# Patient Record
Sex: MALE | Race: BLACK OR AFRICAN AMERICAN | NOT HISPANIC OR LATINO | Employment: UNEMPLOYED | URBAN - METROPOLITAN AREA
[De-identification: names, ages, dates, MRNs, and addresses within clinical notes are randomized per-mention and may not be internally consistent; named-entity substitution may affect disease eponyms.]

---

## 2017-01-16 ENCOUNTER — HOSPITAL ENCOUNTER (INPATIENT)
Facility: HOSPITAL | Age: 37
LOS: 7 days | Discharge: HOME | End: 2017-01-23
Attending: EMERGENCY MEDICINE | Admitting: INTERNAL MEDICINE

## 2017-01-16 DIAGNOSIS — T83.510A URINARY TRACT INFECTION ASSOCIATED WITH CYSTOSTOMY CATHETER, INITIAL ENCOUNTER (HCC): ICD-10-CM

## 2017-01-16 DIAGNOSIS — L89.154 SACRAL DECUBITUS ULCER, STAGE IV (HCC): ICD-10-CM

## 2017-01-16 DIAGNOSIS — L89.154 DECUBITUS ULCER OF SACRAL REGION, STAGE 4 (HCC): ICD-10-CM

## 2017-01-16 DIAGNOSIS — N39.0 URINARY TRACT INFECTION ASSOCIATED WITH CYSTOSTOMY CATHETER, INITIAL ENCOUNTER (HCC): ICD-10-CM

## 2017-01-16 DIAGNOSIS — D64.9 SEVERE ANEMIA: Primary | ICD-10-CM

## 2017-01-16 PROBLEM — T83.010A SUPRAPUBIC CATHETER DYSFUNCTION (HCC): Status: ACTIVE | Noted: 2017-01-16

## 2017-01-16 LAB
ABO GROUP BLD: NORMAL
ALBUMIN SERPL-MCNC: 2.7 G/DL (ref 3.5–5.2)
ALBUMIN/GLOB SERPL: 0.6 G/DL
ALP SERPL-CCNC: 89 U/L (ref 39–117)
ALT SERPL W P-5'-P-CCNC: <5 U/L (ref 1–41)
ANION GAP SERPL CALCULATED.3IONS-SCNC: 10.8 MMOL/L
AST SERPL-CCNC: 6 U/L (ref 1–40)
BASOPHILS # BLD AUTO: 0.05 10*3/MM3 (ref 0–0.2)
BASOPHILS NFR BLD AUTO: 0.6 % (ref 0–1.5)
BILIRUB SERPL-MCNC: <0.2 MG/DL (ref 0.1–1.2)
BLD GP AB SCN SERPL QL: NEGATIVE
BUN BLD-MCNC: 14 MG/DL (ref 6–20)
BUN/CREAT SERPL: 16.1 (ref 7–25)
CALCIUM SPEC-SCNC: 8.5 MG/DL (ref 8.6–10.5)
CHLORIDE SERPL-SCNC: 102 MMOL/L (ref 98–107)
CO2 SERPL-SCNC: 26.2 MMOL/L (ref 22–29)
CREAT BLD-MCNC: 0.87 MG/DL (ref 0.76–1.27)
D-LACTATE SERPL-SCNC: 0.8 MMOL/L (ref 0.5–2)
DEPRECATED RDW RBC AUTO: 55.9 FL (ref 37–54)
EOSINOPHIL # BLD AUTO: 0.17 10*3/MM3 (ref 0–0.7)
EOSINOPHIL NFR BLD AUTO: 1.9 % (ref 0.3–6.2)
ERYTHROCYTE [DISTWIDTH] IN BLOOD BY AUTOMATED COUNT: 18.5 % (ref 11.5–14.5)
GFR SERPL CREATININE-BSD FRML MDRD: 120 ML/MIN/1.73
GLOBULIN UR ELPH-MCNC: 4.7 GM/DL
GLUCOSE BLD-MCNC: 121 MG/DL (ref 65–99)
HCT VFR BLD AUTO: 22.8 % (ref 40.4–52.2)
HGB BLD-MCNC: 6.3 G/DL (ref 13.7–17.6)
HOLD SPECIMEN: NORMAL
IMM GRANULOCYTES # BLD: 0.03 10*3/MM3 (ref 0–0.03)
IMM GRANULOCYTES NFR BLD: 0.3 % (ref 0–0.5)
LYMPHOCYTES # BLD AUTO: 2.06 10*3/MM3 (ref 0.9–4.8)
LYMPHOCYTES NFR BLD AUTO: 23.3 % (ref 19.6–45.3)
MCH RBC QN AUTO: 22.5 PG (ref 27–32.7)
MCHC RBC AUTO-ENTMCNC: 27.6 G/DL (ref 32.6–36.4)
MCV RBC AUTO: 81.4 FL (ref 79.8–96.2)
MONOCYTES # BLD AUTO: 0.55 10*3/MM3 (ref 0.2–1.2)
MONOCYTES NFR BLD AUTO: 6.2 % (ref 5–12)
NEUTROPHILS # BLD AUTO: 5.98 10*3/MM3 (ref 1.9–8.1)
NEUTROPHILS NFR BLD AUTO: 67.7 % (ref 42.7–76)
NRBC BLD MANUAL-RTO: 0 /100 WBC (ref 0–0)
PLATELET # BLD AUTO: 280 10*3/MM3 (ref 140–500)
PMV BLD AUTO: 8.6 FL (ref 6–12)
POTASSIUM BLD-SCNC: 3.6 MMOL/L (ref 3.5–5.2)
PROCALCITONIN SERPL-MCNC: 0.08 NG/ML (ref 0.1–0.25)
PROT SERPL-MCNC: 7.4 G/DL (ref 6–8.5)
RBC # BLD AUTO: 2.8 10*6/MM3 (ref 4.6–6)
RH BLD: POSITIVE
SODIUM BLD-SCNC: 139 MMOL/L (ref 136–145)
WBC NRBC COR # BLD: 8.84 10*3/MM3 (ref 4.5–10.7)
WHOLE BLOOD HOLD SPECIMEN: NORMAL

## 2017-01-16 PROCEDURE — 86850 RBC ANTIBODY SCREEN: CPT

## 2017-01-16 PROCEDURE — 84145 PROCALCITONIN (PCT): CPT | Performed by: EMERGENCY MEDICINE

## 2017-01-16 PROCEDURE — 25010000002 ENOXAPARIN PER 10 MG: Performed by: INTERNAL MEDICINE

## 2017-01-16 PROCEDURE — 25010000002 HYDROMORPHONE PER 4 MG: Performed by: EMERGENCY MEDICINE

## 2017-01-16 PROCEDURE — 80053 COMPREHEN METABOLIC PANEL: CPT | Performed by: EMERGENCY MEDICINE

## 2017-01-16 PROCEDURE — 86923 COMPATIBILITY TEST ELECTRIC: CPT

## 2017-01-16 PROCEDURE — 83605 ASSAY OF LACTIC ACID: CPT | Performed by: EMERGENCY MEDICINE

## 2017-01-16 PROCEDURE — 86900 BLOOD TYPING SEROLOGIC ABO: CPT

## 2017-01-16 PROCEDURE — 99284 EMERGENCY DEPT VISIT MOD MDM: CPT

## 2017-01-16 PROCEDURE — 25010000002 HYDROMORPHONE PER 4 MG: Performed by: INTERNAL MEDICINE

## 2017-01-16 PROCEDURE — 85025 COMPLETE CBC W/AUTO DIFF WBC: CPT | Performed by: EMERGENCY MEDICINE

## 2017-01-16 PROCEDURE — 36430 TRANSFUSION BLD/BLD COMPNT: CPT

## 2017-01-16 PROCEDURE — 25010000002 ONDANSETRON PER 1 MG: Performed by: EMERGENCY MEDICINE

## 2017-01-16 PROCEDURE — 86901 BLOOD TYPING SEROLOGIC RH(D): CPT

## 2017-01-16 PROCEDURE — P9016 RBC LEUKOCYTES REDUCED: HCPCS

## 2017-01-16 RX ORDER — ALPRAZOLAM 0.5 MG/1
1 TABLET ORAL 2 TIMES DAILY PRN
Status: DISCONTINUED | OUTPATIENT
Start: 2017-01-16 | End: 2017-01-23 | Stop reason: HOSPADM

## 2017-01-16 RX ORDER — SODIUM CHLORIDE 0.9 % (FLUSH) 0.9 %
10 SYRINGE (ML) INJECTION AS NEEDED
Status: DISCONTINUED | OUTPATIENT
Start: 2017-01-16 | End: 2017-01-23 | Stop reason: HOSPADM

## 2017-01-16 RX ORDER — OXYBUTYNIN CHLORIDE 5 MG/1
5 TABLET ORAL 3 TIMES DAILY
Status: DISCONTINUED | OUTPATIENT
Start: 2017-01-16 | End: 2017-01-23 | Stop reason: HOSPADM

## 2017-01-16 RX ORDER — PANTOPRAZOLE SODIUM 40 MG/1
40 TABLET, DELAYED RELEASE ORAL DAILY
Status: DISCONTINUED | OUTPATIENT
Start: 2017-01-16 | End: 2017-01-23 | Stop reason: HOSPADM

## 2017-01-16 RX ORDER — SODIUM HYPOCHLORITE 1.25 MG/ML
SOLUTION TOPICAL 2 TIMES DAILY
Status: DISCONTINUED | OUTPATIENT
Start: 2017-01-16 | End: 2017-01-23 | Stop reason: HOSPADM

## 2017-01-16 RX ORDER — ONDANSETRON 4 MG/1
4 TABLET, FILM COATED ORAL EVERY 6 HOURS PRN
Status: DISCONTINUED | OUTPATIENT
Start: 2017-01-16 | End: 2017-01-23 | Stop reason: HOSPADM

## 2017-01-16 RX ORDER — ONDANSETRON 4 MG/1
4 TABLET, ORALLY DISINTEGRATING ORAL EVERY 6 HOURS PRN
Status: DISCONTINUED | OUTPATIENT
Start: 2017-01-16 | End: 2017-01-23 | Stop reason: HOSPADM

## 2017-01-16 RX ORDER — SODIUM CHLORIDE 0.9 % (FLUSH) 0.9 %
1-10 SYRINGE (ML) INJECTION AS NEEDED
Status: DISCONTINUED | OUTPATIENT
Start: 2017-01-16 | End: 2017-01-23 | Stop reason: HOSPADM

## 2017-01-16 RX ORDER — OXYCODONE HYDROCHLORIDE 15 MG/1
15 TABLET, FILM COATED, EXTENDED RELEASE ORAL EVERY 12 HOURS SCHEDULED
Status: DISCONTINUED | OUTPATIENT
Start: 2017-01-16 | End: 2017-01-18

## 2017-01-16 RX ORDER — OXYCODONE HYDROCHLORIDE 15 MG/1
15 TABLET, FILM COATED, EXTENDED RELEASE ORAL EVERY 12 HOURS SCHEDULED
Status: DISCONTINUED | OUTPATIENT
Start: 2017-01-16 | End: 2017-01-16 | Stop reason: SDUPTHER

## 2017-01-16 RX ORDER — PETROLATUM 42 G/100G
OINTMENT TOPICAL EVERY 12 HOURS SCHEDULED
Status: DISCONTINUED | OUTPATIENT
Start: 2017-01-16 | End: 2017-01-23 | Stop reason: HOSPADM

## 2017-01-16 RX ORDER — ONDANSETRON 2 MG/ML
4 INJECTION INTRAMUSCULAR; INTRAVENOUS ONCE
Status: COMPLETED | OUTPATIENT
Start: 2017-01-16 | End: 2017-01-16

## 2017-01-16 RX ORDER — OXYCODONE HYDROCHLORIDE 15 MG/1
15 TABLET, FILM COATED, EXTENDED RELEASE ORAL EVERY 12 HOURS SCHEDULED
COMMUNITY
End: 2017-01-23 | Stop reason: HOSPADM

## 2017-01-16 RX ORDER — ALPRAZOLAM 1 MG/1
1 TABLET ORAL 2 TIMES DAILY PRN
COMMUNITY
End: 2017-01-23 | Stop reason: HOSPADM

## 2017-01-16 RX ORDER — HYDROMORPHONE HYDROCHLORIDE 1 MG/ML
0.5 INJECTION, SOLUTION INTRAMUSCULAR; INTRAVENOUS; SUBCUTANEOUS
Status: DISCONTINUED | OUTPATIENT
Start: 2017-01-16 | End: 2017-01-18

## 2017-01-16 RX ORDER — ONDANSETRON 2 MG/ML
4 INJECTION INTRAMUSCULAR; INTRAVENOUS EVERY 6 HOURS PRN
Status: DISCONTINUED | OUTPATIENT
Start: 2017-01-16 | End: 2017-01-23 | Stop reason: HOSPADM

## 2017-01-16 RX ORDER — SODIUM CHLORIDE 0.9 % (FLUSH) 0.9 %
10 SYRINGE (ML) INJECTION EVERY 12 HOURS SCHEDULED
Status: DISCONTINUED | OUTPATIENT
Start: 2017-01-16 | End: 2017-01-23 | Stop reason: HOSPADM

## 2017-01-16 RX ORDER — GABAPENTIN 300 MG/1
300 CAPSULE ORAL 3 TIMES DAILY
Status: DISCONTINUED | OUTPATIENT
Start: 2017-01-16 | End: 2017-01-23 | Stop reason: HOSPADM

## 2017-01-16 RX ADMIN — GABAPENTIN 300 MG: 300 CAPSULE ORAL at 11:45

## 2017-01-16 RX ADMIN — ENOXAPARIN SODIUM 40 MG: 40 INJECTION SUBCUTANEOUS at 11:45

## 2017-01-16 RX ADMIN — GABAPENTIN 300 MG: 300 CAPSULE ORAL at 21:35

## 2017-01-16 RX ADMIN — HYDROMORPHONE HYDROCHLORIDE 0.5 MG: 1 INJECTION, SOLUTION INTRAMUSCULAR; INTRAVENOUS; SUBCUTANEOUS at 11:45

## 2017-01-16 RX ADMIN — OXYBUTYNIN CHLORIDE 5 MG: 5 TABLET ORAL at 21:35

## 2017-01-16 RX ADMIN — OXYBUTYNIN CHLORIDE 5 MG: 5 TABLET ORAL at 11:45

## 2017-01-16 RX ADMIN — HYDROMORPHONE HYDROCHLORIDE 0.5 MG: 1 INJECTION, SOLUTION INTRAMUSCULAR; INTRAVENOUS; SUBCUTANEOUS at 21:35

## 2017-01-16 RX ADMIN — OXYBUTYNIN CHLORIDE 5 MG: 5 TABLET ORAL at 17:35

## 2017-01-16 RX ADMIN — ONDANSETRON 4 MG: 2 INJECTION INTRAMUSCULAR; INTRAVENOUS at 01:50

## 2017-01-16 RX ADMIN — HYDROMORPHONE HYDROCHLORIDE 1 MG: 1 INJECTION, SOLUTION INTRAMUSCULAR; INTRAVENOUS; SUBCUTANEOUS at 01:49

## 2017-01-16 RX ADMIN — HYDROMORPHONE HYDROCHLORIDE 0.5 MG: 1 INJECTION, SOLUTION INTRAMUSCULAR; INTRAVENOUS; SUBCUTANEOUS at 13:55

## 2017-01-16 RX ADMIN — GABAPENTIN 300 MG: 300 CAPSULE ORAL at 17:35

## 2017-01-16 RX ADMIN — OXYCODONE HYDROCHLORIDE 15 MG: 15 TABLET, FILM COATED, EXTENDED RELEASE ORAL at 05:50

## 2017-01-16 RX ADMIN — PANTOPRAZOLE SODIUM 40 MG: 40 TABLET, DELAYED RELEASE ORAL at 11:54

## 2017-01-16 RX ADMIN — DAKIN'S SOLUTION 0.125% (QUARTER STRENGTH): 0.12 SOLUTION at 14:32

## 2017-01-16 RX ADMIN — OXYCODONE HYDROCHLORIDE 15 MG: 15 TABLET, FILM COATED, EXTENDED RELEASE ORAL at 21:35

## 2017-01-16 RX ADMIN — HYDROMORPHONE HYDROCHLORIDE 0.5 MG: 1 INJECTION, SOLUTION INTRAMUSCULAR; INTRAVENOUS; SUBCUTANEOUS at 17:35

## 2017-01-16 NOTE — NURSING NOTE
"CWOCN consult- patient has multiple chronic wounds that we have followed in the past. His Cl[facility]ron bed has arrived.  Left ischial wound extends across buttocks to right ischium, also sacrum/coccyx area. The only area to pack is the left trochanter. We cleansed all with NS. Packed 1/4 kerlix roll moistened with dakins into trochanter. Placed opticel ag and abd pads over the rest and secured with paper tape. The wounds are all beefy red with epithelialization around the edges. No odor noted. There was some yellow/green drainage on the gauze that was removed. No slough noted. There is bone involvement in wound bed. No need for Santyl at this time. Dakins and Opticel Ag can assist with maintaining these wounds. There continues to be open tissue at base of penis/inner thighs towards buttocks- this tissue is moist, pink as well and we placed opticel ag here.   Patient has a condom catheter as well as suprapubic. The condom catheter seems to be working better than last admission. The suprapubic continues to leak. We replaced this foam dressing and abd pad. Urology has been consulted.  Patient has multiple chronic and recurring lower extremity wounds. He has very dry, flaky skin. There is scar tissue on heels and ankles. He currently has an open red area on his left heel and right lateral malleolus. Also, left lateral leg and right leg. With the dry, peeling skin and prior pressure ulcers, patient is always at risk that an area could reopen. Additionally, there is an open, moist, pink, with some yellow slough wound on left lower leg above ankle. To all open areas on legs, we cleansed with NS and placed NS moistened opticel ag. BLE were wrapped with kerlix.   Patient favors laying to his right side. Due to his contractures he does not lay to the left very well. Patients extremities are very thin and bony. He reports that he has gained a few pounds and that \"my stomach and my face are mir\" in appearance due to the weight " gain.   I would recommend dressing changes as above [id number]. No Santyl indicated at this time. Recommend to change the dressing around the suprapubic catheter daily and PRN. Recommend aquaphor lotion to BLE [id number]. WOCN will plan on seeing weekly.  Patient has a colostomy- supplies are in room for staff to change.

## 2017-01-16 NOTE — IP AVS SNAPSHOT
AFTER VISIT SUMMARY             Joaquín Sherman           About your hospitalization     You were admitted on:  January 16, 2017 You last received care in the:  [address]       Procedures & Surgeries         Medications    If you or your caregiver advised us that you are currently taking a medication and that medication is marked below as “Resume”, this simply indicates that we have reviewed those medications to make sure our new therapy recommendations do not interfere.  If you have concerns about medications other than those new ones which we are prescribing today, please consult the physician who prescribed them (or your primary physician).  Our review of your home medications is not meant to indicate that we are directing their use.             Your Medications      START taking these medications     hydrophor ointment ointment   Apply  topically Every 12 (Twelve) Hours for 30 days.   Last time this was given:  1/23/2017  8:24 AM           oxyCODONE-acetaminophen  MG per tablet   Take 1 tablet by mouth Every 4 (Four) Hours As Needed for severe pain (7-10) for up to 5 days.   Last time this was given:  1/23/2017  4:43 PM   Commonly known as:  PERCOCET             CONTINUE taking these medications     ALPRAZolam 1 MG tablet   Take 1 tablet by mouth 2 (Two) Times a Day As Needed for anxiety.   Last time this was given:  1/23/2017 11:14 AM   Commonly known as:  XANAX           bisacodyl 5 MG EC tablet   Take 1 tablet by mouth daily as needed for constipation.   Commonly known as:  DULCOLAX           gabapentin 300 MG capsule   Take 300 mg by mouth 3 (three) times a day.   Last time this was given:  1/23/2017  4:43 PM   Commonly known as:  NEURONTIN           OXYBUTYNIN CHLORIDE PO   Take 5 mg by mouth 3 (three) times a day.   Last time this was given:  1/23/2017  4:43 PM           oxyCODONE ER 15 MG tablet extended-release 12 hour   Take 1 tablet by mouth Every 12 (Twelve) Hours for  11 doses.   Last time this was given:  1/23/2017  8:23 AM   Commonly known as:  oxyCONTIN           PROTONIX PO   Take 40 mg by mouth daily.   Last time this was given:  1/23/2017  8:23 AM           sodium hypochlorite 0.125 % solution topical solution 0.125%   Lt hip & Rt. Calf: bid with Kerlex and ABD pads   Last time this was given:  1/23/2017  8:24 AM   Commonly known as:  DAKIN'S 1/4 STRENGTH             STOP taking these medications     collagenase 250 UNIT/GM ointment                Where to Get Your Medications      These medications were sent to [facility] [address] [facility] [address] [place] [facility] [place] [address] - [phone]  - [phone]   [address] , [facility] [address]     Phone:  [phone]     hydrophor ointment ointment         You can get these medications from any pharmacy     Bring a paper prescription for each of these medications     ALPRAZolam 1 MG tablet    oxyCODONE ER 15 MG tablet extended-release 12 hour    oxyCODONE-acetaminophen  MG per tablet                  Your Medications      Your Medication List           Morning Noon Evening Bedtime As Needed    ALPRAZolam 1 MG tablet   Take 1 tablet by mouth 2 (Two) Times a Day As Needed for anxiety.   Commonly known as:  XANAX                                   bisacodyl 5 MG EC tablet   Take 1 tablet by mouth daily as needed for constipation.   Commonly known as:  DULCOLAX                                   gabapentin 300 MG capsule   Take 300 mg by mouth 3 (three) times a day.   Commonly known as:  NEURONTIN                                         hydrophor ointment ointment   Apply  topically Every 12 (Twelve) Hours for 30 days.                                      OXYBUTYNIN CHLORIDE PO   Take 5 mg by mouth 3 (three) times a day.                                         oxyCODONE ER 15 MG tablet extended-release 12 hour   Take 1 tablet by mouth Every 12 (Twelve) Hours for 11 doses.    Commonly known as:  oxyCONTIN                                      oxyCODONE-acetaminophen  MG per tablet   Take 1 tablet by mouth Every 4 (Four) Hours As Needed for severe pain (7-10) for up to 5 days.   Commonly known as:  PERCOCET                                   PROTONIX PO   Take 40 mg by mouth daily.                                   sodium hypochlorite 0.125 % solution topical solution 0.125%   Lt hip & Rt. Calf: bid with Kerlex and ABD pads   Commonly known as:  DAKIN'S 1/4 STRENGTH                                               Instructions for After Discharge        Discharge References/Attachments     ACETAMINOPHEN; OXYCODONE TABLETS (ENGLISH)       Follow-ups for After Discharge        Follow-up Information     Follow up with [facility] .    Specialty:  Nurse Practitioner    Contact information:    [address] [address]  [phone]        Referrals and Follow-ups to Schedule     Ambulatory Referral to Wound Clinic    As directed        Follow-Up    As directed    Dr. Blanca () in 2 weeks  Rama Parker (PCP) in 1 week   Follow Up Details:  see below             [employer] Signup     Our records indicate that your [employer] account has been deactivated. If you would like to reactivate your account, please email [email or web address] or call [phone] to talk to our [employer] staff.         Summary of Your Hospitalization        Reason for Hospitalization     Your primary diagnosis was:  Severe Anemia    Your diagnoses also included:  Decubitus Ulcer Of Sacral Region, Stage 4, Paralysis Of Both Lower Limbs, Suprapubic Catheter Dysfunction, Low Blood Pressure      Care Providers     Provider Service Role Specialty    Honorio Meier MD Urology Consulting Physician  Urology      Your Allergies  Date Reviewed: 1/18/2017    Allergen Reactions    Amoxicillin-Pot Clavulanate Not Noted         Ibuprofen Not Noted         Ketorolac  Tromethamine Not Noted         Meropenem Other (See Comments)    Pt reports kidney damage         Vancomycin Other (See Comments)    Pt reports kidney damage      Patient Belongings Returned     Document Return of Belongings Flowsheet     Were the patient bedside belongings sent home?   Yes   Belongings Retrieved from Security & Sent Home   N/A    Belongings Sent to Safe   --   Medications Retrieved from Pharmacy & Sent Home   N/A              More Information      Acetaminophen; Oxycodone tablets  What is this medicine?  ACETAMINOPHEN; OXYCODONE (a set a ERIC aaron fen; ox i KOE done) is a pain reliever. It is used to treat moderate to severe pain.  This medicine may be used for other purposes; ask your health care provider or pharmacist if you have questions.  What should I tell my health care provider before I take this medicine?  They need to know if you have any of these conditions:  -brain tumor  -Crohn's disease, inflammatory bowel disease, or ulcerative colitis  -drug abuse or addiction  -head injury  -heart or circulation problems  -if you often drink alcohol  -kidney disease or problems going to the bathroom  -liver disease  -lung disease, asthma, or breathing problems  -an unusual or allergic reaction to acetaminophen, oxycodone, other opioid analgesics, other medicines, foods, dyes, or preservatives  -pregnant or trying to get pregnant  -breast-feeding  How should I use this medicine?  Take this medicine by mouth with a full glass of water. Follow the directions on the prescription label. You can take it with or without food. If it upsets your stomach, take it with food. Take your medicine at regular intervals. Do not take it more often than directed.  Talk to your pediatrician regarding the use of this medicine in children. Special care may be needed.  Patients over 65 years old may have a stronger reaction and need a smaller dose.  Overdosage: If you think you have taken too much of this medicine contact  a poison control center or emergency room at once.  NOTE: This medicine is only for you. Do not share this medicine with others.  What if I miss a dose?  If you miss a dose, take it as soon as you can. If it is almost time for your next dose, take only that dose. Do not take double or extra doses.  What may interact with this medicine?  -alcohol  -antihistamines  -barbiturates like amobarbital, butalbital, butabarbital, methohexital, pentobarbital, phenobarbital, thiopental, and secobarbital  -benztropine  -drugs for bladder problems like solifenacin, trospium, oxybutynin, tolterodine, hyoscyamine, and methscopolamine  -drugs for breathing problems like ipratropium and tiotropium  -drugs for certain stomach or intestine problems like propantheline, homatropine methylbromide, glycopyrrolate, atropine, belladonna, and dicyclomine  -general anesthetics like etomidate, ketamine, nitrous oxide, propofol, desflurane, enflurane, halothane, isoflurane, and sevoflurane  -medicines for depression, anxiety, or psychotic disturbances  -medicines for sleep  -muscle relaxants  -naltrexone  -narcotic medicines (opiates) for pain  -phenothiazines like perphenazine, thioridazine, chlorpromazine, mesoridazine, fluphenazine, prochlorperazine, promazine, and trifluoperazine  -scopolamine  -tramadol  -trihexyphenidyl  This list may not describe all possible interactions. Give your health care provider a list of all the medicines, herbs, non-prescription drugs, or dietary supplements you use. Also tell them if you smoke, drink alcohol, or use illegal drugs. Some items may interact with your medicine.  What should I watch for while using this medicine?  Tell your doctor or health care professional if your pain does not go away, if it gets worse, or if you have new or a different type of pain. You may develop tolerance to the medicine. Tolerance means that you will need a higher dose of the medication for pain relief. Tolerance is normal  and is expected if you take this medicine for a long time.  Do not suddenly stop taking your medicine because you may develop a severe reaction. Your body becomes used to the medicine. This does NOT mean you are addicted. Addiction is a behavior related to getting and using a drug for a non-medical reason. If you have pain, you have a medical reason to take pain medicine. Your doctor will tell you how much medicine to take. If your doctor wants you to stop the medicine, the dose will be slowly lowered over time to avoid any side effects.  You may get drowsy or dizzy. Do not drive, use machinery, or do anything that needs mental alertness until you know how this medicine affects you. Do not stand or sit up quickly, especially if you are an older patient. This reduces the risk of dizzy or fainting spells. Alcohol may interfere with the effect of this medicine. Avoid alcoholic drinks.  There are different types of narcotic medicines (opiates) for pain. If you take more than one type at the same time, you may have more side effects. Give your health care provider a list of all medicines you use. Your doctor will tell you how much medicine to take. Do not take more medicine than directed. Call emergency for help if you have problems breathing.  The medicine will cause constipation. Try to have a bowel movement at least every 2 to 3 days. If you do not have a bowel movement for 3 days, call your doctor or health care professional.  Do not take Tylenol (acetaminophen) or medicines that have acetaminophen with this medicine. Too much acetaminophen can be very dangerous. Many nonprescription medicines contain acetaminophen. Always read the labels carefully to avoid taking more acetaminophen.  What side effects may I notice from receiving this medicine?  Side effects that you should report to your doctor or health care professional as soon as possible:  -allergic reactions like skin rash, itching or hives, swelling of the  face, lips, or tongue  -breathing difficulties, wheezing  -confusion  -light headedness or fainting spells  -severe stomach pain  -unusually weak or tired  -yellowing of the skin or the whites of the eyes  Side effects that usually do not require medical attention (report to your doctor or health care professional if they continue or are bothersome):  -dizziness  -drowsiness  -nausea  -vomiting  This list may not describe all possible side effects. Call your doctor for medical advice about side effects. You may report side effects to FDA at 9-853-FDA-1953.  Where should I keep my medicine?  Keep out of the reach of children. This medicine can be abused. Keep your medicine in a safe place to protect it from theft. Do not share this medicine with anyone. Selling or giving away this medicine is dangerous and against the law.  This medicine may cause accidental overdose and death if it taken by other adults, children, or pets. Mix any unused medicine with a substance like cat litter or coffee grounds. Then throw the medicine away in a sealed container like a sealed bag or a coffee can with a lid. Do not use the medicine after the expiration date.  Store at room temperature between 20 and 25 degrees C (68 and 77 degrees F).  NOTE: This sheet is a summary. It may not cover all possible information. If you have questions about this medicine, talk to your doctor, pharmacist, or health care provider.     © 2016, [employer]. (2015-11-18 15:18:46)            SYMPTOMS OF A STROKE    Call 911 or have someone take you to the Emergency Department if you have any of the following:    · Sudden numbness or weakness of your face, arm or leg especially on one side of the body  · Sudden confusion, diffiiculty speaking or trouble understanding   · Changes in your vision or loss of sight in one eye  · Sudden severe headache with no known cause  · sudden dizziness, trouble walking, loss of balance or coordination    It is  important to seek emergency care right away if you have further stroke symptoms. If you get emergency help quickly, the powerful clot-dissolving medicines can reduce the disabilities caused by a stroke.     For more information:    American Stroke Association  0-398-9-STROKE  www.strokeassociation.org           IF YOU SMOKE OR USE TOBACCO PLEASE READ THE FOLLOWING:    Why is smoking bad for me?  Smoking increases the risk of heart disease, lung disease, vascular disease, stroke, and cancer.     If you smoke, STOP!    If you would like more information on quitting smoking, please visit the [employer] website: [email or web address]/healthier-[email or web address]   This link will provide additional resources including the QUIT line and the Beat the Pack support groups.     For more information:    American Cancer Society  (300) 789-8648    American Heart Association  1-699.955.8223               YOU ARE THE MOST IMPORTANT FACTOR IN YOUR RECOVERY.     Follow all instructions carefully.     I have reviewed my discharge instructions with my nurse, including the following information, if applicable:     Information about my illness and diagnosis   Follow up appointments (including lab draws)   Wound Care   Equipment Needs   Medications (new and continuing) along with side effects   Preventative information such as vaccines and smoking cessations   Diet   Pain   I know when to contact my Doctor's office or seek emergency care      I want my nurse to describe the side effects of my medications: YES NO   If the answer is no, I understand the side effects of my medications: YES NO   My nurse described the side effects of my medications in a way that I could understand: YES NO   I have taken my personal belongings and my own medications with me at discharge: YES NO            I have received this information and my questions have been answered. I have discussed any concerns I see with this plan with the nurse or  physician. I understand these instructions.    Signature of Patient or Responsible Person: _____________________________________    Date: _________________  Time: __________________    Signature of Healthcare Provider: _______________________________________  Date: _________________  Time: __________________

## 2017-01-16 NOTE — ED PROVIDER NOTES
EMERGENCY DEPARTMENT ENCOUNTER    CHIEF COMPLAINT  Chief Complaint: Catheter problem  History given by: patient  History limited by: n/a  Room Number: 16/16  PMD: ARIES Up      HPI:  Pt is a 36 y.o. male who presents complaining of a catheter problem. Pt states that his suprapubic catheter is clogged, and he reports malodorous urine.      Duration:  1 day  Onset: sudden  Timing: constant  Location: urinary  Radiation: n/a  Quality:  catheter malfunction  Intensity/Severity: moderate  Progression: unchanged   Associated Symptoms: malodorous urine  Aggravating Factors: hx of kidney damage  Alleviating Factors: none  Previous Episodes: unknown  Treatment before arrival: none    PAST MEDICAL HISTORY  Active Ambulatory Problems     Diagnosis Date Noted   • Acute cystitis without hematuria 07/26/2016   • Anemia 07/26/2016   • Paraplegia 07/26/2016   • Hypotension 07/26/2016   • Pneumonia of both lower lobes due to methicillin resistant Staphylococcus aureus (MRSA) 09/26/2016   • Decubitus ulcer of sacral region, stage 4 09/27/2016   • Hypotension 09/27/2016   • Acute kidney failure 09/27/2016   • Sepsis 09/27/2016   • Severe protein-calorie malnutrition 10/03/2016     Resolved Ambulatory Problems     Diagnosis Date Noted   • No Resolved Ambulatory Problems     Past Medical History   Diagnosis Date   • Chronic pain    • GERD (gastroesophageal reflux disease)        PAST SURGICAL HISTORY  Past Surgical History   Procedure Laterality Date   • Suprapubic catheter insertion     • Pr change of bladder tube,complicated N/A 7/28/2016     Procedure: CYSTOSCOPY WITH SUPRAPUBIC CATHETER INSERTION;  Surgeon: Brant Blanca Jr., MD;  Location: [facility];  Service: Urology       FAMILY HISTORY  History reviewed. No pertinent family history.    SOCIAL HISTORY  Social History     Social History   • Marital status: Single     Spouse name: N/A   • Number of children: N/A   • Years of education: N/A     Occupational  History   • Not on file.     Social History Main Topics   • Smoking status: Current Every Day Smoker     Packs/day: 0.50   • Smokeless tobacco: Not on file   • Alcohol use No   • Drug use: No   • Sexual activity: Defer     Other Topics Concern   • Not on file     Social History Narrative       ALLERGIES  Amoxicillin-pot clavulanate; Ibuprofen; Ketorolac tromethamine; Meropenem; and Vancomycin    REVIEW OF SYSTEMS  Review of Systems   Constitutional: Negative for chills and fever.   HENT: Negative for sore throat.    Gastrointestinal: Negative for nausea and vomiting.   Genitourinary: Positive for difficulty urinating (catheter issue). Negative for dysuria.   Musculoskeletal: Negative for back pain.   Skin: Negative for rash.   Psychiatric/Behavioral: The patient is not nervous/anxious.        PHYSICAL EXAM  ED Triage Vitals   Temp Heart Rate Resp BP SpO2   01/16/17 0030 01/16/17 0030 01/16/17 0030 01/16/17 0030 01/16/17 0030   97 °F (36.1 °C) 92 16 92/56 95 %      Temp src Heart Rate Source Patient Position BP Location FiO2 (%)   -- -- -- -- --              Physical Exam   Constitutional: He is oriented to person, place, and time and well-developed, well-nourished, and in no distress.   Eyes: EOM are normal.   Neck: Normal range of motion.   Cardiovascular: Normal rate and regular rhythm.    Pulmonary/Chest: Effort normal and breath sounds normal. No respiratory distress.   Abdominal:   Suprapubic catheter in place   Neurological: He is alert and oriented to person, place, and time.   Pt is a paraplegic   Skin: Skin is warm and dry.   Psychiatric: Affect normal.   Nursing note and vitals reviewed.      LAB RESULTS  Lab Results (last 24 hours)     Procedure Component Value Units Date/Time    CBC & Differential [[id number]] Collected:  01/16/17 0124    Specimen:  Blood Updated:  01/16/17 0142    Narrative:       The following orders were created for panel order CBC & Differential.  Procedure                                Abnormality         Status                     ---------                               -----------         ------                     Scan Slide[[id number]]                                                                   CBC Auto Differential[[id number]]         Abnormal            Final result                 Please view results for these tests on the individual orders.    Comprehensive Metabolic Panel [[id number]] Collected:  01/16/17 0124    Specimen:  Blood Updated:  01/16/17 0134    Procalcitonin [[id number]] Collected:  01/16/17 0124    Specimen:  Blood Updated:  01/16/17 0134    Lactic Acid, Plasma [[id number]]  (Normal) Collected:  01/16/17 0124    Specimen:  Blood Updated:  01/16/17 0148     Lactate 0.8 mmol/L     CBC Auto Differential [[id number]]  (Abnormal) Collected:  01/16/17 0124    Specimen:  Blood Updated:  01/16/17 0142     WBC 8.84 10*3/mm3      RBC 2.80 (L) 10*6/mm3      Hemoglobin 6.3 (C) g/dL      Hematocrit 22.8 (L) %      MCV 81.4 fL      MCH 22.5 (L) pg      MCHC 27.6 (L) g/dL      RDW 18.5 (H) %      RDW-SD 55.9 (H) fl      MPV 8.6 fL      Platelets 280 10*3/mm3      Neutrophil % 67.7 %      Lymphocyte % 23.3 %      Monocyte % 6.2 %      Eosinophil % 1.9 %      Basophil % 0.6 %      Immature Grans % 0.3 %      Neutrophils, Absolute 5.98 10*3/mm3      Lymphocytes, Absolute 2.06 10*3/mm3      Monocytes, Absolute 0.55 10*3/mm3      Eosinophils, Absolute 0.17 10*3/mm3      Basophils, Absolute 0.05 10*3/mm3      Immature Grans, Absolute 0.03 10*3/mm3      nRBC 0.0 /100 WBC           I ordered the above labs and reviewed the results    PROCEDURES  Procedures      PROGRESS AND CONSULTS  ED Course     00:49  Labs ordered for further evaluation.     01:39  RN states that the pt is complaining of abd pain, and he now states that his catheter is working appropriately. Dilaudid ordered.     01:42  HGB is 6.3. Type and screen and 2 units pRBC's ordered. Call placed to [facility] for admission.    01;53  Discussed case  with Dr Soto  Reviewed history, exam, results and treatments.  Discussed concerns and plan of care. Dr Soto accepts pt to be admitted to telemetry.    MEDICAL DECISION MAKING  Results were reviewed/discussed with the patient and they were also made aware of online access. Pt also made aware that some labs, such as cultures, will not be resulted during ER visit and follow up with PMD is necessary.     MDM  Number of Diagnoses or Management Options     Amount and/or Complexity of Data Reviewed  Clinical lab tests: ordered and reviewed  Decide to obtain previous medical records or to obtain history from someone other than the patient: yes  Discuss the patient with other providers: yes (Dr Soto)    Patient Progress  Patient progress: stable         DIAGNOSIS  Final diagnoses:   None       DISPOSITION  ADMISSION    Discussed treatment plan and reason for admission with pt/family and admitting physician.  Pt/family voiced understanding of the plan for admission for further testing/treatment as needed.     Latest Documented Vital Signs:  As of 1:55 AM  BP- 92/56 HR- 92 Temp- 97 °F (36.1 °C) O2 sat- 95%    --  Documentation assistance provided by jeniffer Guerrero for Dr Delvalle.  Information recorded by the jeniffer was done at my direction and has been verified and validated by me.        Cecille Guerrero  01/16/17 0155       Renaldo Delvalle MD  01/16/17 9615

## 2017-01-16 NOTE — H&P
"A Admission H&P    Patient Care Team:  ARIES Up as PCP - General (Nurse Practitioner)    Chief complaint: suprapubic catheter has been leaking and gets clogged resulting in abdominal pain.    History of Present Illness    His is a 36-year-old -American male who is well known to our service.  He is paraplegic and has sacral ulcers that are followed in the wound care clinic but is not an operative candidate.  He also has colostomy and suprapubic catheter.  He presents with a one-week history of catheter dysfunction.  He states that urine has been leaking out of his catheter and has caused his sacral wounds to become worse.  He also states that he is \"peeing rocks\" which he feels clogs the catheter and causes subsequent abdominal pain.  He also wears a condom catheter which has been irritating the shaft of his penis and caused skin breakdown.  For these reasons he came to the emergency room last night.  He was found to have a hemoglobin of 6.3.  2 units of packed red blood cells were ordered and he was admitted.  His hemoglobin typically runs around 8.0.  He has been evaluated extensively in the past by hematology and then diagnosed with anemia of chronic disease.  The last time he was seen by them was in October 2016 at which time they commended for him to be transfused as necessary and that there was no solution to his anemia.  During his previous hospitalization he was treated for pneumonia. The symptoms have improved and he is having no respiratory complaints at this time.  He does have some mild to moderate abdominal pain.  He says at the present time he thinks his catheter is functioning better but still feels that it is leaking.    Past Medical History   Diagnosis Date   • Anemia    • Chronic pain    • GERD (gastroesophageal reflux disease)    • Hypotension    • Paraplegia      Past Surgical History   Procedure Laterality Date   • Suprapubic catheter insertion     • Pr change of bladder " tube,complicated N/A 7/28/2016     Procedure: CYSTOSCOPY WITH SUPRAPUBIC CATHETER INSERTION;  Surgeon: Brant Blanca Jr., MD;  Location: [facility];  Service: Urology     History reviewed. No pertinent family history.  Social History   Substance Use Topics   • Smoking status: Current Every Day Smoker     Packs/day: 0.50   • Smokeless tobacco: None   • Alcohol use No     Prescriptions Prior to Admission   Medication Sig Dispense Refill Last Dose   • ALPRAZolam (XANAX) 1 MG tablet Take 1 mg by mouth 2 (Two) Times a Day As Needed for anxiety.      • oxyCODONE ER (oxyCONTIN) 15 MG tablet extended-release 12 hour Take 15 mg by mouth Every 12 (Twelve) Hours.      • bisacodyl (DULCOLAX) 5 MG EC tablet Take 1 tablet by mouth daily as needed for constipation. 30 tablet 0    • collagenase 250 UNIT/GM ointment Apply  topically Daily. 90 g 0    • gabapentin (NEURONTIN) 300 MG capsule Take 300 mg by mouth 3 (three) times a day.      • OXYBUTYNIN CHLORIDE PO Take 5 mg by mouth 3 (three) times a day.      • Pantoprazole Sodium (PROTONIX PO) Take 40 mg by mouth daily.      • sodium hypochlorite , (DAKIN'S 1/4 STRENGTH) 0.125 % solution topical solution 0.125% Lt hip & Rt. Calf: bid with Kerlex and ABD pads 1 bottle 0      Allergies:  Amoxicillin-pot clavulanate; Ibuprofen; Ketorolac tromethamine; Meropenem; and Vancomycin    Review of Systems   Constitutional: Negative for chills and fever.   HENT: Negative for congestion and sore throat.    Eyes: Negative for visual disturbance.   Respiratory: Negative for cough, chest tightness, shortness of breath and wheezing.    Cardiovascular: Negative for chest pain, palpitations and leg swelling.   Gastrointestinal: Negative for abdominal distention, abdominal pain, diarrhea, nausea and vomiting.   Endocrine: Negative for polydipsia and polyuria.   Genitourinary: Positive for genital sores and penile pain. Negative for difficulty urinating, dysuria, frequency and urgency.         Urine leaking from around his suprapubic catheter.  The catheter also gets clogged at times and causes him abdominal pain.   Musculoskeletal: Negative for arthralgias and myalgias.   Skin: Positive for wound. Negative for color change and rash.        Sacral wounds are more irritated due to urine leakage.   Neurological: Negative for dizziness and light-headedness.        PHYSICAL EXAM    Vital Signs  tMax 24 hrs:  Temp (24hrs), Av.1 °F (36.7 °C), Min:97 °F (36.1 °C), Max:98.5 °F (36.9 °C)    Vitals Ranges:  Temp:  [97 °F (36.1 °C)-98.5 °F (36.9 °C)] 98.4 °F (36.9 °C)  Heart Rate:  [84-98] 84  Resp:  [16] 16  BP: ()/(56-71) 96/61    Physical Exam   Constitutional: He is oriented to person, place, and time. He appears well-developed and well-nourished.   Thin, chronically ill-appearing   HENT:   Head: Normocephalic and atraumatic.   Eyes: EOM are normal. Pupils are equal, round, and reactive to light.   Neck: Neck supple. No tracheal deviation present.   Cardiovascular: Normal rate and regular rhythm.  Exam reveals no gallop.    No murmur heard.  Pulmonary/Chest: Effort normal. No respiratory distress. He has no wheezes.   Abdominal: Soft. Bowel sounds are normal. He exhibits no distension. There is tenderness.   Mild generalized abdominal tenderness When I press firmly with my stethoscope he does not appear to be any pain but when I press with my hands he is tender to moderate palpation.   Genitourinary:   Genitourinary Comments: Suprapubic catheter is in place He also has a condom catheter on and does appear to have skin breakdown on the shaft of penis   Musculoskeletal: He exhibits no edema or tenderness.   Severe lower extremity muscle wasting. His lower extremities are contracted.   Neurological: He is alert and oriented to person, place, and time. No cranial nerve deficit.   paraplegia   Skin: Skin is warm and dry.   Extensive sacral wounds, stage IV   Nursing note and vitals reviewed.      Results  Review:    Results from last 7 days  Lab Units 01/16/17  0124   WBC 10*3/mm3 8.84   HEMOGLOBIN g/dL 6.3*   HEMATOCRIT % 22.8*   PLATELETS 10*3/mm3 280       Results from last 7 days  Lab Units 01/16/17  0124   SODIUM mmol/L 139   POTASSIUM mmol/L 3.6   CHLORIDE mmol/L 102   TOTAL CO2 mmol/L 26.2   BUN mg/dL 14   CREATININE mg/dL 0.87   CALCIUM mg/dL 8.5*   BILIRUBIN mg/dL <0.2   ALK PHOS U/L 89   ALT (SGPT) U/L <5   AST (SGOT) U/L 6   GLUCOSE mg/dL 121*        I reviewed the patient's new clinical results.      Principal Problem:    Severe anemia  Active Problems:    Paraplegia    Decubitus ulcer of sacral region, stage 4    Suprapubic catheter dysfunction      Assessment & Plan    The patient is receiving his second unit of packed red blood cells.  We'll check a hemoglobin this afternoon once transfusion complete.  His anemia is a chronic issue and he has had extensive workup in the past.  I will repeat iron studies.  We'll obtain a urinalysis to ensure that there is no hematuria. I'll also ask urology to evaluate him for the complaints of leakage and blockage of his suprapubic catheter.  Wound care will evaluate patient will get him an air mattress to offload his wounds.  Per previous plastic surgery evaluations he is not a surgical candidate.  Additional plans based on his clinical course.    I discussed the patients findings and my recommendations with patient and nursing staff    Bean Alejandre MD  01/16/17  10:21 AM

## 2017-01-16 NOTE — PLAN OF CARE
Problem: Patient Care Overview (Adult)  Goal: Plan of Care Review  Outcome: Ongoing (interventions implemented as appropriate)    01/16/17 5236   Coping/Psychosocial Response Interventions   Plan Of Care Reviewed With patient   Patient Care Overview   Progress progress toward functional goals is gradual   Outcome Evaluation   Outcome Summary/Follow up Plan pt refuses to comply with nutritional regimen. he is demanding and anxious at times. he has a specialty air bed. wound addressed pressure ulcers and skin issues. pt c/o generalized pain. prn pain meds given. 2 units of blood completed. waiting for hgb results. vs are stable. no distress noted. will conitnue to monitor.       Goal: Adult Individualization and Mutuality  Outcome: Ongoing (interventions implemented as appropriate)    Problem: Fall Risk (Adult)  Goal: Identify Related Risk Factors and Signs and Symptoms  Outcome: Outcome(s) achieved Date Met:  01/16/17  Goal: Absence of Falls  Outcome: Ongoing (interventions implemented as appropriate)    Problem: Skin Integrity Impairment, Risk/Actual (Adult)  Goal: Identify Related Risk Factors and Signs and Symptoms  Outcome: Outcome(s) achieved Date Met:  01/16/17  Goal: Skin Integrity/Wound Healing  Outcome: Ongoing (interventions implemented as appropriate)    Problem: Nutrition, Imbalanced: Inadequate Oral Intake (Adult)  Goal: Identify Related Risk Factors and Signs and Symptoms  Outcome: Outcome(s) achieved Date Met:  01/16/17  Goal: Improved Oral Intake  Outcome: Ongoing (interventions implemented as appropriate)  Goal: Prevent Further Weight Loss  Outcome: Ongoing (interventions implemented as appropriate)

## 2017-01-16 NOTE — PLAN OF CARE
Problem: Patient Care Overview (Adult)  Goal: Plan of Care Review  Outcome: Ongoing (interventions implemented as appropriate)    01/16/17 0801   Coping/Psychosocial Response Interventions   Plan Of Care Reviewed With patient   Patient Care Overview   Progress improving   Outcome Evaluation   Outcome Summary/Follow up Plan Blood infusing, multiple ulcers to be seen by wound RN, pain meds x1 pt resting quietly         Problem: Fall Risk (Adult)  Goal: Absence of Falls  Outcome: Ongoing (interventions implemented as appropriate)

## 2017-01-16 NOTE — PROGRESS NOTES
Discharge Planning Assessment  Psychiatric     Patient Name: Joaquín Sherman  MRN: [id number]  Today's Date: 1/16/2017    Admit Date: 1/16/2017          Discharge Needs Assessment       01/16/17 1505    Living Environment    Lives With parent(s)   mother, sister, son    Living Arrangements house    Provides Primary Care For no one    Quality Of Family Relationships supportive    Able to Return to Prior Living Arrangements yes    Discharge Needs Assessment    Concerns To Be Addressed basic needs concerns    Readmission Within The Last 30 Days no previous admission in last 30 days    Equipment Currently Used at Home colostomy/ostomy supplies;wound care supplies;wheelchair, motorized    Equipment Needed After Discharge none    Transportation Available ambulance            Discharge Plan       01/16/17 1507    Case Management/Social Work Plan    Plan Home with [facility]?    Patient/Family In Agreement With Plan yes    Additional Comments S/W patient at bedside and verified face sheet.  Patient lives at home with mother, sister and son.  Patient requesting Dr. Jolley, as he has no PCP at this time.  Call out to Dr. Jolley's office to see if will accept patient.  Patient requested [facility] and they stated that patient was non-compliant before and are not able to take patient back.  Patient has an electric w/c at home and hospital bed.  Patient uses [facility] on [address] and has no problems getting medications.  Will check re: possible facility placement, and follow up with Dr. Jolley's office in AM.  Will continue to monitor and assist.  Zay RN, CCP        Discharge Placement     No information found                Demographic Summary       01/16/17 1501    Referral Information    Admission Type inpatient    Arrived From home or self-care    Referral Source admission list    Reason For Consult discharge planning    Record Reviewed plan of care    Contact Information    Permission Granted to Share Information  With family/designee   mother, Marlen Al [phone], or Sister, Jenni Khoury [phone]    Primary Care Physician Information    Name Dr. Jolley             Functional Status       01/16/17 8530    Functional Status Current    Ambulation 4-->completely dependent    Transferring 4-->completely dependent    Toileting 4-->completely dependent    Bathing 4-->completely dependent    Dressing 2-->assistive person    Eating 0-->independent    Communication 0-->understands/communicates without difficulty    Swallowing (if score 2 or more for any item, consult Rehab Services) 0-->swallows foods/liquids without difficulty    IADL    Medications completely dependent    Meal Preparation completely dependent    Housekeeping completely dependent    Laundry completely dependent    Shopping completely dependent    Oral Care independent    Activity Tolerance    Current Activity Limitations other (see comments)   paraplegia    Cognitive/Perceptual/Developmental    Current Mental Status/Cognitive Functioning no deficits noted            Psychosocial     None            Abuse/Neglect     None            Legal     None            Substance Abuse     None            Patient Forms     None          Seda Monge RN

## 2017-01-16 NOTE — PROGRESS NOTES
Malnutrition Severity Assessment    Patient Name:  Joaquín Sherman  YOB: 1980  MRN: [id number]  Admit Date:  1/16/2017    Patient meets criteria for : Severe malnutrition    Comments:  BMI, %IBW, %UBW.  Ordered re-weight.  Yesterday weight was 100# and patient reports weight is 120#.  He reports weight gain since last admit.  Ensure Enlive TID with all meals.  Recommend liberalizing diet to a Regular diet.  Will continue to follow.      Malnutrition Type: Social/Environmental Circumstance Malnutrition    Weight Status         Most Recent Value    BMI  Severe (<16)    %IBW  Severe (<70%)    %UBW  Severe (<75%)      Energy Intake Status         Most Recent Value    Energy Intake  Mod (<75% / > or equal to 3 mo)              Electronically signed by:  Darcy Jacobs RD  01/16/17 2:58 PM

## 2017-01-17 ENCOUNTER — APPOINTMENT (OUTPATIENT)
Dept: CT IMAGING | Facility: HOSPITAL | Age: 37
End: 2017-01-17
Attending: UROLOGY

## 2017-01-17 LAB
ABO + RH BLD: NORMAL
ABO + RH BLD: NORMAL
ANION GAP SERPL CALCULATED.3IONS-SCNC: 13.8 MMOL/L
ANISOCYTOSIS BLD QL: NORMAL
BASOPHILS # BLD AUTO: 0.04 10*3/MM3 (ref 0–0.2)
BASOPHILS NFR BLD AUTO: 0.5 % (ref 0–1.5)
BH BB BLOOD EXPIRATION DATE: NORMAL
BH BB BLOOD EXPIRATION DATE: NORMAL
BH BB BLOOD TYPE BARCODE: 8400
BH BB BLOOD TYPE BARCODE: 8400
BH BB DISPENSE STATUS: NORMAL
BH BB DISPENSE STATUS: NORMAL
BH BB PRODUCT CODE: NORMAL
BH BB PRODUCT CODE: NORMAL
BH BB UNIT NUMBER: NORMAL
BH BB UNIT NUMBER: NORMAL
BUN BLD-MCNC: 6 MG/DL (ref 6–20)
BUN/CREAT SERPL: 7 (ref 7–25)
C3 FRG RBC-MCNC: NORMAL
CALCIUM SPEC-SCNC: 8.5 MG/DL (ref 8.6–10.5)
CHLORIDE SERPL-SCNC: 98 MMOL/L (ref 98–107)
CO2 SERPL-SCNC: 24.2 MMOL/L (ref 22–29)
CREAT BLD-MCNC: 0.86 MG/DL (ref 0.76–1.27)
DEPRECATED RDW RBC AUTO: 55.5 FL (ref 37–54)
EOSINOPHIL # BLD AUTO: 0.13 10*3/MM3 (ref 0–0.7)
EOSINOPHIL NFR BLD AUTO: 1.7 % (ref 0.3–6.2)
ERYTHROCYTE [DISTWIDTH] IN BLOOD BY AUTOMATED COUNT: 18.1 % (ref 11.5–14.5)
GFR SERPL CREATININE-BSD FRML MDRD: 122 ML/MIN/1.73
GLUCOSE BLD-MCNC: 115 MG/DL (ref 65–99)
HCT VFR BLD AUTO: 28.3 % (ref 40.4–52.2)
HGB BLD-MCNC: 8.2 G/DL (ref 13.7–17.6)
HGB RETIC QN: 24.5 PG (ref 32.7–38.6)
HYPOCHROMIA BLD QL: NORMAL
IMM GRANULOCYTES # BLD: 0.02 10*3/MM3 (ref 0–0.03)
IMM GRANULOCYTES NFR BLD: 0.3 % (ref 0–0.5)
IMM RETICS NFR: 13.7 % (ref 0.7–13.7)
IRON 24H UR-MRATE: 17 MCG/DL (ref 59–158)
IRON SATN MFR SERPL: 10 % (ref 20–50)
LYMPHOCYTES # BLD AUTO: 2.16 10*3/MM3 (ref 0.9–4.8)
LYMPHOCYTES NFR BLD AUTO: 28.3 % (ref 19.6–45.3)
MAGNESIUM SERPL-MCNC: 1.6 MG/DL (ref 1.6–2.6)
MCH RBC QN AUTO: 23.9 PG (ref 27–32.7)
MCHC RBC AUTO-ENTMCNC: 29 G/DL (ref 32.6–36.4)
MCV RBC AUTO: 82.5 FL (ref 79.8–96.2)
MONOCYTES # BLD AUTO: 0.55 10*3/MM3 (ref 0.2–1.2)
MONOCYTES NFR BLD AUTO: 7.2 % (ref 5–12)
NEUTROPHILS # BLD AUTO: 4.73 10*3/MM3 (ref 1.9–8.1)
NEUTROPHILS NFR BLD AUTO: 62 % (ref 42.7–76)
NRBC BLD MANUAL-RTO: 0 /100 WBC (ref 0–0)
OVALOCYTES BLD QL SMEAR: NORMAL
PLAT MORPH BLD: NORMAL
PLATELET # BLD AUTO: 278 10*3/MM3 (ref 140–500)
PMV BLD AUTO: 8.7 FL (ref 6–12)
POTASSIUM BLD-SCNC: 4 MMOL/L (ref 3.5–5.2)
RBC # BLD AUTO: 3.43 10*6/MM3 (ref 4.6–6)
RETICS/RBC NFR AUTO: 0.89 % (ref 0.5–1.5)
SODIUM BLD-SCNC: 136 MMOL/L (ref 136–145)
TIBC SERPL-MCNC: 173 MCG/DL (ref 298–536)
TRANSFERRIN SERPL-MCNC: 116 MG/DL (ref 200–360)
UNIT  ABO: NORMAL
UNIT  ABO: NORMAL
UNIT  RH: NORMAL
UNIT  RH: NORMAL
WBC MORPH BLD: NORMAL
WBC NRBC COR # BLD: 7.63 10*3/MM3 (ref 4.5–10.7)

## 2017-01-17 PROCEDURE — 85046 RETICYTE/HGB CONCENTRATE: CPT | Performed by: INTERNAL MEDICINE

## 2017-01-17 PROCEDURE — 25010000002 HYDROMORPHONE PER 4 MG: Performed by: INTERNAL MEDICINE

## 2017-01-17 PROCEDURE — 85007 BL SMEAR W/DIFF WBC COUNT: CPT | Performed by: INTERNAL MEDICINE

## 2017-01-17 PROCEDURE — 84466 ASSAY OF TRANSFERRIN: CPT | Performed by: INTERNAL MEDICINE

## 2017-01-17 PROCEDURE — 83735 ASSAY OF MAGNESIUM: CPT | Performed by: INTERNAL MEDICINE

## 2017-01-17 PROCEDURE — 25010000002 ENOXAPARIN PER 10 MG: Performed by: INTERNAL MEDICINE

## 2017-01-17 PROCEDURE — 80048 BASIC METABOLIC PNL TOTAL CA: CPT | Performed by: INTERNAL MEDICINE

## 2017-01-17 PROCEDURE — 74176 CT ABD & PELVIS W/O CONTRAST: CPT

## 2017-01-17 PROCEDURE — 25010000002 ALTEPLASE: Performed by: INTERNAL MEDICINE

## 2017-01-17 PROCEDURE — 25010000002 HEPARIN FLUSH (PORCINE) 100 UNIT/ML SOLUTION: Performed by: INTERNAL MEDICINE

## 2017-01-17 PROCEDURE — 25010000002 IRON SUCROSE PER 1 MG: Performed by: INTERNAL MEDICINE

## 2017-01-17 PROCEDURE — 83540 ASSAY OF IRON: CPT | Performed by: INTERNAL MEDICINE

## 2017-01-17 PROCEDURE — 85025 COMPLETE CBC W/AUTO DIFF WBC: CPT | Performed by: INTERNAL MEDICINE

## 2017-01-17 RX ADMIN — GABAPENTIN 300 MG: 300 CAPSULE ORAL at 18:00

## 2017-01-17 RX ADMIN — ALPRAZOLAM 1 MG: 0.5 TABLET ORAL at 04:39

## 2017-01-17 RX ADMIN — ALTEPLASE 2 MG: 2.2 INJECTION, POWDER, LYOPHILIZED, FOR SOLUTION INTRAVENOUS at 00:20

## 2017-01-17 RX ADMIN — HEPARIN SODIUM (PORCINE) LOCK FLUSH IV SOLN 100 UNIT/ML 500 UNITS: 100 SOLUTION at 03:24

## 2017-01-17 RX ADMIN — HYDROMORPHONE HYDROCHLORIDE 0.5 MG: 1 INJECTION, SOLUTION INTRAMUSCULAR; INTRAVENOUS; SUBCUTANEOUS at 21:05

## 2017-01-17 RX ADMIN — PETROLATUM: 42 OINTMENT TOPICAL at 13:30

## 2017-01-17 RX ADMIN — HYDROMORPHONE HYDROCHLORIDE 0.5 MG: 1 INJECTION, SOLUTION INTRAMUSCULAR; INTRAVENOUS; SUBCUTANEOUS at 10:46

## 2017-01-17 RX ADMIN — DAKIN'S SOLUTION 0.125% (QUARTER STRENGTH): 0.12 SOLUTION at 06:56

## 2017-01-17 RX ADMIN — IRON SUCROSE 300 MG: 20 INJECTION, SOLUTION INTRAVENOUS at 15:40

## 2017-01-17 RX ADMIN — DAKIN'S SOLUTION 0.125% (QUARTER STRENGTH): 0.12 SOLUTION at 13:30

## 2017-01-17 RX ADMIN — ENOXAPARIN SODIUM 40 MG: 40 INJECTION SUBCUTANEOUS at 10:46

## 2017-01-17 RX ADMIN — PANTOPRAZOLE SODIUM 40 MG: 40 TABLET, DELAYED RELEASE ORAL at 10:40

## 2017-01-17 RX ADMIN — ALPRAZOLAM 1 MG: 0.5 TABLET ORAL at 15:59

## 2017-01-17 RX ADMIN — HYDROMORPHONE HYDROCHLORIDE 0.5 MG: 1 INJECTION, SOLUTION INTRAMUSCULAR; INTRAVENOUS; SUBCUTANEOUS at 03:23

## 2017-01-17 RX ADMIN — SODIUM CHLORIDE, PRESERVATIVE FREE 10 ML: 5 INJECTION INTRAVENOUS at 20:51

## 2017-01-17 RX ADMIN — SODIUM CHLORIDE, PRESERVATIVE FREE 10 ML: 5 INJECTION INTRAVENOUS at 10:41

## 2017-01-17 RX ADMIN — PETROLATUM: 42 OINTMENT TOPICAL at 06:55

## 2017-01-17 RX ADMIN — HYDROMORPHONE HYDROCHLORIDE 0.5 MG: 1 INJECTION, SOLUTION INTRAMUSCULAR; INTRAVENOUS; SUBCUTANEOUS at 15:39

## 2017-01-17 RX ADMIN — GABAPENTIN 300 MG: 300 CAPSULE ORAL at 15:39

## 2017-01-17 RX ADMIN — SODIUM CHLORIDE, PRESERVATIVE FREE 500 UNITS: 5 INJECTION INTRAVENOUS at 20:51

## 2017-01-17 RX ADMIN — OXYBUTYNIN CHLORIDE 5 MG: 5 TABLET ORAL at 18:00

## 2017-01-17 RX ADMIN — OXYBUTYNIN CHLORIDE 5 MG: 5 TABLET ORAL at 10:40

## 2017-01-17 RX ADMIN — SODIUM CHLORIDE, PRESERVATIVE FREE 500 UNITS: 5 INJECTION INTRAVENOUS at 15:39

## 2017-01-17 RX ADMIN — OXYCODONE HYDROCHLORIDE 15 MG: 15 TABLET, FILM COATED, EXTENDED RELEASE ORAL at 01:30

## 2017-01-17 NOTE — CONSULTS
FIRST UROLOGY CONSULT      Patient Identification:  NAME:  Joaquín Sherman  Age:  36 y.o.   Sex:  male   :  1980   MRN:  [id number]       Chief complaint: Urinary leakage around SP tube    History of present illness:  Mr. Sherman is a 36-year-old man with paraplegia and sacral ulcers whose urinary tract is managed with chronic SP tube. Recently he has been leaking around his SP tube, irritating his sacral wound. He also reports urinary stones have been clogging the SP catheter. He also wears a condom catheter. No gross hematuria.    After discharge in October, he was lost to follow up, and he reports the SP tube he currently has was placed in October.      Past medical history:  Past Medical History   Diagnosis Date   • Anemia    • Chronic pain    • GERD (gastroesophageal reflux disease)    • Hypotension    • Paraplegia        Past surgical history:  Past Surgical History   Procedure Laterality Date   • Suprapubic catheter insertion     • Pr change of bladder tube,complicated N/A 2016     Procedure: CYSTOSCOPY WITH SUPRAPUBIC CATHETER INSERTION;  Surgeon: Brant Blanca Jr., MD;  Location: [facility];  Service: Urology       Allergies:  Amoxicillin-pot clavulanate; Ibuprofen; Ketorolac tromethamine; Meropenem; and Vancomycin    Home medications:  Prescriptions Prior to Admission   Medication Sig Dispense Refill Last Dose   • ALPRAZolam (XANAX) 1 MG tablet Take 1 mg by mouth 2 (Two) Times a Day As Needed for anxiety.      • oxyCODONE ER (oxyCONTIN) 15 MG tablet extended-release 12 hour Take 15 mg by mouth Every 12 (Twelve) Hours.      • bisacodyl (DULCOLAX) 5 MG EC tablet Take 1 tablet by mouth daily as needed for constipation. 30 tablet 0    • collagenase 250 UNIT/GM ointment Apply  topically Daily. 90 g 0    • gabapentin (NEURONTIN) 300 MG capsule Take 300 mg by mouth 3 (three) times a day.      • OXYBUTYNIN CHLORIDE PO Take 5 mg by mouth 3 (three) times a day.      • Pantoprazole Sodium  (PROTONIX PO) Take 40 mg by mouth daily.      • sodium hypochlorite , (DAKIN'S 1/4 STRENGTH) 0.125 % solution topical solution 0.125% Lt hip & Rt. Calf: bid with Kerlex and ABD pads 1 bottle 0         Hospital medications:    collagenase  Topical Q24H   enoxaparin 40 mg Subcutaneous Q24H   gabapentin 300 mg Oral TID   heparin flush (porcine) 5 mL Intracatheter Q12H   hydrophor  Topical Q12H   oxybutynin 5 mg Oral TID   oxyCODONE 15 mg Oral Q12H   pantoprazole 40 mg Oral Daily   sodium chloride 10 mL Intracatheter Q12H   sodium hypochlorite  Topical BID        •  ALPRAZolam  •  ALPRAZolam  •  bisacodyl  •  heparin flush (porcine)  •  HYDROmorphone  •  ondansetron **OR** ondansetron ODT **OR** ondansetron  •  sodium chloride  •  Insert peripheral IV **AND** sodium chloride  •  sodium chloride    Family history:  History reviewed. No pertinent family history.    Social history:  Social History   Substance Use Topics   • Smoking status: Current Every Day Smoker     Packs/day: 0.50   • Smokeless tobacco: None   • Alcohol use No       Review of systems:    Negative 12-system ROS except for the following:  As above      Objective:  TMax 24 hours:   Temp (24hrs), Av.5 °F (36.9 °C), Min:97.3 °F (36.3 °C), Max:99.7 °F (37.6 °C)      Vitals Ranges:   Temp:  [97.3 °F (36.3 °C)-99.7 °F (37.6 °C)] 99.7 °F (37.6 °C)  Heart Rate:  [] 116  Resp:  [16] 16  BP: (86-96)/(61-72) 91/66    Intake/Output Last 3 shifts:        Physical Exam:       General Appearance:    Alert, cooperative, in no acute distress   Head:    Normocephalic, without obvious abnormality, atraumatic   Eyes:          PERRL                   Lungs:     Respirations unlabored, symmetric excursion       Abdomen:     Soft, NDNT, no masses, no guarding   :    SP tube intact. Urine clear. Condom catheter in place draining clear yellow urine.           Neuro/Psych:   Orientation intact, mood/affect pleasant, no focal findings       Results review:   I reviewed  the patient's new clinical results.    Data review:  Lab Results (last 24 hours)     Procedure Component Value Units Date/Time    Retic With IRF & RET-He [[id number]]  (Abnormal) Collected:  01/17/17 0335    Specimen:  Blood Updated:  01/17/17 0404     Immature Reticulocyte Fraction 13.7 %      Reticulocyte % 0.89 %      Reticulated Hgb 24.5 (L) pg     Iron Profile [[id number]]  (Abnormal) Collected:  01/17/17 0335    Specimen:  Blood Updated:  01/17/17 0420     Iron 17 (L) mcg/dL      Iron Saturation 10 (L) %      Transferrin 116 (L) mg/dL      TIBC 173 mcg/dL     Basic Metabolic Panel [[id number]]  (Abnormal) Collected:  01/17/17 0335    Specimen:  Blood Updated:  01/17/17 0423     Glucose 115 (H) mg/dL      BUN 6 mg/dL      Creatinine 0.86 mg/dL      Sodium 136 mmol/L      Potassium 4.0 mmol/L      Chloride 98 mmol/L      CO2 24.2 mmol/L      Calcium 8.5 (L) mg/dL      eGFR  African Amer 122 mL/min/1.73      BUN/Creatinine Ratio 7.0      Anion Gap 13.8 mmol/L     Narrative:       GFR Normal >60  Chronic Kidney Disease <60  Kidney Failure <15    CBC & Differential [[id number]] Collected:  01/17/17 0335    Specimen:  Blood Updated:  01/17/17 0426    Narrative:       The following orders were created for panel order CBC & Differential.  Procedure                               Abnormality         Status                     ---------                               -----------         ------                     Scan Slide[[id number]]                                        Final result               CBC Auto Differential[[id number]]         Abnormal            Final result                 Please view results for these tests on the individual orders.    CBC Auto Differential [[id number]]  (Abnormal) Collected:  01/17/17 0335    Specimen:  Blood Updated:  01/17/17 0426     WBC 7.63 10*3/mm3      RBC 3.43 (L) 10*6/mm3      Hemoglobin 8.2 (L) g/dL      Hematocrit 28.3 (L) %      MCV 82.5 fL      MCH 23.9 (L) pg      MCHC 29.0 (L) g/dL       RDW 18.1 (H) %      RDW-SD 55.5 (H) fl      MPV 8.7 fL      Platelets 278 10*3/mm3      Neutrophil % 62.0 %      Lymphocyte % 28.3 %      Monocyte % 7.2 %      Eosinophil % 1.7 %      Basophil % 0.5 %      Immature Grans % 0.3 %      Neutrophils, Absolute 4.73 10*3/mm3      Lymphocytes, Absolute 2.16 10*3/mm3      Monocytes, Absolute 0.55 10*3/mm3      Eosinophils, Absolute 0.13 10*3/mm3      Basophils, Absolute 0.04 10*3/mm3      Immature Grans, Absolute 0.02 10*3/mm3      nRBC 0.0 /100 WBC     Scan Slide [[id number]] Collected:  01/17/17 0335    Specimen:  Blood Updated:  01/17/17 0426     Anisocytosis Mod/2+      Hypochromia Slight/1+      Ovalocytes Slight/1+      RBC Fragments Slight/1+      WBC Morphology Normal      Platelet Morphology Normal            Imaging:  Imaging Results (last 24 hours)     ** No results found for the last 24 hours. **             Assessment:     Principal Problem:    Severe anemia  Active Problems:    Paraplegia    Decubitus ulcer of sacral region, stage 4    Suprapubic catheter dysfunction    Neurogenic bladder    Plan:     CT abd/pelvis without contrast. If no bladder stones adherent to the SP tube, will arrange SP tube exchange while inpatient    Brant Blanca Jr., MD  01/17/17  6:38 AM

## 2017-01-17 NOTE — PROGRESS NOTES
Continued Stay Note  [facility]     Patient Name: Joaquín Sherman  MRN: [id number]  Today's Date: 1/17/2017    Admit Date: 1/16/2017          Discharge Plan       01/17/17 1651    Case Management/Social Work Plan    Plan [facility] also unable to accept      01/17/17 1622    Case Management/Social Work Plan    Plan Home with HH    Additional Comments Spoke with Nicki  with [facility]. Patient has used [facility]  in the past  and they are unable to readmit him on discharge.. Spoke with patient. He states he is followed by [facility] . Called [facility]--[phone] and spoke with Pina. She states  patient  is not current with them and they will not readmit him. Spoke with patient. He  is interested in anout patient wound clinic. No wound care clinic at  [facility] [place]note 2] . [facility] and [facility] wound care clinics  would require a pre-cert from patient's Passport.  Patient is  interested in having Dr. Hall be his  PCP. With patient's permission, Dr. Hall's  office was contacted.---[phone].  Was told that  patient and family would  need to make their own appointment. Spoke with patient and patient's sister, Jenni--[phone]. Jenni is agreeable to  caring for patient at discharge. Jenni  will  contact Dr. Hall  and see if he will follow patient as PCP. Have asked [facility] to see patient for HH needs . They are unable to follow              Discharge Codes     None        Expected Discharge Date and Time     Expected Discharge Date Expected Discharge Time    Jan 18, 2017             BALDO Mccall

## 2017-01-17 NOTE — PLAN OF CARE
Problem: Patient Care Overview (Adult)  Goal: Plan of Care Review  Outcome: Ongoing (interventions implemented as appropriate)    01/17/17 0431   Coping/Psychosocial Response Interventions   Plan Of Care Reviewed With patient   Patient Care Overview   Progress improving   Outcome Evaluation   Outcome Summary/Follow up Plan Pt refuses turns or weight readjustments, pain meds x2, continues feeling better, no other complications         Problem: Fall Risk (Adult)  Goal: Absence of Falls  Outcome: Ongoing (interventions implemented as appropriate)    Problem: Skin Integrity Impairment, Risk/Actual (Adult)  Goal: Skin Integrity/Wound Healing  Outcome: Ongoing (interventions implemented as appropriate)    Problem: Nutrition, Imbalanced: Inadequate Oral Intake (Adult)  Goal: Improved Oral Intake  Outcome: Ongoing (interventions implemented as appropriate)

## 2017-01-17 NOTE — PROGRESS NOTES
Continued Stay Note  [facility]     Patient Name: Joaquín Sherman  MRN: [id number]  Today's Date: 1/17/2017    Admit Date: 1/16/2017          Discharge Plan       01/17/17 1301    Case Management/Social Work Plan    Additional Comments S/W [facility] and they do not accept Passport and s/w Marissa Dr. De La Cruz's office and they no longer accept Passport and they have not seen patient since 2015.  JAMAL Collazo, CCP      01/17/17 1107    Case Management/Social Work Plan    Plan Home with HH    Patient/Family In Agreement With Plan yes    Additional Comments S/W patient at bedside and patient does not want to go to a SNF, wants to go home.  I have put another call out to Dr. Jolley's office and left a message re: new patient referral.  Called Dr. Johnson and they do not accept Passport.  Call out to [facility] and they will not take patient back due to non-compliance, not home when physicians were scheduled.  Call out to [facility] Calls at [phone] to see if they will take Passport and accept a new referral, left message.  [facility] will not take patient back and neither will [facility] r/t non-compliance..  Will need a PCP to get [facility] for HH.  Will continue to monitor.  JAMAL Collazo, CCP              Discharge Codes     None            Seda Monge RN

## 2017-01-17 NOTE — PAYOR COMM NOTE
"Joaquín Youssef (36 y.o. Male)                                                               REQUEST FOR INPATIENT                                                                                                CONTACT=  ROBBY ALCALA                                                          FAX .    [phone]                                                       #  [phone]           Date of Birth Social Security Number Address Home Phone MRN    1980  [id number] [id number]    [place]ondo[place] Marital Status          None Single       Admission Date Admission Type Admitting Provider Attending Provider Department, Room/Bed    1/16/17 Emergency Bean Alejandre MD [place], Robert Russell, MD [address], 655/1    Discharge Date Discharge Disposition Discharge Destination                      Attending Provider: Bean Alejandre MD     Allergies:  Amoxicillin-pot Clavulanate, Ibuprofen, Ketorolac Tromethamine, Meropenem, Vancomycin    Isolation:  Contact   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), 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)   Code Status:  FULL    Ht:  72.99\" (185.4 cm)   Wt:  91 lb (41.3 kg)    Admission Cmt:  None   Principal Problem:  Severe anemia [D64.9]                 Active Insurance as of 1/16/2017     Primary Coverage     Payor Plan Insurance Group Employer/Plan Group    PASSPORT PASSPORT      Payor Plan Address Payor Plan Phone Number Effective From Effective To    [address] [phone] 1/1/1998     [place] [address]       Subscriber Name Subscriber Birth Date Member ID       JOAQUÍN YOUSSEF 1980 [id number]                 Emergency Contacts      (Rel.) Home Phone Work Phone Mobile Phone    MikaelaMarlen (Mother) [phone] -- --    Jenni Khoury (Sister) [phone] -- --            Insurance Information                PASSPORT/PASSPORT Phone: [phone]    Subscriber: Joaquín Youssef Subscriber#: " "[id number]    Group#:  Precert#:              History & Physical      Bean Alejandre MD at 1/16/2017 10:21 AM          A Admission H&P    Patient Care Team:  ARIES Up as PCP - General (Nurse Practitioner)    Chief complaint: suprapubic catheter has been leaking and gets clogged resulting in abdominal pain.    History of Present Illness    His is a 36-year-old -American male who is well known to our service.  He is paraplegic and has sacral ulcers that are followed in the wound care clinic but is not an operative candidate.  He also has colostomy and suprapubic catheter.  He presents with a one-week history of catheter dysfunction.  He states that urine has been leaking out of his catheter and has caused his sacral wounds to become worse.  He also states that he is \"peeing rocks\" which he feels clogs the catheter and causes subsequent abdominal pain.  He also wears a condom catheter which has been irritating the shaft of his penis and caused skin breakdown.  For these reasons he came to the emergency room last night.  He was found to have a hemoglobin of 6.3.  2 units of packed red blood cells were ordered and he was admitted.  His hemoglobin typically runs around 8.0.  He has been evaluated extensively in the past by hematology and then diagnosed with anemia of chronic disease.  The last time he was seen by them was in October 2016 at which time they commended for him to be transfused as necessary and that there was no solution to his anemia.  During his previous hospitalization he was treated for pneumonia. The symptoms have improved and he is having no respiratory complaints at this time.  He does have some mild to moderate abdominal pain.  He says at the present time he thinks his catheter is functioning better but still feels that it is leaking.    Past Medical History   Diagnosis Date   • Anemia    • Chronic pain    • GERD (gastroesophageal reflux disease)    • Hypotension    • " Paraplegia      Past Surgical History   Procedure Laterality Date   • Suprapubic catheter insertion     • Pr change of bladder tube,complicated N/A 7/28/2016     Procedure: CYSTOSCOPY WITH SUPRAPUBIC CATHETER INSERTION;  Surgeon: Brant Blanca Jr., MD;  Location: [facility];  Service: Urology     History reviewed. No pertinent family history.  Social History   Substance Use Topics   • Smoking status: Current Every Day Smoker     Packs/day: 0.50   • Smokeless tobacco: None   • Alcohol use No     Prescriptions Prior to Admission   Medication Sig Dispense Refill Last Dose   • ALPRAZolam (XANAX) 1 MG tablet Take 1 mg by mouth 2 (Two) Times a Day As Needed for anxiety.      • oxyCODONE ER (oxyCONTIN) 15 MG tablet extended-release 12 hour Take 15 mg by mouth Every 12 (Twelve) Hours.      • bisacodyl (DULCOLAX) 5 MG EC tablet Take 1 tablet by mouth daily as needed for constipation. 30 tablet 0    • collagenase 250 UNIT/GM ointment Apply  topically Daily. 90 g 0    • gabapentin (NEURONTIN) 300 MG capsule Take 300 mg by mouth 3 (three) times a day.      • OXYBUTYNIN CHLORIDE PO Take 5 mg by mouth 3 (three) times a day.      • Pantoprazole Sodium (PROTONIX PO) Take 40 mg by mouth daily.      • sodium hypochlorite , (DAKIN'S 1/4 STRENGTH) 0.125 % solution topical solution 0.125% Lt hip & Rt. Calf: bid with Kerlex and ABD pads 1 bottle 0      Allergies:  Amoxicillin-pot clavulanate; Ibuprofen; Ketorolac tromethamine; Meropenem; and Vancomycin    Review of Systems   Constitutional: Negative for chills and fever.   HENT: Negative for congestion and sore throat.    Eyes: Negative for visual disturbance.   Respiratory: Negative for cough, chest tightness, shortness of breath and wheezing.    Cardiovascular: Negative for chest pain, palpitations and leg swelling.   Gastrointestinal: Negative for abdominal distention, abdominal pain, diarrhea, nausea and vomiting.   Endocrine: Negative for polydipsia and polyuria.    Genitourinary: Positive for genital sores and penile pain. Negative for difficulty urinating, dysuria, frequency and urgency.        Urine leaking from around his suprapubic catheter.  The catheter also gets clogged at times and causes him abdominal pain.   Musculoskeletal: Negative for arthralgias and myalgias.   Skin: Positive for wound. Negative for color change and rash.        Sacral wounds are more irritated due to urine leakage.   Neurological: Negative for dizziness and light-headedness.        PHYSICAL EXAM    Vital Signs  tMax 24 hrs:  Temp (24hrs), Av.1 °F (36.7 °C), Min:97 °F (36.1 °C), Max:98.5 °F (36.9 °C)    Vitals Ranges:  Temp:  [97 °F (36.1 °C)-98.5 °F (36.9 °C)] 98.4 °F (36.9 °C)  Heart Rate:  [84-98] 84  Resp:  [16] 16  BP: ()/(56-71) 96/61    Physical Exam   Constitutional: He is oriented to person, place, and time. He appears well-developed and well-nourished.   Thin, chronically ill-appearing   HENT:   Head: Normocephalic and atraumatic.   Eyes: EOM are normal. Pupils are equal, round, and reactive to light.   Neck: Neck supple. No tracheal deviation present.   Cardiovascular: Normal rate and regular rhythm.  Exam reveals no gallop.    No murmur heard.  Pulmonary/Chest: Effort normal. No respiratory distress. He has no wheezes.   Abdominal: Soft. Bowel sounds are normal. He exhibits no distension. There is tenderness.   Mild generalized abdominal tenderness When I press firmly with my stethoscope he does not appear to be any pain but when I press with my hands he is tender to moderate palpation.   Genitourinary:   Genitourinary Comments: Suprapubic catheter is in place He also has a condom catheter on and does appear to have skin breakdown on the shaft of penis   Musculoskeletal: He exhibits no edema or tenderness.   Severe lower extremity muscle wasting. His lower extremities are contracted.   Neurological: He is alert and oriented to person, place, and time. No cranial nerve  deficit.   paraplegia   Skin: Skin is warm and dry.   Extensive sacral wounds, stage IV   Nursing note and vitals reviewed.      Results Review:    Results from last 7 days  Lab Units 01/16/17  0124   WBC 10*3/mm3 8.84   HEMOGLOBIN g/dL 6.3*   HEMATOCRIT % 22.8*   PLATELETS 10*3/mm3 280       Results from last 7 days  Lab Units 01/16/17  0124   SODIUM mmol/L 139   POTASSIUM mmol/L 3.6   CHLORIDE mmol/L 102   TOTAL CO2 mmol/L 26.2   BUN mg/dL 14   CREATININE mg/dL 0.87   CALCIUM mg/dL 8.5*   BILIRUBIN mg/dL <0.2   ALK PHOS U/L 89   ALT (SGPT) U/L <5   AST (SGOT) U/L 6   GLUCOSE mg/dL 121*        I reviewed the patient's new clinical results.      Principal Problem:    Severe anemia  Active Problems:    Paraplegia    Decubitus ulcer of sacral region, stage 4    Suprapubic catheter dysfunction      Assessment & Plan    The patient is receiving his second unit of packed red blood cells.  We'll check a hemoglobin this afternoon once transfusion complete.  His anemia is a chronic issue and he has had extensive workup in the past.  I will repeat iron studies.  We'll obtain a urinalysis to ensure that there is no hematuria. I'll also ask urology to evaluate him for the complaints of leakage and blockage of his suprapubic catheter.  Wound care will evaluate patient will get him an air mattress to offload his wounds.  Per previous plastic surgery evaluations he is not a surgical candidate.  Additional plans based on his clinical course.    I discussed the patients findings and my recommendations with patient and nursing staff    Bean Alejandre MD  01/16/17  10:21 AM               Electronically signed by Bean Alejandre MD at 1/16/2017 10:32 AM           Emergency Department Notes      Renaldo Delvalle MD at 1/16/2017 12:48 AM           EMERGENCY DEPARTMENT ENCOUNTER    CHIEF COMPLAINT  Chief Complaint: Catheter problem  History given by: patient  History limited by: n/a  Room Number: 16/16  PMD: Mavis Duong  ARIES Cervantes      HPI:  Pt is a 36 y.o. male who presents complaining of a catheter problem. Pt states that his suprapubic catheter is clogged, and he reports malodorous urine.      Duration:  1 day  Onset: sudden  Timing: constant  Location: urinary  Radiation: n/a  Quality:  catheter malfunction  Intensity/Severity: moderate  Progression: unchanged   Associated Symptoms: malodorous urine  Aggravating Factors: hx of kidney damage  Alleviating Factors: none  Previous Episodes: unknown  Treatment before arrival: none    PAST MEDICAL HISTORY  Active Ambulatory Problems     Diagnosis Date Noted   • Acute cystitis without hematuria 07/26/2016   • Anemia 07/26/2016   • Paraplegia 07/26/2016   • Hypotension 07/26/2016   • Pneumonia of both lower lobes due to methicillin resistant Staphylococcus aureus (MRSA) 09/26/2016   • Decubitus ulcer of sacral region, stage 4 09/27/2016   • Hypotension 09/27/2016   • Acute kidney failure 09/27/2016   • Sepsis 09/27/2016   • Severe protein-calorie malnutrition 10/03/2016     Resolved Ambulatory Problems     Diagnosis Date Noted   • No Resolved Ambulatory Problems     Past Medical History   Diagnosis Date   • Chronic pain    • GERD (gastroesophageal reflux disease)        PAST SURGICAL HISTORY  Past Surgical History   Procedure Laterality Date   • Suprapubic catheter insertion     • Pr change of bladder tube,complicated N/A 7/28/2016     Procedure: CYSTOSCOPY WITH SUPRAPUBIC CATHETER INSERTION;  Surgeon: Brant Blanca Jr., MD;  Location: [facility];  Service: Urology       FAMILY HISTORY  History reviewed. No pertinent family history.    SOCIAL HISTORY  Social History     Social History   • Marital status: Single     Spouse name: N/A   • Number of children: N/A   • Years of education: N/A     Occupational History   • Not on file.     Social History Main Topics   • Smoking status: Current Every Day Smoker     Packs/day: 0.50   • Smokeless tobacco: Not on file   • Alcohol use No    • Drug use: No   • Sexual activity: Defer     Other Topics Concern   • Not on file     Social History Narrative       ALLERGIES  Amoxicillin-pot clavulanate; Ibuprofen; Ketorolac tromethamine; Meropenem; and Vancomycin    REVIEW OF SYSTEMS  Review of Systems   Constitutional: Negative for chills and fever.   HENT: Negative for sore throat.    Gastrointestinal: Negative for nausea and vomiting.   Genitourinary: Positive for difficulty urinating (catheter issue). Negative for dysuria.   Musculoskeletal: Negative for back pain.   Skin: Negative for rash.   Psychiatric/Behavioral: The patient is not nervous/anxious.        PHYSICAL EXAM  ED Triage Vitals   Temp Heart Rate Resp BP SpO2   01/16/17 0030 01/16/17 0030 01/16/17 0030 01/16/17 0030 01/16/17 0030   97 °F (36.1 °C) 92 16 92/56 95 %      Temp src Heart Rate Source Patient Position BP Location FiO2 (%)   -- -- -- -- --              Physical Exam   Constitutional: He is oriented to person, place, and time and well-developed, well-nourished, and in no distress.   Eyes: EOM are normal.   Neck: Normal range of motion.   Cardiovascular: Normal rate and regular rhythm.    Pulmonary/Chest: Effort normal and breath sounds normal. No respiratory distress.   Abdominal:   Suprapubic catheter in place   Neurological: He is alert and oriented to person, place, and time.   Pt is a paraplegic   Skin: Skin is warm and dry.   Psychiatric: Affect normal.   Nursing note and vitals reviewed.      LAB RESULTS  Lab Results (last 24 hours)     Procedure Component Value Units Date/Time    CBC & Differential [[id number]] Collected:  01/16/17 0124    Specimen:  Blood Updated:  01/16/17 0142    Narrative:       The following orders were created for panel order CBC & Differential.  Procedure                               Abnormality         Status                     ---------                               -----------         ------                     Scan Slide[[id number]]                                                                    CBC Auto Differential[[id number]]         Abnormal            Final result                 Please view results for these tests on the individual orders.    Comprehensive Metabolic Panel [[id number]] Collected:  01/16/17 0124    Specimen:  Blood Updated:  01/16/17 0134    Procalcitonin [[id number]] Collected:  01/16/17 0124    Specimen:  Blood Updated:  01/16/17 0134    Lactic Acid, Plasma [[id number]]  (Normal) Collected:  01/16/17 0124    Specimen:  Blood Updated:  01/16/17 0148     Lactate 0.8 mmol/L     CBC Auto Differential [[id number]]  (Abnormal) Collected:  01/16/17 0124    Specimen:  Blood Updated:  01/16/17 0142     WBC 8.84 10*3/mm3      RBC 2.80 (L) 10*6/mm3      Hemoglobin 6.3 (C) g/dL      Hematocrit 22.8 (L) %      MCV 81.4 fL      MCH 22.5 (L) pg      MCHC 27.6 (L) g/dL      RDW 18.5 (H) %      RDW-SD 55.9 (H) fl      MPV 8.6 fL      Platelets 280 10*3/mm3      Neutrophil % 67.7 %      Lymphocyte % 23.3 %      Monocyte % 6.2 %      Eosinophil % 1.9 %      Basophil % 0.6 %      Immature Grans % 0.3 %      Neutrophils, Absolute 5.98 10*3/mm3      Lymphocytes, Absolute 2.06 10*3/mm3      Monocytes, Absolute 0.55 10*3/mm3      Eosinophils, Absolute 0.17 10*3/mm3      Basophils, Absolute 0.05 10*3/mm3      Immature Grans, Absolute 0.03 10*3/mm3      nRBC 0.0 /100 WBC           I ordered the above labs and reviewed the results    PROCEDURES  Procedures      PROGRESS AND CONSULTS  ED Course     00:49  Labs ordered for further evaluation.     01:39  RN states that the pt is complaining of abd pain, and he now states that his catheter is working appropriately. Dilaudid ordered.     01:42  HGB is 6.3. Type and screen and 2 units pRBC's ordered. Call placed to A for admission.    01;53  Discussed case with Dr Soto  Reviewed history, exam, results and treatments.  Discussed concerns and plan of care. Dr Soto accepts pt to be admitted to telemetry.    MEDICAL DECISION  MAKING  Results were reviewed/discussed with the patient and they were also made aware of online access. Pt also made aware that some labs, such as cultures, will not be resulted during ER visit and follow up with PMD is necessary.     MDM  Number of Diagnoses or Management Options     Amount and/or Complexity of Data Reviewed  Clinical lab tests: ordered and reviewed  Decide to obtain previous medical records or to obtain history from someone other than the patient: yes  Discuss the patient with other providers: yes (Dr Soto)    Patient Progress  Patient progress: stable         DIAGNOSIS  Final diagnoses:   None       DISPOSITION  ADMISSION    Discussed treatment plan and reason for admission with pt/family and admitting physician.  Pt/family voiced understanding of the plan for admission for further testing/treatment as needed.     Latest Documented Vital Signs:  As of 1:55 AM  BP- 92/56 HR- 92 Temp- 97 °F (36.1 °C) O2 sat- 95%    --  Documentation assistance provided by jeniffer Guerrero for Dr Delvalle.  Information recorded by the scribe was done at my direction and has been verified and validated by me.        Cecille Guerrero  01/16/17 0155       Renaldo Delvalle MD  01/16/17 0323       Electronically signed by Renaldo Delvalle MD at 1/16/2017  3:23 AM        Vital Signs (last 24 hours)       01/16 0700  -  01/17 0659 01/17 0700  -  01/17 0916   Most Recent    Temp (°F) 97.3 -  99.7      98.6     98.6 (37)    Heart Rate 80 -  116      99     99    Resp   16      20     20    BP (!)86/62 -  96/61      (!)87/60     (!) 87/60    SpO2 (%)   100      99     99          Hospital Medications (active)       Dose Frequency Start End    ALPRAZolam (XANAX) tablet 1 mg 1 mg 2 Times Daily PRN 1/16/2017 1/26/2017    Sig - Route: Take 2 tablets by mouth 2 (Two) Times a Day As Needed for anxiety. - Oral    ALPRAZolam (XANAX) tablet 1 mg 1 mg 2 Times Daily PRN 1/16/2017     Sig - Route: Take 2 tablets by mouth 2 (Two) Times a  "Day As Needed for anxiety. - Oral    alteplase ((CATHFLO/ACTIVASE)) syringe 2 mg 2 mg Once 1/16/2017 1/17/2017    Sig - Route: 2 mL by Intracatheter route 1 (One) Time. - Intracatheter    bisacodyl (DULCOLAX) EC tablet 5 mg 5 mg Daily PRN 1/16/2017     Sig - Route: Take 1 tablet by mouth Daily As Needed for constipation. - Oral    enoxaparin (LOVENOX) syringe 40 mg 40 mg Every 24 Hours 1/16/2017     Sig - Route: Inject 0.4 mL under the skin Daily. - Subcutaneous    gabapentin (NEURONTIN) capsule 300 mg 300 mg 3 Times Daily 1/16/2017     Sig - Route: Take 1 capsule by mouth 3 (Three) Times a Day. - Oral    heparin flush (porcine) 100 UNIT/ML injection 500 Units 5 mL Every 12 Hours Scheduled 1/16/2017     Sig - Route: 5 mL by Intracatheter route Every 12 (Twelve) Hours. - Intracatheter    heparin flush (porcine) 100 UNIT/ML injection 500 Units 5 mL As Needed 1/16/2017     Sig - Route: 5 mL by Intracatheter route As Needed for line care (After Medication Administration or Blood Draw If Not Going to Be Utilized Immediately). - Intracatheter    HYDROmorphone (DILAUDID) injection 0.5 mg 0.5 mg Every 2 Hours PRN 1/16/2017 1/26/2017    Sig - Route: Infuse 0.5 mg into a venous catheter Every 2 (Two) Hours As Needed for severe pain (7-10). - Intravenous    hydrophor (AQUAPHOR) ointment  Every 12 Hours Scheduled 1/16/2017     Sig - Route: Apply  topically Every 12 (Twelve) Hours. - Topical    ondansetron (ZOFRAN) injection 4 mg 4 mg Every 6 Hours PRN 1/16/2017     Sig - Route: Infuse 2 mL into a venous catheter Every 6 (Six) Hours As Needed for nausea or vomiting. - Intravenous    Linked Group 1:  \"Or\" Linked Group Details        ondansetron (ZOFRAN) tablet 4 mg 4 mg Every 6 Hours PRN 1/16/2017     Sig - Route: Take 1 tablet by mouth Every 6 (Six) Hours As Needed for nausea or vomiting. - Oral    Linked Group 1:  \"Or\" Linked Group Details        ondansetron ODT (ZOFRAN-ODT) disintegrating tablet 4 mg 4 mg Every 6 Hours PRN " "1/16/2017     Sig - Route: Take 1 tablet by mouth Every 6 (Six) Hours As Needed for nausea or vomiting. - Oral    Linked Group 1:  \"Or\" Linked Group Details        oxybutynin (DITROPAN) tablet 5 mg 5 mg 3 Times Daily 1/16/2017     Sig - Route: Take 1 tablet by mouth 3 (Three) Times a Day. - Oral    oxyCODONE ER (oxyCONTIN) 12 hr tablet 15 mg 15 mg Every 12 Hours Scheduled 1/16/2017 1/26/2017    Sig - Route: Take 1 tablet by mouth Every 12 (Twelve) Hours. - Oral    pantoprazole (PROTONIX) EC tablet 40 mg 40 mg Daily 1/16/2017     Sig - Route: Take 1 tablet by mouth Daily. - Oral    sodium chloride 0.9 % flush 1-10 mL 1-10 mL As Needed 1/16/2017     Sig - Route: Infuse 1-10 mL into a venous catheter As Needed for line care. - Intravenous    sodium chloride 0.9 % flush 10 mL 10 mL As Needed 1/16/2017     Sig - Route: Infuse 10 mL into a venous catheter As Needed for line care. - Intravenous    Linked Group 2:  \"And\" Linked Group Details        sodium chloride 0.9 % flush 10 mL 10 mL Every 12 Hours Scheduled 1/16/2017     Sig - Route: 10 mL by Intracatheter route Every 12 (Twelve) Hours. - Intracatheter    sodium chloride 0.9 % flush 10 mL 10 mL As Needed 1/16/2017     Sig - Route: 10 mL by Intracatheter route As Needed for line care (After Medication Administration or Blood Draw). - Intracatheter    sodium hypochlorite (DAKIN'S 1/4 STRENGTH) 0.125 % topical solution 0.125% solution  2 Times Daily 1/16/2017     Sig - Route: Apply  topically 2 (Two) Times a Day. - Topical    alteplase ((CATHFLO/ACTIVASE)) injection 2 mg (Discontinued) 2 mg Once 1/16/2017 1/16/2017    Sig - Route: Infuse 2 mL into a venous catheter 1 (One) Time. - Intravenous    Reason for Discontinue: Error    collagenase ointment (Discontinued)  Every 24 Hours Scheduled 1/16/2017 1/17/2017    Sig - Route: Apply  topically Daily. - Topical          Physician Progress Notes (last 24 hours) (Notes from 1/16/2017  9:17 AM through 1/17/2017  9:17 AM)     No " notes of this type exist for this encounter.           Consult Notes (last 24 hours) (Notes from 2017  9:17 AM through 2017  9:17 AM)      Brant Blanca Jr., MD at 2017  6:38 AM  Version 1 of 1     Consult Orders:    1. Inpatient Consult to Urology [[id number]] ordered by Bean Alejandre MD at 17 1019                       FIRST UROLOGY CONSULT      Patient Identification:  NAME:  Joaquín Sherman  Age:  36 y.o.   Sex:  male   :  1980   MRN:  [id number]       Chief complaint: Urinary leakage around SP tube    History of present illness:  Mr. Sherman is a 36-year-old man with paraplegia and sacral ulcers whose urinary tract is managed with chronic SP tube. Recently he has been leaking around his SP tube, irritating his sacral wound. He also reports urinary stones have been clogging the SP catheter. He also wears a condom catheter. No gross hematuria.    After discharge in October, he was lost to follow up, and he reports the SP tube he currently has was placed in October.      Past medical history:  Past Medical History   Diagnosis Date   • Anemia    • Chronic pain    • GERD (gastroesophageal reflux disease)    • Hypotension    • Paraplegia        Past surgical history:  Past Surgical History   Procedure Laterality Date   • Suprapubic catheter insertion     • Pr change of bladder tube,complicated N/A 2016     Procedure: CYSTOSCOPY WITH SUPRAPUBIC CATHETER INSERTION;  Surgeon: Brant Blanca Jr., MD;  Location: [facility];  Service: Urology       Allergies:  Amoxicillin-pot clavulanate; Ibuprofen; Ketorolac tromethamine; Meropenem; and Vancomycin    Home medications:  Prescriptions Prior to Admission   Medication Sig Dispense Refill Last Dose   • ALPRAZolam (XANAX) 1 MG tablet Take 1 mg by mouth 2 (Two) Times a Day As Needed for anxiety.      • oxyCODONE ER (oxyCONTIN) 15 MG tablet extended-release 12 hour Take 15 mg by mouth Every 12 (Twelve) Hours.      •  bisacodyl (DULCOLAX) 5 MG EC tablet Take 1 tablet by mouth daily as needed for constipation. 30 tablet 0    • collagenase 250 UNIT/GM ointment Apply  topically Daily. 90 g 0    • gabapentin (NEURONTIN) 300 MG capsule Take 300 mg by mouth 3 (three) times a day.      • OXYBUTYNIN CHLORIDE PO Take 5 mg by mouth 3 (three) times a day.      • Pantoprazole Sodium (PROTONIX PO) Take 40 mg by mouth daily.      • sodium hypochlorite , (DAKIN'S 1/4 STRENGTH) 0.125 % solution topical solution 0.125% Lt hip & Rt. Calf: bid with Kerlex and ABD pads 1 bottle 0         Hospital medications:    collagenase  Topical Q24H   enoxaparin 40 mg Subcutaneous Q24H   gabapentin 300 mg Oral TID   heparin flush (porcine) 5 mL Intracatheter Q12H   hydrophor  Topical Q12H   oxybutynin 5 mg Oral TID   oxyCODONE 15 mg Oral Q12H   pantoprazole 40 mg Oral Daily   sodium chloride 10 mL Intracatheter Q12H   sodium hypochlorite  Topical BID        •  ALPRAZolam  •  ALPRAZolam  •  bisacodyl  •  heparin flush (porcine)  •  HYDROmorphone  •  ondansetron **OR** ondansetron ODT **OR** ondansetron  •  sodium chloride  •  Insert peripheral IV **AND** sodium chloride  •  sodium chloride    Family history:  History reviewed. No pertinent family history.    Social history:  Social History   Substance Use Topics   • Smoking status: Current Every Day Smoker     Packs/day: 0.50   • Smokeless tobacco: None   • Alcohol use No       Review of systems:    Negative 12-system ROS except for the following:  As above      Objective:  TMax 24 hours:   Temp (24hrs), Av.5 °F (36.9 °C), Min:97.3 °F (36.3 °C), Max:99.7 °F (37.6 °C)      Vitals Ranges:   Temp:  [97.3 °F (36.3 °C)-99.7 °F (37.6 °C)] 99.7 °F (37.6 °C)  Heart Rate:  [] 116  Resp:  [16] 16  BP: (86-96)/(61-72) 91/66    Intake/Output Last 3 shifts:        Physical Exam:       General Appearance:    Alert, cooperative, in no acute distress   Head:    Normocephalic, without obvious abnormality, atraumatic    Eyes:          PERRL                   Lungs:     Respirations unlabored, symmetric excursion       Abdomen:     Soft, NDNT, no masses, no guarding   :    SP tube intact. Urine clear. Condom catheter in place draining clear yellow urine.           Neuro/Psych:   Orientation intact, mood/affect pleasant, no focal findings       Results review:   I reviewed the patient's new clinical results.    Data review:  Lab Results (last 24 hours)     Procedure Component Value Units Date/Time    Retic With IRF & RET-He [[id number]]  (Abnormal) Collected:  01/17/17 0335    Specimen:  Blood Updated:  01/17/17 0404     Immature Reticulocyte Fraction 13.7 %      Reticulocyte % 0.89 %      Reticulated Hgb 24.5 (L) pg     Iron Profile [[id number]]  (Abnormal) Collected:  01/17/17 0335    Specimen:  Blood Updated:  01/17/17 0420     Iron 17 (L) mcg/dL      Iron Saturation 10 (L) %      Transferrin 116 (L) mg/dL      TIBC 173 mcg/dL     Basic Metabolic Panel [[id number]]  (Abnormal) Collected:  01/17/17 0335    Specimen:  Blood Updated:  01/17/17 0423     Glucose 115 (H) mg/dL      BUN 6 mg/dL      Creatinine 0.86 mg/dL      Sodium 136 mmol/L      Potassium 4.0 mmol/L      Chloride 98 mmol/L      CO2 24.2 mmol/L      Calcium 8.5 (L) mg/dL      eGFR  African Amer 122 mL/min/1.73      BUN/Creatinine Ratio 7.0      Anion Gap 13.8 mmol/L     Narrative:       GFR Normal >60  Chronic Kidney Disease <60  Kidney Failure <15    CBC & Differential [[id number]] Collected:  01/17/17 0335    Specimen:  Blood Updated:  01/17/17 0426    Narrative:       The following orders were created for panel order CBC & Differential.  Procedure                               Abnormality         Status                     ---------                               -----------         ------                     Scan Slide[[id number]]                                        Final result               CBC Auto Differential[[id number]]         Abnormal            Final result                  Please view results for these tests on the individual orders.    CBC Auto Differential [[id number]]  (Abnormal) Collected:  01/17/17 0335    Specimen:  Blood Updated:  01/17/17 0426     WBC 7.63 10*3/mm3      RBC 3.43 (L) 10*6/mm3      Hemoglobin 8.2 (L) g/dL      Hematocrit 28.3 (L) %      MCV 82.5 fL      MCH 23.9 (L) pg      MCHC 29.0 (L) g/dL      RDW 18.1 (H) %      RDW-SD 55.5 (H) fl      MPV 8.7 fL      Platelets 278 10*3/mm3      Neutrophil % 62.0 %      Lymphocyte % 28.3 %      Monocyte % 7.2 %      Eosinophil % 1.7 %      Basophil % 0.5 %      Immature Grans % 0.3 %      Neutrophils, Absolute 4.73 10*3/mm3      Lymphocytes, Absolute 2.16 10*3/mm3      Monocytes, Absolute 0.55 10*3/mm3      Eosinophils, Absolute 0.13 10*3/mm3      Basophils, Absolute 0.04 10*3/mm3      Immature Grans, Absolute 0.02 10*3/mm3      nRBC 0.0 /100 WBC     Scan Slide [[id number]] Collected:  01/17/17 0335    Specimen:  Blood Updated:  01/17/17 0426     Anisocytosis Mod/2+      Hypochromia Slight/1+      Ovalocytes Slight/1+      RBC Fragments Slight/1+      WBC Morphology Normal      Platelet Morphology Normal          Assessment:     Principal Problem:    Severe anemia  Active Problems:    Paraplegia    Decubitus ulcer of sacral region, stage 4    Suprapubic catheter dysfunction    Neurogenic bladder    Plan:     CT abd/pelvis without contrast. If no bladder stones adherent to the SP tube, will arrange SP tube exchange while inpatient    Brant Blanca Jr., MD  01/17/17  6:38 AM               Electronically signed by Brant Blanca Jr., MD at 1/17/2017  6:49 AM

## 2017-01-17 NOTE — PROGRESS NOTES
" LOS: 1 day     Name: Joaquín Sherman  Age: 36 y.o.  Sex: male  :  1980  MRN: [id number]         Primary Care Physician: ARIES Up    Subjective   Subjective  Abdominal pain better today.  No new complaints.  Poor appetite but this is chronic, was able to eat PB&J today    Objective   Vital Signs  Temp:  [97.3 °F (36.3 °C)-99.7 °F (37.6 °C)] 98.6 °F (37 °C)  Heart Rate:  [] 99  Resp:  [16-20] 20  BP: (86-91)/(60-72) 87/60  Body mass index is 12.01 kg/(m^2).    Objective:  General Appearance:  Comfortable and in no acute distress (chronically ill appearing).    Vital signs: (most recent): Blood pressure 87/60, pulse 99, temperature 98.6 °F (37 °C), temperature source Oral, resp. rate 20, height 72.99\" (185.4 cm), weight 91 lb (41.3 kg), SpO2 99 %.    Lungs:  Normal respiratory rate and normal effort.    Heart: Normal rate.  Regular rhythm.    Abdomen: Abdomen is soft.  (Suprapubic catheter and ostomy in place)Bowel sounds are normal.   There is left upper quadrant tenderness.    Extremities: There is no local swelling or dependent edema.  (Muscle wasting and contracture of BLE)  Neurological: Patient is alert and oriented to person, place and time.    Skin:  Warm and dry.              Results Review:       I reviewed the patient's new clinical results.      Results from last 7 days  Lab Units 17  0335 17  0124   WBC 10*3/mm3 7.63 8.84   HEMOGLOBIN g/dL 8.2* 6.3*   PLATELETS 10*3/mm3 278 280       Results from last 7 days  Lab Units 17  0335 17  0124   SODIUM mmol/L 136 139   POTASSIUM mmol/L 4.0 3.6   CHLORIDE mmol/L 98 102   TOTAL CO2 mmol/L 24.2 26.2   BUN mg/dL 6 14   CREATININE mg/dL 0.86 0.87   CALCIUM mg/dL 8.5* 8.5*   GLUCOSE mg/dL 115* 121*         Scheduled Meds:     enoxaparin 40 mg Subcutaneous Q24H   gabapentin 300 mg Oral TID   heparin flush (porcine) 5 mL Intracatheter Q12H   hydrophor  Topical Q12H   oxybutynin 5 mg Oral TID   oxyCODONE 15 mg Oral Q12H "   pantoprazole 40 mg Oral Daily   sodium chloride 10 mL Intracatheter Q12H   sodium hypochlorite  Topical BID     PRN Meds:   •  ALPRAZolam  •  ALPRAZolam  •  bisacodyl  •  heparin flush (porcine)  •  HYDROmorphone  •  ondansetron **OR** ondansetron ODT **OR** ondansetron  •  sodium chloride  •  Insert peripheral IV **AND** sodium chloride  •  sodium chloride  Continuous Infusions:       Assessment/Plan   Principal Problem:    Severe anemia  Active Problems:    Paraplegia    Hypotension, chronic asymptomatic    Decubitus ulcer of sacral region, stage 4    Suprapubic catheter dysfunction      Assessment & Plan    - Good response to 2 units PRBCs yesterday.  Hgb now back at his baseline.  Monitor.  No evidence of active bleeding.  Anemia due to chronic disease.  Iron low, will give a dose of venofer  - Urology eval appreciated.  Awaiting CT scan before proceeding with SP catheter exchange  - Continue wound and ostomy care  - He plans to return home at discharge    Bean Alejandre MD  [facility]  01/17/17  12:00 PM

## 2017-01-17 NOTE — PLAN OF CARE
Problem: Patient Care Overview (Adult)  Goal: Plan of Care Review  Outcome: Ongoing (interventions implemented as appropriate)    01/17/17 3942   Coping/Psychosocial Response Interventions   Plan Of Care Reviewed With patient   Patient Care Overview   Progress improving   Outcome Evaluation   Outcome Summary/Follow up Plan pt tolerating dressing changes. he is refusing to turn or repposition. pt is noncompliant with nutritional regimen. he c/o of generalized pain. prn pan meds given. no acute distress noted. will continue to monitor.       Goal: Adult Individualization and Mutuality  Outcome: Ongoing (interventions implemented as appropriate)    Problem: Fall Risk (Adult)  Goal: Absence of Falls  Outcome: Ongoing (interventions implemented as appropriate)    Problem: Skin Integrity Impairment, Risk/Actual (Adult)  Goal: Skin Integrity/Wound Healing  Outcome: Ongoing (interventions implemented as appropriate)    Problem: Nutrition, Imbalanced: Inadequate Oral Intake (Adult)  Goal: Improved Oral Intake  Outcome: Ongoing (interventions implemented as appropriate)  Goal: Prevent Further Weight Loss  Outcome: Ongoing (interventions implemented as appropriate)

## 2017-01-17 NOTE — PROGRESS NOTES
Continued Stay Note  [facility]     Patient Name: Joaquín Sherman  MRN: [id number]  Today's Date: 1/17/2017    Admit Date: 1/16/2017          Discharge Plan       01/17/17 1622    Case Management/Social Work Plan    Plan Home with HH    Additional Comments Spoke with Nicki  with [facility]. [facility] has used [facility]  in the past  and they are unable to readmit him on discharge.. Spoke with patient. He states he is followed by [facility] . Called [facility]--[phone] and spoke with Pina. She states  patient  is not current with them and they will not readmit him. Spoke with patient. He  is interested in anout patient wound clinic. No wound care clinic at  [facility] . [facility] and [facility] wound care clinics  would require a pre-cert from patient's Passport.  Patient is  interested in having Dr. Hall be his  PCP. With patient's permission, Dr. Hall's  office was contacted.---[phone].  Was told that  patient and family would  need to make their own appointment. Spoke with patient and patient's sister, Jenni--[phone]. Jenni is agreeable to  caring for patient at discharge. Jenni  will  contact Dr. Hall  and see if he will follow patient as PCP. Have asked [facility] to see patient for HH needs . They are unable to follow              Discharge Codes     None        Expected Discharge Date and Time     Expected Discharge Date Expected Discharge Time    Jan 18, 2017             BALDO Mccall

## 2017-01-17 NOTE — PROGRESS NOTES
Continued Stay Note  [facility]     Patient Name: Joaquín Sherman  MRN: [id number]  Today's Date: 1/17/2017    Admit Date: 1/16/2017          Discharge Plan       01/17/17 1107    Case Management/Social Work Plan    Plan Home with HH    Patient/Family In Agreement With Plan yes    Additional Comments S/W patient at bedside and patient does not want to go to a SNF, wants to go home.  I have put another call out to Dr. Jolley's office and left a message re: new patient referral.  Called Dr. Johnson and they do not accept Passport.  Call out to [facility] and they will not take patient back due to non-compliance, not home when physicians were scheduled.  Call out to [facility] at [phone] to see if they will take Passport and accept a new referral, left message.  [facility] will not take patient back and neither will [facility] r/t non-compliance..  Will need a PCP to get [facility] for HH.  Will continue to monitor.  Zay RN, CCP              Discharge Codes     None            Seda Monge RN

## 2017-01-18 LAB
ANION GAP SERPL CALCULATED.3IONS-SCNC: 12.4 MMOL/L
BASOPHILS # BLD AUTO: 0.04 10*3/MM3 (ref 0–0.2)
BASOPHILS NFR BLD AUTO: 0.5 % (ref 0–1.5)
BILIRUB UR QL STRIP: NEGATIVE
BUN BLD-MCNC: 11 MG/DL (ref 6–20)
BUN/CREAT SERPL: 12.2 (ref 7–25)
CALCIUM SPEC-SCNC: 8.4 MG/DL (ref 8.6–10.5)
CHLORIDE SERPL-SCNC: 98 MMOL/L (ref 98–107)
CLARITY UR: ABNORMAL
CO2 SERPL-SCNC: 23.6 MMOL/L (ref 22–29)
COLOR UR: YELLOW
CREAT BLD-MCNC: 0.9 MG/DL (ref 0.76–1.27)
DEPRECATED RDW RBC AUTO: 54.8 FL (ref 37–54)
EOSINOPHIL # BLD AUTO: 0.13 10*3/MM3 (ref 0–0.7)
EOSINOPHIL NFR BLD AUTO: 1.7 % (ref 0.3–6.2)
ERYTHROCYTE [DISTWIDTH] IN BLOOD BY AUTOMATED COUNT: 18.1 % (ref 11.5–14.5)
GFR SERPL CREATININE-BSD FRML MDRD: 116 ML/MIN/1.73
GLUCOSE BLD-MCNC: 123 MG/DL (ref 65–99)
GLUCOSE UR STRIP-MCNC: NEGATIVE MG/DL
HCT VFR BLD AUTO: 28 % (ref 40.4–52.2)
HGB BLD-MCNC: 8 G/DL (ref 13.7–17.6)
HGB UR QL STRIP.AUTO: ABNORMAL
IMM GRANULOCYTES # BLD: 0.02 10*3/MM3 (ref 0–0.03)
IMM GRANULOCYTES NFR BLD: 0.3 % (ref 0–0.5)
KETONES UR QL STRIP: NEGATIVE
LEUKOCYTE ESTERASE UR QL STRIP.AUTO: ABNORMAL
LYMPHOCYTES # BLD AUTO: 1.73 10*3/MM3 (ref 0.9–4.8)
LYMPHOCYTES NFR BLD AUTO: 22 % (ref 19.6–45.3)
MCH RBC QN AUTO: 23.7 PG (ref 27–32.7)
MCHC RBC AUTO-ENTMCNC: 28.6 G/DL (ref 32.6–36.4)
MCV RBC AUTO: 83.1 FL (ref 79.8–96.2)
MONOCYTES # BLD AUTO: 0.77 10*3/MM3 (ref 0.2–1.2)
MONOCYTES NFR BLD AUTO: 9.8 % (ref 5–12)
NEUTROPHILS # BLD AUTO: 5.18 10*3/MM3 (ref 1.9–8.1)
NEUTROPHILS NFR BLD AUTO: 65.7 % (ref 42.7–76)
NITRITE UR QL STRIP: NEGATIVE
NRBC BLD MANUAL-RTO: 0 /100 WBC (ref 0–0)
PH UR STRIP.AUTO: 7 [PH] (ref 5–8)
PLATELET # BLD AUTO: 243 10*3/MM3 (ref 140–500)
PMV BLD AUTO: 8.8 FL (ref 6–12)
POTASSIUM BLD-SCNC: 4 MMOL/L (ref 3.5–5.2)
PROT UR QL STRIP: ABNORMAL
RBC # BLD AUTO: 3.37 10*6/MM3 (ref 4.6–6)
SODIUM BLD-SCNC: 134 MMOL/L (ref 136–145)
SP GR UR STRIP: 1.01 (ref 1–1.03)
UROBILINOGEN UR QL STRIP: ABNORMAL
WBC NRBC COR # BLD: 7.87 10*3/MM3 (ref 4.5–10.7)

## 2017-01-18 PROCEDURE — 25010000002 ENOXAPARIN PER 10 MG: Performed by: INTERNAL MEDICINE

## 2017-01-18 PROCEDURE — 93010 ELECTROCARDIOGRAM REPORT: CPT | Performed by: INTERNAL MEDICINE

## 2017-01-18 PROCEDURE — 87086 URINE CULTURE/COLONY COUNT: CPT | Performed by: EMERGENCY MEDICINE

## 2017-01-18 PROCEDURE — 80048 BASIC METABOLIC PNL TOTAL CA: CPT | Performed by: INTERNAL MEDICINE

## 2017-01-18 PROCEDURE — 93005 ELECTROCARDIOGRAM TRACING: CPT | Performed by: INTERNAL MEDICINE

## 2017-01-18 PROCEDURE — 81001 URINALYSIS AUTO W/SCOPE: CPT | Performed by: EMERGENCY MEDICINE

## 2017-01-18 PROCEDURE — 25010000002 HYDROMORPHONE PER 4 MG: Performed by: INTERNAL MEDICINE

## 2017-01-18 PROCEDURE — 85025 COMPLETE CBC W/AUTO DIFF WBC: CPT | Performed by: INTERNAL MEDICINE

## 2017-01-18 PROCEDURE — 87186 SC STD MICRODIL/AGAR DIL: CPT | Performed by: EMERGENCY MEDICINE

## 2017-01-18 PROCEDURE — 25010000002 HEPARIN FLUSH (PORCINE) 100 UNIT/ML SOLUTION: Performed by: INTERNAL MEDICINE

## 2017-01-18 PROCEDURE — 0T2BX0Z CHANGE DRAINAGE DEVICE IN BLADDER, EXTERNAL APPROACH: ICD-10-PCS | Performed by: UROLOGY

## 2017-01-18 PROCEDURE — 25010000002 HYDROMORPHONE PER 4 MG: Performed by: HOSPITALIST

## 2017-01-18 RX ORDER — OXYCODONE HCL 10 MG/1
10 TABLET, FILM COATED, EXTENDED RELEASE ORAL EVERY 12 HOURS SCHEDULED
Status: DISCONTINUED | OUTPATIENT
Start: 2017-01-18 | End: 2017-01-19

## 2017-01-18 RX ORDER — OXYCODONE HCL 10 MG/1
10 TABLET, FILM COATED, EXTENDED RELEASE ORAL EVERY 12 HOURS SCHEDULED
Status: DISCONTINUED | OUTPATIENT
Start: 2017-01-18 | End: 2017-01-18

## 2017-01-18 RX ORDER — OXYCODONE HCL 10 MG/1
10 TABLET, FILM COATED, EXTENDED RELEASE ORAL EVERY 4 HOURS PRN
Status: DISCONTINUED | OUTPATIENT
Start: 2017-01-18 | End: 2017-01-18

## 2017-01-18 RX ORDER — HYDROMORPHONE HYDROCHLORIDE 1 MG/ML
0.5 INJECTION, SOLUTION INTRAMUSCULAR; INTRAVENOUS; SUBCUTANEOUS EVERY 4 HOURS PRN
Status: DISCONTINUED | OUTPATIENT
Start: 2017-01-18 | End: 2017-01-20

## 2017-01-18 RX ORDER — OXYCODONE AND ACETAMINOPHEN 10; 325 MG/1; MG/1
1 TABLET ORAL EVERY 4 HOURS PRN
Status: DISCONTINUED | OUTPATIENT
Start: 2017-01-18 | End: 2017-01-23 | Stop reason: HOSPADM

## 2017-01-18 RX ADMIN — OXYBUTYNIN CHLORIDE 5 MG: 5 TABLET ORAL at 21:04

## 2017-01-18 RX ADMIN — HYDROMORPHONE HYDROCHLORIDE 0.5 MG: 1 INJECTION, SOLUTION INTRAMUSCULAR; INTRAVENOUS; SUBCUTANEOUS at 23:11

## 2017-01-18 RX ADMIN — SODIUM CHLORIDE, PRESERVATIVE FREE 10 ML: 5 INJECTION INTRAVENOUS at 21:09

## 2017-01-18 RX ADMIN — OXYBUTYNIN CHLORIDE 5 MG: 5 TABLET ORAL at 00:40

## 2017-01-18 RX ADMIN — OXYCODONE HYDROCHLORIDE AND ACETAMINOPHEN 1 TABLET: 10; 325 TABLET ORAL at 21:03

## 2017-01-18 RX ADMIN — DAKIN'S SOLUTION 0.125% (QUARTER STRENGTH): 0.12 SOLUTION at 01:15

## 2017-01-18 RX ADMIN — OXYCODONE HYDROCHLORIDE 15 MG: 15 TABLET, FILM COATED, EXTENDED RELEASE ORAL at 00:39

## 2017-01-18 RX ADMIN — HYDROMORPHONE HYDROCHLORIDE 0.5 MG: 1 INJECTION, SOLUTION INTRAMUSCULAR; INTRAVENOUS; SUBCUTANEOUS at 01:36

## 2017-01-18 RX ADMIN — ALPRAZOLAM 1 MG: 0.5 TABLET ORAL at 21:04

## 2017-01-18 RX ADMIN — HYDROMORPHONE HYDROCHLORIDE 0.5 MG: 1 INJECTION, SOLUTION INTRAMUSCULAR; INTRAVENOUS; SUBCUTANEOUS at 08:34

## 2017-01-18 RX ADMIN — OXYCODONE HYDROCHLORIDE 15 MG: 15 TABLET, FILM COATED, EXTENDED RELEASE ORAL at 08:35

## 2017-01-18 RX ADMIN — SODIUM CHLORIDE, PRESERVATIVE FREE 10 ML: 5 INJECTION INTRAVENOUS at 08:35

## 2017-01-18 RX ADMIN — GABAPENTIN 300 MG: 300 CAPSULE ORAL at 16:06

## 2017-01-18 RX ADMIN — HYDROMORPHONE HYDROCHLORIDE 0.5 MG: 1 INJECTION, SOLUTION INTRAMUSCULAR; INTRAVENOUS; SUBCUTANEOUS at 18:14

## 2017-01-18 RX ADMIN — HYDROMORPHONE HYDROCHLORIDE 0.5 MG: 1 INJECTION, SOLUTION INTRAMUSCULAR; INTRAVENOUS; SUBCUTANEOUS at 13:04

## 2017-01-18 RX ADMIN — GABAPENTIN 300 MG: 300 CAPSULE ORAL at 00:40

## 2017-01-18 RX ADMIN — HYDROMORPHONE HYDROCHLORIDE 0.5 MG: 1 INJECTION, SOLUTION INTRAMUSCULAR; INTRAVENOUS; SUBCUTANEOUS at 04:33

## 2017-01-18 RX ADMIN — ENOXAPARIN SODIUM 40 MG: 40 INJECTION SUBCUTANEOUS at 13:04

## 2017-01-18 RX ADMIN — PETROLATUM: 42 OINTMENT TOPICAL at 21:08

## 2017-01-18 RX ADMIN — PANTOPRAZOLE SODIUM 40 MG: 40 TABLET, DELAYED RELEASE ORAL at 08:34

## 2017-01-18 RX ADMIN — GABAPENTIN 300 MG: 300 CAPSULE ORAL at 21:04

## 2017-01-18 RX ADMIN — PETROLATUM: 42 OINTMENT TOPICAL at 01:15

## 2017-01-18 RX ADMIN — ALPRAZOLAM 1 MG: 0.5 TABLET ORAL at 08:35

## 2017-01-18 RX ADMIN — OXYBUTYNIN CHLORIDE 5 MG: 5 TABLET ORAL at 18:16

## 2017-01-18 RX ADMIN — OXYBUTYNIN CHLORIDE 5 MG: 5 TABLET ORAL at 08:34

## 2017-01-18 RX ADMIN — HYDROMORPHONE HYDROCHLORIDE 0.5 MG: 1 INJECTION, SOLUTION INTRAMUSCULAR; INTRAVENOUS; SUBCUTANEOUS at 06:47

## 2017-01-18 RX ADMIN — DAKIN'S SOLUTION 0.125% (QUARTER STRENGTH): 0.12 SOLUTION at 23:14

## 2017-01-18 NOTE — PROGRESS NOTES
No events overnight. Catheter functioning well.     AVSS  Abd soft  SP tube intact.    CT reviewed. No calcifications associated with SP tube.    Plan:  - After informed consent obtained, SP tube exchanged with new 20F catheter under sterile conditions. 10 cc sterile water used in balloon.  - Recommend SP tube changes q6 weeks.   - He will need follow up with urologist after discharge. I recommended the [facility] urology department, as they may be able to discuss suprapubic urinary diversion with him as well.

## 2017-01-18 NOTE — PROGRESS NOTES
Continued Stay Note  [facility]     Patient Name: Joaquín Sherman  MRN: [id number]  Today's Date: 1/18/2017    Admit Date: 1/16/2017          Discharge Plan       01/18/17 2525    Case Management/Social Work Plan    Additional Comments Pt may be eligible for a personal care attendant program through the [facility]. Spoke with Destinee [phone] who reports pt will need to call and speak with Ashleigh to determine if pt is eligible and meets financial criteria. Per Destinee Personal care attendant can help with housecleaning, cooking, laundry, shopping, transferring, skin care, bathing, positioning, toilet assistance, dressing and grooming. Gave contact information to pt.               Discharge Codes     None        Expected Discharge Date and Time     Expected Discharge Date Expected Discharge Time    Jan 18, 2017             Dena Johnston

## 2017-01-18 NOTE — PLAN OF CARE
Problem: Patient Care Overview (Adult)  Goal: Plan of Care Review  Outcome: Ongoing (interventions implemented as appropriate)    01/18/17 0416   Coping/Psychosocial Response Interventions   Plan Of Care Reviewed With patient   Patient Care Overview   Progress improving   Outcome Evaluation   Outcome Summary/Follow up Plan pt noncompliant with diet, tolerating dressing changes, pe-medicated before changing his dressings, no falls, dialudid given PRN for generalized pain, room air, sinus tach and bigeminyDr. Menezes aware, possible suprapubic catheter exchange, possible D/C, continue to monitor        Goal: Adult Individualization and Mutuality  Outcome: Ongoing (interventions implemented as appropriate)  Goal: Discharge Needs Assessment  Outcome: Ongoing (interventions implemented as appropriate)    Problem: Fall Risk (Adult)  Goal: Absence of Falls  Outcome: Ongoing (interventions implemented as appropriate)    Problem: Skin Integrity Impairment, Risk/Actual (Adult)  Goal: Skin Integrity/Wound Healing  Outcome: Ongoing (interventions implemented as appropriate)    Problem: Nutrition, Imbalanced: Inadequate Oral Intake (Adult)  Goal: Improved Oral Intake  Outcome: Ongoing (interventions implemented as appropriate)  Goal: Prevent Further Weight Loss  Outcome: Ongoing (interventions implemented as appropriate)

## 2017-01-18 NOTE — PROGRESS NOTES
Continued Stay Note  [facility]     Patient Name: Joaquín Sherman  MRN: [id number]  Today's Date: 1/18/2017    Admit Date: 1/16/2017          Discharge Plan       01/18/17 1617    Case Management/Social Work Plan    Additional Comments Spoke with sisterJenni and she said that once patient gets home and she gets all his information ( passport number, social security, etc), she will schedule him an appointment with Dr. Hall.  Encouraged her to get an apoointment ASAP and advised that patient will likely be dc'sd tomorrow.       01/18/17 1405    Case Management/Social Work Plan    Additional Comments Pt may be eligible for a personal care attendant program through the [facility] for [facility]. Spoke with Destinee [phone] who reports pt will need to call and speak with Ashleigh to determine if pt is eligible and meets financial criteria. Per Destinee Personal care attendant can help with housecleaning, cooking, laundry, shopping, transferring, skin care, bathing, positioning, toilet assistance, dressing and grooming. Gave contact information to pt.               Discharge Codes     None        Expected Discharge Date and Time     Expected Discharge Date Expected Discharge Time    Jan 18, 2017             Idalia Ocasio RN

## 2017-01-18 NOTE — PROGRESS NOTES
"   LOS: 2 days   Patient Care Team:  ARIES Up as PCP - General (Nurse Practitioner)    Chief Complaint: back pain    Subjective     HPI Comments: Pt is sedated. Says he's acting this way because his back hurts.    Urinary Tract Infection    Pertinent negatives include no nausea or vomiting.       Subjective:  Symptoms:  Stable.  No shortness of breath, malaise, cough, chest pain, weakness, headache, chest pressure, anorexia, diarrhea or anxiety.    Diet:  Adequate intake.  No nausea or vomiting.    Activity level: Activity impairment: impaired due to paraplegia.    Pain:  He complains of pain that is mild.  He reports pain is unchanged.  Pain is partially controlled.        History taken from: patient chart and CCP    Objective     Vital Signs  Temp:  [97.7 °F (36.5 °C)-98.8 °F (37.1 °C)] 98 °F (36.7 °C)  Heart Rate:  [] 93  Resp:  [18] 18  BP: (85-94)/(56-62) 85/58    Objective:  General Appearance:  Comfortable and in no acute distress (seems sedated).    Vital signs: (most recent): Blood pressure 85/58, pulse 93, temperature 98 °F (36.7 °C), temperature source Oral, resp. rate 18, height 72.99\" (185.4 cm), weight 91 lb (41.3 kg), SpO2 98 %.  Vital signs are normal.  No fever.    Output: Producing urine and producing stool.    Lungs:  Normal respiratory rate and normal effort.  Breath sounds clear to auscultation.    Heart: Normal rate.  Regular rhythm.    Abdomen: Abdomen is soft.  Bowel sounds are normal.   There is no abdominal tenderness.     Extremities: There is no dependent edema.    Pulses: Distal pulses are intact.    Neurological: Patient is alert and oriented to person, place and time.  (Sedated).    Skin:  Warm and dry.              Results Review:     I reviewed the patient's new clinical results.  I reviewed the patient's other test results and agree with the interpretation  Discussed with patient and CCP    Medication Review: reviewed    Assessment/Plan     Principal Problem:    " Severe anemia  Active Problems:    Paraplegia    Hypotension, chronic asymptomatic    Decubitus ulcer of sacral region, stage 4    Suprapubic catheter dysfunction      Assessment:  (1. Severe anemia (iron deficiency and chronic disease)  2. Paraplegia and chronic suprapubic catheter  3. Non-compliance  4. Chronic hypotension  5. Stage 4 Sacral decubitus  6. Chronic pain syndrome  7. GERD).     Plan:   (IV iron  S/p exchange of supra-pubic catheter (will need to get changed every 6 weeks)   suggests follow-up with [facility]  Compliance is biggest carri for pt, every home health agency in area has refused to accept him. Can't find any wound clinics for him either. Family is trying to get him an appt with Dr. Hall as PCP now. Pt is chronically non-compliant. Misses appointments. Doesn't use his pressure mattress. Isn't home when HH agencies come by.).       Brant Saxena MD  01/18/17  2:06 PM    Time: 35min

## 2017-01-19 ENCOUNTER — APPOINTMENT (OUTPATIENT)
Dept: CT IMAGING | Facility: HOSPITAL | Age: 37
End: 2017-01-19
Attending: HOSPITALIST

## 2017-01-19 LAB
ANION GAP SERPL CALCULATED.3IONS-SCNC: 9.9 MMOL/L
BACTERIA UR QL AUTO: ABNORMAL /HPF
BUN BLD-MCNC: 12 MG/DL (ref 6–20)
BUN/CREAT SERPL: 14.1 (ref 7–25)
CALCIUM SPEC-SCNC: 8.6 MG/DL (ref 8.6–10.5)
CHLORIDE SERPL-SCNC: 98 MMOL/L (ref 98–107)
CO2 SERPL-SCNC: 27.1 MMOL/L (ref 22–29)
CREAT BLD-MCNC: 0.85 MG/DL (ref 0.76–1.27)
DEPRECATED RDW RBC AUTO: 57.2 FL (ref 37–54)
ERYTHROCYTE [DISTWIDTH] IN BLOOD BY AUTOMATED COUNT: 18.3 % (ref 11.5–14.5)
GFR SERPL CREATININE-BSD FRML MDRD: 124 ML/MIN/1.73
GLUCOSE BLD-MCNC: 117 MG/DL (ref 65–99)
HCT VFR BLD AUTO: 29.7 % (ref 40.4–52.2)
HGB BLD-MCNC: 8.4 G/DL (ref 13.7–17.6)
HYALINE CASTS UR QL AUTO: ABNORMAL /LPF
MCH RBC QN AUTO: 24.1 PG (ref 27–32.7)
MCHC RBC AUTO-ENTMCNC: 28.3 G/DL (ref 32.6–36.4)
MCV RBC AUTO: 85.3 FL (ref 79.8–96.2)
PLATELET # BLD AUTO: 249 10*3/MM3 (ref 140–500)
PMV BLD AUTO: 8.8 FL (ref 6–12)
POTASSIUM BLD-SCNC: 4.7 MMOL/L (ref 3.5–5.2)
RBC # BLD AUTO: 3.48 10*6/MM3 (ref 4.6–6)
RBC # UR: ABNORMAL /HPF
REF LAB TEST METHOD: ABNORMAL
SODIUM BLD-SCNC: 135 MMOL/L (ref 136–145)
SQUAMOUS #/AREA URNS HPF: ABNORMAL /HPF
WBC NRBC COR # BLD: 6.89 10*3/MM3 (ref 4.5–10.7)
WBC UR QL AUTO: ABNORMAL /HPF

## 2017-01-19 PROCEDURE — 25010000003 CEFTRIAXONE PER 250 MG: Performed by: HOSPITALIST

## 2017-01-19 PROCEDURE — 85027 COMPLETE CBC AUTOMATED: CPT | Performed by: HOSPITALIST

## 2017-01-19 PROCEDURE — 80048 BASIC METABOLIC PNL TOTAL CA: CPT | Performed by: HOSPITALIST

## 2017-01-19 PROCEDURE — 71250 CT THORAX DX C-: CPT

## 2017-01-19 PROCEDURE — 25010000002 HYDROMORPHONE PER 4 MG: Performed by: HOSPITALIST

## 2017-01-19 PROCEDURE — 25010000002 ENOXAPARIN PER 10 MG: Performed by: INTERNAL MEDICINE

## 2017-01-19 RX ORDER — CEFTRIAXONE SODIUM 1 G/50ML
1 INJECTION, SOLUTION INTRAVENOUS EVERY 24 HOURS
Status: DISCONTINUED | OUTPATIENT
Start: 2017-01-19 | End: 2017-01-20

## 2017-01-19 RX ORDER — OXYCODONE HYDROCHLORIDE 15 MG/1
15 TABLET, FILM COATED, EXTENDED RELEASE ORAL EVERY 12 HOURS SCHEDULED
Status: DISCONTINUED | OUTPATIENT
Start: 2017-01-19 | End: 2017-01-23 | Stop reason: HOSPADM

## 2017-01-19 RX ADMIN — CEFTRIAXONE SODIUM 1 G: 1 INJECTION, SOLUTION INTRAVENOUS at 13:17

## 2017-01-19 RX ADMIN — OXYCODONE HYDROCHLORIDE 10 MG: 10 TABLET, FILM COATED, EXTENDED RELEASE ORAL at 09:59

## 2017-01-19 RX ADMIN — DAKIN'S SOLUTION 0.125% (QUARTER STRENGTH): 0.12 SOLUTION at 21:46

## 2017-01-19 RX ADMIN — HYDROMORPHONE HYDROCHLORIDE 0.5 MG: 1 INJECTION, SOLUTION INTRAMUSCULAR; INTRAVENOUS; SUBCUTANEOUS at 07:53

## 2017-01-19 RX ADMIN — OXYCODONE HYDROCHLORIDE 15 MG: 15 TABLET, FILM COATED, EXTENDED RELEASE ORAL at 21:44

## 2017-01-19 RX ADMIN — GABAPENTIN 300 MG: 300 CAPSULE ORAL at 16:50

## 2017-01-19 RX ADMIN — OXYBUTYNIN CHLORIDE 5 MG: 5 TABLET ORAL at 21:44

## 2017-01-19 RX ADMIN — OXYBUTYNIN CHLORIDE 5 MG: 5 TABLET ORAL at 16:50

## 2017-01-19 RX ADMIN — SODIUM CHLORIDE, PRESERVATIVE FREE 10 ML: 5 INJECTION INTRAVENOUS at 10:00

## 2017-01-19 RX ADMIN — PETROLATUM: 42 OINTMENT TOPICAL at 21:46

## 2017-01-19 RX ADMIN — OXYCODONE HYDROCHLORIDE 10 MG: 10 TABLET, FILM COATED, EXTENDED RELEASE ORAL at 00:22

## 2017-01-19 RX ADMIN — OXYBUTYNIN CHLORIDE 5 MG: 5 TABLET ORAL at 09:59

## 2017-01-19 RX ADMIN — PANTOPRAZOLE SODIUM 40 MG: 40 TABLET, DELAYED RELEASE ORAL at 10:00

## 2017-01-19 RX ADMIN — OXYCODONE HYDROCHLORIDE AND ACETAMINOPHEN 1 TABLET: 10; 325 TABLET ORAL at 19:53

## 2017-01-19 RX ADMIN — GABAPENTIN 300 MG: 300 CAPSULE ORAL at 21:44

## 2017-01-19 RX ADMIN — PETROLATUM: 42 OINTMENT TOPICAL at 10:01

## 2017-01-19 RX ADMIN — ENOXAPARIN SODIUM 40 MG: 40 INJECTION SUBCUTANEOUS at 13:16

## 2017-01-19 RX ADMIN — HYDROMORPHONE HYDROCHLORIDE 0.5 MG: 1 INJECTION, SOLUTION INTRAMUSCULAR; INTRAVENOUS; SUBCUTANEOUS at 03:03

## 2017-01-19 RX ADMIN — ALPRAZOLAM 1 MG: 0.5 TABLET ORAL at 19:45

## 2017-01-19 RX ADMIN — HYDROMORPHONE HYDROCHLORIDE 0.5 MG: 1 INJECTION, SOLUTION INTRAMUSCULAR; INTRAVENOUS; SUBCUTANEOUS at 13:30

## 2017-01-19 RX ADMIN — HYDROMORPHONE HYDROCHLORIDE 0.5 MG: 1 INJECTION, SOLUTION INTRAMUSCULAR; INTRAVENOUS; SUBCUTANEOUS at 18:10

## 2017-01-19 RX ADMIN — OXYCODONE HYDROCHLORIDE AND ACETAMINOPHEN 1 TABLET: 10; 325 TABLET ORAL at 04:46

## 2017-01-19 RX ADMIN — DAKIN'S SOLUTION 0.125% (QUARTER STRENGTH): 0.12 SOLUTION at 10:01

## 2017-01-19 RX ADMIN — GABAPENTIN 300 MG: 300 CAPSULE ORAL at 13:17

## 2017-01-19 RX ADMIN — SODIUM CHLORIDE, PRESERVATIVE FREE 10 ML: 5 INJECTION INTRAVENOUS at 21:47

## 2017-01-19 NOTE — PROGRESS NOTES
Discharge Planning Assessment  [facility]     Patient Name: Joaquín Sherman  MRN: [id number]  Today's Date: 1/19/2017    Admit Date: 1/16/2017          Discharge Needs Assessment     None            Discharge Plan       01/19/17 1327    Case Management/Social Work Plan    Plan Home with sister Jenni     Additional Comments Called and spoke with Delmi/ [facility] [phone] Patient is current with [facility] as listed on the back of his current insurance care, but has not been there for 6 years. Called sister Jenni and informed her CCP will  leave print out of Clinic address and phone number in patient's  room. CONTRERAS Che RN        Discharge Placement     Facility/Agency Request Status Selected? Address Phone Number Fax Number    [facility] Declined   they have had patient in the past and he was non compliant     [address] [address] [phone]    [facility] Declined   couldn't take a passport at this time     [address] [id number] [phone]    [facility] Declined   declined due to non compliance     [address] [facility] [address] [phone]    [facility] Declined   declined due to past experience with patient     [address] [facility] [address] [phone]        Expected Discharge Date and Time     Expected Discharge Date Expected Discharge Time    Jan 18, 2017               Demographic Summary     None            Functional Status     None            Psychosocial     None            Abuse/Neglect     None            Legal     None            Substance Abuse     None            Patient Forms     None          Meeta Che, RN

## 2017-01-19 NOTE — PLAN OF CARE
Problem: Patient Care Overview (Adult)  Goal: Plan of Care Review  Outcome: Ongoing (interventions implemented as appropriate)  Goal: Adult Individualization and Mutuality  Outcome: Ongoing (interventions implemented as appropriate)  Goal: Discharge Needs Assessment  Outcome: Ongoing (interventions implemented as appropriate)    Problem: Fall Risk (Adult)  Goal: Absence of Falls  Outcome: Ongoing (interventions implemented as appropriate)    Problem: Skin Integrity Impairment, Risk/Actual (Adult)  Goal: Skin Integrity/Wound Healing  Outcome: Ongoing (interventions implemented as appropriate)    Problem: Nutrition, Imbalanced: Inadequate Oral Intake (Adult)  Goal: Improved Oral Intake  Outcome: Ongoing (interventions implemented as appropriate)  Goal: Prevent Further Weight Loss  Outcome: Ongoing (interventions implemented as appropriate)

## 2017-01-19 NOTE — PAYOR COMM NOTE
"Joaquín Youssef (36 y.o. Male)                                            CONT STAY REVIEW                                          REF# [id number]                                                         CONTACT=   ROBBY ALCALA                                                         FAX  [phone]                                                           #  [phone]          Date of Birth Social Security Number Address Home Phone MRN    1980  [id number] [id number] [phone] [id number]    Yarsanism Marital Status          None Single       Admission Date Admission Type Admitting Provider Attending Provider Department, Room/Bed    1/16/17 Emergency Bean Alejandre MD Benton, John B, MD [address], 655/1    Discharge Date Discharge Disposition Discharge Destination                      Attending Provider: Brant Saxena MD     Allergies:  Amoxicillin-pot Clavulanate, Ibuprofen, Ketorolac Tromethamine, Meropenem, Vancomycin    Isolation:  Contact   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), 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)   Code Status:  FULL    Ht:  72.99\" (185.4 cm)   Wt:  91 lb (41.3 kg)    Admission Cmt:  None   Principal Problem:  Severe anemia [D64.9]                 Active Insurance as of 1/16/2017     Primary Coverage     Payor Plan Insurance Group Employer/Plan Group    PASSPORT PASSPORT      Payor Plan Address Payor Plan Phone Number Effective From Effective To    [address] [phone] 1/1/1998     [place] [address]       Subscriber Name Subscriber Birth Date Member ID       JOAQUÍN YOUSSEF 1980 [id number]                 Emergency Contacts      (Rel.) Home Phone Work Phone Mobile Phone    Marlen Al (Mother) [phone] -- --    Jenni Khoury (Sister) [phone] -- --            Insurance Information                PASSPORT/PASSPORT Phone: [phone]    Subscriber: Joaquín Youssef Subscriber#: [id number]    Group#:  Precert#:  "          Vital Signs (last 24 hours)       01/18 0700  -  01/19 0659 01/19 0700  -  01/19 1120   Most Recent    Temp (°F) 98 -  98.3      98.5     98.5 (36.9)    Heart Rate 85 -  94      96     96    Resp 12 - 16 16 16    BP (!)76/60 -  93/64    (!)88/55 -  90/60     90/60    SpO2 (%) 98 -  100      97     97          Hospital Medications (active)       Dose Frequency Start End    ALPRAZolam (XANAX) tablet 1 mg 1 mg 2 Times Daily PRN 1/16/2017 1/26/2017    Sig - Route: Take 2 tablets by mouth 2 (Two) Times a Day As Needed for anxiety. - Oral    ALPRAZolam (XANAX) tablet 1 mg 1 mg 2 Times Daily PRN 1/16/2017     Sig - Route: Take 2 tablets by mouth 2 (Two) Times a Day As Needed for anxiety. - Oral    bisacodyl (DULCOLAX) EC tablet 5 mg 5 mg Daily PRN 1/16/2017     Sig - Route: Take 1 tablet by mouth Daily As Needed for constipation. - Oral    cefTRIAXone (ROCEPHIN) IVPB 1 g 1 g Every 24 Hours 1/19/2017     Sig - Route: Infuse 50 mL into a venous catheter Daily. - Intravenous    enoxaparin (LOVENOX) syringe 40 mg 40 mg Every 24 Hours 1/16/2017     Sig - Route: Inject 0.4 mL under the skin Daily. - Subcutaneous    gabapentin (NEURONTIN) capsule 300 mg 300 mg 3 Times Daily 1/16/2017     Sig - Route: Take 1 capsule by mouth 3 (Three) Times a Day. - Oral    heparin flush (porcine) 100 UNIT/ML injection 500 Units 5 mL Every 12 Hours Scheduled 1/16/2017     Sig - Route: 5 mL by Intracatheter route Every 12 (Twelve) Hours. - Intracatheter    heparin flush (porcine) 100 UNIT/ML injection 500 Units 5 mL As Needed 1/16/2017     Sig - Route: 5 mL by Intracatheter route As Needed for line care (After Medication Administration or Blood Draw If Not Going to Be Utilized Immediately). - Intracatheter    HYDROmorphone (DILAUDID) injection 0.5 mg 0.5 mg Every 4 Hours PRN 1/18/2017     Sig - Route: Infuse 0.5 mg into a venous catheter Every 4 (Four) Hours As Needed for severe pain (7-10). - Intravenous    hydrophor (AQUAPHOR)  "ointment  Every 12 Hours Scheduled 1/16/2017     Sig - Route: Apply  topically Every 12 (Twelve) Hours. - Topical    ondansetron (ZOFRAN) injection 4 mg 4 mg Every 6 Hours PRN 1/16/2017     Sig - Route: Infuse 2 mL into a venous catheter Every 6 (Six) Hours As Needed for nausea or vomiting. - Intravenous    Linked Group 1:  \"Or\" Linked Group Details        ondansetron (ZOFRAN) tablet 4 mg 4 mg Every 6 Hours PRN 1/16/2017     Sig - Route: Take 1 tablet by mouth Every 6 (Six) Hours As Needed for nausea or vomiting. - Oral    Linked Group 1:  \"Or\" Linked Group Details        ondansetron ODT (ZOFRAN-ODT) disintegrating tablet 4 mg 4 mg Every 6 Hours PRN 1/16/2017     Sig - Route: Take 1 tablet by mouth Every 6 (Six) Hours As Needed for nausea or vomiting. - Oral    Linked Group 1:  \"Or\" Linked Group Details        oxybutynin (DITROPAN) tablet 5 mg 5 mg 3 Times Daily 1/16/2017     Sig - Route: Take 1 tablet by mouth 3 (Three) Times a Day. - Oral    oxyCODONE ER (oxyCONTIN) 12 hr tablet 15 mg 15 mg Every 12 Hours Scheduled 1/19/2017 1/28/2017    Sig - Route: Take 1 tablet by mouth Every 12 (Twelve) Hours. - Oral    oxyCODONE-acetaminophen (PERCOCET)  MG per tablet 1 tablet 1 tablet Every 4 Hours PRN 1/18/2017 1/28/2017    Sig - Route: Take 1 tablet by mouth Every 4 (Four) Hours As Needed for severe pain (7-10). - Oral    pantoprazole (PROTONIX) EC tablet 40 mg 40 mg Daily 1/16/2017     Sig - Route: Take 1 tablet by mouth Daily. - Oral    sodium chloride 0.9 % flush 1-10 mL 1-10 mL As Needed 1/16/2017     Sig - Route: Infuse 1-10 mL into a venous catheter As Needed for line care. - Intravenous    sodium chloride 0.9 % flush 10 mL 10 mL As Needed 1/16/2017     Sig - Route: Infuse 10 mL into a venous catheter As Needed for line care. - Intravenous    Linked Group 2:  \"And\" Linked Group Details        sodium chloride 0.9 % flush 10 mL 10 mL Every 12 Hours Scheduled 1/16/2017     Sig - Route: 10 mL by Intracatheter route " Every 12 (Twelve) Hours. - Intracatheter    sodium chloride 0.9 % flush 10 mL 10 mL As Needed 1/16/2017     Sig - Route: 10 mL by Intracatheter route As Needed for line care (After Medication Administration or Blood Draw). - Intracatheter    sodium hypochlorite (DAKIN'S 1/4 STRENGTH) 0.125 % topical solution 0.125% solution  2 Times Daily 1/16/2017     Sig - Route: Apply  topically 2 (Two) Times a Day. - Topical    HYDROmorphone (DILAUDID) injection 0.5 mg (Discontinued) 0.5 mg Every 2 Hours PRN 1/16/2017 1/18/2017    Sig - Route: Infuse 0.5 mg into a venous catheter Every 2 (Two) Hours As Needed for severe pain (7-10). - Intravenous    oxyCODONE (oxyCONTIN) 12 hr tablet 10 mg (Discontinued) 10 mg Every 12 Hours Scheduled 1/18/2017 1/18/2017    Sig - Route: Take 1 tablet by mouth Every 12 (Twelve) Hours. - Oral    oxyCODONE (oxyCONTIN) 12 hr tablet 10 mg (Discontinued) 10 mg Every 4 Hours PRN 1/18/2017 1/18/2017    Sig - Route: Take 1 tablet by mouth Every 4 (Four) Hours As Needed for severe pain (7-10). - Oral    oxyCODONE (oxyCONTIN) 12 hr tablet 10 mg (Discontinued) 10 mg Every 12 Hours Scheduled 1/18/2017 1/19/2017    Sig - Route: Take 1 tablet by mouth Every 12 (Twelve) Hours. - Oral    oxyCODONE ER (oxyCONTIN) 12 hr tablet 15 mg (Discontinued) 15 mg Every 12 Hours Scheduled 1/16/2017 1/18/2017    Sig - Route: Take 1 tablet by mouth Every 12 (Twelve) Hours. - Oral          Lab Results (last 24 hours)     Procedure Component Value Units Date/Time    Urinalysis With / Culture If Indicated [[id number]]  (Abnormal) Collected:  01/18/17 2107    Specimen:  Urine from Urine, Catheter Updated:  01/18/17 2135     Color, UA Yellow      Appearance, UA Cloudy (A)      pH, UA 7.0      Specific Gravity, UA 1.007      Glucose, UA Negative      Ketones, UA Negative      Bilirubin, UA Negative      Blood, UA Moderate (2+) (A)      Protein, UA 30 mg/dL (1+) (A)      Leuk Esterase, UA Large (3+) (A)      Nitrite, UA Negative       Urobilinogen, UA 1.0 E.U./dL     Urinalysis, Microscopic Only [[id number]]  (Abnormal) Collected:  01/18/17 2107    Specimen:  Urine from Urine, Catheter Updated:  01/19/17 0015     RBC, UA  /HPF      Unable to determine due to loaded field (A)     WBC, UA Too Numerous to Count (A) /HPF      Bacteria, UA  /HPF      Unable to determine due to loaded field (A)     Squamous Epithelial Cells, UA  /HPF      Unable to determine due to loaded field (A)     Hyaline Casts, UA  /LPF      Unable to determine due to loaded field     Methodology Manual Light Microscopy     Basic Metabolic Panel [[id number]]  (Abnormal) Collected:  01/19/17 0453    Specimen:  Blood Updated:  01/19/17 0548     Glucose 117 (H) mg/dL      BUN 12 mg/dL      Creatinine 0.85 mg/dL      Sodium 135 (L) mmol/L      Potassium 4.7 mmol/L      Chloride 98 mmol/L      CO2 27.1 mmol/L      Calcium 8.6 mg/dL      eGFR   Amer 124 mL/min/1.73      BUN/Creatinine Ratio 14.1      Anion Gap 9.9 mmol/L     Narrative:       GFR Normal >60  Chronic Kidney Disease <60  Kidney Failure <15    CBC (No Diff) [[id number]]  (Abnormal) Collected:  01/19/17 0453    Specimen:  Blood Updated:  01/19/17 0557     WBC 6.89 10*3/mm3      RBC 3.48 (L) 10*6/mm3      Hemoglobin 8.4 (L) g/dL      Hematocrit 29.7 (L) %      MCV 85.3 fL      MCH 24.1 (L) pg      MCHC 28.3 (L) g/dL      RDW 18.3 (H) %      RDW-SD 57.2 (H) fl      MPV 8.8 fL      Platelets 249 10*3/mm3     Urine Culture [[id number]]  (Abnormal) Collected:  01/18/17 2107    Specimen:  Urine from Urine, Catheter Updated:  01/19/17 0823     Urine Culture >100,000 CFU/mL Escherichia coli (A)              Physician Progress Notes (last 24 hours) (Notes from 1/18/2017 11:20 AM through 1/19/2017 11:20 AM)      Brant Saxena MD at 1/18/2017  2:06 PM  Version 1 of 1            LOS: 2 days   Patient Care Team:  ARIES Up as PCP - General (Nurse Practitioner)    Chief Complaint: back pain    Subjective     HPI Comments: Pt  "is sedated. Says he's acting this way because his back hurts.    Urinary Tract Infection    Pertinent negatives include no nausea or vomiting.       Subjective:  Symptoms:  Stable.  No shortness of breath, malaise, cough, chest pain, weakness, headache, chest pressure, anorexia, diarrhea or anxiety.    Diet:  Adequate intake.  No nausea or vomiting.    Activity level: Activity impairment: impaired due to paraplegia.    Pain:  He complains of pain that is mild.  He reports pain is unchanged.  Pain is partially controlled.        History taken from: patient chart and CCP    Objective     Vital Signs  Temp:  [97.7 °F (36.5 °C)-98.8 °F (37.1 °C)] 98 °F (36.7 °C)  Heart Rate:  [] 93  Resp:  [18] 18  BP: (85-94)/(56-62) 85/58    Objective:  General Appearance:  Comfortable and in no acute distress (seems sedated).    Vital signs: (most recent): Blood pressure 85/58, pulse 93, temperature 98 °F (36.7 °C), temperature source Oral, resp. rate 18, height 72.99\" (185.4 cm), weight 91 lb (41.3 kg), SpO2 98 %.  Vital signs are normal.  No fever.    Output: Producing urine and producing stool.    Lungs:  Normal respiratory rate and normal effort.  Breath sounds clear to auscultation.    Heart: Normal rate.  Regular rhythm.    Abdomen: Abdomen is soft.  Bowel sounds are normal.   There is no abdominal tenderness.     Extremities: There is no dependent edema.    Pulses: Distal pulses are intact.    Neurological: Patient is alert and oriented to person, place and time.  (Sedated).    Skin:  Warm and dry.              Results Review:     I reviewed the patient's new clinical results.  I reviewed the patient's other test results and agree with the interpretation  Discussed with patient and CCP    Medication Review: reviewed    Assessment&#47;Plan     Principal Problem:    Severe anemia  Active Problems:    Paraplegia    Hypotension, chronic asymptomatic    Decubitus ulcer of sacral region, stage 4    Suprapubic catheter " dysfunction      Assessment:  (1. Severe anemia (iron deficiency and chronic disease)  2. Paraplegia and chronic suprapubic catheter  3. Non-compliance  4. Chronic hypotension  5. Stage 4 Sacral decubitus  6. Chronic pain syndrome  7. GERD).     Plan:   (IV iron  S/p exchange of supra-pubic catheter (will need to get changed every 6 weeks)   suggests follow-up with [facility]  Compliance is biggest carri for pt, every home health agency in area has refused to accept him. Can't find any wound clinics for him either. Family is trying to get him an appt with Dr. Hall as PCP now. Pt is chronically non-compliant. Misses appointments. Doesn't use his pressure mattress. Isn't home when HH agencies come by.).       Brant Saxena MD  01/18/17  2:06 PM    Time: 35min         Electronically signed by Brant Saxena MD at 1/18/2017  2:33 PM      Brant Saxena MD at 1/19/2017  9:53 AM  Version 1 of 1            LOS: 3 days   Patient Care Team:  ARIES Up as PCP - General (Nurse Practitioner)    Chief Complaint: back pain    Subjective     HPI Comments: :Much more awake today now that analgesia decreased. All pt wants to talk about is his financial/social situation and the fact that his pain meds were decreased. Much more awake and interactive today.    Urinary Tract Infection    Pertinent negatives include no nausea or vomiting.       Subjective:  Symptoms:  Stable.  No shortness of breath, malaise, cough, chest pain, weakness, headache, chest pressure, anorexia, diarrhea or anxiety.    Diet:  Adequate intake.  No nausea or vomiting.    Activity level: Activity impairment: impaired due to paraplegia.    Pain:  He complains of pain that is mild.  He reports pain is unchanged.  Pain is partially controlled.        History taken from: patient chart RN    Objective     Vital Signs  Temp:  [98.3 °F (36.8 °C)-98.5 °F (36.9 °C)] 98.5 °F (36.9 °C)  Heart Rate:  [85-96] 96  Resp:  [12-16] 16  BP: (76-93)/(50-64)  "90/60    Objective:  General Appearance:  Comfortable and in no acute distress (seems sedated).    Vital signs: (most recent): Blood pressure 90/60, pulse 96, temperature 98.5 °F (36.9 °C), temperature source Oral, resp. rate 16, height 72.99\" (185.4 cm), weight 91 lb (41.3 kg), SpO2 97 %.  Vital signs are normal.  No fever.    Output: Producing urine and producing stool.    Lungs:  Normal respiratory rate and normal effort.  Breath sounds clear to auscultation.    Heart: Normal rate.  Regular rhythm.    Chest: (Port right chest)  Abdomen: Abdomen is soft.  (Colostomy LLQ)Bowel sounds are normal.   There is no abdominal tenderness.     Extremities: There is no dependent edema.    Pulses: Distal pulses are intact.    Neurological: Patient is alert and oriented to person, place and time.  (Less sedated today).    Skin:  Warm and dry.              Results Review:     I reviewed the patient's new clinical results.  I reviewed the patient's other test results and agree with the interpretation  Discussed with patient and RN    Medication Review: reviewed and adjusted    Assessment&#47;Plan     Principal Problem:    Severe anemia  Active Problems:    Paraplegia    Hypotension, chronic asymptomatic    Decubitus ulcer of sacral region, stage 4    Suprapubic catheter dysfunction      Assessment:  (1. Severe anemia (iron deficiency and chronic disease)  2. Paraplegia and chronic suprapubic catheter  3. Non-compliance  4. Chronic hypotension  5. Stage 4 Sacral decubitus with SP cath and colostomy  6. Chronic pain syndrome  7. GERD  8. Abnl CT Chest in October 2016).     Plan:   (S/p PRBCs and IV iron  S/p exchange of supra-pubic catheter (will need to get changed every 6 weeks)   suggests follow-up with [facility]  Compliance is biggest carri for pt, every home health agency in area has refused to accept him. Can't find any wound clinics for him either. Family is trying to get him an appt with Dr. Hall as PCP now. Pt is " chronically non-compliant. Misses appointments. Doesn't use his pressure mattress. Isn't home when HH agencies come by.  BRANNON reviewed, pt does not actually take chronic opioids at home as he claims. This would explain how sedated he is here. Doses decreased accordingly.  Start Rocephin and await cx/sensitivities, colonization?  Repeat CT chest as suggested in October.).       Brant Saxena MD  01/19/17  9:53 AM    Time: More than 50% of time spent in counseling and coordination of care:  Total face-to-face/floor time 45 min.  Time spent in counseling 39 min. Counseling included the following topics: UTI, chronic pain issues, opioid dependence         Electronically signed by Brant Saxena MD at 1/19/2017 10:22 AM

## 2017-01-19 NOTE — PROGRESS NOTES
"   LOS: 3 days   Patient Care Team:  ARIES Up as PCP - General (Nurse Practitioner)    Chief Complaint: back pain    Subjective     HPI Comments: :Much more awake today now that analgesia decreased. All pt wants to talk about is his financial/social situation and the fact that his pain meds were decreased. Much more awake and interactive today.    Urinary Tract Infection    Pertinent negatives include no nausea or vomiting.       Subjective:  Symptoms:  Stable.  No shortness of breath, malaise, cough, chest pain, weakness, headache, chest pressure, anorexia, diarrhea or anxiety.    Diet:  Adequate intake.  No nausea or vomiting.    Activity level: Activity impairment: impaired due to paraplegia.    Pain:  He complains of pain that is mild.  He reports pain is unchanged.  Pain is partially controlled.        History taken from: patient chart RN    Objective     Vital Signs  Temp:  [98.3 °F (36.8 °C)-98.5 °F (36.9 °C)] 98.5 °F (36.9 °C)  Heart Rate:  [85-96] 96  Resp:  [12-16] 16  BP: (76-93)/(50-64) 90/60    Objective:  General Appearance:  Comfortable and in no acute distress (seems sedated).    Vital signs: (most recent): Blood pressure 90/60, pulse 96, temperature 98.5 °F (36.9 °C), temperature source Oral, resp. rate 16, height 72.99\" (185.4 cm), weight 91 lb (41.3 kg), SpO2 97 %.  Vital signs are normal.  No fever.    Output: Producing urine and producing stool.    Lungs:  Normal respiratory rate and normal effort.  Breath sounds clear to auscultation.    Heart: Normal rate.  Regular rhythm.    Chest: (Port right chest)  Abdomen: Abdomen is soft.  (Colostomy LLQ)Bowel sounds are normal.   There is no abdominal tenderness.     Extremities: There is no dependent edema.    Pulses: Distal pulses are intact.    Neurological: Patient is alert and oriented to person, place and time.  (Less sedated today).    Skin:  Warm and dry.              Results Review:     I reviewed the patient's new clinical " results.  I reviewed the patient's other test results and agree with the interpretation  Discussed with patient and RN    Medication Review: reviewed and adjusted    Assessment/Plan     Principal Problem:    Severe anemia  Active Problems:    Paraplegia    Hypotension, chronic asymptomatic    Decubitus ulcer of sacral region, stage 4    Suprapubic catheter dysfunction      Assessment:  (1. Severe anemia (iron deficiency and chronic disease)  2. Paraplegia and chronic suprapubic catheter  3. Non-compliance  4. Chronic hypotension  5. Stage 4 Sacral decubitus with SP cath and colostomy  6. Chronic pain syndrome  7. GERD  8. Abnl CT Chest in October 2016).     Plan:   (S/p PRBCs and IV iron  S/p exchange of supra-pubic catheter (will need to get changed every 6 weeks)   suggests follow-up with [facility]  Compliance is biggest carri for pt, every home health agency in area has refused to accept him. Can't find any wound clinics for him either. Family is trying to get him an appt with Dr. Hall as PCP now. Pt is chronically non-compliant. Misses appointments. Doesn't use his pressure mattress. Isn't home when HH agencies come by.  BRANNON reviewed, pt does not actually take chronic opioids at home as he claims. This would explain how sedated he is here. Doses decreased accordingly.  Start Rocephin and await cx/sensitivities, colonization?  Repeat CT chest as suggested in October.).       Brant Saxena MD  01/19/17  9:53 AM    Time: More than 50% of time spent in counseling and coordination of care:  Total face-to-face/floor time 45 min.  Time spent in counseling 39 min. Counseling included the following topics: UTI, chronic pain issues, opioid dependence

## 2017-01-19 NOTE — PLAN OF CARE
Problem: Patient Care Overview (Adult)  Goal: Plan of Care Review    01/19/17 1839 01/19/17 1843   Coping/Psychosocial Response Interventions   Plan Of Care Reviewed With --  patient   Outcome Evaluation   Outcome Summary/Follow up Plan pt medicated prn for pain, pt changed condom catheter, good output from suprapubic catheter, tolerated dressing changes. Continue to monitor.  --

## 2017-01-19 NOTE — PLAN OF CARE
Problem: Patient Care Overview (Adult)  Goal: Plan of Care Review  Outcome: Ongoing (interventions implemented as appropriate)    01/19/17 0417   Coping/Psychosocial Response Interventions   Plan Of Care Reviewed With patient   Patient Care Overview   Progress no change   Outcome Evaluation   Outcome Summary/Follow up Plan pt tolerated dressing changes this shift. agitated over pain medicine. MD notified. meds given prn for pain. suprapubic catheter draining. Condom catheter changed per pt. pt irritable and noncompliant. Will continue to monitor.        Goal: Adult Individualization and Mutuality  Outcome: Ongoing (interventions implemented as appropriate)  Goal: Discharge Needs Assessment  Outcome: Ongoing (interventions implemented as appropriate)    Problem: Fall Risk (Adult)  Goal: Absence of Falls  Outcome: Ongoing (interventions implemented as appropriate)    Problem: Skin Integrity Impairment, Risk/Actual (Adult)  Goal: Skin Integrity/Wound Healing  Outcome: Ongoing (interventions implemented as appropriate)    Problem: Nutrition, Imbalanced: Inadequate Oral Intake (Adult)  Goal: Improved Oral Intake  Outcome: Ongoing (interventions implemented as appropriate)  Goal: Prevent Further Weight Loss  Outcome: Ongoing (interventions implemented as appropriate)

## 2017-01-20 LAB
ANION GAP SERPL CALCULATED.3IONS-SCNC: 13.3 MMOL/L
BACTERIA SPEC AEROBE CULT: ABNORMAL
BUN BLD-MCNC: 12 MG/DL (ref 6–20)
BUN/CREAT SERPL: 14.5 (ref 7–25)
CALCIUM SPEC-SCNC: 8.5 MG/DL (ref 8.6–10.5)
CHLORIDE SERPL-SCNC: 97 MMOL/L (ref 98–107)
CO2 SERPL-SCNC: 25.7 MMOL/L (ref 22–29)
CREAT BLD-MCNC: 0.83 MG/DL (ref 0.76–1.27)
DEPRECATED RDW RBC AUTO: 58.2 FL (ref 37–54)
ERYTHROCYTE [DISTWIDTH] IN BLOOD BY AUTOMATED COUNT: 18.5 % (ref 11.5–14.5)
GFR SERPL CREATININE-BSD FRML MDRD: 127 ML/MIN/1.73
GLUCOSE BLD-MCNC: 112 MG/DL (ref 65–99)
HCT VFR BLD AUTO: 30 % (ref 40.4–52.2)
HGB BLD-MCNC: 8.8 G/DL (ref 13.7–17.6)
MCH RBC QN AUTO: 25.1 PG (ref 27–32.7)
MCHC RBC AUTO-ENTMCNC: 29.3 G/DL (ref 32.6–36.4)
MCV RBC AUTO: 85.7 FL (ref 79.8–96.2)
PLATELET # BLD AUTO: 221 10*3/MM3 (ref 140–500)
PMV BLD AUTO: 8.9 FL (ref 6–12)
POTASSIUM BLD-SCNC: 4.9 MMOL/L (ref 3.5–5.2)
RBC # BLD AUTO: 3.5 10*6/MM3 (ref 4.6–6)
SODIUM BLD-SCNC: 136 MMOL/L (ref 136–145)
WBC NRBC COR # BLD: 6.91 10*3/MM3 (ref 4.5–10.7)

## 2017-01-20 PROCEDURE — 85027 COMPLETE CBC AUTOMATED: CPT | Performed by: HOSPITALIST

## 2017-01-20 PROCEDURE — 25010000002 HYDROMORPHONE PER 4 MG: Performed by: HOSPITALIST

## 2017-01-20 PROCEDURE — 25010000002 ALTEPLASE 2 MG RECONSTITUTED SOLUTION: Performed by: HOSPITALIST

## 2017-01-20 PROCEDURE — 25010000002 HEPARIN FLUSH (PORCINE) 100 UNIT/ML SOLUTION: Performed by: INTERNAL MEDICINE

## 2017-01-20 PROCEDURE — 80048 BASIC METABOLIC PNL TOTAL CA: CPT | Performed by: HOSPITALIST

## 2017-01-20 PROCEDURE — 25010000003 CEFTRIAXONE PER 250 MG: Performed by: HOSPITALIST

## 2017-01-20 PROCEDURE — 25010000002 ENOXAPARIN PER 10 MG: Performed by: INTERNAL MEDICINE

## 2017-01-20 RX ORDER — HYDROMORPHONE HYDROCHLORIDE 1 MG/ML
0.5 INJECTION, SOLUTION INTRAMUSCULAR; INTRAVENOUS; SUBCUTANEOUS
Status: DISCONTINUED | OUTPATIENT
Start: 2017-01-20 | End: 2017-01-23 | Stop reason: HOSPADM

## 2017-01-20 RX ADMIN — ALTEPLASE 2 MG: 2.2 INJECTION, POWDER, LYOPHILIZED, FOR SOLUTION INTRAVENOUS at 10:45

## 2017-01-20 RX ADMIN — SODIUM CHLORIDE, PRESERVATIVE FREE 10 ML: 5 INJECTION INTRAVENOUS at 20:03

## 2017-01-20 RX ADMIN — ENOXAPARIN SODIUM 40 MG: 40 INJECTION SUBCUTANEOUS at 11:33

## 2017-01-20 RX ADMIN — OXYCODONE HYDROCHLORIDE 15 MG: 15 TABLET, FILM COATED, EXTENDED RELEASE ORAL at 09:57

## 2017-01-20 RX ADMIN — OXYCODONE HYDROCHLORIDE AND ACETAMINOPHEN 1 TABLET: 10; 325 TABLET ORAL at 21:37

## 2017-01-20 RX ADMIN — OXYCODONE HYDROCHLORIDE AND ACETAMINOPHEN 1 TABLET: 10; 325 TABLET ORAL at 09:57

## 2017-01-20 RX ADMIN — PANTOPRAZOLE SODIUM 40 MG: 40 TABLET, DELAYED RELEASE ORAL at 09:57

## 2017-01-20 RX ADMIN — CEFTRIAXONE SODIUM 1 G: 1 INJECTION, SOLUTION INTRAVENOUS at 11:33

## 2017-01-20 RX ADMIN — PETROLATUM: 42 OINTMENT TOPICAL at 11:30

## 2017-01-20 RX ADMIN — OXYCODONE HYDROCHLORIDE 15 MG: 15 TABLET, FILM COATED, EXTENDED RELEASE ORAL at 20:02

## 2017-01-20 RX ADMIN — HYDROMORPHONE HYDROCHLORIDE 0.5 MG: 1 INJECTION, SOLUTION INTRAMUSCULAR; INTRAVENOUS; SUBCUTANEOUS at 00:42

## 2017-01-20 RX ADMIN — OXYBUTYNIN CHLORIDE 5 MG: 5 TABLET ORAL at 20:02

## 2017-01-20 RX ADMIN — SODIUM CHLORIDE, PRESERVATIVE FREE 500 UNITS: 5 INJECTION INTRAVENOUS at 20:03

## 2017-01-20 RX ADMIN — HYDROMORPHONE HYDROCHLORIDE 0.5 MG: 1 INJECTION, SOLUTION INTRAMUSCULAR; INTRAVENOUS; SUBCUTANEOUS at 06:25

## 2017-01-20 RX ADMIN — GABAPENTIN 300 MG: 300 CAPSULE ORAL at 12:46

## 2017-01-20 RX ADMIN — OXYCODONE HYDROCHLORIDE AND ACETAMINOPHEN 1 TABLET: 10; 325 TABLET ORAL at 16:34

## 2017-01-20 RX ADMIN — OXYBUTYNIN CHLORIDE 5 MG: 5 TABLET ORAL at 09:57

## 2017-01-20 RX ADMIN — HYDROMORPHONE HYDROCHLORIDE 0.5 MG: 1 INJECTION, SOLUTION INTRAMUSCULAR; INTRAVENOUS; SUBCUTANEOUS at 20:33

## 2017-01-20 RX ADMIN — HYDROMORPHONE HYDROCHLORIDE 0.5 MG: 1 INJECTION, SOLUTION INTRAMUSCULAR; INTRAVENOUS; SUBCUTANEOUS at 10:11

## 2017-01-20 RX ADMIN — OXYBUTYNIN CHLORIDE 5 MG: 5 TABLET ORAL at 16:30

## 2017-01-20 RX ADMIN — GABAPENTIN 300 MG: 300 CAPSULE ORAL at 16:30

## 2017-01-20 RX ADMIN — HYDROMORPHONE HYDROCHLORIDE 0.5 MG: 1 INJECTION, SOLUTION INTRAMUSCULAR; INTRAVENOUS; SUBCUTANEOUS at 17:11

## 2017-01-20 RX ADMIN — GABAPENTIN 300 MG: 300 CAPSULE ORAL at 20:02

## 2017-01-20 RX ADMIN — HYDROMORPHONE HYDROCHLORIDE 0.5 MG: 1 INJECTION, SOLUTION INTRAMUSCULAR; INTRAVENOUS; SUBCUTANEOUS at 12:46

## 2017-01-20 NOTE — PROGRESS NOTES
"   LOS: 4 days   Patient Care Team:  ARIES Up as PCP - General (Nurse Practitioner)    Chief Complaint: bladder pain    Subjective     HPI Comments: Still c/o pain in bladder. Looks much better today.    Urinary Tract Infection    Pertinent negatives include no nausea or vomiting.       Subjective:  Symptoms:  Stable.  No shortness of breath, malaise, cough, chest pain, weakness, headache, chest pressure, anorexia, diarrhea or anxiety.    Diet:  Adequate intake.  No nausea or vomiting.    Activity level: Activity impairment: impaired due to paraplegia.    Pain:  He complains of pain that is moderate.  He reports pain is unchanged.  Pain is partially controlled.        History taken from: patient chart    Objective     Vital Signs  Temp:  [97.8 °F (36.6 °C)-98.7 °F (37.1 °C)] 98.2 °F (36.8 °C)  Heart Rate:  [] 96  Resp:  [16-17] 16  BP: (81-99)/(51-64) 81/51    Objective:  General Appearance:  Comfortable and in no acute distress (seems sedated).    Vital signs: (most recent): Blood pressure 81/51, pulse 96, temperature 98.2 °F (36.8 °C), temperature source Oral, resp. rate 16, height 72.99\" (185.4 cm), weight 91 lb 0.8 oz (41.3 kg), SpO2 100 %.  Vital signs are normal.  No fever.    Output: Producing urine and producing stool.    Lungs:  Normal respiratory rate and normal effort.  Breath sounds clear to auscultation.    Heart: Normal rate.  Regular rhythm.    Chest: (Port right chest)  Abdomen: Abdomen is soft.  (Colostomy LLQ)Bowel sounds are normal.   There is suprapubic tenderness. There is no rebound tenderness.  There is no guarding.     Extremities: There is no dependent edema.    Pulses: Distal pulses are intact.    Neurological: Patient is alert and oriented to person, place and time.  (Less sedated today).    Skin:  Warm and dry.              Results Review:     I reviewed the patient's new clinical results.  I reviewed the patient's new imaging results and agree with the " interpretation.  I reviewed the patient's other test results and agree with the interpretation  Discussed with patient    Medication Review: reviewed    Assessment/Plan     Principal Problem:    Severe anemia  Active Problems:    Paraplegia    Hypotension, chronic asymptomatic    Decubitus ulcer of sacral region, stage 4    Suprapubic catheter dysfunction      Assessment:  (1. Severe anemia (iron deficiency and chronic disease)  2. Paraplegia and chronic suprapubic catheter  3. Non-compliance  4. Chronic hypotension  5. Stage 4 Sacral decubitus with SP cath and colostomy  6. Chronic pain syndrome  7. GERD  8. Abnl CT Chest in October 2016  9. ESBL E.coli in urine).     Plan:   (S/p PRBCs and IV iron  S/p exchange of supra-pubic catheter (will need to get changed every 6 weeks)   suggests follow-up with [facility]  Compliance is biggest carri for pt, every home health agency in area has refused to accept him. Can't find any wound clinics for him either. Apparently he is still a pt at [facility] [place]--family will look into this. Pt is chronically non-compliant. Misses appointments. Doesn't use his pressure mattress. Isn't home when HH agencies come by.  BRANNON reviewed, pt does not actually take chronic opioids at home as he claims. This would explain how sedated he is here. Doses decreased accordingly.  Will ask ID to comment on ESBL E.coli in urine, ?colonization ?need for treatment  Repeat CT chest (as suggested in October) noted, appears to be scarring in left apex, continued surveillance recommended).       Brant Saxena MD  01/20/17  1:39 PM    Time: 30min

## 2017-01-20 NOTE — PLAN OF CARE
Problem: Patient Care Overview (Adult)  Goal: Plan of Care Review  Outcome: Ongoing (interventions implemented as appropriate)    01/20/17 0357   Coping/Psychosocial Response Interventions   Plan Of Care Reviewed With patient   Patient Care Overview   Progress no change   Outcome Evaluation   Outcome Summary/Follow up Plan pt medicated for pain as needed. pt changed condom catheter and colostomy. pt tolerated dressing changes. will continue to monitor pt.        Goal: Adult Individualization and Mutuality  Outcome: Ongoing (interventions implemented as appropriate)  Goal: Discharge Needs Assessment  Outcome: Ongoing (interventions implemented as appropriate)    Problem: Fall Risk (Adult)  Goal: Absence of Falls  Outcome: Ongoing (interventions implemented as appropriate)    Problem: Skin Integrity Impairment, Risk/Actual (Adult)  Goal: Skin Integrity/Wound Healing  Outcome: Ongoing (interventions implemented as appropriate)    Problem: Nutrition, Imbalanced: Inadequate Oral Intake (Adult)  Goal: Improved Oral Intake  Outcome: Ongoing (interventions implemented as appropriate)  Goal: Prevent Further Weight Loss  Outcome: Ongoing (interventions implemented as appropriate)

## 2017-01-20 NOTE — PROGRESS NOTES
Continued Stay Note  [facility]     Patient Name: Joaquín Sherman  MRN: [id number]  Today's Date: 1/20/2017    Admit Date: 1/16/2017          Discharge Plan       01/20/17 1511    Case Management/Social Work Plan    Additional Comments Spoke with sister Jenni she stated [facility] will provide a ride to and from the clinic  if they have at least a 24 hour notice.  Called [facility] [phone] and they confirmed  they  do provide transport to patient's they have Passport insurance. CONTRERAS Che RN.      01/20/17 1424    Case Management/Social Work Plan    Plan Home with Mom and sister Jenni    Additional Comments Sister Jenni to call [facility] [phone] when discharged for appointment.  Patient is current with [facility] but has not been there for 6 years.               Discharge Codes     None        Expected Discharge Date and Time     Expected Discharge Date Expected Discharge Time    Jan 18, 2017             Meeta Che, RN

## 2017-01-21 LAB
ANION GAP SERPL CALCULATED.3IONS-SCNC: 9.6 MMOL/L
BUN BLD-MCNC: 12 MG/DL (ref 6–20)
BUN/CREAT SERPL: 13.3 (ref 7–25)
CALCIUM SPEC-SCNC: 8.6 MG/DL (ref 8.6–10.5)
CHLORIDE SERPL-SCNC: 99 MMOL/L (ref 98–107)
CO2 SERPL-SCNC: 27.4 MMOL/L (ref 22–29)
CREAT BLD-MCNC: 0.9 MG/DL (ref 0.76–1.27)
GFR SERPL CREATININE-BSD FRML MDRD: 116 ML/MIN/1.73
GLUCOSE BLD-MCNC: 89 MG/DL (ref 65–99)
POTASSIUM BLD-SCNC: 4.4 MMOL/L (ref 3.5–5.2)
SODIUM BLD-SCNC: 136 MMOL/L (ref 136–145)

## 2017-01-21 PROCEDURE — 25010000002 HYDROMORPHONE PER 4 MG: Performed by: HOSPITALIST

## 2017-01-21 PROCEDURE — 25010000002 HEPARIN FLUSH (PORCINE) 100 UNIT/ML SOLUTION: Performed by: INTERNAL MEDICINE

## 2017-01-21 PROCEDURE — 80048 BASIC METABOLIC PNL TOTAL CA: CPT | Performed by: HOSPITALIST

## 2017-01-21 PROCEDURE — 25010000002 ENOXAPARIN PER 10 MG: Performed by: INTERNAL MEDICINE

## 2017-01-21 PROCEDURE — 99222 1ST HOSP IP/OBS MODERATE 55: CPT | Performed by: INTERNAL MEDICINE

## 2017-01-21 RX ORDER — PHENAZOPYRIDINE HYDROCHLORIDE 100 MG/1
100 TABLET, FILM COATED ORAL
Status: DISCONTINUED | OUTPATIENT
Start: 2017-01-21 | End: 2017-01-23 | Stop reason: HOSPADM

## 2017-01-21 RX ADMIN — HYDROMORPHONE HYDROCHLORIDE 0.5 MG: 1 INJECTION, SOLUTION INTRAMUSCULAR; INTRAVENOUS; SUBCUTANEOUS at 15:02

## 2017-01-21 RX ADMIN — PETROLATUM: 42 OINTMENT TOPICAL at 14:59

## 2017-01-21 RX ADMIN — GABAPENTIN 300 MG: 300 CAPSULE ORAL at 17:22

## 2017-01-21 RX ADMIN — SODIUM CHLORIDE, PRESERVATIVE FREE 10 ML: 5 INJECTION INTRAVENOUS at 08:17

## 2017-01-21 RX ADMIN — DAKIN'S SOLUTION 0.125% (QUARTER STRENGTH): 0.12 SOLUTION at 00:27

## 2017-01-21 RX ADMIN — GABAPENTIN 300 MG: 300 CAPSULE ORAL at 20:50

## 2017-01-21 RX ADMIN — HYDROMORPHONE HYDROCHLORIDE 0.5 MG: 1 INJECTION, SOLUTION INTRAMUSCULAR; INTRAVENOUS; SUBCUTANEOUS at 11:37

## 2017-01-21 RX ADMIN — OXYCODONE HYDROCHLORIDE 15 MG: 15 TABLET, FILM COATED, EXTENDED RELEASE ORAL at 17:30

## 2017-01-21 RX ADMIN — OXYCODONE HYDROCHLORIDE AND ACETAMINOPHEN 1 TABLET: 10; 325 TABLET ORAL at 15:24

## 2017-01-21 RX ADMIN — SODIUM CHLORIDE, PRESERVATIVE FREE 500 UNITS: 5 INJECTION INTRAVENOUS at 08:17

## 2017-01-21 RX ADMIN — HYDROMORPHONE HYDROCHLORIDE 0.5 MG: 1 INJECTION, SOLUTION INTRAMUSCULAR; INTRAVENOUS; SUBCUTANEOUS at 08:30

## 2017-01-21 RX ADMIN — ALPRAZOLAM 1 MG: 0.5 TABLET ORAL at 02:14

## 2017-01-21 RX ADMIN — PANTOPRAZOLE SODIUM 40 MG: 40 TABLET, DELAYED RELEASE ORAL at 08:17

## 2017-01-21 RX ADMIN — OXYCODONE HYDROCHLORIDE AND ACETAMINOPHEN 1 TABLET: 10; 325 TABLET ORAL at 01:34

## 2017-01-21 RX ADMIN — DAKIN'S SOLUTION 0.125% (QUARTER STRENGTH): 0.12 SOLUTION at 14:58

## 2017-01-21 RX ADMIN — OXYBUTYNIN CHLORIDE 5 MG: 5 TABLET ORAL at 08:17

## 2017-01-21 RX ADMIN — PETROLATUM: 42 OINTMENT TOPICAL at 00:26

## 2017-01-21 RX ADMIN — OXYCODONE HYDROCHLORIDE AND ACETAMINOPHEN 1 TABLET: 10; 325 TABLET ORAL at 21:08

## 2017-01-21 RX ADMIN — GABAPENTIN 300 MG: 300 CAPSULE ORAL at 15:00

## 2017-01-21 RX ADMIN — SODIUM CHLORIDE, PRESERVATIVE FREE 500 UNITS: 5 INJECTION INTRAVENOUS at 20:50

## 2017-01-21 RX ADMIN — ALPRAZOLAM 1 MG: 0.5 TABLET ORAL at 15:24

## 2017-01-21 RX ADMIN — OXYBUTYNIN CHLORIDE 5 MG: 5 TABLET ORAL at 17:23

## 2017-01-21 RX ADMIN — OXYBUTYNIN CHLORIDE 5 MG: 5 TABLET ORAL at 20:50

## 2017-01-21 RX ADMIN — HYDROMORPHONE HYDROCHLORIDE 0.5 MG: 1 INJECTION, SOLUTION INTRAMUSCULAR; INTRAVENOUS; SUBCUTANEOUS at 00:26

## 2017-01-21 RX ADMIN — HYDROMORPHONE HYDROCHLORIDE 0.5 MG: 1 INJECTION, SOLUTION INTRAMUSCULAR; INTRAVENOUS; SUBCUTANEOUS at 18:57

## 2017-01-21 RX ADMIN — HYDROMORPHONE HYDROCHLORIDE 0.5 MG: 1 INJECTION, SOLUTION INTRAMUSCULAR; INTRAVENOUS; SUBCUTANEOUS at 22:02

## 2017-01-21 RX ADMIN — ENOXAPARIN SODIUM 40 MG: 40 INJECTION SUBCUTANEOUS at 11:38

## 2017-01-21 RX ADMIN — HYDROMORPHONE HYDROCHLORIDE 0.5 MG: 1 INJECTION, SOLUTION INTRAMUSCULAR; INTRAVENOUS; SUBCUTANEOUS at 05:32

## 2017-01-21 NOTE — PROGRESS NOTES
"   LOS: 5 days   Patient Care Team:  ARIES Up as PCP - General (Nurse Practitioner)    Chief Complaint: leaking around condom cath    Subjective     HPI Comments: Pt still c/o generalized pain. Looks comfortable. Says he didn't sleep all night so he's dozing now. Still c/o leaking around condom cath.    Urinary Tract Infection    Pertinent negatives include no nausea or vomiting.       Subjective:  Symptoms:  Stable.  No shortness of breath, malaise, cough, chest pain, weakness, headache, chest pressure, anorexia, diarrhea or anxiety.    Diet:  Adequate intake.  No nausea or vomiting.    Activity level: Activity impairment: impaired due to paraplegia.    Pain:  He complains of pain that is moderate.  He reports pain is unchanged.  Pain is partially controlled.        History taken from: patient chart    Objective     Vital Signs  Temp:  [98 °F (36.7 °C)-99.1 °F (37.3 °C)] 98 °F (36.7 °C)  Heart Rate:  [78-94] 78  Resp:  [16-18] 18  BP: ()/(59-73) 84/73    Objective:  General Appearance:  Comfortable and in no acute distress (seems sedated today).    Vital signs: (most recent): Blood pressure 84/73, pulse 78, temperature 98 °F (36.7 °C), temperature source Oral, resp. rate 18, height 72.99\" (185.4 cm), weight 91 lb 0.8 oz (41.3 kg), SpO2 95 %.  Vital signs are normal.  No fever.    Output: Producing urine and producing stool.    Lungs:  Normal respiratory rate and normal effort.  Breath sounds clear to auscultation.    Heart: Normal rate.  Regular rhythm.    Chest: (Port right chest)  Abdomen: Abdomen is soft.  (Colostomy LLQ)Bowel sounds are normal.   There is suprapubic tenderness. There is no rebound tenderness.  There is no guarding.     Extremities: There is no dependent edema.    Pulses: Distal pulses are intact.    Neurological: Patient is alert and oriented to person, place and time.    Skin:  Warm and dry.              Results Review:     I reviewed the patient's new clinical results.  I " reviewed the patient's other test results and agree with the interpretation  Discussed with patient    Medication Review: reviewed    Assessment/Plan     Principal Problem:    Severe anemia  Active Problems:    Paraplegia    Hypotension, chronic asymptomatic    Decubitus ulcer of sacral region, stage 4    Suprapubic catheter dysfunction      Assessment:  (1. Severe anemia (iron deficiency and chronic disease)  2. Paraplegia and chronic suprapubic catheter (obstructed)  3. Non-compliance  4. Chronic hypotension  5. Stage 4 Sacral decubitus with SP cath and colostomy  6. Chronic pain syndrome  7. GERD  8. Abnl CT Chest in October 2016  9. ESBL E.coli colonization of urine).     Plan:   (S/p PRBCs and IV iron  S/p exchange of supra-pubic catheter (will need to get changed every 6 weeks)   suggests follow-up with [facility]  Compliance is biggest carri for pt, every home health agency in area has refused to accept him. Can't find any wound clinics for him either. Apparently he is still a pt at [facility]--family will look into this. Pt is chronically non-compliant. Misses appointments. Doesn't use his pressure mattress. Isn't home when HH agencies come by.  BRANNON reviewed, pt does not actually take chronic opioids at home as he claims. This would explain how sedated he is here. Doses decreased accordingly.  Repeat CT chest (as suggested in October) noted, appears to be scarring in left apex, continued surveillance recommended  ESBL E.coli in urine is colonization only, abx dc'd  Maybe home tomorrow if still doing well off abx).       Brant Saxena MD  01/21/17  9:57 AM    Time: 25min

## 2017-01-21 NOTE — PLAN OF CARE
Problem: Patient Care Overview (Adult)  Goal: Plan of Care Review  Outcome: Ongoing (interventions implemented as appropriate)    01/20/17 1855 01/20/17 2033 01/21/17 0601   Coping/Psychosocial Response Interventions   Plan Of Care Reviewed With --  patient --    Patient Care Overview   Progress no change --  --    Outcome Evaluation   Outcome Summary/Follow up Plan --  --  PT CONTINUES TO C/O GENERALIZED PAIN. DRESSING CHANGED, AND PRN MEDICATIONS ADMINISTERED. CONDOM CATHETER CHANGED, AND BOWDEN CATHETER CARE DONE.        Goal: Adult Individualization and Mutuality  Outcome: Ongoing (interventions implemented as appropriate)  Goal: Discharge Needs Assessment  Outcome: Ongoing (interventions implemented as appropriate)    Problem: Fall Risk (Adult)  Goal: Absence of Falls  Outcome: Ongoing (interventions implemented as appropriate)    Problem: Skin Integrity Impairment, Risk/Actual (Adult)  Goal: Skin Integrity/Wound Healing  Outcome: Ongoing (interventions implemented as appropriate)    Problem: Nutrition, Imbalanced: Inadequate Oral Intake (Adult)  Goal: Improved Oral Intake  Outcome: Ongoing (interventions implemented as appropriate)  Goal: Prevent Further Weight Loss  Outcome: Ongoing (interventions implemented as appropriate)    Problem: Pain, Acute (Adult)  Goal: Identify Related Risk Factors and Signs and Symptoms  Outcome: Ongoing (interventions implemented as appropriate)  Goal: Acceptable Pain Control/Comfort Level  Outcome: Ongoing (interventions implemented as appropriate)

## 2017-01-21 NOTE — PLAN OF CARE
Problem: Patient Care Overview (Adult)  Goal: Plan of Care Review  Outcome: Ongoing (interventions implemented as appropriate)    01/20/17 4077   Coping/Psychosocial Response Interventions   Plan Of Care Reviewed With patient   Patient Care Overview   Progress no change   Outcome Evaluation   Outcome Summary/Follow up Plan Patient c/o continuous generalized and abdominal pain, minimally alleviated by prn pain medication. Patient was not receiving IV Dilaudid as his port was stripped, and sluggish. Port re-accessed by IV team, tpa instilled, blood return positive, 9 ml/hr NS KVO running. Colostomy was changed, skin intact and dry underneath, dressing to wounds changed at 11 am, patient tolerated well. Suprapubic catheter leaking small amounts of urine, condom catheter replaced and intact currently. Continues IV ABX, no adverse reactions or side effects noted, will continue to monitor.        Goal: Adult Individualization and Mutuality  Outcome: Ongoing (interventions implemented as appropriate)  Goal: Discharge Needs Assessment  Outcome: Ongoing (interventions implemented as appropriate)    Problem: Fall Risk (Adult)  Goal: Absence of Falls  Outcome: Ongoing (interventions implemented as appropriate)    Problem: Skin Integrity Impairment, Risk/Actual (Adult)  Goal: Skin Integrity/Wound Healing  Outcome: Ongoing (interventions implemented as appropriate)    Problem: Nutrition, Imbalanced: Inadequate Oral Intake (Adult)  Goal: Improved Oral Intake  Outcome: Ongoing (interventions implemented as appropriate)  Goal: Prevent Further Weight Loss  Outcome: Ongoing (interventions implemented as appropriate)    Problem: Pain, Acute (Adult)  Goal: Identify Related Risk Factors and Signs and Symptoms  Outcome: Ongoing (interventions implemented as appropriate)  Goal: Acceptable Pain Control/Comfort Level  Outcome: Ongoing (interventions implemented as appropriate)
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
## 2017-01-22 PROCEDURE — 25010000002 ENOXAPARIN PER 10 MG: Performed by: INTERNAL MEDICINE

## 2017-01-22 PROCEDURE — 25010000002 HYDROMORPHONE PER 4 MG: Performed by: HOSPITALIST

## 2017-01-22 PROCEDURE — 25010000002 HEPARIN FLUSH (PORCINE) 100 UNIT/ML SOLUTION: Performed by: INTERNAL MEDICINE

## 2017-01-22 RX ADMIN — SODIUM CHLORIDE, PRESERVATIVE FREE 10 ML: 5 INJECTION INTRAVENOUS at 10:05

## 2017-01-22 RX ADMIN — OXYBUTYNIN CHLORIDE 5 MG: 5 TABLET ORAL at 10:05

## 2017-01-22 RX ADMIN — OXYBUTYNIN CHLORIDE 5 MG: 5 TABLET ORAL at 17:53

## 2017-01-22 RX ADMIN — ALPRAZOLAM 1 MG: 0.5 TABLET ORAL at 22:18

## 2017-01-22 RX ADMIN — HYDROMORPHONE HYDROCHLORIDE 0.5 MG: 1 INJECTION, SOLUTION INTRAMUSCULAR; INTRAVENOUS; SUBCUTANEOUS at 21:52

## 2017-01-22 RX ADMIN — SODIUM CHLORIDE, PRESERVATIVE FREE 500 UNITS: 5 INJECTION INTRAVENOUS at 10:05

## 2017-01-22 RX ADMIN — GABAPENTIN 300 MG: 300 CAPSULE ORAL at 17:53

## 2017-01-22 RX ADMIN — OXYCODONE HYDROCHLORIDE 15 MG: 15 TABLET, FILM COATED, EXTENDED RELEASE ORAL at 17:53

## 2017-01-22 RX ADMIN — DAKIN'S SOLUTION 0.125% (QUARTER STRENGTH): 0.12 SOLUTION at 21:52

## 2017-01-22 RX ADMIN — PETROLATUM: 42 OINTMENT TOPICAL at 21:52

## 2017-01-22 RX ADMIN — HYDROMORPHONE HYDROCHLORIDE 0.5 MG: 1 INJECTION, SOLUTION INTRAMUSCULAR; INTRAVENOUS; SUBCUTANEOUS at 04:40

## 2017-01-22 RX ADMIN — SODIUM CHLORIDE, PRESERVATIVE FREE 10 ML: 5 INJECTION INTRAVENOUS at 00:30

## 2017-01-22 RX ADMIN — SODIUM CHLORIDE, PRESERVATIVE FREE 10 ML: 5 INJECTION INTRAVENOUS at 21:55

## 2017-01-22 RX ADMIN — HYDROMORPHONE HYDROCHLORIDE 0.5 MG: 1 INJECTION, SOLUTION INTRAMUSCULAR; INTRAVENOUS; SUBCUTANEOUS at 14:37

## 2017-01-22 RX ADMIN — PETROLATUM: 42 OINTMENT TOPICAL at 00:30

## 2017-01-22 RX ADMIN — DAKIN'S SOLUTION 0.125% (QUARTER STRENGTH): 0.12 SOLUTION at 00:30

## 2017-01-22 RX ADMIN — OXYCODONE HYDROCHLORIDE AND ACETAMINOPHEN 1 TABLET: 10; 325 TABLET ORAL at 22:48

## 2017-01-22 RX ADMIN — ENOXAPARIN SODIUM 40 MG: 40 INJECTION SUBCUTANEOUS at 10:05

## 2017-01-22 RX ADMIN — GABAPENTIN 300 MG: 300 CAPSULE ORAL at 21:52

## 2017-01-22 RX ADMIN — OXYBUTYNIN CHLORIDE 5 MG: 5 TABLET ORAL at 21:52

## 2017-01-22 RX ADMIN — GABAPENTIN 300 MG: 300 CAPSULE ORAL at 14:37

## 2017-01-22 RX ADMIN — HYDROMORPHONE HYDROCHLORIDE 0.5 MG: 1 INJECTION, SOLUTION INTRAMUSCULAR; INTRAVENOUS; SUBCUTANEOUS at 18:58

## 2017-01-22 RX ADMIN — HYDROMORPHONE HYDROCHLORIDE 0.5 MG: 1 INJECTION, SOLUTION INTRAMUSCULAR; INTRAVENOUS; SUBCUTANEOUS at 10:09

## 2017-01-22 RX ADMIN — PANTOPRAZOLE SODIUM 40 MG: 40 TABLET, DELAYED RELEASE ORAL at 10:05

## 2017-01-22 RX ADMIN — OXYCODONE HYDROCHLORIDE 15 MG: 15 TABLET, FILM COATED, EXTENDED RELEASE ORAL at 06:05

## 2017-01-22 NOTE — PROGRESS NOTES
"   LOS: 6 days   Patient Care Team:  ARIES Up as PCP - General (Nurse Practitioner)    Chief Complaint: \"I'm still peeing on myself\"    Subjective     HPI Comments: Pt c/o penile irritation and injury due to condom cath. Says he is \"peeing on myself\". Refused to take Pyridium per RN notes.    Urinary Tract Infection    Pertinent negatives include no nausea or vomiting.       Subjective:  Symptoms:  Stable.  No shortness of breath, malaise, cough, chest pain, weakness, headache, chest pressure, anorexia, diarrhea or anxiety.    Diet:  Adequate intake.  No nausea or vomiting.    Activity level: Activity impairment: impaired due to paraplegia.    Pain:  He complains of pain that is moderate.  He reports pain is unchanged.  Pain is partially controlled.        History taken from: patient chart RN    Objective     Vital Signs  Temp:  [97.4 °F (36.3 °C)-99.6 °F (37.6 °C)] 97.9 °F (36.6 °C)  Heart Rate:  [] 93  Resp:  [16-18] 18  BP: (80-99)/(55-68) 90/68    Objective:  General Appearance:  Comfortable and in no acute distress (wide awake today).    Vital signs: (most recent): Blood pressure 90/68, pulse 93, temperature 97.9 °F (36.6 °C), temperature source Oral, resp. rate 18, height 72.99\" (185.4 cm), weight 91 lb 0.8 oz (41.3 kg), SpO2 99 %.  Vital signs are normal.  No fever.    Output: Producing urine and producing stool.    Lungs:  Normal respiratory rate and normal effort.  Breath sounds clear to auscultation.    Heart: Normal rate.  Regular rhythm.    Chest: (Port right chest)  Abdomen: Abdomen is soft.  (Colostomy LLQ)Bowel sounds are normal.   There is suprapubic tenderness. There is no rebound tenderness.  There is no guarding.     Extremities: There is no dependent edema.    Pulses: Distal pulses are intact.    Neurological: Patient is alert and oriented to person, place and time.    Skin:  Warm and dry.              Results Review:     I reviewed the patient's new clinical results.  I " "reviewed the patient's other test results and agree with the interpretation  Discussed with patient and RN    Medication Review: reviewed    Assessment/Plan     Principal Problem:    Severe anemia  Active Problems:    Paraplegia    Hypotension, chronic asymptomatic    Decubitus ulcer of sacral region, stage 4    Suprapubic catheter dysfunction      Assessment:  (1. Severe anemia (iron deficiency and chronic disease)  2. Paraplegia and chronic suprapubic catheter (obstructed)  3. Non-compliance  4. Chronic hypotension  5. Stage 4 Sacral decubitus with SP cath and colostomy  6. Chronic pain syndrome  7. GERD  8. Abnl CT Chest in October 2016  9. ESBL E.coli colonization of urine).     Plan:   (S/p PRBCs and IV iron  S/p exchange of supra-pubic catheter (will need to get changed every 6 weeks)   suggests follow-up with [facility]  Compliance is biggest carri for pt, every home health agency in area has refused to accept him. Can't find any wound clinics for him either. Apparently he is still a pt at [facility] and family confirmed that [facility] can follow pt after dc. Pt is chronically non-compliant. Misses appointments. Doesn't use his pressure mattress. Isn't home when HH agencies come by.  Now pt is refusing to go home. He says that the condom catheter is cutting off his penis and he is peeing all over himself. He says he is unhappy that a home health agency made him move out of a second floor room bc it was \"unsafe\" for him to be there as a paraplegic. He says he has no supplies to care for his ostomy and SP cath. He says he wants to speak with \"patient relations\".  BRANNON reviewed, pt does not actually take chronic opioids at home as he claims. This would explain how sedated he is here. Doses decreased accordingly.  Repeat CT chest (as suggested in October) noted, appears to be scarring in left apex, continued surveillance recommended  ESBL E.coli in urine is colonization only, abx dc'd  Refuses " "Pyridium because he read online that it would make him \"pee more\"  Will ask  to see again regarding complaints of penile trauma  WIll ask patient advocate to see pt prior to dc  Maybe home tomorrow if still doing well  He looks better today than I've ever seen him look. Not sedated, very comfortable, voice strong, less cachectic than prior admissions, tolerating diet without issue.  I believe he has reached maximum hospital benefit and is appropriate for dc home.).       Brant Saxena MD  01/22/17  11:56 AM    Time: More than 50% of time spent in counseling and coordination of care:  Total face-to-face/floor time 40 min.  Time spent in counseling 30 min. Counseling included the following topics: bladder irritation and spasm, appropriate use of analgesia, f/u needs, penile discomfort      "

## 2017-01-22 NOTE — PROGRESS NOTES
[facility] Progress Note     Patient well know to our service and recently seen by Dr. Blanca with sp tube change.  Now with penile ulcer form condom caths most likely. Still leaking around sp tube.  He is not wanting to do urinary diversion and has considered proceeding with Botox.  Exam- scaphoid abd with sp tube, penile ulcer prox shaft is clean  Imp:   Penile ulcer  NGB with UI refractory to ditropan and diversion    Plan: LWC  Consider Botox to bladder but this will have to be done in office.

## 2017-01-22 NOTE — PLAN OF CARE
Problem: Patient Care Overview (Adult)  Goal: Plan of Care Review  Outcome: Ongoing (interventions implemented as appropriate)    01/21/17 1954   Coping/Psychosocial Response Interventions   Plan Of Care Reviewed With patient   Patient Care Overview   Progress improving   Outcome Evaluation   Outcome Summary/Follow up Plan Dressing changes as ordered, peristant c/o's of pain. Irritated about urine leakages from penis and supra pubic. Refused to try Pyridium.         Problem: Fall Risk (Adult)  Goal: Absence of Falls  Outcome: Ongoing (interventions implemented as appropriate)    01/21/17 1954   Fall Risk (Adult)   Absence of Falls making progress toward outcome         Problem: Skin Integrity Impairment, Risk/Actual (Adult)  Goal: Skin Integrity/Wound Healing  Outcome: Ongoing (interventions implemented as appropriate)    01/21/17 1954   Skin Integrity Impairment, Risk/Actual (Adult)   Skin Integrity/Wound Healing making progress toward outcome         Problem: Nutrition, Imbalanced: Inadequate Oral Intake (Adult)  Goal: Improved Oral Intake  Outcome: Ongoing (interventions implemented as appropriate)    01/21/17 1954   Nutrition, Imbalanced: Inadequate Oral Intake (Adult)   Improved Oral Intake making progress toward outcome       Goal: Prevent Further Weight Loss  Outcome: Ongoing (interventions implemented as appropriate)    01/21/17 1954   Nutrition, Imbalanced: Inadequate Oral Intake (Adult)   Prevent Further Weight Loss making progress toward outcome         Problem: Pain, Acute (Adult)  Goal: Identify Related Risk Factors and Signs and Symptoms  Outcome: Ongoing (interventions implemented as appropriate)    01/21/17 1954   Pain, Acute   Related Risk Factors (Acute Pain) persistent pain   Signs and Symptoms (Acute Pain) verbalization of pain descriptors       Goal: Acceptable Pain Control/Comfort Level  Outcome: Ongoing (interventions implemented as appropriate)

## 2017-01-22 NOTE — PLAN OF CARE
Problem: Patient Care Overview (Adult)  Goal: Plan of Care Review  Outcome: Ongoing (interventions implemented as appropriate)    01/22/17 1561   Coping/Psychosocial Response Interventions   Plan Of Care Reviewed With patient   Patient Care Overview   Progress progress towards functional goals is fair   Outcome Evaluation   Outcome Summary/Follow up Plan pt c/o generalized pain. prn pain meds given. concerned about going home with alexandre area not tx. refusing pyridium. ccp involved. md aware. tolerating dressing changes. no acute distress noted. will continue to monitor.       Goal: Adult Individualization and Mutuality  Outcome: Ongoing (interventions implemented as appropriate)    Problem: Fall Risk (Adult)  Goal: Absence of Falls  Outcome: Ongoing (interventions implemented as appropriate)    Problem: Skin Integrity Impairment, Risk/Actual (Adult)  Goal: Skin Integrity/Wound Healing  Outcome: Ongoing (interventions implemented as appropriate)    Problem: Nutrition, Imbalanced: Inadequate Oral Intake (Adult)  Goal: Improved Oral Intake  Outcome: Ongoing (interventions implemented as appropriate)  Goal: Prevent Further Weight Loss  Outcome: Ongoing (interventions implemented as appropriate)    Problem: Pain, Acute (Adult)  Goal: Identify Related Risk Factors and Signs and Symptoms  Outcome: Outcome(s) achieved Date Met:  01/22/17  Goal: Acceptable Pain Control/Comfort Level  Outcome: Ongoing (interventions implemented as appropriate)

## 2017-01-23 VITALS
HEIGHT: 73 IN | HEART RATE: 95 BPM | DIASTOLIC BLOOD PRESSURE: 70 MMHG | SYSTOLIC BLOOD PRESSURE: 98 MMHG | OXYGEN SATURATION: 97 % | TEMPERATURE: 98.8 F | WEIGHT: 91.05 LBS | RESPIRATION RATE: 18 BRPM | BODY MASS INDEX: 12.07 KG/M2

## 2017-01-23 PROBLEM — D64.9 SEVERE ANEMIA: Status: RESOLVED | Noted: 2017-01-16 | Resolved: 2017-01-23

## 2017-01-23 PROBLEM — T83.010A SUPRAPUBIC CATHETER DYSFUNCTION (HCC): Status: RESOLVED | Noted: 2017-01-16 | Resolved: 2017-01-23

## 2017-01-23 LAB
ANION GAP SERPL CALCULATED.3IONS-SCNC: 10.2 MMOL/L
BASOPHILS # BLD AUTO: 0.04 10*3/MM3 (ref 0–0.2)
BASOPHILS NFR BLD AUTO: 0.6 % (ref 0–1.5)
BUN BLD-MCNC: 12 MG/DL (ref 6–20)
BUN/CREAT SERPL: 15.8 (ref 7–25)
CALCIUM SPEC-SCNC: 8.4 MG/DL (ref 8.6–10.5)
CHLORIDE SERPL-SCNC: 100 MMOL/L (ref 98–107)
CO2 SERPL-SCNC: 26.8 MMOL/L (ref 22–29)
CREAT BLD-MCNC: 0.76 MG/DL (ref 0.76–1.27)
DEPRECATED RDW RBC AUTO: 55.3 FL (ref 37–54)
DEPRECATED RDW RBC AUTO: 56.3 FL (ref 37–54)
EOSINOPHIL # BLD AUTO: 0.23 10*3/MM3 (ref 0–0.7)
EOSINOPHIL NFR BLD AUTO: 3.7 % (ref 0.3–6.2)
ERYTHROCYTE [DISTWIDTH] IN BLOOD BY AUTOMATED COUNT: 18.3 % (ref 11.5–14.5)
ERYTHROCYTE [DISTWIDTH] IN BLOOD BY AUTOMATED COUNT: 18.3 % (ref 11.5–14.5)
GFR SERPL CREATININE-BSD FRML MDRD: 141 ML/MIN/1.73
GLUCOSE BLD-MCNC: 109 MG/DL (ref 65–99)
HCT VFR BLD AUTO: 27.2 % (ref 40.4–52.2)
HCT VFR BLD AUTO: 27.5 % (ref 40.4–52.2)
HGB BLD-MCNC: 7.9 G/DL (ref 13.7–17.6)
HGB BLD-MCNC: 7.9 G/DL (ref 13.7–17.6)
IMM GRANULOCYTES # BLD: 0.02 10*3/MM3 (ref 0–0.03)
IMM GRANULOCYTES NFR BLD: 0.3 % (ref 0–0.5)
LYMPHOCYTES # BLD AUTO: 1.76 10*3/MM3 (ref 0.9–4.8)
LYMPHOCYTES NFR BLD AUTO: 28.5 % (ref 19.6–45.3)
MCH RBC QN AUTO: 23.9 PG (ref 27–32.7)
MCH RBC QN AUTO: 23.9 PG (ref 27–32.7)
MCHC RBC AUTO-ENTMCNC: 28.7 G/DL (ref 32.6–36.4)
MCHC RBC AUTO-ENTMCNC: 29 G/DL (ref 32.6–36.4)
MCV RBC AUTO: 82.2 FL (ref 79.8–96.2)
MCV RBC AUTO: 83.3 FL (ref 79.8–96.2)
MONOCYTES # BLD AUTO: 0.81 10*3/MM3 (ref 0.2–1.2)
MONOCYTES NFR BLD AUTO: 13.1 % (ref 5–12)
NEUTROPHILS # BLD AUTO: 3.32 10*3/MM3 (ref 1.9–8.1)
NEUTROPHILS NFR BLD AUTO: 53.8 % (ref 42.7–76)
NRBC BLD MANUAL-RTO: 0 /100 WBC (ref 0–0)
PLATELET # BLD AUTO: 187 10*3/MM3 (ref 140–500)
PLATELET # BLD AUTO: 187 10*3/MM3 (ref 140–500)
PMV BLD AUTO: 8.9 FL (ref 6–12)
PMV BLD AUTO: 9 FL (ref 6–12)
POTASSIUM BLD-SCNC: 3.8 MMOL/L (ref 3.5–5.2)
RBC # BLD AUTO: 3.3 10*6/MM3 (ref 4.6–6)
RBC # BLD AUTO: 3.31 10*6/MM3 (ref 4.6–6)
SODIUM BLD-SCNC: 137 MMOL/L (ref 136–145)
WBC NRBC COR # BLD: 6.18 10*3/MM3 (ref 4.5–10.7)
WBC NRBC COR # BLD: 6.46 10*3/MM3 (ref 4.5–10.7)

## 2017-01-23 PROCEDURE — 85027 COMPLETE CBC AUTOMATED: CPT | Performed by: HOSPITALIST

## 2017-01-23 PROCEDURE — 85025 COMPLETE CBC W/AUTO DIFF WBC: CPT | Performed by: HOSPITALIST

## 2017-01-23 PROCEDURE — 80048 BASIC METABOLIC PNL TOTAL CA: CPT | Performed by: HOSPITALIST

## 2017-01-23 PROCEDURE — 25010000002 ENOXAPARIN PER 10 MG: Performed by: INTERNAL MEDICINE

## 2017-01-23 PROCEDURE — 25010000002 HYDROMORPHONE PER 4 MG: Performed by: HOSPITALIST

## 2017-01-23 RX ORDER — PETROLATUM 42 G/100G
OINTMENT TOPICAL EVERY 12 HOURS SCHEDULED
Qty: 454 G | Refills: 0 | Status: SHIPPED | OUTPATIENT
Start: 2017-01-23 | End: 2017-02-22

## 2017-01-23 RX ORDER — OXYCODONE AND ACETAMINOPHEN 10; 325 MG/1; MG/1
1 TABLET ORAL EVERY 4 HOURS PRN
Qty: 25 TABLET | Refills: 0 | Status: SHIPPED | OUTPATIENT
Start: 2017-01-23 | End: 2017-01-28

## 2017-01-23 RX ORDER — ALPRAZOLAM 1 MG/1
1 TABLET ORAL 2 TIMES DAILY PRN
Qty: 45 TABLET | Refills: 0 | Status: ON HOLD | OUTPATIENT
Start: 2017-01-23 | End: 2018-10-06

## 2017-01-23 RX ORDER — OXYCODONE HYDROCHLORIDE 15 MG/1
15 TABLET, FILM COATED, EXTENDED RELEASE ORAL EVERY 12 HOURS SCHEDULED
Qty: 60 TABLET | Refills: 0 | Status: SHIPPED | OUTPATIENT
Start: 2017-01-23 | End: 2017-01-29

## 2017-01-23 RX ADMIN — SODIUM CHLORIDE, PRESERVATIVE FREE 10 ML: 5 INJECTION INTRAVENOUS at 08:24

## 2017-01-23 RX ADMIN — OXYCODONE HYDROCHLORIDE AND ACETAMINOPHEN 1 TABLET: 10; 325 TABLET ORAL at 16:43

## 2017-01-23 RX ADMIN — ENOXAPARIN SODIUM 40 MG: 40 INJECTION SUBCUTANEOUS at 11:14

## 2017-01-23 RX ADMIN — DAKIN'S SOLUTION 0.125% (QUARTER STRENGTH): 0.12 SOLUTION at 08:24

## 2017-01-23 RX ADMIN — ALPRAZOLAM 1 MG: 0.5 TABLET ORAL at 11:14

## 2017-01-23 RX ADMIN — PANTOPRAZOLE SODIUM 40 MG: 40 TABLET, DELAYED RELEASE ORAL at 08:23

## 2017-01-23 RX ADMIN — HYDROMORPHONE HYDROCHLORIDE 0.5 MG: 1 INJECTION, SOLUTION INTRAMUSCULAR; INTRAVENOUS; SUBCUTANEOUS at 05:39

## 2017-01-23 RX ADMIN — HYDROMORPHONE HYDROCHLORIDE 0.5 MG: 1 INJECTION, SOLUTION INTRAMUSCULAR; INTRAVENOUS; SUBCUTANEOUS at 02:18

## 2017-01-23 RX ADMIN — OXYCODONE HYDROCHLORIDE AND ACETAMINOPHEN 1 TABLET: 10; 325 TABLET ORAL at 03:44

## 2017-01-23 RX ADMIN — HYDROMORPHONE HYDROCHLORIDE 0.5 MG: 1 INJECTION, SOLUTION INTRAMUSCULAR; INTRAVENOUS; SUBCUTANEOUS at 15:09

## 2017-01-23 RX ADMIN — OXYCODONE HYDROCHLORIDE 15 MG: 15 TABLET, FILM COATED, EXTENDED RELEASE ORAL at 08:23

## 2017-01-23 RX ADMIN — OXYBUTYNIN CHLORIDE 5 MG: 5 TABLET ORAL at 08:24

## 2017-01-23 RX ADMIN — PETROLATUM: 42 OINTMENT TOPICAL at 08:24

## 2017-01-23 RX ADMIN — OXYBUTYNIN CHLORIDE 5 MG: 5 TABLET ORAL at 16:43

## 2017-01-23 RX ADMIN — GABAPENTIN 300 MG: 300 CAPSULE ORAL at 13:36

## 2017-01-23 RX ADMIN — GABAPENTIN 300 MG: 300 CAPSULE ORAL at 16:43

## 2017-01-23 RX ADMIN — HYDROMORPHONE HYDROCHLORIDE 0.5 MG: 1 INJECTION, SOLUTION INTRAMUSCULAR; INTRAVENOUS; SUBCUTANEOUS at 11:14

## 2017-01-23 NOTE — PROGRESS NOTES
Continued Stay Note  [facility]     Patient Name: Joaquín Sherman  MRN: [id number]  Today's Date: 1/23/2017    Admit Date: 1/16/2017          Discharge Plan       01/23/17 0805    Case Management/Social Work Plan    Plan Home with Mom and sister Jenni     Patient/Family In Agreement With Plan yes    Additional Comments S/W Wound Care RN, Lashae and she stated that patient should have all information to order supplies, as these supplies are not new.  S/W JAMAL Givens about getting wound measurements.  Attempted to call mother, Marlen Guzman ([phone]) and could not leave message, voicemail is full.  Called Lashae Marte, Patient Advocate r/t JAMAL Givens stated that patient requested.  Lashae not in and left message.  JAMAL Collazo, CCP              Discharge Codes     None        Expected Discharge Date and Time     Expected Discharge Date Expected Discharge Time    Jan 18, 2017             Seda Monge RN

## 2017-01-23 NOTE — PROGRESS NOTES
Continued Stay Note  [facility]     Patient Name: Joaquín Sherman  MRN: [id number]  Today's Date: 1/23/2017    Admit Date: 1/16/2017          Discharge Plan       01/23/17 1522    Case Management/Social Work Plan    Additional Comments [facility] [phone] Ref # [id number]  who stated that pt does not need a precert for non emergent ambulance transport just documentation of medcially necessity.   [facility] are both preferred providers.          01/23/17 1353    Case Management/Social Work Plan    Plan Home with mom and sister     Additional Comments Spoke with pt this AM to discuss D/C plan and options.   Discussed pt returning home with the help of   CCP  attempting to arrange any outpt services welling to assist pt vs pt considering SNu for short term placement for assistance with wound care.    Pt was very verbally adimant  about not going to a NH.   Informed pt that [facility] would proceed with arranging an outpt follow up appt with Rama Sams [phone].  Also [facility] will place call to [facility]  to inquire about getting a pre cert for pt to transport to MD appt by ambulance Also spoke with pt's mom Marlen who confirmed pllan is for pt to return home where she will plan to do pt's dressing changes.  However as of today 1/23 pt's mom is int the hospital herslf at [facility]  and unsure when she will be D/C'ed home.    Pt's mom did provide CCP with  dressing supply company that has provided pt's dressing changes in the past [facility] [phone].  CCP placed call to [facility] who did confirm that they have had pt in the past and do accept pt's passport insurance.   Upon D/C CCP will need a written order and documentation of pt's skin decub's (size/measurments) to be faxed to [facility] at [phone].  P will need to go home with several days supply in order for CCS to get  dressing supplies shipped to pt's house.    Call placed to [facility] (in home MD) spoke with Elizabeth to see if they could accept pt for in home MD visits and per Elizabeht they can not accept Passport.  Call also placed to check with [phone] to see if there is an open case on pt and per [facility] intake there is not an open case. CCP will continue to work on pt's DCP home.                 Discharge Codes     None        Expected Discharge Date and Time     Expected Discharge Date Expected Discharge Time    Jan 18, 2017             MISHA Carrillo

## 2017-01-23 NOTE — PLAN OF CARE
Problem: Patient Care Overview (Adult)  Goal: Plan of Care Review  Outcome: Ongoing (interventions implemented as appropriate)    01/23/17 0415   Coping/Psychosocial Response Interventions   Plan Of Care Reviewed With patient   Patient Care Overview   Progress improving   Outcome Evaluation   Outcome Summary/Follow up Plan states pain always 9 or 10, smiling and socializing , has had 4 guest thoughout shift, little sleep noted, no c/o nausea, Tolerated dressing change,       Goal: Adult Individualization and Mutuality  Outcome: Ongoing (interventions implemented as appropriate)  Goal: Discharge Needs Assessment  Outcome: Ongoing (interventions implemented as appropriate)    Problem: Fall Risk (Adult)  Goal: Absence of Falls  Outcome: Ongoing (interventions implemented as appropriate)    Problem: Skin Integrity Impairment, Risk/Actual (Adult)  Goal: Skin Integrity/Wound Healing  Outcome: Ongoing (interventions implemented as appropriate)    Problem: Nutrition, Imbalanced: Inadequate Oral Intake (Adult)  Goal: Improved Oral Intake  Outcome: Ongoing (interventions implemented as appropriate)  Goal: Prevent Further Weight Loss  Outcome: Ongoing (interventions implemented as appropriate)    Problem: Pain, Acute (Adult)  Goal: Acceptable Pain Control/Comfort Level  Outcome: Ongoing (interventions implemented as appropriate)

## 2017-01-23 NOTE — PAYOR COMM NOTE
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 Renaldo Delvalle MD  •  sodium chloride 0.9 % flush 10 mL, 10 mL, Intracatheter, Q12H, Bean Alejandre MD, 10 mL at 01/20/17 2003  •  sodium chloride 0.9 % flush 10 mL, 10 mL, Intracatheter, PRN, Bean Alejandre MD  •  sodium hypochlorite (DAKIN'S 1/4 STRENGTH) 0.125 % topical solution 0.125% solution, , Topical, BID, Bean Alejandre MD      Review of Systems  All systems were reviewed and are negative unless otherwise stated above in the HPI    Objective   Vital Signs   Temp:  [98 °F (36.7 °C)-99.1 °F (37.3 °C)] 98 °F (36.7 °C)  Heart Rate:  [78-94] 78  Resp:  [16-18] 18  BP: ()/(59-73) 84/73    Physical Exam:   General: awake, alert, NAD, chronically ill appearing  Head: Normocephalic, atraumatic, long dreadlocks  Eyes: PERRL, EOMI, no scleral icterus,  ENT: MMM, OP clear, no thrush. Gold dentition.   Neck: Supple, trachea is midline  Cardiovascular: NR, RR, no murmurs; no LE edema  Respiratory: Lungs are clear to ascultation bilaterally, no  "rales or wheezing; normal work of breathing on ambient air   GI: Abdomen is thin, soft, non-tender, non-distended, normal bowel sounds in all four quadrants; no hepatosplenomegaly, no masses palpated  : condom and suprapubic catheters present  Musculoskeletal: no joint abnormalities, very thin musculature, L hip and sacral ulcer  Skin: No rashes, lesions, or embolic phenomenon  Neurological: Alert and oriented x 3,  motor strength 5/5 in all upper extremities; 0/5 lower  Psychiatric: Normal mood and affect   Lymph: no pre-auricular, post-auricular, submandibular, cervical, supraclavicular LAD  Vasc: no cyanosis; R chest port w/o erythema    Labs:     Lab Results   Component Value Date    WBC 6.91 01/20/2017    HGB 8.8 (L) 01/20/2017    HCT 30.0 (L) 01/20/2017    MCV 85.7 01/20/2017     01/20/2017       Lab Results   Component Value Date    GLUCOSE 89 01/21/2017    BUN 12 01/21/2017    CREATININE 0.90 01/21/2017    EGFRIFAFRI 116 01/21/2017    BCR 13.3 01/21/2017    CO2 27.4 01/21/2017    CALCIUM 8.6 01/21/2017    ALBUMIN 2.70 (L) 01/16/2017    LABIL2 0.6 01/16/2017    AST 6 01/16/2017    ALT <5 01/16/2017   \"loaded\"  Procal 0.08    Microbiology:  1/18 UCx: >100k ESBL E coli    Radiology (personally reviewed):  CT chest with bullous changes at apices and scar; no effusion or consolidation  CT A/P negative    Assessment&#47;Plan   1. Bladder colonization with ESBL organism in patient with suprapubic catheter  -in a patient with chronic indwelling suprapubic catheter, no systemic symptoms, and growth of this organism in prior cultures, we can safely say that he is colonized and no antibiotic treatment is needed as its risks outweigh its benefits  he is still leaking from the condom cath on his penis; urology was following    2. Anemia  3. Paraplegia  4. Chronic Sacral ulcer  Urinary retention    Thank you for this consult. Please call me at [phone] if any further questions.           Electronically signed by " Pancho Miller MD at 1/21/2017  8:51 AM

## 2017-01-23 NOTE — DISCHARGE SUMMARY
DATE OF ADMISSION: 01/16/2017    DATE OF DISCHARGE:   01/23/2017    ADMISSION DIAGNOSIS: Severe anemia.     DISCHARGE DIAGNOSIS:  Severe anemia due to both iron deficiency and chronic disease.     SECONDARY DIAGNOSES:  1.  Paraplegia.   2.  Chronic suprapubic catheter with obstruction.   3.  Chronic urinary incontinence.   4.  Noncompliance.   5.  Chronic hypotension.   6.  Stage IV sacral decubitus with suprapubic catheter and diverting colostomy.   7.  Chronic pain syndrome.   8.  Gastroesophageal reflux disease.   9.  Abnormal CT chest with scarring only in the left apex.   10.  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     CONSULTANTS:  1.  Pancho Miller MD, (Infectious Disease).   2.  Brant Blanca MD, (Urology).     HISTORY:  Please see dictated admission History and Physical for details, but in brief, the patient is a 36-year-old gentleman, well-known to our service through multiple admissions for various reasons in the past year. He is paraplegic with pretty significant sacral ulcers that are followed in [facility]. He is not an operative candidate. He has a diverting colostomy and suprapubic catheter but continues to void urine from his penis.  Despite use of a condom catheter he is soaking his groin in the urine on a pretty regular basis. He feels that his catheters are both clogged at times.  The shaft of his penis is irritated by condom catheter.  In the emergency department he was found to have a hemoglobin of 6.3. He was documented to have anemia of chronic disease by hematology last admission that was in October 2016.  He was lost to follow up. He was admitted to our service for further evaluation and management, and transfused 2 units of packed red cells.     HOSPITAL COURSE: Urology was asked to evaluate him for complaints of leakage and blockage of his suprapubic catheter.  CT did not reveal evidence of bladder stones. Therefore, he was taken to  "cystoscopy and suprapubic catheter was exchanged.  Urology as prescribed local wound care for his penile ulcer and suggested followup with them in their office for consideration of permanent urinary diversion versus perhaps trying  Botox injections of the bladder. The patient says he will \"think about this.\" The patient's lab remained stable. He is actually doing a lot better than I have seen him in the past and he has gained some weight.  His voice is stronger. He is less sedated. Pain is controlled on current regimen. He was hospitalized for a long period of time here due to disposition issues. He has had difficult time maintaining a relationship with a primary care physician and home health services. Case management was able to get him followup with [facility] and they were able to find a home health services company that could provide him with dressing supplies and help out around the house a little bit. The patient's urinalysis was pretty impressive and urine culture grew ESBL E. coli. Infectious disease evaluated the patient and as stated in the chart, they feel this is colonization not a urinary tract infection. He was not treated with antibiotics after that point.      DISPOSITION: Home.     DIET: As tolerated.     ACTIVITY: As tolerated.     DISCHARGE MEDICATIONS:  1.  OxyContin 15 mg p.o. q.12 h. #60. No refills.   2.  Percocet 10/325, take 1 p.o. q.4 h. p.r.n. breakthrough pain; #25, no refills.   3.  Xanax 1 mg p.o. b.i.d. p.r.n. anxiety.   4.  Gabapentin 300 mg p.o. t.i.d.   5.  Dakin 1/4 strength to wounds as instructed.   6.  Aquaphor ointment apply q.12 h. with dressing changes.   7.  Dulcolax 5 mg p.o. daily p.r.n. constipation.   8.  Protonix 40 mg p.o. daily.   9.  Oxybutynin chloride 5 mg p.o. t.i.d.     FOLLOWUP:   1.  The patient is instructed to follow up with Dr. Blanca, (Urology) as an outpatient sometime in the next couple of weeks.   2.  Follow up with primary care physician at " [facility] in the next week.          Greater than 30 minutes was spent in coordination of this discharge.     ERICKA Orona:chaya  D:   01/23/2017 16:14:42  T:   01/23/2017 16:27:40  Job ID:   [id number]  Document ID:   [id number]  cc:   [place] [phone] + [phone] - [place] [phone] +

## 2017-01-23 NOTE — PROGRESS NOTES
"   LOS: 7 days   Patient Care Team:  ARIES Up as PCP - General (Nurse Practitioner)    Chief Complaint: urinary incontinence    Subjective     HPI Comments: Pt very upset at social situation currently. Seems to have exhausted most of his resources. Has been quite agitated and has been verbally abusive to staff.    Urinary Tract Infection    Pertinent negatives include no nausea or vomiting.       Subjective:  Symptoms:  Stable.  No shortness of breath, malaise, cough, chest pain, weakness, headache, chest pressure, anorexia, diarrhea or anxiety.    Diet:  Adequate intake.  No nausea or vomiting.    Activity level: Activity impairment: impaired due to paraplegia.    Pain:  He complains of pain that is moderate.  He reports pain is unchanged.  Pain is partially controlled.        History taken from: patient chart    Objective     Vital Signs  Temp:  [98.6 °F (37 °C)-100 °F (37.8 °C)] 98.9 °F (37.2 °C)  Heart Rate:  [] 98  Resp:  [18] 18  BP: ()/(39-89) 81/62    Objective:  General Appearance:  Comfortable and in no acute distress (wide awake today).    Vital signs: (most recent): Blood pressure 81/62, pulse 98, temperature 98.9 °F (37.2 °C), temperature source Oral, resp. rate 18, height 72.99\" (185.4 cm), weight 91 lb 0.8 oz (41.3 kg), SpO2 98 %.  Vital signs are normal.  No fever.    Output: Producing urine and producing stool.    Lungs:  Normal respiratory rate and normal effort.  Breath sounds clear to auscultation.    Heart: Normal rate.  Regular rhythm.    Chest: (Port right chest)  Abdomen: Abdomen is soft.  (Colostomy LLQ)Bowel sounds are normal.   There is suprapubic tenderness. There is no rebound tenderness.  There is no guarding.     Extremities: There is no dependent edema.    Pulses: Distal pulses are intact.    Neurological: Patient is alert and oriented to person, place and time.    Skin:  Warm and dry.              Results Review:     I reviewed the patient's new clinical " "results.  I reviewed the patient's other test results and agree with the interpretation  Discussed with patient and CCP    Medication Review: reviewed    Assessment/Plan     Principal Problem:    Severe anemia  Active Problems:    Paraplegia    Hypotension, chronic asymptomatic    Decubitus ulcer of sacral region, stage 4    Suprapubic catheter dysfunction      Assessment:  (1. Severe anemia (iron deficiency and chronic disease)  2. Paraplegia and chronic suprapubic catheter (obstructed)  3. Non-compliance  4. Chronic hypotension  5. Stage 4 Sacral decubitus with SP cath and colostomy  6. Chronic pain syndrome  7. GERD  8. Abnl CT Chest in October 2016  9. ESBL E.coli colonization of urine).     Plan:   (S/p PRBCs and IV iron  S/p exchange of supra-pubic catheter (will need to get changed every 6 weeks)   [facility] for discussion of other options  Compliance is biggest carri for pt, every home health agency in area has refused to accept him. Can't find any wound clinics for him either. Apparently he is still a pt at [facility]--CCP and family confirmed that [facility] can follow pt after dc. Pt is chronically non-compliant. Misses appointments. Doesn't use his pressure mattress. Isn't home when HH agencies come by.  Now pt is refusing to go home. He says that the condom catheter is cutting off his penis and he is peeing all over himself. He says he is unhappy that a home health agency made him move out of a second floor room bc it was \"unsafe\" for him to be there as a paraplegic. He says he has no supplies to care for his ostomy and SP cath. He says he wants to speak with \"patient relations\".  BRANNON reviewed, pt does not actually take chronic opioids at home as he claims. This would explain how sedated he is here. Doses decreased accordingly.  Repeat CT chest (as suggested in October) noted, appears to be scarring in left apex, continued surveillance recommended  ESBL " "E.coli in urine is colonization only, abx dc'd  Refuses Pyridium because he read online that it would make him \"pee more\"   saw regarding complaints of penile trauma, local wound care recommended, maybe Botox injections of bladder?  WIll ask patient advocate to see pt prior to dc  Maybe home tomorrow if still doing well  He looks better today than I've ever seen him look. Not sedated, very comfortable, voice strong, less cachectic than prior admissions, tolerating diet without issue.  I believe he has reached maximum hospital benefit and is appropriate for dc home.  Very difficult situation.  Pt has agreed to go home today and successful arrangements have been made through [facility]  DC Dictated #[id number]).       Brant Saxena MD  01/23/17  12:26 PM    Time: 30min   DC Time >55minutes      "

## 2017-01-23 NOTE — PROGRESS NOTES
Awake, alert, comfortable.    LG fevers. Tachycardia.  Abd soft, nontender, nondistended  SP tube draining well  Has condom catheter in place. Penile ulceration around condom catheter    Cr 0.9, WBC 6.9, Hb 8.8    Plan:  - discussed long term options for management of urinary incontinence and neurogenic bladder. I recommended urinary diversion to him and we briefly discussed risks of the procedure. We also discussed intravesical Botox.  - Please change condom catheter  - He would like to consider further. Once discharged, we will arrange SP tube exchange and discuss at his outpatient appointment.

## 2017-01-23 NOTE — PROGRESS NOTES
Continued Stay Note  [facility]     Patient Name: Joaquín Sherman  MRN: [id number]  Today's Date: 1/23/2017    Admit Date: 1/16/2017          Discharge Plan       01/23/17 1635    Case Management/Social Work Plan    Plan Home with sister    Patient/Family In Agreement With Plan yes    Additional Comments Patient has decided that he is going home with friends and they will transport in their vehicle.  Supplies given to friend.  Patient aware of transportation arrangements needing to be made through [facility] or [facility].  Patient has stated that he is not going to [place] [facility] and has told [facility]/Patient Services that he is going to follow up with Dr. Hall and per patient, Dr. Hall has told patient to call a couple of hours before coming and he will see.  Zay, RN, CCP    Final Note    Final Note orders to d/c home.      01/23/17 1522    Case Management/Social Work Plan    Additional Comments Spoke Jennyfer  with [facility]/medicaid provider services [phone] Ref # [id number]  who stated that pt does not need a precert for non emergent ambulance transport just documentation of medcially necessity.   [facility] are both preferred providers.          01/23/17 1359    Case Management/Social Work Plan    Plan Home with mom and sister     Additional Comments Spoke with pt this AM to discuss D/C plan and options.   Discussed pt returning home with the help of   CCP  attempting to arrange any outpt services welling to assist pt vs pt considering SNu for short term placement for assistance with wound care.    Pt was very verbally adimant  about not going to a NH.   Informed pt that [facility] would proceed with arranging an outpt follow up appt with [facility] Sams [phone].  Also CCp will place call to [facility] [place]  to inquire about getting a pre cert for pt to transport to MD appt by ambulance Also spoke with pt's mom Marlen who confirmed pllan is for pt to return home where she will plan to do pt's dressing changes.   However as of today 1/23 pt's mom is int the hospital herslf at [facility]  and unsure when she will be D/C'ed home.    Pt's mom did provide CCP with  dressing supply company that has provided pt's dressing changes in the past [facility] medical [phone].  CCP placed call to [facility] who did confirm that they have had pt in the past and do accept pt's passport insurance.   Upon D/C CCP will need a written order and documentation of pt's skin decub's (size/measurments) to be faxed to [facility] at [phone].  P will need to go home with several days supply in order for [facility] to get  dressing supplies shipped to pt's house.    Call placed to [facility] (in home MD) spoke with Elizabeth to see if they could accept pt for in home MD visits and per Elizabeth they can not accept Passport.  Call also placed to check with [phone] to see if there is an open case on pt and per [facility]PS intake there is not an open case. [facility] will continue to work on pt's DCP home.                 Discharge Codes     None        Expected Discharge Date and Time     Expected Discharge Date Expected Discharge Time    Jan 23, 2017             Seda Monge RN

## 2017-01-23 NOTE — PROGRESS NOTES
Continued Stay Note  [facility]     Patient Name: Joaquín Sherman  MRN: [id number]  Today's Date: 1/23/2017    Admit Date: 1/16/2017          Discharge Plan       01/23/17 6751    Case Management/Social Work Plan    Plan Home with mom and sister     Additional Comments Spoke with pt this AM to discuss D/C plan and options.   Discussed pt returning home with the help of   CCP  attempting to arrange any outpt services welling to assist pt vs pt considering SNu for short term placement for assistance with wound care.    Pt was very verbally adimant  about not going to a NH.   Informed pt that CCP would proceed with arranging an outpt follow up appt with Rama Sams [phone].  Also CCp will place call to [employer]  to inquire about getting a pre cert for pt to transport to MD appt by ambulance Also spoke with pt's mom Marlen who confirmed pllan is for pt to return home where she will plan to do pt's dressing changes.  However as of today 1/23 pt's mom is int the hospital herslf at [facility]  and unsure when she will be D/C'ed home.    Pt's mom did provide CCP with  dressing supply company that has provided pt's dressing changes in the past [facility] medical [phone].  CCP placed call to [facility] who did confirm that they have had pt in the past and do accept pt's passport insurance.   Upon D/C CCP will need a written order and documentation of pt's skin decub's (size/measurments) to be faxed to [facility] at [phone].  P will need to go home with several days supply in order for [facility] to get  dressing supplies shipped to pt's house.    Call placed to [facility] (in home MD) spoke with Elizabeth to see if they could accept pt for in home MD visits and per Elizabeth they can not accept Passport.  Call also placed to check with [phone] to see if there is an open case on pt and per [facility]PS intake there is not an open case. CCP will continue to work on pt's DCP home.                 Discharge Codes     None         Expected Discharge Date and Time     Expected Discharge Date Expected Discharge Time    Jan 18, 2017             MISHA Carrillo

## 2017-01-24 NOTE — PROGRESS NOTES
Continued Stay Note  [facility]     Patient Name: Joaquín Sherman  MRN: [id number]  Today's Date: 1/24/2017    Admit Date: 1/16/2017          Discharge Plan       01/24/17 0734    Case Management/Social Work Plan    Plan Home with sister    Patient/Family In Agreement With Plan yes    Final Note    Final Note home with friends, private vehicle.              Discharge Codes       01/24/17 0734    Discharge Codes    Discharge Codes 01  Discharge to home        Expected Discharge Date and Time     Expected Discharge Date Expected Discharge Time    Jan 23, 2017             Seda Monge RN

## 2017-01-24 NOTE — NURSING NOTE
Pt was discharged home today, dressing changes to all pressure ulcers were completed. Wound care supplies given to friend. Measurements of pressure ulcers are as follows.  Left leg ulcers, top measures 3cm, middle ulcer is 3cm, bottom ulcer is 6cm.  Ulcer on Right leg is 15cm long, 3cm wide, 2cm deep.  Left hip ucler is 7cm deep, and ucler across whole bottom meaures 22cm wide.  Ulcer on penis is 3cm long.

## 2017-01-24 NOTE — PROGRESS NOTES
Continued Stay Note  [facility]     Patient Name: Joaquín Sherman  MRN: [id number]  Today's Date: 1/24/2017    Admit Date: 1/16/2017          Discharge Plan       01/24/17 0917    Case Management/Social Work Plan    Additional Comments APS report by fax was  made by CCP on pt for self neglect. non compliance and concern of lack of medcial after care follow up.  Ref/confirmation # is [id number].  Also did canfirm with Medicaid/Passport Provider Services [phone] that pt does not need  pre cert witn non emergent  ambulance transportation but does need to show medical neccessity such as being bed bound, not being able to sit in a wheel chair and being a paraplegic.  This information was shared with pt prior to his D/C.       01/24/17 0740    Case Management/Social Work Plan    Plan Home with sister    Patient/Family In Agreement With Plan yes    Final Note    Final Note home with friends, private vehicle.              Discharge Codes     None        Expected Discharge Date and Time     Expected Discharge Date Expected Discharge Time    Jan 23, 2017             MISHA Carrillo

## 2017-01-24 NOTE — PROGRESS NOTES
Continued Stay Note  [facility]     Patient Name: Joaquín Sherman  MRN: [id number]  Today's Date: 1/24/2017    Admit Date: 1/16/2017          Discharge Plan       01/24/17 0917    Case Management/Social Work Plan    Additional Comments APS report by fax was  made by CCP on pt for self neglect. non compliance and concern of lack of medcial after care follow up.  Ref/confirmation # is [id number]      01/24/17 0734    Case Management/Social Work Plan    Plan Home with sister    Patient/Family In Agreement With Plan yes    Final Note    Final Note home with friends, private vehicle.              Discharge Codes     None        Expected Discharge Date and Time     Expected Discharge Date Expected Discharge Time    Jan 23, 2017             MISHA Carrillo

## 2017-01-25 NOTE — PROGRESS NOTES
[facility]    Physicians Statement of Medical Necessity for Ambulance Transportation    It is medically necessary for:    Patient Name: Joaquín Sherman    Insurance Information:      To be transported by ambulance:    From (if nursing facility, specify level of care: skilled, prison, etc):     To (specify level of care if nursing facility): Dr. Hall [address] [place] [address]    Date of Service:     For dialysis patients state date dialysis began:     Diagnosis: Recent hospitalization with  a stage 4 decubitus ulcer of the sacral region.    Past Medical/Surgical History:  Past Medical History   Diagnosis Date   • Anemia    • Chronic pain    • GERD (gastroesophageal reflux disease)    • Hypotension    • Paraplegia       Past Surgical History   Procedure Laterality Date   • Suprapubic catheter insertion     • Pr change of bladder tube,complicated N/A 7/28/2016     Procedure: CYSTOSCOPY WITH SUPRAPUBIC CATHETER INSERTION;  Surgeon: Brnat Blanca Jr., MD;  Location: [facility];  Service: Urology        Current Objective Medical Evidence(including physical exam finding to support reason for limitations):    Immobilization syndrome  Bedridden  Unable to sit at 90 degree angle    Other:  Pt currently unable to sit due to stage 4 sacral decubitus.      Physician Signature:           (RN,NP,PA,CAN, Discharge Planner) Date/Time:      Printed Name:    __________________________________    [facility]   Phone: [phone] Phone: [phone] Phone: [phone]   Fax: [phone] Fax: [phone] Fax: [phone]

## 2017-01-25 NOTE — PROGRESS NOTES
Continued Stay Note  [facility]     Patient Name: Joaquín Sherman  MRN: [id number]  Today's Date: 1/25/2017    Admit Date: 1/16/2017          Discharge Plan       01/25/17 1009    Case Management/Social Work Plan    Additional Comments Pt's D/C 1/23.  Call recieved from APS Abbie Ascencio [phone] who has been assigned pt's case.   Hilaria has arranged an outpt follow on Monday  up with Dr. Hall's office [address] [place], [place] [id number] [phone].  Did complete and fax [phone] MD statement of Medcial Necessity for Ambulance Transportation for pt to be able to get transportation to MD appoint on Monday.                 Discharge Codes     None        Expected Discharge Date and Time     Expected Discharge Date Expected Discharge Time    Jan 23, 2017             MISHA Carrillo

## 2017-01-26 NOTE — PROGRESS NOTES
Continued Stay Note  [facility]     Patient Name: Joaquín Sherman  MRN: [id number]  Today's Date: 1/26/2017    Admit Date: 1/16/2017          Discharge Plan       01/26/17 1426    Case Management/Social Work Plan    Plan Home with sister    Patient/Family In Agreement With Plan yes    Additional Comments S/W Xochitl/Rama Avery and they stated patient saw a physician yesterday, 1/25/17.  Attempted to obtain scripts for medical supplies through hospitalist and they wanted patient to follow up with PCP.  Called Rama Avery at [phone] and s/w Samantha/Adult appointments and she states that patient left without being seen.  Zay, RN, CCP              Discharge Codes     None        Expected Discharge Date and Time     Expected Discharge Date Expected Discharge Time    Jan 23, 2017             Seda Monge RN

## 2017-04-10 ENCOUNTER — HOSPITAL ENCOUNTER (INPATIENT)
Facility: HOSPITAL | Age: 37
LOS: 7 days | Discharge: HOME HEALTH | End: 2017-04-17
Attending: EMERGENCY MEDICINE | Admitting: HOSPITALIST

## 2017-04-10 DIAGNOSIS — N39.0 ACUTE UTI (URINARY TRACT INFECTION): Primary | ICD-10-CM

## 2017-04-10 LAB
ABO GROUP BLD: NORMAL
ALBUMIN SERPL-MCNC: 3 G/DL (ref 3.5–5.2)
ALBUMIN/GLOB SERPL: 0.6 G/DL
ALP SERPL-CCNC: 98 U/L (ref 39–117)
ALT SERPL W P-5'-P-CCNC: 5 U/L (ref 1–41)
ANION GAP SERPL CALCULATED.3IONS-SCNC: 14.8 MMOL/L
AST SERPL-CCNC: 5 U/L (ref 1–40)
BACTERIA BLD CULT: ABNORMAL
BACTERIA UR QL AUTO: ABNORMAL /HPF
BASOPHILS # BLD AUTO: 0.02 10*3/MM3 (ref 0–0.2)
BASOPHILS NFR BLD AUTO: 0.2 % (ref 0–1.5)
BILIRUB SERPL-MCNC: 0.5 MG/DL (ref 0.1–1.2)
BILIRUB UR QL STRIP: NEGATIVE
BLD GP AB SCN SERPL QL: NEGATIVE
BUN BLD-MCNC: 15 MG/DL (ref 6–20)
BUN/CREAT SERPL: 12.6 (ref 7–25)
CALCIUM SPEC-SCNC: 8.4 MG/DL (ref 8.6–10.5)
CHLORIDE SERPL-SCNC: 97 MMOL/L (ref 98–107)
CLARITY UR: ABNORMAL
CO2 SERPL-SCNC: 23.2 MMOL/L (ref 22–29)
COLOR UR: YELLOW
CREAT BLD-MCNC: 1.19 MG/DL (ref 0.76–1.27)
D-LACTATE SERPL-SCNC: 0.5 MMOL/L (ref 0.5–2)
DEPRECATED RDW RBC AUTO: 52.7 FL (ref 37–54)
EOSINOPHIL # BLD AUTO: 0.05 10*3/MM3 (ref 0–0.7)
EOSINOPHIL NFR BLD AUTO: 0.4 % (ref 0.3–6.2)
ERYTHROCYTE [DISTWIDTH] IN BLOOD BY AUTOMATED COUNT: 17.3 % (ref 11.5–14.5)
GFR SERPL CREATININE-BSD FRML MDRD: 83 ML/MIN/1.73
GLOBULIN UR ELPH-MCNC: 4.8 GM/DL
GLUCOSE BLD-MCNC: 100 MG/DL (ref 65–99)
GLUCOSE UR STRIP-MCNC: NEGATIVE MG/DL
HBA1C MFR BLD: 4.89 % (ref 4.8–5.6)
HCT VFR BLD AUTO: 26 % (ref 40.4–52.2)
HGB BLD-MCNC: 7.9 G/DL (ref 13.7–17.6)
HGB UR QL STRIP.AUTO: ABNORMAL
HYALINE CASTS UR QL AUTO: ABNORMAL /LPF
IMM GRANULOCYTES # BLD: 0.06 10*3/MM3 (ref 0–0.03)
IMM GRANULOCYTES NFR BLD: 0.5 % (ref 0–0.5)
KETONES UR QL STRIP: NEGATIVE
LEUKOCYTE ESTERASE UR QL STRIP.AUTO: ABNORMAL
LYMPHOCYTES # BLD AUTO: 1.58 10*3/MM3 (ref 0.9–4.8)
LYMPHOCYTES NFR BLD AUTO: 14.1 % (ref 19.6–45.3)
MCH RBC QN AUTO: 25.1 PG (ref 27–32.7)
MCHC RBC AUTO-ENTMCNC: 30.4 G/DL (ref 32.6–36.4)
MCV RBC AUTO: 82.5 FL (ref 79.8–96.2)
MONOCYTES # BLD AUTO: 0.8 10*3/MM3 (ref 0.2–1.2)
MONOCYTES NFR BLD AUTO: 7.1 % (ref 5–12)
NEUTROPHILS # BLD AUTO: 8.7 10*3/MM3 (ref 1.9–8.1)
NEUTROPHILS NFR BLD AUTO: 77.7 % (ref 42.7–76)
NITRITE UR QL STRIP: NEGATIVE
NRBC BLD MANUAL-RTO: 0 /100 WBC (ref 0–0)
PH UR STRIP.AUTO: 8.5 [PH] (ref 5–8)
PLATELET # BLD AUTO: 278 10*3/MM3 (ref 140–500)
PMV BLD AUTO: 8.8 FL (ref 6–12)
POTASSIUM BLD-SCNC: 3.9 MMOL/L (ref 3.5–5.2)
PROT SERPL-MCNC: 7.8 G/DL (ref 6–8.5)
PROT UR QL STRIP: ABNORMAL
RBC # BLD AUTO: 3.15 10*6/MM3 (ref 4.6–6)
RBC # UR: ABNORMAL /HPF
REF LAB TEST METHOD: ABNORMAL
RH BLD: POSITIVE
SODIUM BLD-SCNC: 135 MMOL/L (ref 136–145)
SP GR UR STRIP: 1.01 (ref 1–1.03)
SQUAMOUS #/AREA URNS HPF: ABNORMAL /HPF
TRI-PHOS CRY URNS QL MICRO: ABNORMAL /HPF
UROBILINOGEN UR QL STRIP: ABNORMAL
WBC NRBC COR # BLD: 11.21 10*3/MM3 (ref 4.5–10.7)
WBC UR QL AUTO: ABNORMAL /HPF

## 2017-04-10 PROCEDURE — 36430 TRANSFUSION BLD/BLD COMPNT: CPT

## 2017-04-10 PROCEDURE — 85014 HEMATOCRIT: CPT | Performed by: HOSPITALIST

## 2017-04-10 PROCEDURE — P9016 RBC LEUKOCYTES REDUCED: HCPCS

## 2017-04-10 PROCEDURE — 83605 ASSAY OF LACTIC ACID: CPT | Performed by: EMERGENCY MEDICINE

## 2017-04-10 PROCEDURE — 83036 HEMOGLOBIN GLYCOSYLATED A1C: CPT | Performed by: HOSPITALIST

## 2017-04-10 PROCEDURE — 87186 SC STD MICRODIL/AGAR DIL: CPT | Performed by: EMERGENCY MEDICINE

## 2017-04-10 PROCEDURE — 87040 BLOOD CULTURE FOR BACTERIA: CPT | Performed by: EMERGENCY MEDICINE

## 2017-04-10 PROCEDURE — 87150 DNA/RNA AMPLIFIED PROBE: CPT | Performed by: EMERGENCY MEDICINE

## 2017-04-10 PROCEDURE — 85025 COMPLETE CBC W/AUTO DIFF WBC: CPT | Performed by: EMERGENCY MEDICINE

## 2017-04-10 PROCEDURE — 99285 EMERGENCY DEPT VISIT HI MDM: CPT

## 2017-04-10 PROCEDURE — 80053 COMPREHEN METABOLIC PANEL: CPT | Performed by: EMERGENCY MEDICINE

## 2017-04-10 PROCEDURE — 25010000002 GENTAMICIN PER 80 MG: Performed by: HOSPITALIST

## 2017-04-10 PROCEDURE — 86923 COMPATIBILITY TEST ELECTRIC: CPT

## 2017-04-10 PROCEDURE — 86901 BLOOD TYPING SEROLOGIC RH(D): CPT | Performed by: HOSPITALIST

## 2017-04-10 PROCEDURE — 85018 HEMOGLOBIN: CPT | Performed by: HOSPITALIST

## 2017-04-10 PROCEDURE — 87086 URINE CULTURE/COLONY COUNT: CPT | Performed by: EMERGENCY MEDICINE

## 2017-04-10 PROCEDURE — 86900 BLOOD TYPING SEROLOGIC ABO: CPT

## 2017-04-10 PROCEDURE — 86850 RBC ANTIBODY SCREEN: CPT | Performed by: HOSPITALIST

## 2017-04-10 PROCEDURE — 81001 URINALYSIS AUTO W/SCOPE: CPT | Performed by: EMERGENCY MEDICINE

## 2017-04-10 PROCEDURE — 86900 BLOOD TYPING SEROLOGIC ABO: CPT | Performed by: HOSPITALIST

## 2017-04-10 PROCEDURE — 25010000002 GENTAMICIN PER 80 MG: Performed by: EMERGENCY MEDICINE

## 2017-04-10 PROCEDURE — 99223 1ST HOSP IP/OBS HIGH 75: CPT | Performed by: INTERNAL MEDICINE

## 2017-04-10 PROCEDURE — 25010000003 CEFTRIAXONE PER 250 MG: Performed by: INTERNAL MEDICINE

## 2017-04-10 RX ORDER — ALPRAZOLAM 0.25 MG/1
1 TABLET ORAL 2 TIMES DAILY PRN
Status: DISCONTINUED | OUTPATIENT
Start: 2017-04-10 | End: 2017-04-10

## 2017-04-10 RX ORDER — OXYCODONE AND ACETAMINOPHEN 10; 325 MG/1; MG/1
1 TABLET ORAL EVERY 8 HOURS PRN
Status: DISCONTINUED | OUTPATIENT
Start: 2017-04-10 | End: 2017-04-17 | Stop reason: HOSPADM

## 2017-04-10 RX ORDER — GENTAMICIN SULFATE 100 MG/100ML
2 INJECTION, SOLUTION INTRAVENOUS ONCE
Status: DISCONTINUED | OUTPATIENT
Start: 2017-04-10 | End: 2017-04-10

## 2017-04-10 RX ORDER — SODIUM HYPOCHLORITE 1.25 MG/ML
SOLUTION TOPICAL 2 TIMES DAILY
Status: DISCONTINUED | OUTPATIENT
Start: 2017-04-10 | End: 2017-04-11

## 2017-04-10 RX ORDER — OXYCODONE AND ACETAMINOPHEN 10; 325 MG/1; MG/1
1 TABLET ORAL EVERY 6 HOURS PRN
Status: DISCONTINUED | OUTPATIENT
Start: 2017-04-10 | End: 2017-04-10

## 2017-04-10 RX ORDER — SODIUM CHLORIDE 0.9 % (FLUSH) 0.9 %
10 SYRINGE (ML) INJECTION AS NEEDED
Status: DISCONTINUED | OUTPATIENT
Start: 2017-04-10 | End: 2017-04-17 | Stop reason: HOSPADM

## 2017-04-10 RX ORDER — BISACODYL 5 MG/1
5 TABLET, DELAYED RELEASE ORAL DAILY PRN
Status: DISCONTINUED | OUTPATIENT
Start: 2017-04-10 | End: 2017-04-17 | Stop reason: HOSPADM

## 2017-04-10 RX ORDER — HYDROCODONE BITARTRATE AND ACETAMINOPHEN 5; 325 MG/1; MG/1
1 TABLET ORAL EVERY 6 HOURS PRN
Status: DISCONTINUED | OUTPATIENT
Start: 2017-04-10 | End: 2017-04-10

## 2017-04-10 RX ORDER — HYDROCODONE BITARTRATE AND ACETAMINOPHEN 5; 325 MG/1; MG/1
1 TABLET ORAL EVERY 4 HOURS PRN
Status: DISCONTINUED | OUTPATIENT
Start: 2017-04-10 | End: 2017-04-10

## 2017-04-10 RX ORDER — ACETAMINOPHEN 325 MG/1
650 TABLET ORAL ONCE
Status: DISCONTINUED | OUTPATIENT
Start: 2017-04-10 | End: 2017-04-10

## 2017-04-10 RX ORDER — ALPRAZOLAM 0.5 MG/1
1 TABLET ORAL 2 TIMES DAILY PRN
Status: DISCONTINUED | OUTPATIENT
Start: 2017-04-10 | End: 2017-04-17 | Stop reason: HOSPADM

## 2017-04-10 RX ORDER — GABAPENTIN 300 MG/1
300 CAPSULE ORAL 3 TIMES DAILY
Status: DISCONTINUED | OUTPATIENT
Start: 2017-04-10 | End: 2017-04-17 | Stop reason: HOSPADM

## 2017-04-10 RX ORDER — OXYBUTYNIN CHLORIDE 5 MG/1
5 TABLET ORAL 3 TIMES DAILY
Status: DISCONTINUED | OUTPATIENT
Start: 2017-04-10 | End: 2017-04-10

## 2017-04-10 RX ORDER — OXYCODONE HYDROCHLORIDE 15 MG/1
15 TABLET ORAL EVERY 6 HOURS PRN
Status: DISCONTINUED | OUTPATIENT
Start: 2017-04-10 | End: 2017-04-17 | Stop reason: HOSPADM

## 2017-04-10 RX ORDER — ALPRAZOLAM 0.25 MG/1
0.25 TABLET ORAL 4 TIMES DAILY PRN
Status: DISCONTINUED | OUTPATIENT
Start: 2017-04-10 | End: 2017-04-10

## 2017-04-10 RX ORDER — OXYCODONE HYDROCHLORIDE 15 MG/1
15 TABLET ORAL EVERY 8 HOURS PRN
Status: DISCONTINUED | OUTPATIENT
Start: 2017-04-10 | End: 2017-04-10

## 2017-04-10 RX ORDER — CEFTRIAXONE SODIUM 2 G/50ML
2 INJECTION, SOLUTION INTRAVENOUS EVERY 24 HOURS
Status: DISCONTINUED | OUTPATIENT
Start: 2017-04-10 | End: 2017-04-11

## 2017-04-10 RX ORDER — SODIUM CHLORIDE 9 MG/ML
50 INJECTION, SOLUTION INTRAVENOUS CONTINUOUS
Status: DISCONTINUED | OUTPATIENT
Start: 2017-04-10 | End: 2017-04-13

## 2017-04-10 RX ORDER — OXYBUTYNIN CHLORIDE 5 MG/1
5 TABLET ORAL 2 TIMES DAILY
Status: DISCONTINUED | OUTPATIENT
Start: 2017-04-10 | End: 2017-04-17 | Stop reason: HOSPADM

## 2017-04-10 RX ORDER — PANTOPRAZOLE SODIUM 40 MG/1
40 TABLET, DELAYED RELEASE ORAL DAILY
Status: DISCONTINUED | OUTPATIENT
Start: 2017-04-10 | End: 2017-04-17 | Stop reason: HOSPADM

## 2017-04-10 RX ORDER — OXYCODONE HYDROCHLORIDE 15 MG/1
15 TABLET ORAL EVERY 6 HOURS PRN
Status: ON HOLD | COMMUNITY
End: 2017-11-17

## 2017-04-10 RX ORDER — OXYCODONE AND ACETAMINOPHEN 10; 325 MG/1; MG/1
1 TABLET ORAL EVERY 8 HOURS PRN
COMMUNITY
Start: 2017-03-04 | End: 2017-04-17 | Stop reason: HOSPADM

## 2017-04-10 RX ORDER — ACETAMINOPHEN 325 MG/1
650 TABLET ORAL EVERY 4 HOURS PRN
Status: DISCONTINUED | OUTPATIENT
Start: 2017-04-10 | End: 2017-04-10

## 2017-04-10 RX ORDER — ACETAMINOPHEN 325 MG/1
650 TABLET ORAL EVERY 4 HOURS PRN
Status: ACTIVE | OUTPATIENT
Start: 2017-04-10 | End: 2017-04-10

## 2017-04-10 RX ADMIN — SODIUM CHLORIDE 150 ML/HR: 9 INJECTION, SOLUTION INTRAVENOUS at 06:22

## 2017-04-10 RX ADMIN — SODIUM HYPOCHLORITE: 1.25 SOLUTION TOPICAL at 12:27

## 2017-04-10 RX ADMIN — OXYCODONE HYDROCHLORIDE 15 MG: 15 TABLET ORAL at 15:25

## 2017-04-10 RX ADMIN — OXYCODONE HYDROCHLORIDE 15 MG: 15 TABLET ORAL at 21:31

## 2017-04-10 RX ADMIN — CEFTRIAXONE SODIUM 2 G: 2 INJECTION, SOLUTION INTRAVENOUS at 21:57

## 2017-04-10 RX ADMIN — GENTAMICIN SULFATE 100 MG: 100 INJECTION, SOLUTION INTRAVENOUS at 13:18

## 2017-04-10 RX ADMIN — GENTAMICIN SULFATE 160 MG: 40 INJECTION, SOLUTION INTRAMUSCULAR; INTRAVENOUS at 05:40

## 2017-04-10 RX ADMIN — GABAPENTIN 300 MG: 300 CAPSULE ORAL at 19:22

## 2017-04-10 RX ADMIN — OXYBUTYNIN CHLORIDE 5 MG: 5 TABLET ORAL at 12:14

## 2017-04-10 RX ADMIN — PANTOPRAZOLE SODIUM 40 MG: 40 TABLET, DELAYED RELEASE ORAL at 12:11

## 2017-04-10 RX ADMIN — SODIUM CHLORIDE 1566 ML: 9 INJECTION, SOLUTION INTRAVENOUS at 05:02

## 2017-04-10 RX ADMIN — ALPRAZOLAM 0.25 MG: 0.25 TABLET ORAL at 12:14

## 2017-04-10 RX ADMIN — ALPRAZOLAM 1 MG: 0.5 TABLET ORAL at 17:46

## 2017-04-10 RX ADMIN — GABAPENTIN 300 MG: 300 CAPSULE ORAL at 12:14

## 2017-04-10 RX ADMIN — SODIUM CHLORIDE 150 ML/HR: 9 INJECTION, SOLUTION INTRAVENOUS at 09:56

## 2017-04-10 RX ADMIN — OXYCODONE HYDROCHLORIDE AND ACETAMINOPHEN 1 TABLET: 10; 325 TABLET ORAL at 17:46

## 2017-04-10 NOTE — ED NOTES
Doctor Carmelo called at this time R/T medicions, because patient stated he take xanax 1mg, not 0.25mg, and he takes oxycodone 15mg. Dr. Rhodes instructed me to give what was ordered. Not what the patient takes.      Caleb Woods RN  04/10/17 0529

## 2017-04-10 NOTE — H&P
CHIEF COMPLAINT: Abdominal pain.     HISTORY:  A 37-year-old  male who has been paraplegic. Other medical problems are chronic anemia, chronic hypertension, recurrent infection, decubitus ulcer, malnutrition, chronic pain syndrome, and gastroesophageal reflux disease who had a suprapubic catheter.  He presented [facility] Emergency Room with abdominal pain which began just yesterday. Workup in the ER revealed recurrent UTI.  Admitted for management. The patient denies any fever or chills, but he does have nausea and more pain. No diarrhea. No other complaint.  It is just hurting.     PAST MEDICAL HISTORY:  1.  T3 paraplegia.    2.  Suprapubic catheter.    3.  Chronic anemia.   4.  Gastroesophageal reflux disease   5.  Chronic hypertension.     PAST SURGICAL HISTORY: Suprapubic catheter.     SOCIAL HISTORY: Smoker. Denies any alcohol or drug abuse.     FAMILY HISTORY: Noncontributory.     ALLERGIES:   1.  AMOXICILLIN.    2.  IBUPROFEN.    3.  MEROPENEM.  4.  VANCOMYCIN.     CURRENT MEDICATIONS:  1.  Neurontin.    2.  Ditropan.    3.  Protonix.    4.  Dakin's.  5.  Xanax.      PHYSICAL EXAMINATION:  GENERAL: Alert, and oriented.   VITAL SIGNS: Maximum temperature 99.9, pulse 92, respirations 30, blood pressure 99/64, and O2 saturation 98%.   HEENT: Unremarkable.   NECK: Supple.   LUNGS: Clear.   HEART: S1 and S2 with loud ejection systolic murmur.   ABDOMEN: Soft. Diffuse tenderness.  Suprapubic catheter in place. Bowel sounds positive.   EXTREMITIES: Chronic changes.  Paraplegic with contractures.  Gait and balance:  He does not walk.  Good strength in upper extremities.     DIAGNOSTIC DATA: White count 11.2, hemoglobin 7.9, platelets 278,000.  Potassium 3.9, BUN 15, creatinine 1.1.  UA is positive for WBCs.  Positive for 4+ bacteria. Cultures are pending, blood and urine.  No x-rays.  No CT.     ASSESSMENT:  1.  UTI with sepsis.    2.  Paraplegic.    3.  Neurogenic bladder.    4.   Chronic suprapubic catheter.   5.  Gastroesophageal reflux disease.   6.  Chronic pain syndrome.     PLAN:  1.  Admit.   2.  IV fluids.   3.  Gentamicin.   4.  ID consult.   5.  Continue home medications.   6.  Repeat labs in the morning.   7.  Stress ulcer and DVT prophylaxis.   8.  Follow closely.  9.  Further recommendations according to hospital course.       Jeff Rhodes M.D.  AA:shelly  D:   04/10/2017 11:25:04  T:   04/10/2017 11:54:01  Job ID:   [id number]  Document ID:   [id number]  cc:

## 2017-04-10 NOTE — CONSULTS
Referring Provider: Jeff Rhodes MD  Reason for Consultation: Sepsis    Subjective   History of present illness:    This is a very nice 37-year-old who was admitted on 04/10/2017 with abdominal pain. The infectious disease service was asked to comment on antibiotic recommendations for a potential urinary tract infection.     Per the patient, he was in his usual state of health until 2-3 days ago when he had a chronic suprapubic catheter change. Afterwards he developed sharp bladder pain that radiated into the back and he describes it as constant and mildly better with pain medicine, 10 out of 10 in intensity and associated with some nausea and vomiting. He has not had fevers. He tends to say that he just hurts all over. He mentioned pain medicine to me no less than 5 times during our interview and is upset that he is on a low-dose pain medicine. He has been given doses of gentamicin and has been admitted after a urinalysis showed too numerous to count white blood cells as well as a mild leukocytosis of 11.7. He has not felt much better since this admission. Per review of the past medical records he has an extensive history of growing multi-drug-resistant organisms. He was last in the hospital in 01/2017 when he was seen by my colleague, Dr. Pancho Miller, for ESBL E coli in the urine. This was felt to be colonization and he was monitored off antibiotics. He also grew ESBL E coli and klebsiella ESBL as well as proteus in 07/2015 from his urine.     PAST MEDICAL/SURGICAL HISTORY:  1. Paraplegia.   2. Anemia.   3. GERD.   4. Sacral decubitus ulcer, not a surgical candidate per Plastics in 08/2016.   5. Protein calorie malnutrition.   6. 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   7. Suprapubic catheter placement.   8. Right chest port.   9. Colostomy.     SOCIAL HISTORY: Denies tobacco, ethanol, or drug use. He is single and lives with his mother here in [place], [place]. He denies sick  contacts.     FAMILY HISTORY: No family history of infectious diseases.     ALLERGIES:   1. VANCOMYCIN (apparently increased creatinine).  2. MEROPENEM (hallucinate).  3. AMOXICILLIN CLAVULANATE (uncharacterized allergy).  4. He has tolerated cephalosporins on multiple occasions.     Review of Systems  Pertinent items are noted in HPI, all other systems reviewed and negative    Objective     Physical Exam:   Vital Signs   Temp:  [99.9 °F (37.7 °C)-100 °F (37.8 °C)] 99.9 °F (37.7 °C)  Heart Rate:  [] 80  Resp:  [16-20] 18  BP: (69-99)/(43-67) 92/61    GENERAL: Awake and alert, in no acute distress.   HEENT: Oropharynx is clear. Hearing is grossly normal.   EYES: PERRL. No conjunctival injection. No lid lag.   LYMPHATICS: No lymphadenopathy of the neck or axillary regions.   HEART: Regular rate and rhythm. No peripheral edema.   LUNGS: Clear to auscultation anteriorly with normal respiratory effort.   GI: Soft, no grady tenderness to palpation in the suprapubic or CVA regions.  Nondistended. No appreciable organomegaly.   SKIN: Multiple areas of skin breakdown and ulcerations without cellulitic features  PSYCHIATRIC: Appropriate mood, affect, insight, and judgment.     Results Review:   I reviewed the patient's new clinical results.  CMP, cr 1.19  WBC 11.2 (P78, L 14, M 7)  H/H 7.9/26        Estimated Creatinine Clearance: 62.8 mL/min (by C-G formula based on Cr of 1.19).      Microbiology:  Bcx and Ucx P    Radiology: None      Assessment/Plan   1.  Pyuria in patient with suprapubic catheter and known colonization with ESBL Escherichia coli  2.  Chronic sacral ulcers  3.  Mild acute kidney injury, Cr 1.19 up from a discharge of 0.76 on 1/23/17  4.  Anemia, as per primary  5.  Multiple antibiotic allergies    Patient with chronic suprapubic catheter and urinalysis very difficult to interpret in the setting as due to colonization will invariably grow bacteria independent of actual infection.  He has no  grady urinary symptoms, but has global abdominal pain that is poorly characterized.  I'm not sure entirely if this represents urinary tract infection or some other pathology.  He is a very difficult historian.    I will hold on gentamicin given mild acute kidney injury.  We will start ceftriaxone 2 g IV every 24 hours.  If his pain is no better tomorrow, we'll consider CT of his abdomen and pelvis to make sure that his suprapubic catheter isn't malpositioned as it tends to be his predominant area of pain.  Fosfomycin might be a reasonable antibiotic choice to cover cystitis depending on culture results and how his case evolves.    Thanks!      Travis Moran MD  04/10/17  1:10 PM

## 2017-04-10 NOTE — ED NOTES
Informed patient on the orders Dr. Rhodes gave this nurse, patient stated tylenol does nothing for him and he is not going to take the med. Transferred patient to hospital bed at this time, and placed pillows under his legs.      Caleb Woods RN  04/10/17 1000

## 2017-04-10 NOTE — PROGRESS NOTES
"Disregard note below. Shortly after dosing gentamicin, SHILPA CONSTANTINO assessed patient and discontinued gentamicin. Ceftriaxone ordered.    Martha Shay, PharmD, [facility]  Clinical Pharmacy Specialist, Emergency Medicine  Asc Phone: [phone]  _______________________________________________________________  Pharmacy to dose Gentamicin: Initial Consult  Patient: Joaquín Sherman  : 1980  MRN: [id number]  Admit date: 4/10/2017 12:26 AM    Day 1 of pharmacy to dose gentamicin  Consult for Dr. Rhodes  Treating: Urinary tract infection  Goal gentamicin peak: 10 - 20 mg/L    Current gentamicin dose: 160mg (3mg/kg on ABW) IV x 1 at 0540; then 100mg (2mg/kg ABW) IV x 1 due at 1330. Gentamicin 260mg IV (5mg/kg ABW) Q24h ordered thereafter      IV Anti-Infectives     Ordered     Dose/Rate Route Frequency Start Stop    04/10/17 1304  gentamicin (GARAMYCIN) 260 mg in sodium chloride 0.9 % 100 mL IVPB     Ordering Provider:  Jeff Rhodes MD    5 mg/kg × 52.2 kg  over 30 Minutes Intravenous Every 24 Hours 17 1330      04/10/17 1302  gentamicin (GARAMYCIN) IVPB 100 mg     Ordering Provider:  Jeff Rhodes MD    2 mg/kg × 52.2 kg  over 30 Minutes Intravenous Once 04/10/17 1330      04/10/17 0455  gentamicin (GARAMYCIN) 160 mg in sodium chloride 0.9 % 100 mL IVPB     Ordering Provider:  Sanjeev Bishop MD    2 mg/kg × 79.9 kg (Ideal) Intravenous Once 04/10/17 0457 04/10/17 0612           Relevant clinical data and objective history reviewed:  37 y.o. male 73\" (185.4 cm) 115 lb (52.2 kg)    Lab Results   Component Value Date    CREATININE 1.19 04/10/2017     Estimated Creatinine Clearance: 62.8 mL/min (by C-G formula based on Cr of 1.19).    Lab Results   Component Value Date    WBC 11.21 (H) 04/10/2017     Temp Readings from Last 1 Encounters:   04/10/17 99.9 °F (37.7 °C) (Oral)       Baseline cultures/labs/radiology:  4/10 - Blood culture x 2 - in progress  4/10  - Urine culture, from catheter - in progress    Assessment/Plan: "   -Based on patient's age, weight, renal function, goal peak (10-20 mg/L) and site of infection, recommend initial regimen of gentamicin 260mg IV (~5mg/kg ABW) Q24h  -First total dose divided into two doses due to dose being given prior to pharmacy to dose consult  -Check gentamicin random level 10 hours following gentamicin dose, scheduled for ~2300 on 4/10; use level to assess gentamicin per nomogram  -Follow renal function closely with BMP daily for at least first three days of gentamicin  -Follow up cultures to de-escalate antibiotic therapy as clinically appropriate  -Pharmacy will continue to follow daily and adjust as needed.    Thank you for the consult.    Please call if questions,    Martha Shay, PharmD, BCPS  Clinical Pharmacy Specialist, Emergency Medicine  As[facility] Phone: [phone]

## 2017-04-10 NOTE — ED NOTES
Ostomy care nurse called to bed side for ostomy replacement, patient cleaned up and new ostomy applied. Patient ronnie well.      Caleb Woods RN  04/10/17 1300

## 2017-04-10 NOTE — ED NOTES
"Pt with extensive skin break down to buttocks, left and right hips.  Skin breakdown noted to left shin and left heel. Skin breakdown to right posterior and anterior leg involving length of muchof lower leg. Copious yellow/green purulent, malodorous drainage noted through out. Pt states he has home health care for dressing changes at home and these pressure ulcers have been ongoing x \"a few years\". See wound photo documentation.      Karen Keenan RN  04/10/17 0609    "

## 2017-04-10 NOTE — NURSING NOTE
While going through the belongs section on admission, pt very hesitant to admit to what he has at bedside.  What he disclosed was documented on admission

## 2017-04-10 NOTE — ED PROVIDER NOTES
" EMERGENCY DEPARTMENT ENCOUNTER    CHIEF COMPLAINT  Chief Complaint: abd pain   History given by: pt  History limited by: vague historian   Room Number: 19/19  PMD: Josesito Hall MD      HPI:  Pt is a 37 y.o. male who presents complaining of generalized abd pain which began tonight. History is limited - pt poor historian.       Duration:  1 day   Onset: gradual   Timing: constant   Location: generalized   Radiation: none reported   Quality: \"pain\"  Intensity/Severity: moderate   Progression: unchanged   Associated Symptoms: none reported   Aggravating Factors: none reported   Alleviating Factors: none reported   Previous Episodes: unknown   Treatment before arrival: none reported     PAST MEDICAL HISTORY  Active Ambulatory Problems     Diagnosis Date Noted   • Acute cystitis without hematuria 07/26/2016   • Anemia 07/26/2016   • Paraplegia 07/26/2016   • Hypotension, chronic asymptomatic 07/26/2016   • Pneumonia of both lower lobes due to methicillin resistant Staphylococcus aureus (MRSA) 09/26/2016   • Decubitus ulcer of sacral region, stage 4 09/27/2016   • Hypotension 09/27/2016   • Acute kidney failure 09/27/2016   • Sepsis 09/27/2016   • Severe protein-calorie malnutrition 10/03/2016     Resolved Ambulatory Problems     Diagnosis Date Noted   • Severe anemia 01/16/2017   • Suprapubic catheter dysfunction 01/16/2017     Past Medical History:   Diagnosis Date   • Anemia    • Chronic pain    • GERD (gastroesophageal reflux disease)    • Hypotension    • Paraplegia        PAST SURGICAL HISTORY  Past Surgical History:   Procedure Laterality Date   • VT CHANGE OF BLADDER TUBE,COMPLICATED N/A 7/28/2016    Procedure: CYSTOSCOPY WITH SUPRAPUBIC CATHETER INSERTION;  Surgeon: Brant Blanca Jr., MD;  Location: [facility];  Service: Urology   • SUPRAPUBIC CATHETER INSERTION         FAMILY HISTORY  History reviewed. No pertinent family history.    SOCIAL HISTORY  Social History     Social History   • Marital " status: Single     Spouse name: N/A   • Number of children: N/A   • Years of education: N/A     Occupational History   • Not on file.     Social History Main Topics   • Smoking status: Current Every Day Smoker     Packs/day: 0.50   • Smokeless tobacco: Not on file   • Alcohol use No   • Drug use: No   • Sexual activity: Defer     Other Topics Concern   • Not on file     Social History Narrative       ALLERGIES  Amoxicillin-pot clavulanate; Ibuprofen; Ketorolac tromethamine; Meropenem; and Vancomycin    REVIEW OF SYSTEMS  Review of Systems   Unable to perform ROS: Other (poor historian)   Gastrointestinal: Positive for abdominal pain.       PHYSICAL EXAM  ED Triage Vitals   Temp Heart Rate Resp BP SpO2   04/10/17 0024 04/10/17 0024 04/10/17 0024 04/10/17 0024 04/10/17 0024   100 °F (37.8 °C) 125 18 95/56 100 %      Temp src Heart Rate Source Patient Position BP Location FiO2 (%)   04/10/17 0024 04/10/17 0024 04/10/17 0242 04/10/17 0242 --   Tympanic Monitor Lying Left arm        Physical Exam   Constitutional: He is oriented to person, place, and time. No distress.   somnolent but easily aroused. Thin. Not cooperative with the exam.      HENT:   Head: Normocephalic and atraumatic.   Eyes: EOM are normal.   Neck: Normal range of motion. Neck supple.   Cardiovascular: Normal rate, regular rhythm and normal heart sounds.    Pulmonary/Chest: Effort normal and breath sounds normal. No respiratory distress.   Abdominal: Soft. There is no tenderness. There is no rebound and no guarding.   Colostomy in LLQ that has soft stool in the bag. Suprapubic catheter draining cloudy urine and some urine leaking around catheter site. No skin break down.     Musculoskeletal: Normal range of motion. He exhibits no edema.   Neurological: He is alert and oriented to person, place, and time.   Paraplegic. Chronic contracture of lower extremities. Pressure dressing on legs.         Skin: Skin is warm and dry. He is not diaphoretic.   Nursing  note and vitals reviewed.      LAB RESULTS  Lab Results (last 24 hours)     Procedure Component Value Units Date/Time    Urinalysis With / Culture If Indicated [[id number]]  (Abnormal) Collected:  04/10/17 0055    Specimen:  Urine from Urine, Catheter Updated:  04/10/17 0106     Color, UA Yellow     Appearance, UA Cloudy (A)     pH, UA 8.5 (H)     Specific Gravity, UA 1.006     Glucose, UA Negative     Ketones, UA Negative     Bilirubin, UA Negative     Blood, UA Moderate (2+) (A)     Protein,  mg/dL (2+) (A)     Leuk Esterase, UA Large (3+) (A)     Nitrite, UA Negative     Urobilinogen, UA 1.0 E.U./dL    Urinalysis, Microscopic Only [[id number]]  (Abnormal) Collected:  04/10/17 0055    Specimen:  Urine from Urine, Catheter Updated:  04/10/17 0141     RBC, UA 6-12 (A) /HPF      WBC, UA Too Numerous to Count (A) /HPF      Bacteria, UA 4+ (A) /HPF      Squamous Epithelial Cells, UA 3-6 (A) /HPF      Hyaline Casts, UA 3-6 /LPF      Triple Phosphate Crystals, UA Moderate/2+ /HPF      Methodology Manual Light Microscopy    Urine Culture [[id number]] Collected:  04/10/17 0055    Specimen:  Urine from Urine, Catheter Updated:  04/10/17 0105    Comprehensive Metabolic Panel [[id number]]  (Abnormal) Collected:  04/10/17 0116    Specimen:  Blood Updated:  04/10/17 0154     Glucose 100 (H) mg/dL      BUN 15 mg/dL      Creatinine 1.19 mg/dL      Sodium 135 (L) mmol/L      Potassium 3.9 mmol/L      Chloride 97 (L) mmol/L      CO2 23.2 mmol/L      Calcium 8.4 (L) mg/dL      Total Protein 7.8 g/dL      Albumin 3.00 (L) g/dL      ALT (SGPT) 5 U/L      AST (SGOT) 5 U/L      Alkaline Phosphatase 98 U/L      Total Bilirubin 0.5 mg/dL      eGFR   Amer 83 mL/min/1.73      Globulin 4.8 gm/dL      A/G Ratio 0.6 g/dL      BUN/Creatinine Ratio 12.6     Anion Gap 14.8 mmol/L     CBC & Differential [[id number]] Collected:  04/10/17 0116    Specimen:  Blood Updated:  04/10/17 0135    Narrative:       The following orders were created for  panel order CBC & Differential.  Procedure                               Abnormality         Status                     ---------                               -----------         ------                     CBC Auto Differential[[id number]]         Abnormal            Final result                 Please view results for these tests on the individual orders.    Lactic Acid, Plasma [[id number]]  (Normal) Collected:  04/10/17 0116    Specimen:  Blood Updated:  04/10/17 0149     Lactate 0.5 mmol/L     CBC Auto Differential [[id number]]  (Abnormal) Collected:  04/10/17 0116    Specimen:  Blood Updated:  04/10/17 0135     WBC 11.21 (H) 10*3/mm3      RBC 3.15 (L) 10*6/mm3      Hemoglobin 7.9 (L) g/dL      Hematocrit 26.0 (L) %      MCV 82.5 fL      MCH 25.1 (L) pg      MCHC 30.4 (L) g/dL      RDW 17.3 (H) %      RDW-SD 52.7 fl      MPV 8.8 fL      Platelets 278 10*3/mm3      Neutrophil % 77.7 (H) %      Lymphocyte % 14.1 (L) %      Monocyte % 7.1 %      Eosinophil % 0.4 %      Basophil % 0.2 %      Immature Grans % 0.5 %      Neutrophils, Absolute 8.70 (H) 10*3/mm3      Lymphocytes, Absolute 1.58 10*3/mm3      Monocytes, Absolute 0.80 10*3/mm3      Eosinophils, Absolute 0.05 10*3/mm3      Basophils, Absolute 0.02 10*3/mm3      Immature Grans, Absolute 0.06 (H) 10*3/mm3      nRBC 0.0 /100 WBC     Blood Culture [[id number]] Collected:  04/10/17 0119    Specimen:  Blood from Blood, Central Line Updated:  04/10/17 0125          I ordered the above labs and reviewed the results      PROCEDURES  Procedures      PROGRESS AND CONSULTS  ED Course   Comment By Time   The antibiotic selection on this patient was very complicated due to multiple drug allergies as well as multiple resistant organisms in the past.  He claims an allergy to penicillins, and claims acute kidney injury U with ertapenem in the past.  He most recently grew ESBL in his urine, which is susceptible to ertapenem as well as gentamicin. Sanjeev Bishop MD 04/10 5225   And  evaluating patient's labs, I see that the hemoglobin is chronically low and stable.  He will has been given a fluid bolus for the blood pressure and heart rate.  The lactic acid however is within normal limits.  The Lockett catheter did have some leakage around the tube, and on bladder scan there were 350 cc of urine, so the catheter was irrigated and began draining nicely.  I discussed case with Dr. Rhodes, and after discussing the antibiotic difficulties, we chose gentamicin based on the most recent urine culture sensitivities.  The patient is currently hemodynamically stable, and be admitted to a telemetry bed. Sanjeev Bishop MD 04/10 0506       00:34 Ordered blood work, UA, lactic acid, and blood cultures for further evaluation.     03:48 Bladder scan showed 325cc in the bladder, and now 50 cc after irrigation. Placed call to [facility] for admission. Pt has h/o multiple drug allergies and h/o multiple drug resistant infections so will talk to Dr. Rhodes about abx selection.    Discussed case with Dr. Rhodes ([facility]) who agrees to admit pt to a tele bed.       MEDICAL DECISION MAKING  Results were reviewed/discussed with the patient and they were also made aware of online access. Pt also made aware that some labs, such as cultures, will not be resulted during ER visit and follow up with PMD is necessary.     MDM  Number of Diagnoses or Management Options     Amount and/or Complexity of Data Reviewed  Clinical lab tests: ordered and reviewed (WBC - 11  Lactate - 0.5)  Decide to obtain previous medical records or to obtain history from someone other than the patient: yes  Review and summarize past medical records: yes  Discuss the patient with other providers: yes (D/w Dr. Rhodes ([facility]))           DIAGNOSIS  Final diagnoses:   Acute UTI (urinary tract infection)       DISPOSITION  ADMISSION    Discussed treatment plan and reason for admission with pt/family and admitting physician.  Pt/family voiced understanding of the  plan for admission for further testing/treatment as needed.         Latest Documented Vital Signs:  As of 5:06 AM  BP- (!) 89/50 HR- 80 Temp- 99.9 °F (37.7 °C) (Oral) O2 sat- 98%    --  Documentation assistance provided by jeniffer Moe for Dr. Bishop.  Information recorded by the scribe was done at my direction and has been verified and validated by me.        Freida Moe  04/10/17 0420       Sanjeev Bishop MD  04/10/17 0501

## 2017-04-10 NOTE — NURSING NOTE
04/10/17 1200   Colostomy    Placement Date: (c)   Existing LDA: present on admission to this facility   Stoma Appearance round;red;moist   Peristomal Skin dry;intact   Appliance 2-piece;leakage  (high output pouch placed)   Accessories/Skin Care cleansed with water   Stoma Function stool   Stool Color brown   Irrigation Return moderate amount of   Patient in ED. Pouch leakage. New high output pouch placed. Multiple Pressure Ulcers . Instructed staff to apply saline dressings until WOCN assessment.

## 2017-04-10 NOTE — ED NOTES
Dr. Rhodes called at this time to inform him of patient not having a diet order and patient is c/o pain in lower legs and back. Orders given and carried out.      Caleb Woods RN  04/10/17 0961

## 2017-04-11 ENCOUNTER — APPOINTMENT (OUTPATIENT)
Dept: CT IMAGING | Facility: HOSPITAL | Age: 37
End: 2017-04-11
Attending: UROLOGY

## 2017-04-11 LAB
ABO + RH BLD: NORMAL
ABO + RH BLD: NORMAL
ALBUMIN SERPL-MCNC: 2.5 G/DL (ref 3.5–5.2)
ALBUMIN/GLOB SERPL: 0.6 G/DL
ALP SERPL-CCNC: 92 U/L (ref 39–117)
ALT SERPL W P-5'-P-CCNC: <5 U/L (ref 1–41)
ANION GAP SERPL CALCULATED.3IONS-SCNC: 14 MMOL/L
AST SERPL-CCNC: 8 U/L (ref 1–40)
BACTERIA SPEC AEROBE CULT: NORMAL
BASOPHILS # BLD AUTO: 0.02 10*3/MM3 (ref 0–0.2)
BASOPHILS NFR BLD AUTO: 0.3 % (ref 0–1.5)
BH BB BLOOD EXPIRATION DATE: NORMAL
BH BB BLOOD EXPIRATION DATE: NORMAL
BH BB BLOOD TYPE BARCODE: 8400
BH BB BLOOD TYPE BARCODE: 8400
BH BB DISPENSE STATUS: NORMAL
BH BB DISPENSE STATUS: NORMAL
BH BB PRODUCT CODE: NORMAL
BH BB PRODUCT CODE: NORMAL
BH BB UNIT NUMBER: NORMAL
BH BB UNIT NUMBER: NORMAL
BILIRUB SERPL-MCNC: 0.2 MG/DL (ref 0.1–1.2)
BUN BLD-MCNC: 13 MG/DL (ref 6–20)
BUN/CREAT SERPL: 13.4 (ref 7–25)
CALCIUM SPEC-SCNC: 7.8 MG/DL (ref 8.6–10.5)
CHLORIDE SERPL-SCNC: 102 MMOL/L (ref 98–107)
CHOLEST SERPL-MCNC: 107 MG/DL (ref 0–200)
CO2 SERPL-SCNC: 21 MMOL/L (ref 22–29)
CREAT BLD-MCNC: 0.97 MG/DL (ref 0.76–1.27)
DEPRECATED RDW RBC AUTO: 50.3 FL (ref 37–54)
EOSINOPHIL # BLD AUTO: 0.16 10*3/MM3 (ref 0–0.7)
EOSINOPHIL NFR BLD AUTO: 2.1 % (ref 0.3–6.2)
ERYTHROCYTE [DISTWIDTH] IN BLOOD BY AUTOMATED COUNT: 16.1 % (ref 11.5–14.5)
GFR SERPL CREATININE-BSD FRML MDRD: 106 ML/MIN/1.73
GLOBULIN UR ELPH-MCNC: 4.4 GM/DL
GLUCOSE BLD-MCNC: 112 MG/DL (ref 65–99)
HCT VFR BLD AUTO: 30.1 % (ref 40.4–52.2)
HCT VFR BLD AUTO: 30.2 % (ref 40.4–52.2)
HDLC SERPL-MCNC: 21 MG/DL (ref 40–60)
HGB BLD-MCNC: 9.3 G/DL (ref 13.7–17.6)
HGB BLD-MCNC: 9.3 G/DL (ref 13.7–17.6)
IMM GRANULOCYTES # BLD: 0.03 10*3/MM3 (ref 0–0.03)
IMM GRANULOCYTES NFR BLD: 0.4 % (ref 0–0.5)
LDLC SERPL CALC-MCNC: 66 MG/DL (ref 0–100)
LDLC/HDLC SERPL: 3.14 {RATIO}
LYMPHOCYTES # BLD AUTO: 1.22 10*3/MM3 (ref 0.9–4.8)
LYMPHOCYTES NFR BLD AUTO: 16.1 % (ref 19.6–45.3)
MCH RBC QN AUTO: 26.3 PG (ref 27–32.7)
MCHC RBC AUTO-ENTMCNC: 30.8 G/DL (ref 32.6–36.4)
MCV RBC AUTO: 85.3 FL (ref 79.8–96.2)
MONOCYTES # BLD AUTO: 0.84 10*3/MM3 (ref 0.2–1.2)
MONOCYTES NFR BLD AUTO: 11.1 % (ref 5–12)
NEUTROPHILS # BLD AUTO: 5.29 10*3/MM3 (ref 1.9–8.1)
NEUTROPHILS NFR BLD AUTO: 70 % (ref 42.7–76)
NT-PROBNP SERPL-MCNC: 1300 PG/ML (ref 5–450)
PLATELET # BLD AUTO: 193 10*3/MM3 (ref 140–500)
PMV BLD AUTO: 9.7 FL (ref 6–12)
POTASSIUM BLD-SCNC: 3.4 MMOL/L (ref 3.5–5.2)
PROT SERPL-MCNC: 6.9 G/DL (ref 6–8.5)
RBC # BLD AUTO: 3.54 10*6/MM3 (ref 4.6–6)
SODIUM BLD-SCNC: 137 MMOL/L (ref 136–145)
TRIGL SERPL-MCNC: 100 MG/DL (ref 0–150)
TSH SERPL DL<=0.05 MIU/L-ACNC: 3.54 MIU/ML (ref 0.27–4.2)
UNIT  ABO: NORMAL
UNIT  ABO: NORMAL
UNIT  RH: NORMAL
UNIT  RH: NORMAL
VLDLC SERPL-MCNC: 20 MG/DL (ref 5–40)
WBC NRBC COR # BLD: 7.56 10*3/MM3 (ref 4.5–10.7)

## 2017-04-11 PROCEDURE — 80053 COMPREHEN METABOLIC PANEL: CPT | Performed by: HOSPITALIST

## 2017-04-11 PROCEDURE — 84443 ASSAY THYROID STIM HORMONE: CPT | Performed by: HOSPITALIST

## 2017-04-11 PROCEDURE — 80061 LIPID PANEL: CPT | Performed by: HOSPITALIST

## 2017-04-11 PROCEDURE — 83880 ASSAY OF NATRIURETIC PEPTIDE: CPT | Performed by: HOSPITALIST

## 2017-04-11 PROCEDURE — 99232 SBSQ HOSP IP/OBS MODERATE 35: CPT | Performed by: INTERNAL MEDICINE

## 2017-04-11 PROCEDURE — 74176 CT ABD & PELVIS W/O CONTRAST: CPT

## 2017-04-11 PROCEDURE — 87040 BLOOD CULTURE FOR BACTERIA: CPT | Performed by: HOSPITALIST

## 2017-04-11 PROCEDURE — 85025 COMPLETE CBC W/AUTO DIFF WBC: CPT | Performed by: HOSPITALIST

## 2017-04-11 RX ORDER — SODIUM HYPOCHLORITE 1.25 MG/ML
SOLUTION TOPICAL DAILY
Status: DISCONTINUED | OUTPATIENT
Start: 2017-04-12 | End: 2017-04-16

## 2017-04-11 RX ADMIN — OXYCODONE HYDROCHLORIDE 15 MG: 15 TABLET ORAL at 11:21

## 2017-04-11 RX ADMIN — GABAPENTIN 300 MG: 300 CAPSULE ORAL at 23:26

## 2017-04-11 RX ADMIN — OXYBUTYNIN CHLORIDE 5 MG: 5 TABLET ORAL at 09:48

## 2017-04-11 RX ADMIN — OXYCODONE HYDROCHLORIDE AND ACETAMINOPHEN 1 TABLET: 10; 325 TABLET ORAL at 05:58

## 2017-04-11 RX ADMIN — ALPRAZOLAM 1 MG: 0.5 TABLET ORAL at 11:04

## 2017-04-11 RX ADMIN — GABAPENTIN 300 MG: 300 CAPSULE ORAL at 09:49

## 2017-04-11 RX ADMIN — OXYBUTYNIN CHLORIDE 5 MG: 5 TABLET ORAL at 19:28

## 2017-04-11 RX ADMIN — ERTAPENEM SODIUM 1 G: 1 INJECTION, POWDER, LYOPHILIZED, FOR SOLUTION INTRAMUSCULAR; INTRAVENOUS at 11:21

## 2017-04-11 RX ADMIN — GABAPENTIN 300 MG: 300 CAPSULE ORAL at 19:28

## 2017-04-11 RX ADMIN — PANTOPRAZOLE SODIUM 40 MG: 40 TABLET, DELAYED RELEASE ORAL at 09:49

## 2017-04-11 RX ADMIN — ALPRAZOLAM 1 MG: 0.5 TABLET ORAL at 23:34

## 2017-04-11 RX ADMIN — OXYBUTYNIN CHLORIDE 5 MG: 5 TABLET ORAL at 05:58

## 2017-04-11 RX ADMIN — OXYCODONE HYDROCHLORIDE 15 MG: 15 TABLET ORAL at 23:26

## 2017-04-11 NOTE — CONSULTS
Dictated.  Has sp tube and chronic leak around sp tube and from penis since placement of sp tube. Sp tube draining well.  Will follow.

## 2017-04-11 NOTE — NURSING NOTE
04/11/17 1457   Wound 01/19/17 0056 penis ulceration, venous   Date first assessed/Time first assessed: 01/19/17 0056   Location: penis  Type: ulceration, venous   Wound WDL WDL   Base moist;pink   Periwound Area intact   Edges open   Drainage Characteristics/Odor serosanguineous   Drainage Amount scant   Wound Cleaning cleansed with;sterile normal saline   Dressing open to air  (wound is too close to condom cath to place dressing)   Wound 07/26/16 Right lateral leg other (see comments)   Date first assessed: 07/26/16   Side: Right  Orientation: lateral  Location: leg  Type: (c) other (see comments)   Wound WDL WDL   Dressing Appearance intact   Base reddened;clean;moist   Periwound Area intact   Edges open   Drainage Characteristics/Odor serosanguineous   Drainage Amount scant   Wound Cleaning cleansed with;sterile normal saline   Dressing Dressing changed;gauze;hydrofiber;petroleum-based dressing   Wound 09/26/16 2352 Right posterior gluteal other (see comments)   Date first assessed/Time first assessed: 09/26/16 2352   Side: Right  Orientation: posterior  Location: gluteal  Type: other (see comments)  Additional Comments: pressure ulcer   Wound WDL WDL   Dressing Appearance intact   Base epithelialization;reddened;moist;pink   Periwound Area intact   Edges open   Drainage Characteristics/Odor serosanguineous   Drainage Amount small   Wound Cleaning cleansed with;sterile normal saline   Dressing Dressing changed;gauze;hydrofiber;silver impregnated dressing   Wound 09/26/16 2351 Left anterior leg other (see comments)   Date first assessed/Time first assessed: 09/26/16 2351   Side: Left  Orientation: anterior  Location: leg  Type: other (see comments)  Additional Comments: pressure ulcer   Wound WDL WDL   Dressing Appearance intact   Base moist;pink   Periwound Area dry   Edges open   Drainage Characteristics/Odor serosanguineous   Drainage Amount scant   Wound Cleaning cleansed with;sterile normal saline    Dressing low-adherent   Wound 04/11/17 Left anterior ankle other (see comments)   Date first assessed: 04/11/17   Side: Left  Orientation: anterior  Location: ankle  Type: (c) other (see comments)   Wound WDL ex   Dressing Appearance intact   Base moist;pink   Periwound Area dry   Edges irregular;open   Drainage Characteristics/Odor serosanguineous;no odor   Drainage Amount scant   Wound Cleaning cleansed with;sterile normal saline   Dressing Dressing changed;hydrofiber;low-adherent;silver impregnated dressing   Pressure Ulcer 01/19/17 0034 Left hip Stage IV   Date first assessed/Time first assessed: 01/19/17 0034   Side: Left  Location: hip  Stage: Stage IV   Dressing Appearance moist drainage   Pressure Ulcer Appearance bone;necrotic;reddened;slough;yellow;not granulating;moist   Periwound Area pink   Pressure Ulcer Wound Care cleansed with;sterile normal saline   Dressing Dressing changed;gauze, wet-to-dry;hydrofiber;silver impregnated dressing     Patient well known to WOCN team from prior admissions. Recommend to continue plan of care from home.  Patient's anatomy is abnormal due to his paraplegia and contractions. Left hip stage 4- pack with dakins moistened gauze, cover with opticel ag. This wound is malodorous and the most changed from last assessment with soft dark necrotic tissue and slough in wound bed.   All of the other wounds- from left hip to right, in both ischium/buttocks, bilateral legs and left ankle- apply optcel ag and cover with secondary dressing. See orders.  Patient is on a clinitron bed. I called [facility] about the trapeze- yesterday they were out but will put a new order in. Having a trapeze greatly improves patient's in bed movement.

## 2017-04-11 NOTE — PROGRESS NOTES
" DAILY PROGRESS NOTE    CHIEF COMPLAINT:   DOING BETTER  C/O PAIN    HISTORY:  A 37-year-old  male who has been paraplegic. Other medical problems are chronic anemia, chronic hypertension, recurrent infection, decubitus ulcer, malnutrition, chronic pain syndrome, and gastroesophageal reflux disease who had a suprapubic catheter.  He presented [facility] Emergency Room with abdominal pain which began just yesterday. Workup in the ER revealed recurrent UTI.  Admitted for management. The patient denies any fever or chills, but he does have nausea and more pain. No diarrhea. No other complaint.  It is just hurting.     PHYSICAL EXAMINATION:Blood pressure 101/62, pulse 80, temperature 98.1 °F (36.7 °C), temperature source Oral, resp. rate 18, height 73.2\" (185.9 cm), weight 97 lb 14.2 oz (44.4 kg), SpO2 100 %.    GENERAL: Alert, and oriented.    HEENT: Unremarkable.   NECK: Supple.   LUNGS: Clear.   HEART: S1 and S2 with loud ejection systolic murmur.   ABDOMEN: Soft. Diffuse tenderness.  Suprapubic catheter in place. Bowel sounds positive.   EXTREMITIES: Chronic changes.  Paraplegic with contractures.  Gait and balance:  He does not walk.  Good strength in upper extremities.     DIAGNOSTIC DATA:   Lab Results (last 24 hours)     Procedure Component Value Units Date/Time    Hemoglobin A1c [[id number]]  (Normal) Collected:  04/10/17 1325    Specimen:  Blood Updated:  04/10/17 1537     Hemoglobin A1C 4.89 %     Narrative:       Hemoglobin A1C Ranges:    Increased Risk for Diabetes  5.7% to 6.4%  Diabetes                     >= 6.5%  Diabetic Goal                < 7.0%    Blood Culture ID, PCR [[id number]]  (Abnormal) Collected:  04/10/17 0119    Specimen:  Blood from Blood, Central Line Updated:  04/10/17 2310     BCID, PCR Escherichia coli. Identification by BCID PCR. (A)    Hemoglobin & Hematocrit, Blood [[id number]]  (Abnormal) Collected:  04/10/17 2354    Specimen:  Blood Updated:  04/11/17 " 0017     Hemoglobin 9.3 (L) g/dL      Hematocrit 30.1 (L) %     Blood Culture [[id number]]  (Normal) Collected:  04/10/17 0119    Specimen:  Blood from Blood, Central Line Updated:  04/11/17 0207     Blood Culture No growth at 24 hours    CBC & Differential [[id number]] Collected:  04/11/17 0500    Specimen:  Blood Updated:  04/11/17 0612    Narrative:       The following orders were created for panel order CBC & Differential.  Procedure                               Abnormality         Status                     ---------                               -----------         ------                     CBC Auto Differential[[id number]]         Abnormal            Final result                 Please view results for these tests on the individual orders.    CBC Auto Differential [[id number]]  (Abnormal) Collected:  04/11/17 0500    Specimen:  Blood Updated:  04/11/17 0612     WBC 7.56 10*3/mm3      RBC 3.54 (L) 10*6/mm3      Hemoglobin 9.3 (L) g/dL      Hematocrit 30.2 (L) %      MCV 85.3 fL      MCH 26.3 (L) pg      MCHC 30.8 (L) g/dL      RDW 16.1 (H) %      RDW-SD 50.3 fl      MPV 9.7 fL      Platelets 193 10*3/mm3      Neutrophil % 70.0 %      Lymphocyte % 16.1 (L) %      Monocyte % 11.1 %      Eosinophil % 2.1 %      Basophil % 0.3 %      Immature Grans % 0.4 %      Neutrophils, Absolute 5.29 10*3/mm3      Lymphocytes, Absolute 1.22 10*3/mm3      Monocytes, Absolute 0.84 10*3/mm3      Eosinophils, Absolute 0.16 10*3/mm3      Basophils, Absolute 0.02 10*3/mm3      Immature Grans, Absolute 0.03 10*3/mm3     Lipid Panel [[id number]]  (Abnormal) Collected:  04/11/17 0500    Specimen:  Blood Updated:  04/11/17 0622     Total Cholesterol 107 mg/dL      Triglycerides 100 mg/dL      HDL Cholesterol 21 (L) mg/dL      LDL Cholesterol  66 mg/dL      VLDL Cholesterol 20 mg/dL      LDL/HDL Ratio 3.14    Narrative:       Cholesterol Reference Ranges  (U.S. Department of Health and Human Services ATP III Classifications)    Desirable           <200 mg/dL  Borderline High    200-239 mg/dL  High Risk          >240 mg/dL      Triglyceride Reference Ranges  (U.S. Department of Health and Human Services ATP III Classifications)    Normal           <150 mg/dL  Borderline High  150-199 mg/dL  High             200-499 mg/dL  Very High        >500 mg/dL    HDL Reference Ranges  (U.S. Department of Health and Human Services ATP III Classifcations)    Low     <40 mg/dl (major risk factor for CHD)  High    >60 mg/dl ('negative' risk factor for CHD)        LDL Reference Ranges  (U.S. Department of Health and Human Services ATP III Classifcations)    Optimal          <100 mg/dL  Near Optimal     100-129 mg/dL  Borderline High  130-159 mg/dL  High             160-189 mg/dL  Very High        >189 mg/dL    Urine Culture [[id number]] Collected:  04/10/17 0055    Specimen:  Urine from Urine, Catheter Updated:  04/11/17 0635     Urine Culture >100,000 CFU/mL Mixed Culture    Narrative:         Specimen contains mixed organisms of questionable pathogenicity which indicates contamination with commensal tal.  Further identification is unlikely to provide clinically useful information.  Suggest recollection.    TSH [[id number]]  (Normal) Collected:  04/11/17 0500    Specimen:  Blood Updated:  04/11/17 0637     TSH 3.540 mIU/mL     BNP [[id number]]  (Abnormal) Collected:  04/11/17 0500    Specimen:  Blood Updated:  04/11/17 0637     proBNP 1300.0 (H) pg/mL     Narrative:       Among patients with dyspnea, NT-proBNP is highly sensitive for the detection of acute congestive heart failure. In addition NT-proBNP of <300 pg/ml effectively rules out acute congestive heart failure with 99% negative predictive value.    Comprehensive Metabolic Panel [[id number]]  (Abnormal) Collected:  04/11/17 0500    Specimen:  Blood Updated:  04/11/17 0640     Glucose 112 (H) mg/dL      BUN 13 mg/dL      Creatinine 0.97 mg/dL      Sodium 137 mmol/L      Potassium 3.4 (L) mmol/L      Chloride 102 mmol/L       CO2 21.0 (L) mmol/L      Calcium 7.8 (L) mg/dL      Total Protein 6.9 g/dL      Albumin 2.50 (L) g/dL      ALT (SGPT) <5 U/L      AST (SGOT) 8 U/L      Alkaline Phosphatase 92 U/L      Total Bilirubin 0.2 mg/dL      eGFR   Amer 106 mL/min/1.73      Globulin 4.4 gm/dL      A/G Ratio 0.6 g/dL      BUN/Creatinine Ratio 13.4     Anion Gap 14.0 mmol/L     Blood Culture [[id number]]  (Abnormal) Collected:  04/10/17 0119    Specimen:  Blood from Blood, Central Line Updated:  04/11/17 0721     Blood Culture Escherichia coli (A)     Gram Stain Result Anaerobic Bottle Gram negative bacilli      White count 11.2, hemoglobin 7.9, platelets 278,000.  Potassium 3.9, BUN 15, creatinine 1.1.  UA is positive for WBCs.  Positive for 4+ bacteria. Cultures are pending, blood and urine.  No x-rays.  No CT.   Imaging Results (last 72 hours)     Procedure Component Value Units Date/Time    CT Abdomen Pelvis Without Contrast [[id number]] Collected:  04/11/17 1329     Updated:  04/11/17 1345    Narrative:       CT ABDOMEN PELVIS WO CONTRAST-     INDICATIONS: Suprapubic catheter, possible kidney stones     TECHNIQUE: Unenhanced ABDOMEN AND PELVIS CT     COMPARISON: 01/17/2017     FINDINGS:      Suprapubic catheter is seen within expected location of the urinary  bladder. Gas in the urinary bladder may be related to catheterization.  The urinary bladder is only partly filled may contribute to. Some  borderline wall thickening, correlate clinically to exclude any  possibility of urinary infection. 2 stones measuring 4 mm are seen in  the right kidney, are nonobstructive, no right hydronephrosis. No other  urolithiasis is identified. Mild left hydroureter is apparent, with  slender borderline left hydronephrosis.     Stable somewhat lobulated contour of the kidneys is seen.     Otherwise unremarkable unenhanced appearance of the liver, spleen,  adrenal glands, pancreas, kidneys, bladder.     No bowel obstruction or abnormal bowel thickening  is identified. Left  lower quadrant colostomy redemonstrated.     No free intraperitoneal gas or free fluid.     Mildly prominent para-aortic lymph nodes appear similar to prior exam,  for example left para-aortic, 9 mm short axis, image 83. Some hyperdense  portacaval lymph nodes are suggested. To the left of the urinary  bladder, a mildly prominent 11 mm short axis lymph node on image 139  appear stable, nonspecific. Stable subcentimeter short axis subcutaneous  left groin lymph node.     Abdominal aorta is not aneurysmal.     The lung bases show minimal bilateral pleural effusions, change from  prior exam, with small likely atelectasis.     Prominent chronic deformity of the bilateral hips with adjacent soft  tissue swimmer's or ulcerations suggested, appearance is similar to  prior exam, could reflect presence of infection, correlate clinically.  Chronic sclerotic change of the bones in the sacroiliac regions, and  thinning of the soft tissues overlying the sacrum and lower back.             Impression:             1. Suprapubic catheter within the urinary bladder. Appearance of  borderline thickening of the urinary bladder wall, likely at least in  part from lack of distention. Nonobstructive right nephrolithiasis. Mild  left hydroureter, slight/borderline left hydronephrosis.  2. Mildly prominent retroperitoneal lymph nodes, not significantly  changed.  3. Chronic changes of the hips and pelvis with soft tissue sores  suggested, correlate clinically.  4. Minimal bilateral pleural effusions, change from prior exam.     This report was finalized on 4/11/2017 1:42 PM by Dr. Medardo Washington MD.             Current Facility-Administered Medications:   •  ALPRAZolam (XANAX) tablet 1 mg, 1 mg, Oral, BID PRN, Jeff Rhodes MD, 1 mg at 04/11/17 1104  •  bisacodyl (DULCOLAX) EC tablet 5 mg, 5 mg, Oral, Daily PRN, Jeff Rhodes MD  •  ertapenem (INVanz) 1 g/100 mL 0.9% NS VTB (mbp), 1 g, Intravenous, Q24H, Travis  Con Moran MD, 1 g at 04/11/17 1121  •  gabapentin (NEURONTIN) capsule 300 mg, 300 mg, Oral, TID, Jeff Rhodes MD, 300 mg at 04/11/17 0949  •  oxybutynin (DITROPAN) tablet 5 mg, 5 mg, Oral, BID, Jeff Rhodes MD, 5 mg at 04/11/17 0948  •  oxyCODONE (ROXICODONE) immediate release tablet 15 mg, 15 mg, Oral, Q6H PRN, Jeff Rhodes MD, 15 mg at 04/11/17 1121  •  oxyCODONE-acetaminophen (PERCOCET)  MG per tablet 1 tablet, 1 tablet, Oral, Q8H PRN, Jeff Rhodes MD, 1 tablet at 04/11/17 0558  •  pantoprazole (PROTONIX) EC tablet 40 mg, 40 mg, Oral, Daily, Jeff Rhodes MD, 40 mg at 04/11/17 0949  •  Insert peripheral IV, , , Once **AND** sodium chloride 0.9 % flush 10 mL, 10 mL, Intravenous, PRN, Sanjeev Bishop MD  •  sodium chloride 0.9 % infusion, 150 mL/hr, Intravenous, Continuous, Jeff Rhodes MD, Last Rate: 150 mL/hr at 04/10/17 0956, 150 mL/hr at 04/10/17 0956  •  sodium hypochlorite (DAKIN'S 1/4 STRENGTH) 0.125 % topical solution 0.125% solution, , Topical, BID, Jeff Rhodes MD     ASSESSMENT:  1.  UTI with sepsis.  /E COLI BACTREMIA  2.  Paraplegic.    3.  Neurogenic bladder.    4.  Chronic suprapubic catheter.   5.  Gastroesophageal reflux disease.   6.  Chronic pain syndrome.     PLAN:  1.  CPM  2.  IVF   3.  ABX PER ID  4.  ID consult. NOTED  5.  Continue home medications.   6.  PAIN MANAGE MENT  7.  Stress ulcer and DVT prophylaxis.   8.  Follow closely.  9.  Further recommendations according to hospital course.       Jeff Rhodes M.D.

## 2017-04-11 NOTE — PROGRESS NOTES
INFECTIOUS DISEASES PROGRESS NOTE    CC: f/u sepsis    S:   Still with abdominal pain  No f/c/ns    O:  Physical Exam:  Temp:  [96 °F (35.6 °C)-99.2 °F (37.3 °C)] 98.7 °F (37.1 °C)  Heart Rate:  [67-95] 67  Resp:  [16-19] 19  BP: ()/(52-88) 82/61  Physical Exam   Constitutional: He appears well-developed. No distress.   Abdominal: Soft. He exhibits no distension. There is tenderness (suprapubic).   Neurological: He is alert.   Skin: Skin is warm and dry.        Diagnostics:    CO2 21  Cr 0.97  WBC 7.56  H/H 9.3/30      Bcx 1/2 E. Coli   Ucx >100K Mixed culture    No results found for: ANDREAS CASTLE VANCORANDOM       Estimated Creatinine Clearance: 65.5 mL/min (by C-G formula based on Cr of 0.97).    Assessment/Plan   1.  E coli septicemia and Pyuria in patient with suprapubic catheter and known colonization with ESBL Escherichia coli  2. Chronic sacral ulcers  3. Mild acute kidney injury, better. admission Cr 1.19 up from a discharge of 0.76 on 1/23/17  4. Anemia, as per primary  5. Multiple antibiotic allergies    Bcx now positive for E coli.  Will change to ertapenem 1g IV q24 given past ESBl history and purported intolerability of meropenem.  I d/w him that we do not have attractive alternative options if ESBL infection and will monitor closely for toxicities.   Repeat bcx and check CT a/p.  May need port removal depending on how things shake out.      Travis Moran MD  9:42 AM  04/11/17

## 2017-04-11 NOTE — H&P
UROLOGY CONSULTATION    CONSULTING PHYSICIAN:  Bean Santillan M.D.    REASON FOR CONSULTATION:  Possible malpositioned suprapubic tube.       HISTORY OF PRESENT ILLNESS: This 37-year-old black male has an indwelling suprapubic tube. He is paraplegic.  He underwent placement of a suprapubic tube several days ago. He presents now with chronic pain and chronic leakage around his suprapubic tube and per urethra. He has a condom catheter in place. There are concerns that his suprapubic tube may be malpositioned.  I was asked to see and evaluate the patient for this condition.  The patient also notes some chronic nausea and vomiting, suprapubic pain. He also notes some mild granulation tissue around the insertion of the suprapubic tube and chronic suprapubic swelling. There is no evidence of suprapubic swelling on todays exam.  He states he has had some bilateral back pain, diffuse abdominal pain. He also notes some nausea and vomiting.     PAST MEDICAL HISTORY:    1.  An indwelling suprapubic catheter.    2.  He has T3 paraplegia.    3.  Gastroesophageal reflux disease.   4.  Chronic hypertension.     PAST SURGICAL HISTORY:  Placement of a suprapubic catheter.     PHYSICAL EXAMINATION:  GENERAL: He is a thin, black male in no apparent distress, but hostile and somewhat argumentative.    COR: Regular rate and rhythm.   LUNGS: Clear.   ABDOMEN: Firm, but benign.   GENITOURINARY:  His suprapubic tube site has some granulation tissue, but  appears healthy. He has a condom catheter in place with some mild scarring of his penis from chronic condom catheter use, _____ condom catheter.  His suprapubic tube irrigated easily, for clear return.     REVIEW OF SYSTEMS: The patient states he has had some headaches. He denies any visual changes,  denies hearing loss. He denies a sore throat. He has chronic diffuse myalgias. He states he has had some nausea and vomiting. He denies any bleeding. He denies any flushing or obvious  hypertension. He denies any skin rashes. He denies any peripheral edema. He denies any fever, chills, weight loss. He denies any testicular swelling. He denies any obvious allergic reactions.     ASSESSMENT: Chronic suprapubic tube with neurogenic bladder from spinal cord injury.     PLAN: I do not have a good solution for this problem.  The patient likely does not empty his bladder well.  If his suprapubic tube is removed he will obviously need an indwelling Lockett catheter with all the attendant risks and complications associated with an indwelling Lockett catheter.  He will likely leak around his indwelling Lockett catheter probably from some component of a neurogenic bladder.       The patients suprapubic tube seems to be in good position.  I will follow the patient along. It is unclear as to whether or not he has an active urinary tract infection at this time given the fact it is a suprapubic tube in place making urinalysis unreliable.     Thank you for asking me to help with his care.       eBan Santillan M.D.  QUE:bharathi  D:   04/11/2017 06:49:04  T:   04/11/2017 08:50:02  Job ID:   [id number]  Document ID:   [id number]  cc:

## 2017-04-11 NOTE — PLAN OF CARE
Problem: Pressure Ulcer (Adult)  Intervention: Promote/Optimize Nutrition    04/11/17 1548   Nutrition Interventions   Oral Nutrition Promotion nutritional therapy counseling provided;calorie dense liquids provided

## 2017-04-11 NOTE — PROGRESS NOTES
Adult Nutrition  Assessment/PES    Patient Name:  Joaquín Sherman  YOB: 1980  MRN: [id number]  Admit Date:  4/10/2017    Assessment Date:  4/11/2017     Comments:  Nutrition assessment complete. MSA complete as well.  Will add ensure with meals. Encouraged po. Will follow.          Reason for Assessment       04/11/17 1528    Reason for Assessment    Reason For Assessment/Visit admission assessment;skin risk    Identified At Risk By Screening Criteria BMI    Diagnosis Diagnosis    Gastrointestinal Colostomy    Infectious Disease UTI    Musculoskeletal Other (comment)   paralysis              Nutrition/Diet History       04/11/17 1539    Nutrition/Diet History    Factors Affecting Nutritional Intake Factors    Reported/Observed By Patient    Appetite Fair    Other likes ludwig ensure            Anthropometrics       04/11/17 1530    Usual Body Weight (UBW)    Usual Body Weight 54.4 kg (120 lb)   6 months ago per medical record    Weight Loss 10.4 kg (23 lb)    % Weight Loss  19 %    Weight Loss Time Frame 6 months    Body Mass Index (BMI)    BMI Grade less than 16 low grade III            Labs/Tests/Procedures/Meds       04/11/17 1537    Labs/Tests/Procedures/Meds    Diagnostic Test/Procedure Review reviewed, pertinent    Labs/Tests Review Reviewed;Na+;K+;Glucose;Alb    Medication Review Reviewed, pertinent;Antacid;Laxative    Significant Vitals reviewed            Physical Findings       04/11/17 1538    Physical Findings/Assessment    Additional Documentation Physical Appearance (Group)    Physical Appearance    Overall Physical Appearance paraplegia    Skin pressure ulcer(s);non-healing wound(s)   mulitple wounds. See RN notes            Estimated/Assessed Needs       04/11/17 1543    Calculation Measurements    Weight Used For Calculations 44 kg (97 lb)    Estimated/Assessed Energy Needs    Energy Need Method Kcal/kg    kcal/kg 45    45 Kcal/Kg (kcal) 1979.96    Estimated/Assessed Protein Needs     Weight Used for Protein Calculation 44 kg (97 lb)    Protein (gm/kg) 2.0    2.0 Gm Protein (gm) 88    Estimated/Assessed Fluid Needs    Fluid Need Method RDA method    RDA Method (mL)  1979            Nutrition Prescription Ordered       04/11/17 1539    Nutrition Prescription PO    Current PO Diet Regular            Evaluation of Received Nutrient/Fluid Intake       04/11/17 1539    PO Evaluation    Number of Days PO Intake Evaluated Insufficient Data              Malnutrition Severity Assessment       04/11/17 1540    Malnutrition Severity Assessment    Malnutrition Type Chronic Illness Malnutrition    Physical Signs of Malnutrition (Chronic)    Muscle Wasting Severe    Fat Loss Severe    Secondary Physical Signs Present (comment)   mulitple wounds and ulcers    Weight Status (Chronic)    BMI Severe (<16)    %IBW Severe (<70%)    %UBW Severe (<75%)    Weight Loss Severe (>10% / 6 mo)    Criteria Met (Must meet criteria for severity in at least 2 of these categories: M Wasting, Fat Loss, Fluid, Secondary Signs, Wt. Status, Intake)    Patient meets criteria for  Severe malnutrition      Problem/Interventions:        Problem 1       04/11/17 1541    Nutrition Diagnoses Problem 1    Problem 1 Inadequate Intake/Infusion    Inadequate Intake Type Oral    Macronutrient Kcal;Protein    Signs/Symptoms (evidenced by) Unintended Weight Change    Unintended Weight Change Loss    Number of Pounds Lost 23lb    Weight loss time period 6 months              Intervention Goal       04/11/17 1541    Intervention Goal    General Maintain nutrition;Reduce/improve symptoms;Meet nutritional needs for age/condition    PO Tolerate PO;Increase intake;PO intake (%)    PO Intake % 100 %    Weight Appropriate weight gain            Nutrition Intervention       04/11/17 1541    Nutrition Intervention    RD/Tech Action Care plan reviewd;Follow Tx progress;Supplement provided;Encourage intake;Interview for preference            Nutrition  Prescription       04/11/17 1542    Nutrition Prescription PO    PO Prescription Begin/change supplement    Supplement Other (comment)   ensure enlive    Supplement Frequency 3 times a day    New PO Prescription Ordered? Yes            Education/Evaluation       04/11/17 1542    Education    Education Will Instruct as appropriate    Monitor/Evaluation    Monitor Per protocol;I&O;PO intake;Supplement intake;Pertinent labs;Weight;Skin status;Symptoms            Electronically signed by:  Yolanda Duke RD  04/11/17 3:43 PM

## 2017-04-11 NOTE — NURSING NOTE
"Attempted to irrigate suprapubic catheter. Patient refused. Stated he \"wants the Dr to look at it\". Pubic area swollen and pt wants ultra sound to check placement.  "

## 2017-04-11 NOTE — PLAN OF CARE
Problem: Patient Care Overview (Adult)  Goal: Plan of Care Review  Outcome: Ongoing (interventions implemented as appropriate)    04/10/17 2030   Coping/Psychosocial Response Interventions   Plan Of Care Reviewed With patient   Patient Care Overview   Progress no change   Outcome Evaluation   Outcome Summary/Follow up Plan PT admitted to unit, suprapubic cath in place but leaking, condom cath not draining properly- replaced and now draining, Wound dressing on LEFT Hip and BUTTOCKS falling off- redressed, wounds and skin tears noted to patient's legs and heels. PICTURES faxed to him. Pt arrived on floor extremely upset that PAIN medications not ordered to his home medicatins. Dr. Rhodes called and pain medications/anxiety ordered per his home dosages. Urology consulted due to leaking suprapubic cath. 1 unit of blood given, 2nd unit of blood currently transfusing. Vitals stable. Will continue to monitor.        Goal: Adult Individualization and Mutuality  Outcome: Ongoing (interventions implemented as appropriate)  Goal: Discharge Needs Assessment  Outcome: Ongoing (interventions implemented as appropriate)    Problem: Adjustment to Hospitalization, Illness, Injury, Treatment (Adult,Pediatric)  Goal: Identify Related Risk Factors and Signs and Symptoms  Outcome: Outcome(s) achieved Date Met:  04/10/17  Goal: Adjustment to Events/Situations regarding Hospitalization/Illness/Injury/Treatment  Outcome: Ongoing (interventions implemented as appropriate)  Goal: Continued Mastery/Enhancement of Self-Esteem while Adjusting to Hospitalization/Illness/Injury  Outcome: Ongoing (interventions implemented as appropriate)    Problem: Infection, Risk/Actual (Adult)  Goal: Identify Related Risk Factors and Signs and Symptoms  Outcome: Ongoing (interventions implemented as appropriate)  Goal: Infection Prevention/Resolution  Outcome: Ongoing (interventions implemented as appropriate)    Problem: Fluid Volume Deficit (Adult)  Goal:  Identify Related Risk Factors and Signs and Symptoms  Outcome: Ongoing (interventions implemented as appropriate)  Goal: Fluid/Electrolyte Balance  Outcome: Ongoing (interventions implemented as appropriate)  Goal: Comfort/Well Being  Outcome: Ongoing (interventions implemented as appropriate)

## 2017-04-11 NOTE — PLAN OF CARE
Problem: Patient Care Overview (Adult)  Goal: Plan of Care Review  Outcome: Ongoing (interventions implemented as appropriate)    04/11/17 4132   Coping/Psychosocial Response Interventions   Plan Of Care Reviewed With patient;daughter   Outcome Evaluation   Outcome Summary/Follow up Plan pt still c/o pain to suprapubic, started on Ivanze r/t infection        Goal: Adult Individualization and Mutuality  Outcome: Ongoing (interventions implemented as appropriate)  Goal: Discharge Needs Assessment  Outcome: Ongoing (interventions implemented as appropriate)    Problem: Adjustment to Hospitalization, Illness, Injury, Treatment (Adult,Pediatric)  Goal: Adjustment to Events/Situations regarding Hospitalization/Illness/Injury/Treatment  Outcome: Ongoing (interventions implemented as appropriate)  Goal: Continued Mastery/Enhancement of Self-Esteem while Adjusting to Hospitalization/Illness/Injury  Outcome: Ongoing (interventions implemented as appropriate)    Problem: Infection, Risk/Actual (Adult)  Goal: Identify Related Risk Factors and Signs and Symptoms  Outcome: Ongoing (interventions implemented as appropriate)  Goal: Infection Prevention/Resolution  Outcome: Ongoing (interventions implemented as appropriate)    Problem: Fluid Volume Deficit (Adult)  Goal: Identify Related Risk Factors and Signs and Symptoms  Outcome: Ongoing (interventions implemented as appropriate)  Goal: Fluid/Electrolyte Balance  Outcome: Ongoing (interventions implemented as appropriate)  Goal: Comfort/Well Being  Outcome: Ongoing (interventions implemented as appropriate)    Problem: Pressure Ulcer (Adult)  Goal: Signs and Symptoms of Listed Potential Problems Will be Absent or Manageable (Pressure Ulcer)  Outcome: Ongoing (interventions implemented as appropriate)

## 2017-04-11 NOTE — PLAN OF CARE
Problem: Patient Care Overview (Adult)  Goal: Adult Individualization and Mutuality  Outcome: Ongoing (interventions implemented as appropriate)  Goal: Discharge Needs Assessment  Outcome: Ongoing (interventions implemented as appropriate)    Problem: Adjustment to Hospitalization, Illness, Injury, Treatment (Adult,Pediatric)  Goal: Adjustment to Events/Situations regarding Hospitalization/Illness/Injury/Treatment  Outcome: Ongoing (interventions implemented as appropriate)  Goal: Continued Mastery/Enhancement of Self-Esteem while Adjusting to Hospitalization/Illness/Injury  Outcome: Ongoing (interventions implemented as appropriate)    Problem: Infection, Risk/Actual (Adult)  Goal: Identify Related Risk Factors and Signs and Symptoms  Outcome: Ongoing (interventions implemented as appropriate)  Goal: Infection Prevention/Resolution  Outcome: Ongoing (interventions implemented as appropriate)    Problem: Fluid Volume Deficit (Adult)  Goal: Identify Related Risk Factors and Signs and Symptoms  Outcome: Ongoing (interventions implemented as appropriate)  Goal: Fluid/Electrolyte Balance  Outcome: Ongoing (interventions implemented as appropriate)  Goal: Comfort/Well Being  Outcome: Ongoing (interventions implemented as appropriate)

## 2017-04-11 NOTE — PROGRESS NOTES
Discharge Planning Assessment  [facility]     Patient Name: Joaquín Sherman  MRN: [id number]  Today's Date: 4/11/2017    Admit Date: 4/10/2017          Discharge Needs Assessment       04/11/17 1142    Living Environment    Lives With sibling(s)    Living Arrangements house    Discharge Needs Assessment    Transportation Available ambulance   [facility]             Discharge Plan       04/11/17 1144    Case Management/Social Work Plan    Plan Home with [facility]    Additional Comments Patient with Passport insurance. (no IMM). Face sheet verified. Patient says he lives  with his mother, his sister  and a son in a house. [facility] is current with [facility] and they plan to follow patient for discharge needs. Patient states he uses Dr. Hall at [address]--[phone]. However he has had a difficult time reaching Dr. Hall's office. Per [facility] nurse, patient does have people coming to the house and the nurse feels he is  receiving care. Patient states he has no problem with transportation as [facility]. Previous record reviewed. Noted that [facility] had been called . Case was accepted and assigned to Hilaria Ascencio [phone]. Called Ms Ascencio to see if she was  still current with patient. She states no. She closed the case  when  patient became established with a PCP. Patient plans to go home on discharge with [facility] to follow. . CCP to follow and will assist if needed        Discharge Placement     Facility/Agency Request Status Selected? Address Phone Number Fax Number    [facility] Accepted    Yes [address] [place] [id number] [phone]                Demographic Summary       04/11/17 1140    Referral Information    Admission Type inpatient    Arrived From home or self-care    Referral Source admission list    Reason For Consult discharge planning    Primary Care Physician Information    Name Josesito Hall MD            Functional  Status       04/11/17 1141    Functional Status Current    Ambulation 4-->completely dependent    Transferring 4-->completely dependent    Toileting 4-->completely dependent    Bathing 4-->completely dependent    Dressing 4-->completely dependent    Eating 4-->completely dependent    Communication 0-->understands/communicates without difficulty    Functional Status Prior    Ambulation 4-->completely dependent    Transferring 4-->completely dependent    Toileting 4-->completely dependent    Bathing 4-->completely dependent    Dressing 4-->completely dependent    Eating 4-->completely dependent    Cognitive/Perceptual/Developmental    Developmental Stage (Eriksson's Stages of Development) Stage 7 (35-65 years/Middle Adulthood) Generativity vs. Stagnation            Psychosocial     None            Abuse/Neglect     None            Legal     None            Substance Abuse     None            Patient Forms     None          BALDO Mccall

## 2017-04-11 NOTE — PROGRESS NOTES
Malnutrition Severity Assessment    Patient Name:  Joaquín Sherman  YOB: 1980  MRN: [id number]  Admit Date:  4/10/2017    Patient meets criteria for : Severe malnutrition    Comments: See nutrition assessment for details      Malnutrition Type: Chronic Illness Malnutrition     Malnutrition Type (last 8 hours)      Malnutrition Severity Assessment       04/11/17 1540    Physical Signs of Malnutrition (Chronic)    Muscle Wasting Severe    Fat Loss Severe    Secondary Physical Signs Present (comment)   mulitple wounds and ulcers      04/11/17 1540    Malnutrition Severity Assessment    Malnutrition Type Chronic Illness Malnutrition          Physical Signs of Malnutrition         Most Recent Value    Muscle Wasting  Severe    Fat Loss  Severe    Secondary Physical Signs  Present (comment) [mulitple wounds and ulcers]      Weight Status         Most Recent Value    BMI  Severe (<16)    %IBW  Severe (<70%)    %UBW  Severe (<75%)    Weight Loss  Severe (>10% / 6 mo)              Electronically signed by:  Yolanda Duke RD  04/11/17 3:44 PM

## 2017-04-11 NOTE — PAYOR COMM NOTE
"Joaquín Youssef (37 y.o. Male)                                                             REQUEST FOR  INPATIENT                                                 CONTACT=  ROBBY ALCALA                                               FAX  [phone]                                                 [phone]          Date of Birth Social Security Number Address Home Phone MRN    [id number]  [id number] [phone] [id number]    Taoism Marital Status          None Single       Admission Date Admission Type Admitting Provider Attending Provider Department, Room/Bed    4/10/17 Emergency Jeff Rhodes MD Ahmed, Aftab, MD [address], 611/1    Discharge Date Discharge Disposition Discharge Destination                      Attending Provider: Jeff Rhodes MD     Allergies:  Amoxicillin-pot Clavulanate, Ibuprofen, Ketorolac Tromethamine, Meropenem, Vancomycin    Isolation:  Contact   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)   Code Status:  Prior    Ht:  73.2\" (185.9 cm)   Wt:  97 lb 14.2 oz (44.4 kg)    Admission Cmt:  None   Principal Problem:  None                Active Insurance as of 4/10/2017     Primary Coverage     Payor Plan Insurance Group Employer/Plan Group    PASSPORT PASSPORT      Payor Plan Address Payor Plan Phone Number Effective From Effective To    [address] [phone] 1/1/1998     [place] [address]       Subscriber Name Subscriber Birth Date Member ID       JOAQUÍN YOUSSEF 1980 [id number]                 Emergency Contacts      (Rel.) Home Phone Work Phone Mobile Phone    Marlen Al (Mother) [phone] -- --    PengJenni (Sister) [phone] -- --            Insurance Information                PASSPORT/PASSPORT Phone: [phone]    Subscriber: Joaquín Youssef Subscriber#: [id number]    Group#:  Precert#:              History & Physical      H&P signed by Jeff Rhodes MD at 4/10/2017  2:05 PM              CHIEF " COMPLAINT: Abdominal pain.     HISTORY:  A 37-year-old  male who has been paraplegic. Other medical problems are chronic anemia, chronic hypertension, recurrent infection, decubitus ulcer, malnutrition, chronic pain syndrome, and gastroesophageal reflux disease who had a suprapubic catheter.  He presented [facility] Emergency Room with abdominal pain which began just yesterday. Workup in the ER revealed recurrent UTI.  Admitted for management. The patient denies any fever or chills, but he does have nausea and more pain. No diarrhea. No other complaint.  It is just hurting.     PAST MEDICAL HISTORY:  1.  T3 paraplegia.    2.  Suprapubic catheter.    3.  Chronic anemia.   4.  Gastroesophageal reflux disease   5.  Chronic hypertension.     PAST SURGICAL HISTORY: Suprapubic catheter.     SOCIAL HISTORY: Smoker. Denies any alcohol or drug abuse.     FAMILY HISTORY: Noncontributory.     ALLERGIES:   1.  AMOXICILLIN.    2.  IBUPROFEN.    3.  MEROPENEM.  4.  VANCOMYCIN.     CURRENT MEDICATIONS:  1.  Neurontin.    2.  Ditropan.    3.  Protonix.    4.  Dakin's.  5.  Xanax.      PHYSICAL EXAMINATION:  GENERAL: Alert, and oriented.   VITAL SIGNS: Maximum temperature 99.9, pulse 92, respirations 30, blood pressure 99/64, and O2 saturation 98%.   HEENT: Unremarkable.   NECK: Supple.   LUNGS: Clear.   HEART: S1 and S2 with loud ejection systolic murmur.   ABDOMEN: Soft. Diffuse tenderness.  Suprapubic catheter in place. Bowel sounds positive.   EXTREMITIES: Chronic changes.  Paraplegic with contractures.  Gait and balance:  He does not walk.  Good strength in upper extremities.     DIAGNOSTIC DATA: White count 11.2, hemoglobin 7.9, platelets 278,000.  Potassium 3.9, BUN 15, creatinine 1.1.  UA is positive for WBCs.  Positive for 4+ bacteria. Cultures are pending, blood and urine.  No x-rays.  No CT.     ASSESSMENT:  1.  UTI with sepsis.    2.  Paraplegic.    3.  Neurogenic bladder.    4.  Chronic  "suprapubic catheter.   5.  Gastroesophageal reflux disease.   6.  Chronic pain syndrome.     PLAN:  1.  Admit.   2.  IV fluids.   3.  Gentamicin.   4.  ID consult.   5.  Continue home medications.   6.  Repeat labs in the morning.   7.  Stress ulcer and DVT prophylaxis.   8.  Follow closely.  9.  Further recommendations according to hospital course.       Jeff Rhodes M.D.  AA:shelly  D:   04/10/2017 11:25:04  T:   04/10/2017 11:54:01  Job ID:   [id number]  Document ID:   [id number]  cc:              Electronically signed by Jeff Rhodes MD at 4/10/2017  2:05 PM           Emergency Department Notes      Sanjeev Bishop MD at 4/10/2017  4:05 AM           EMERGENCY DEPARTMENT ENCOUNTER    CHIEF COMPLAINT  Chief Complaint: abd pain   History given by: pt  History limited by: vague historian   Room Number: 19/19  PMD: Josesito Hall MD      HPI:  Pt is a 37 y.o. male who presents complaining of generalized abd pain which began tonight. History is limited - pt poor historian.       Duration:  1 day   Onset: gradual   Timing: constant   Location: generalized   Radiation: none reported   Quality: \"pain\"  Intensity/Severity: moderate   Progression: unchanged   Associated Symptoms: none reported   Aggravating Factors: none reported   Alleviating Factors: none reported   Previous Episodes: unknown   Treatment before arrival: none reported     PAST MEDICAL HISTORY  Active Ambulatory Problems     Diagnosis Date Noted   • Acute cystitis without hematuria 07/26/2016   • Anemia 07/26/2016   • Paraplegia 07/26/2016   • Hypotension, chronic asymptomatic 07/26/2016   • Pneumonia of both lower lobes due to methicillin resistant Staphylococcus aureus (MRSA) 09/26/2016   • Decubitus ulcer of sacral region, stage 4 09/27/2016   • Hypotension 09/27/2016   • Acute kidney failure 09/27/2016   • Sepsis 09/27/2016   • Severe protein-calorie malnutrition 10/03/2016     Resolved Ambulatory Problems     Diagnosis Date Noted   • Severe anemia 01/16/2017 "   • Suprapubic catheter dysfunction 01/16/2017     Past Medical History:   Diagnosis Date   • Anemia    • Chronic pain    • GERD (gastroesophageal reflux disease)    • Hypotension    • Paraplegia        PAST SURGICAL HISTORY  Past Surgical History:   Procedure Laterality Date   • WA CHANGE OF BLADDER TUBE,COMPLICATED N/A 7/28/2016    Procedure: CYSTOSCOPY WITH SUPRAPUBIC CATHETER INSERTION;  Surgeon: Brant Blanca Jr., MD;  Location: [facility];  Service: Urology   • SUPRAPUBIC CATHETER INSERTION         FAMILY HISTORY  History reviewed. No pertinent family history.    SOCIAL HISTORY  Social History     Social History   • Marital status: Single     Spouse name: N/A   • Number of children: N/A   • Years of education: N/A     Occupational History   • Not on file.     Social History Main Topics   • Smoking status: Current Every Day Smoker     Packs/day: 0.50   • Smokeless tobacco: Not on file   • Alcohol use No   • Drug use: No   • Sexual activity: Defer     Other Topics Concern   • Not on file     Social History Narrative       ALLERGIES  Amoxicillin-pot clavulanate; Ibuprofen; Ketorolac tromethamine; Meropenem; and Vancomycin    REVIEW OF SYSTEMS  Review of Systems   Unable to perform ROS: Other (poor historian)   Gastrointestinal: Positive for abdominal pain.       PHYSICAL EXAM  ED Triage Vitals   Temp Heart Rate Resp BP SpO2   04/10/17 0024 04/10/17 0024 04/10/17 0024 04/10/17 0024 04/10/17 0024   100 °F (37.8 °C) 125 18 95/56 100 %      Temp src Heart Rate Source Patient Position BP Location FiO2 (%)   04/10/17 0024 04/10/17 0024 04/10/17 0242 04/10/17 0242 --   Tympanic Monitor Lying Left arm        Physical Exam   Constitutional: He is oriented to person, place, and time. No distress.   somnolent but easily aroused. Thin. Not cooperative with the exam.      HENT:   Head: Normocephalic and atraumatic.   Eyes: EOM are normal.   Neck: Normal range of motion. Neck supple.   Cardiovascular: Normal rate,  regular rhythm and normal heart sounds.    Pulmonary/Chest: Effort normal and breath sounds normal. No respiratory distress.   Abdominal: Soft. There is no tenderness. There is no rebound and no guarding.   Colostomy in LLQ that has soft stool in the bag. Suprapubic catheter draining cloudy urine and some urine leaking around catheter site. No skin break down.     Musculoskeletal: Normal range of motion. He exhibits no edema.   Neurological: He is alert and oriented to person, place, and time.   Paraplegic. Chronic contracture of lower extremities. Pressure dressing on legs.         Skin: Skin is warm and dry. He is not diaphoretic.   Nursing note and vitals reviewed.      LAB RESULTS  Lab Results (last 24 hours)     Procedure Component Value Units Date/Time    Urinalysis With / Culture If Indicated [[id number]]  (Abnormal) Collected:  04/10/17 0055    Specimen:  Urine from Urine, Catheter Updated:  04/10/17 0106     Color, UA Yellow     Appearance, UA Cloudy (A)     pH, UA 8.5 (H)     Specific Gravity, UA 1.006     Glucose, UA Negative     Ketones, UA Negative     Bilirubin, UA Negative     Blood, UA Moderate (2+) (A)     Protein,  mg/dL (2+) (A)     Leuk Esterase, UA Large (3+) (A)     Nitrite, UA Negative     Urobilinogen, UA 1.0 E.U./dL    Urinalysis, Microscopic Only [[id number]]  (Abnormal) Collected:  04/10/17 0055    Specimen:  Urine from Urine, Catheter Updated:  04/10/17 0141     RBC, UA 6-12 (A) /HPF      WBC, UA Too Numerous to Count (A) /HPF      Bacteria, UA 4+ (A) /HPF      Squamous Epithelial Cells, UA 3-6 (A) /HPF      Hyaline Casts, UA 3-6 /LPF      Triple Phosphate Crystals, UA Moderate/2+ /HPF      Methodology Manual Light Microscopy    Urine Culture [[id number]] Collected:  04/10/17 0055    Specimen:  Urine from Urine, Catheter Updated:  04/10/17 0105    Comprehensive Metabolic Panel [[id number]]  (Abnormal) Collected:  04/10/17 0116    Specimen:  Blood Updated:  04/10/17 0154     Glucose 100 (H)  mg/dL      BUN 15 mg/dL      Creatinine 1.19 mg/dL      Sodium 135 (L) mmol/L      Potassium 3.9 mmol/L      Chloride 97 (L) mmol/L      CO2 23.2 mmol/L      Calcium 8.4 (L) mg/dL      Total Protein 7.8 g/dL      Albumin 3.00 (L) g/dL      ALT (SGPT) 5 U/L      AST (SGOT) 5 U/L      Alkaline Phosphatase 98 U/L      Total Bilirubin 0.5 mg/dL      eGFR   Amer 83 mL/min/1.73      Globulin 4.8 gm/dL      A/G Ratio 0.6 g/dL      BUN/Creatinine Ratio 12.6     Anion Gap 14.8 mmol/L     CBC & Differential [[id number]] Collected:  04/10/17 0116    Specimen:  Blood Updated:  04/10/17 0135    Narrative:       The following orders were created for panel order CBC & Differential.  Procedure                               Abnormality         Status                     ---------                               -----------         ------                     CBC Auto Differential[[id number]]         Abnormal            Final result                 Please view results for these tests on the individual orders.    Lactic Acid, Plasma [[id number]]  (Normal) Collected:  04/10/17 0116    Specimen:  Blood Updated:  04/10/17 0149     Lactate 0.5 mmol/L     CBC Auto Differential [[id number]]  (Abnormal) Collected:  04/10/17 0116    Specimen:  Blood Updated:  04/10/17 0135     WBC 11.21 (H) 10*3/mm3      RBC 3.15 (L) 10*6/mm3      Hemoglobin 7.9 (L) g/dL      Hematocrit 26.0 (L) %      MCV 82.5 fL      MCH 25.1 (L) pg      MCHC 30.4 (L) g/dL      RDW 17.3 (H) %      RDW-SD 52.7 fl      MPV 8.8 fL      Platelets 278 10*3/mm3      Neutrophil % 77.7 (H) %      Lymphocyte % 14.1 (L) %      Monocyte % 7.1 %      Eosinophil % 0.4 %      Basophil % 0.2 %      Immature Grans % 0.5 %      Neutrophils, Absolute 8.70 (H) 10*3/mm3      Lymphocytes, Absolute 1.58 10*3/mm3      Monocytes, Absolute 0.80 10*3/mm3      Eosinophils, Absolute 0.05 10*3/mm3      Basophils, Absolute 0.02 10*3/mm3      Immature Grans, Absolute 0.06 (H) 10*3/mm3      nRBC 0.0 /100 WBC      Blood Culture [[id number]] Collected:  04/10/17 0119    Specimen:  Blood from Blood, Central Line Updated:  04/10/17 0125          I ordered the above labs and reviewed the results      PROCEDURES  Procedures      PROGRESS AND CONSULTS  ED Course   Comment By Time   The antibiotic selection on this patient was very complicated due to multiple drug allergies as well as multiple resistant organisms in the past.  He claims an allergy to penicillins, and claims acute kidney injury U with ertapenem in the past.  He most recently grew ESBL in his urine, which is susceptible to ertapenem as well as gentamicin. Sanjeev Bishop MD 04/10 0504   And evaluating patient's labs, I see that the hemoglobin is chronically low and stable.  He will has been given a fluid bolus for the blood pressure and heart rate.  The lactic acid however is within normal limits.  The Lockett catheter did have some leakage around the tube, and on bladder scan there were 350 cc of urine, so the catheter was irrigated and began draining nicely.  I discussed case with Dr. Rhodes, and after discussing the antibiotic difficulties, we chose gentamicin based on the most recent urine culture sensitivities.  The patient is currently hemodynamically stable, and be admitted to a telemetry bed. Sanjeev Bishop MD 04/10 0506       00:34 Ordered blood work, UA, lactic acid, and blood cultures for further evaluation.     03:48 Bladder scan showed 325cc in the bladder, and now 50 cc after irrigation. Placed call to [facility] for admission. Pt has h/o multiple drug allergies and h/o multiple drug resistant infections so will talk to Dr. Rhodes about abx selection.    Discussed case with Dr. Rhodes ([facility]) who agrees to admit pt to a tele bed.       MEDICAL DECISION MAKING  Results were reviewed/discussed with the patient and they were also made aware of online access. Pt also made aware that some labs, such as cultures, will not be resulted during ER visit and follow up with  "PMD is necessary.     MDM  Number of Diagnoses or Management Options     Amount and/or Complexity of Data Reviewed  Clinical lab tests: ordered and reviewed (WBC - 11  Lactate - 0.5)  Decide to obtain previous medical records or to obtain history from someone other than the patient: yes  Review and summarize past medical records: yes  Discuss the patient with other providers: yes (D/w Dr. Rhodes ([facility]))           DIAGNOSIS  Final diagnoses:   Acute UTI (urinary tract infection)       DISPOSITION  ADMISSION    Discussed treatment plan and reason for admission with pt/family and admitting physician.  Pt/family voiced understanding of the plan for admission for further testing/treatment as needed.         Latest Documented Vital Signs:  As of 5:06 AM  BP- (!) 89/50 HR- 80 Temp- 99.9 °F (37.7 °C) (Oral) O2 sat- 98%    --  Documentation assistance provided by jeniffer Moe for Dr. Bishop.  Information recorded by the scribe was done at my direction and has been verified and validated by me.        Freida Moe  04/10/17 0420       Sanjeev Bishop MD  04/10/17 9307       Electronically signed by Sanjeev Bishop MD at 4/10/2017  5:07 AM      Karen Keenan RN at 4/10/2017  5:43 AM          Pt with extensive skin breakdown      Karen Keenan RN  04/10/17 0548       Electronically signed by Karen Keenan RN at 4/10/2017  5:48 AM      Karen Keenan RN at 4/10/2017  6:04 AM          Pt with extensive skin break down to buttocks, left and right hips.  Skin breakdown noted to left shin and left heel. Skin breakdown to right posterior and anterior leg involving length of muchof lower leg. Copious yellow/green purulent, malodorous drainage noted through out. Pt states he has home health care for dressing changes at home and these pressure ulcers have been ongoing x \"a few years\". See wound photo documentation.      Karen Keenan RN  04/10/17 0610       Electronically signed by Karen Keenan RN at 4/10/2017  " 6:09 AM      Caleb Woods RN at 4/10/2017  8:40 AM          Dr. Rhodes called at this time to inform him of patient not having a diet order and patient is c/o pain in lower legs and back. Orders given and carried out.      Caleb Woods RN  04/10/17 0958       Electronically signed by Caleb Woods RN at 4/10/2017  9:58 AM      Caleb Woods RN at 4/10/2017  8:50 AM          Informed patient on the orders Dr. Rhodes gave this nurse, patient stated tylenol does nothing for him and he is not going to take the med. Transferred patient to hospital bed at this time, and placed pillows under his legs.      Caleb Woods RN  04/10/17 1000       Electronically signed by Caleb Woods RN at 4/10/2017 10:00 AM      Caleb Woods RN at 4/10/2017  8:53 AM          Patient refused the Tylenol 650mg that was ordered.      Caleb Woods RN  04/10/17 1257       Electronically signed by Caleb Woods RN at 4/10/2017 12:57 PM      Caleb Woods RN at 4/10/2017 12:00 PM          Ostomy care nurse called to bed side for ostomy replacement, patient cleaned up and new ostomy applied. Patient ronnie well.      Caleb Woods RN  04/10/17 1300       Electronically signed by Caleb Woods RN at 4/10/2017  1:00 PM      Caleb Woods RN at 4/10/2017 12:26 PM          Doctor Rhodes called at this time R/T medicions, because patient stated he take xanax 1mg, not 0.25mg, and he takes oxycodone 15mg. Dr. Rhodes instructed me to give what was ordered. Not what the patient takes.      Caleb Woods RN  04/10/17 1228       Electronically signed by Caleb Woods RN at 4/10/2017 12:28 PM        Vital Signs (last 24 hours)       04/10 0700  -  04/11 0659 04/11 0700  -  04/11 0805   Most Recent    Temp (°F) 96 -  99.2       96 (35.6)    Heart Rate 68 -  95       86    Resp 16 -  20       16    BP (!)86/52 -  122/88       (!) 86/52    SpO2 (%) 98 -  100       100          Hospital Medications (active)   "     Dose Frequency Start End    acetaminophen (TYLENOL) tablet 650 mg 650 mg Every 4 Hours PRN 4/10/2017 4/10/2017    Sig - Route: Take 2 tablets by mouth Every 4 (Four) Hours As Needed for Mild Pain (1-3). - Oral    ALPRAZolam (XANAX) tablet 1 mg 1 mg 2 Times Daily PRN 4/10/2017 4/20/2017    Sig - Route: Take 2 tablets by mouth 2 (Two) Times a Day As Needed for Anxiety. - Oral    bisacodyl (DULCOLAX) EC tablet 5 mg 5 mg Daily PRN 4/10/2017     Sig - Route: Take 1 tablet by mouth Daily As Needed for Constipation. - Oral    cefTRIAXone (ROCEPHIN) IVPB 2 g 2 g Every 24 Hours 4/10/2017     Sig - Route: Infuse 50 mL into a venous catheter Daily. - Intravenous    gabapentin (NEURONTIN) capsule 300 mg 300 mg 3 Times Daily 4/10/2017     Sig - Route: Take 1 capsule by mouth 3 (Three) Times a Day. - Oral    oxybutynin (DITROPAN) tablet 5 mg 5 mg 2 Times Daily 4/10/2017     Sig - Route: Take 1 tablet by mouth 2 (Two) Times a Day. - Oral    oxyCODONE (ROXICODONE) immediate release tablet 15 mg 15 mg Every 6 Hours PRN 4/10/2017     Sig - Route: Take 1 tablet by mouth Every 6 (Six) Hours As Needed for Moderate Pain (4-6). - Oral    oxyCODONE-acetaminophen (PERCOCET)  MG per tablet 1 tablet 1 tablet Every 8 Hours PRN 4/10/2017     Sig - Route: Take 1 tablet by mouth Every 8 (Eight) Hours As Needed for Moderate Pain (4-6). - Oral    pantoprazole (PROTONIX) EC tablet 40 mg 40 mg Daily 4/10/2017     Sig - Route: Take 1 tablet by mouth Daily. - Oral    sodium chloride 0.9 % flush 10 mL 10 mL As Needed 4/10/2017     Sig - Route: Infuse 10 mL into a venous catheter As Needed for Line Care. - Intravenous    Linked Group 1:  \"And\" Linked Group Details        sodium chloride 0.9 % infusion 150 mL/hr Continuous 4/10/2017     Sig - Route: Infuse 150 mL/hr into a venous catheter Continuous. - Intravenous    sodium hypochlorite (DAKIN'S 1/4 STRENGTH) 0.125 % topical solution 0.125% solution  2 Times Daily 4/10/2017     Sig - Route: " Apply  topically 2 (Two) Times a Day. - Topical    acetaminophen (TYLENOL) tablet 650 mg (Discontinued) 650 mg Once 4/10/2017 4/10/2017    Sig - Route: Take 2 tablets by mouth 1 (One) Time. - Oral    acetaminophen (TYLENOL) tablet 650 mg (Discontinued) 650 mg Every 4 Hours PRN 4/10/2017 4/10/2017    Sig - Route: Take 2 tablets by mouth Every 4 (Four) Hours As Needed for Mild Pain (1-3). - Oral    ALPRAZolam (XANAX) tablet 0.25 mg (Discontinued) 0.25 mg 4 Times Daily PRN 4/10/2017 4/10/2017    Sig - Route: Take 1 tablet by mouth 4 (Four) Times a Day As Needed for Anxiety. - Oral    ALPRAZolam (XANAX) tablet 1 mg (Discontinued) 1 mg 2 Times Daily PRN 4/10/2017 4/10/2017    Sig - Route: Take 4 tablets by mouth 2 (Two) Times a Day As Needed for Anxiety. - Oral    gentamicin (GARAMYCIN) 160 mg in sodium chloride 0.9 % 100 mL IVPB (Discontinued) 2 mg/kg × 79.9 kg (Ideal) Once 4/10/2017 4/10/2017    Sig - Route: Infuse 160 mg into a venous catheter 1 (One) Time. - Intravenous    gentamicin (GARAMYCIN) 260 mg in sodium chloride 0.9 % 100 mL IVPB (Discontinued) 5 mg/kg × 52.2 kg Every 24 Hours 4/11/2017 4/10/2017    Sig - Route: Infuse 260 mg into a venous catheter Daily. - Intravenous    gentamicin (GARAMYCIN) IVPB 100 mg (Discontinued) 2 mg/kg × 52.2 kg Once 4/10/2017 4/10/2017    Sig - Route: Infuse 100 mL into a venous catheter 1 (One) Time. - Intravenous    HYDROcodone-acetaminophen (NORCO) 5-325 MG per tablet 1 tablet (Discontinued) 1 tablet Every 6 Hours PRN 4/10/2017 4/10/2017    Sig - Route: Take 1 tablet by mouth Every 6 (Six) Hours As Needed for Moderate Pain (4-6). - Oral    HYDROcodone-acetaminophen (NORCO) 5-325 MG per tablet 1 tablet (Discontinued) 1 tablet Every 4 Hours PRN 4/10/2017 4/10/2017    Sig - Route: Take 1 tablet by mouth Every 4 (Four) Hours As Needed for Moderate Pain (4-6). - Oral    oxybutynin (DITROPAN) tablet 5 mg (Discontinued) 5 mg 3 Times Daily 4/10/2017 4/10/2017    Sig - Route: Take 1  tablet by mouth 3 (Three) Times a Day. - Oral    oxyCODONE (ROXICODONE) immediate release tablet 15 mg (Discontinued) 15 mg Every 8 Hours PRN 4/10/2017 4/10/2017    Sig - Route: Take 1 tablet by mouth Every 8 (Eight) Hours As Needed for Moderate Pain (4-6). - Oral    oxyCODONE-acetaminophen (PERCOCET)  MG per tablet 1 tablet (Discontinued) 1 tablet Every 6 Hours PRN 4/10/2017 4/10/2017    Sig - Route: Take 1 tablet by mouth Every 6 (Six) Hours As Needed for Moderate Pain (4-6). - Oral    Pharmacy to Dose gentamicin (GARAMYCIN) (Discontinued)  Continuous PRN 4/10/2017 4/10/2017    Sig - Route: Continuous As Needed for Consult. - Does not apply    sodium chloride 0.9 % bolus 1,566 mL (Discontinued) 30 mL/kg × 52.2 kg Once 4/10/2017 4/10/2017    Sig - Route: Infuse 1,566 mL into a venous catheter 1 (One) Time. - Intravenous            Lab Results (last 24 hours)     Procedure Component Value Units Date/Time    Hemoglobin A1c [[id number]]  (Normal) Collected:  04/10/17 1325    Specimen:  Blood Updated:  04/10/17 1537     Hemoglobin A1C 4.89 %     Narrative:       Hemoglobin A1C Ranges:    Increased Risk for Diabetes  5.7% to 6.4%  Diabetes                     >= 6.5%  Diabetic Goal                < 7.0%    Blood Culture ID, PCR [[id number]]  (Abnormal) Collected:  04/10/17 0119    Specimen:  Blood from Blood, Central Line Updated:  04/10/17 2310     BCID, PCR Escherichia coli. Identification by BCID PCR. (A)    Hemoglobin & Hematocrit, Blood [[id number]]  (Abnormal) Collected:  04/10/17 2354    Specimen:  Blood Updated:  04/11/17 0017     Hemoglobin 9.3 (L) g/dL      Hematocrit 30.1 (L) %     Blood Culture [[id number]]  (Normal) Collected:  04/10/17 0119    Specimen:  Blood from Blood, Central Line Updated:  04/11/17 0207     Blood Culture No growth at 24 hours    CBC & Differential [[id number]] Collected:  04/11/17 0500    Specimen:  Blood Updated:  04/11/17 0612    Narrative:       The following orders were created for  panel order CBC & Differential.  Procedure                               Abnormality         Status                     ---------                               -----------         ------                     CBC Auto Differential[[id number]]         Abnormal            Final result                 Please view results for these tests on the individual orders.    CBC Auto Differential [[id number]]  (Abnormal) Collected:  04/11/17 0500    Specimen:  Blood Updated:  04/11/17 0612     WBC 7.56 10*3/mm3      RBC 3.54 (L) 10*6/mm3      Hemoglobin 9.3 (L) g/dL      Hematocrit 30.2 (L) %      MCV 85.3 fL      MCH 26.3 (L) pg      MCHC 30.8 (L) g/dL      RDW 16.1 (H) %      RDW-SD 50.3 fl      MPV 9.7 fL      Platelets 193 10*3/mm3      Neutrophil % 70.0 %      Lymphocyte % 16.1 (L) %      Monocyte % 11.1 %      Eosinophil % 2.1 %      Basophil % 0.3 %      Immature Grans % 0.4 %      Neutrophils, Absolute 5.29 10*3/mm3      Lymphocytes, Absolute 1.22 10*3/mm3      Monocytes, Absolute 0.84 10*3/mm3      Eosinophils, Absolute 0.16 10*3/mm3      Basophils, Absolute 0.02 10*3/mm3      Immature Grans, Absolute 0.03 10*3/mm3     Lipid Panel [[id number]]  (Abnormal) Collected:  04/11/17 0500    Specimen:  Blood Updated:  04/11/17 0622     Total Cholesterol 107 mg/dL      Triglycerides 100 mg/dL      HDL Cholesterol 21 (L) mg/dL      LDL Cholesterol  66 mg/dL      VLDL Cholesterol 20 mg/dL      LDL/HDL Ratio 3.14    Narrative:       Cholesterol Reference Ranges  (U.S. Department of Health and Human Services ATP III Classifications)    Desirable          <200 mg/dL  Borderline High    200-239 mg/dL  High Risk          >240 mg/dL      Triglyceride Reference Ranges  (U.S. Department of Health and Human Services ATP III Classifications)    Normal           <150 mg/dL  Borderline High  150-199 mg/dL  High             200-499 mg/dL  Very High        >500 mg/dL    HDL Reference Ranges  (U.S. Department of Health and Human Services ATP III  Classifcations)    Low     <40 mg/dl (major risk factor for CHD)  High    >60 mg/dl ('negative' risk factor for CHD)        LDL Reference Ranges  (U.S. Department of Health and Human Services ATP III Classifcations)    Optimal          <100 mg/dL  Near Optimal     100-129 mg/dL  Borderline High  130-159 mg/dL  High             160-189 mg/dL  Very High        >189 mg/dL    Urine Culture [[id number]] Collected:  04/10/17 0055    Specimen:  Urine from Urine, Catheter Updated:  04/11/17 0635     Urine Culture >100,000 CFU/mL Mixed Culture    Narrative:         Specimen contains mixed organisms of questionable pathogenicity which indicates contamination with commensal tal.  Further identification is unlikely to provide clinically useful information.  Suggest recollection.    TSH [[id number]]  (Normal) Collected:  04/11/17 0500    Specimen:  Blood Updated:  04/11/17 0637     TSH 3.540 mIU/mL     BNP [[id number]]  (Abnormal) Collected:  04/11/17 0500    Specimen:  Blood Updated:  04/11/17 0637     proBNP 1300.0 (H) pg/mL     Narrative:       Among patients with dyspnea, NT-proBNP is highly sensitive for the detection of acute congestive heart failure. In addition NT-proBNP of <300 pg/ml effectively rules out acute congestive heart failure with 99% negative predictive value.    Comprehensive Metabolic Panel [[id number]]  (Abnormal) Collected:  04/11/17 0500    Specimen:  Blood Updated:  04/11/17 0640     Glucose 112 (H) mg/dL      BUN 13 mg/dL      Creatinine 0.97 mg/dL      Sodium 137 mmol/L      Potassium 3.4 (L) mmol/L      Chloride 102 mmol/L      CO2 21.0 (L) mmol/L      Calcium 7.8 (L) mg/dL      Total Protein 6.9 g/dL      Albumin 2.50 (L) g/dL      ALT (SGPT) <5 U/L      AST (SGOT) 8 U/L      Alkaline Phosphatase 92 U/L      Total Bilirubin 0.2 mg/dL      eGFR   Amer 106 mL/min/1.73      Globulin 4.4 gm/dL      A/G Ratio 0.6 g/dL      BUN/Creatinine Ratio 13.4     Anion Gap 14.0 mmol/L     Blood Culture [[id number]]   (Abnormal) Collected:  04/10/17 0119    Specimen:  Blood from Blood, Central Line Updated:  04/11/17 0721     Blood Culture Escherichia coli (A)     Gram Stain Result Anaerobic Bottle Gram negative bacilli             Consult Notes (last 24 hours) (Notes from 4/10/2017  8:06 AM through 4/11/2017  8:06 AM)      Travis Moran MD at 4/10/2017  1:10 PM  Version 3 of 3     Consult Orders:    1. Inpatient Consult to Infectious Diseases [[id number]] ordered by Jeff Rhodes MD at 04/10/17 1114                Referring Provider: Jeff Rhodes MD  Reason for Consultation: Sepsis    Subjective   History of present illness:    This is a very nice 37-year-old who was admitted on 04/10/2017 with abdominal pain. The infectious disease service was asked to comment on antibiotic recommendations for a potential urinary tract infection.     Per the patient, he was in his usual state of health until 2-3 days ago when he had a chronic suprapubic catheter change. Afterwards he developed sharp bladder pain that radiated into the back and he describes it as constant and mildly better with pain medicine, 10 out of 10 in intensity and associated with some nausea and vomiting. He has not had fevers. He tends to say that he just hurts all over. He mentioned pain medicine to me no less than 5 times during our interview and is upset that he is on a low-dose pain medicine. He has been given doses of gentamicin and has been admitted after a urinalysis showed too numerous to count white blood cells as well as a mild leukocytosis of 11.7. He has not felt much better since this admission. Per review of the past medical records he has an extensive history of growing multi-drug-resistant organisms. He was last in the hospital in 01/2017 when he was seen by my colleague, Dr. Pancho Miller, for ESBL E coli in the urine. This was felt to be colonization and he was monitored off antibiotics. He also grew ESBL E coli and klebsiella ESBL as well  as proteus in 07/2015 from his urine.     PAST MEDICAL/SURGICAL HISTORY:  1. Paraplegia.   2. Anemia.   3. GERD.   4. Sacral decubitus ulcer, not a surgical candidate per Plastics in 08/2016.   5. Protein calorie malnutrition.   6. 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   7. Suprapubic catheter placement.   8. Right chest port.   9. Colostomy.     SOCIAL HISTORY: Denies tobacco, ethanol, or drug use. He is single and lives with his mother here in [place], [place]. He denies sick contacts.     FAMILY HISTORY: No family history of infectious diseases.     ALLERGIES:   1. VANCOMYCIN (apparently increased creatinine).  2. MEROPENEM (hallucinate).  3. AMOXICILLIN CLAVULANATE (uncharacterized allergy).  4. He has tolerated cephalosporins on multiple occasions.     Review of Systems  Pertinent items are noted in HPI, all other systems reviewed and negative    Objective     Physical Exam:   Vital Signs   Temp:  [99.9 °F (37.7 °C)-100 °F (37.8 °C)] 99.9 °F (37.7 °C)  Heart Rate:  [] 80  Resp:  [16-20] 18  BP: (69-99)/(43-67) 92/61    GENERAL: Awake and alert, in no acute distress.   HEENT: Oropharynx is clear. Hearing is grossly normal.   EYES: PERRL. No conjunctival injection. No lid lag.   LYMPHATICS: No lymphadenopathy of the neck or axillary regions.   HEART: Regular rate and rhythm. No peripheral edema.   LUNGS: Clear to auscultation anteriorly with normal respiratory effort.   GI: Soft, no grady tenderness to palpation in the suprapubic or CVA regions.  Nondistended. No appreciable organomegaly.   SKIN: Multiple areas of skin breakdown and ulcerations without cellulitic features  PSYCHIATRIC: Appropriate mood, affect, insight, and judgment.     Results Review:   I reviewed the patient's new clinical results.  CMP, cr 1.19  WBC 11.2 (P78, L 14, M 7)  H/H 7.9/26        Estimated Creatinine Clearance: 62.8 mL/min (by C-G formula based on Cr of 1.19).      Microbiology:  Bcx and Ucx  P    Radiology: None      Assessment/Plan   1.  Pyuria in patient with suprapubic catheter and known colonization with ESBL Escherichia coli  2.  Chronic sacral ulcers  3.  Mild acute kidney injury, Cr 1.19 up from a discharge of 0.76 on 1/23/17  4.  Anemia, as per primary  5.  Multiple antibiotic allergies    Patient with chronic suprapubic catheter and urinalysis very difficult to interpret in the setting as due to colonization will invariably grow bacteria independent of actual infection.  He has no grady urinary symptoms, but has global abdominal pain that is poorly characterized.  I'm not sure entirely if this represents urinary tract infection or some other pathology.  He is a very difficult historian.    I will hold on gentamicin given mild acute kidney injury.  We will start ceftriaxone 2 g IV every 24 hours.  If his pain is no better tomorrow, we'll consider CT of his abdomen and pelvis to make sure that his suprapubic catheter isn't malpositioned as it tends to be his predominant area of pain.  Fosfomycin might be a reasonable antibiotic choice to cover cystitis depending on culture results and how his case evolves.    Thanks!      Travis Malagon MD  04/10/17  1:10 PM           Electronically signed by Travis Malagon MD at 4/10/2017  3:16 PM      Travis Malagon MD at 4/10/2017  1:10 PM  Version 2 of 3         Referring Provider: Jeff Rhodes MD  Reason for Consultation: Sepsis    Subjective   History of present illness:    Dictation on: 04/10/2017  1:14 PM by: TRAVIS MALAGON T [[id number]]     Review of Systems  Pertinent items are noted in HPI, all other systems reviewed and negative    Objective     Physical Exam:   Vital Signs   Temp:  [99.9 °F (37.7 °C)-100 °F (37.8 °C)] 99.9 °F (37.7 °C)  Heart Rate:  [] 80  Resp:  [16-20] 18  BP: (69-99)/(43-67) 92/61    GENERAL: Awake and alert, in no acute distress.   HEENT: Oropharynx is clear. Hearing is grossly normal.    EYES: PERRL. No conjunctival injection. No lid lag.   LYMPHATICS: No lymphadenopathy of the neck or axillary regions.   HEART: Regular rate and rhythm. No peripheral edema.   LUNGS: Clear to auscultation anteriorly with normal respiratory effort.   GI: Soft, no grayd tenderness to palpation in the suprapubic or CVA regions.  Nondistended. No appreciable organomegaly.   SKIN: Multiple areas of skin breakdown and ulcerations without cellulitic features  PSYCHIATRIC: Appropriate mood, affect, insight, and judgment.     Results Review:   I reviewed the patient's new clinical results.  CMP, cr 1.19  WBC 11.2 (P78, L 14, M 7)  H/H 7.9/26        Estimated Creatinine Clearance: 62.8 mL/min (by C-G formula based on Cr of 1.19).      Microbiology:  Bcx and Ucx P    Radiology: None      Assessment/Plan   1.  Pyuria in patient with suprapubic catheter and known colonization with ESBL Escherichia coli  2.  Chronic sacral ulcers  3.  Mild acute kidney injury, Cr 1.19 up from a discharge of 0.76 on 1/23/17  4.  Anemia, as per primary  5.  Multiple antibiotic allergies    Patient with chronic suprapubic catheter and urinalysis very difficult to interpret in the setting as due to colonization will invariably grow bacteria independent of actual infection.  He has no grady urinary symptoms, but has global abdominal pain that is poorly characterized.  I'm not sure entirely if this represents urinary tract infection or some other pathology.  He is a very difficult historian.    I will hold on gentamicin given mild acute kidney injury.  We will start ceftriaxone 2 g IV every 24 hours.  If his pain is no better tomorrow, we'll consider CT of his abdomen and pelvis to make sure that his suprapubic catheter isn't malpositioned as it tends to be his predominant area of pain.  Fosfomycin might be a reasonable antibiotic choice to cover cystitis depending on culture results and how his case evolves.    Thanks!      Travis Andujar  MD Dean  04/10/17  1:10 PM           Electronically signed by Travis Malagon MD at 4/10/2017  1:33 PM      Travis Malagon MD at 4/10/2017  1:10 PM  Version 1 of 3         Referring Provider: Jeff Rhodes MD  Reason for Consultation: Sepsis    Subjective   History of present illness:    Dictation on: 04/10/2017  1:14 PM by: TRAVIS MALAGON T [[id number]]     Review of Systems  Pertinent items are noted in HPI, all other systems reviewed and negative    Objective     Physical Exam:   Vital Signs   Temp:  [99.9 °F (37.7 °C)-100 °F (37.8 °C)] 99.9 °F (37.7 °C)  Heart Rate:  [] 80  Resp:  [16-20] 18  BP: (69-99)/(43-67) 92/61    GENERAL: Awake and alert, in no acute distress.   HEENT: Oropharynx is clear. Hearing is grossly normal.   EYES: PERRL. No conjunctival injection. No lid lag.   LYMPHATICS: No lymphadenopathy of the neck or axillary regions.   HEART: Regular rate and rhythm. No peripheral edema.   LUNGS: Clear to auscultation anteriorly with normal respiratory effort.   GI: Soft, no grady tenderness to palpation in the suprapubic or CVA regions.  Nondistended. No appreciable organomegaly.   SKIN: Multiple areas of skin breakdown and ulcerations without cellulitic features  PSYCHIATRIC: Appropriate mood, affect, insight, and judgment.     Results Review:   I reviewed the patient's new clinical results.  CMP, cr 1.19  WBC 11.2 (P78, L 14, M 7)  H/H 7.9/26        Estimated Creatinine Clearance: 62.8 mL/min (by C-G formula based on Cr of 1.19).      Microbiology:  Bcx and Ucx P    Radiology: None      Assessment/Plan   1.  Pyuria in patient with suprapubic catheter and known colonization with ESBL Escherichia coli  2.  Chronic sacral ulcers  3.  Mild acute kidney injury  4.  Anemia, as per primary  5.  Multiple antibiotic allergies    Patient with chronic suprapubic catheter and urinalysis very difficult to interpret in the setting as due to colonization will invariably grow  bacteria independent of actual infection.  He has no grady urinary symptoms, but has global abdominal pain that is poorly characterized.  I'm not sure entirely if this represents urinary tract infection or some other pathology.  He is a very difficult historian.    I will hold on gentamicin given mild acute kidney injury.  We will start ceftriaxone 2 g IV every 24 hours.  If his pain is no better tomorrow, we'll consider CT of his abdomen and pelvis to make sure that his suprapubic catheter isn't malpositioned as it tends to be his predominant area of pain.  Fosfomycin might be a reasonable antibiotic choice to cover cystitis depending on culture results and how his case evolves.    Thank you for this consult.  We will continue to follow along and tailor antibiotics as the patient's clinical course evolves.    Travis Moran MD  04/10/17  1:10 PM           Electronically signed by Travis Moran MD at 4/10/2017  1:29 PM      Bean Santillan MD at 4/11/2017  6:30 AM  Version 1 of 1     Consult Orders:    1. Inpatient Consult to Urology [[id number]] ordered by Jeff Rhodes MD at 04/10/17 1902                Dictated.  Has sp tube and chronic leak around sp tube and from penis since placement of sp tube. Sp tube draining well.  Will follow.     Electronically signed by Bean Santillan MD at 4/11/2017  6:31 AM

## 2017-04-12 ENCOUNTER — APPOINTMENT (OUTPATIENT)
Dept: CARDIOLOGY | Facility: HOSPITAL | Age: 37
End: 2017-04-12
Attending: HOSPITALIST

## 2017-04-12 LAB
BACTERIA SPEC AEROBE CULT: ABNORMAL
BH CV LOW VAS LEFT DISTAL FEMORAL SPONT: 1
BH CV LOWER VASCULAR LEFT COMMON FEMORAL AUGMENT: NORMAL
BH CV LOWER VASCULAR LEFT COMMON FEMORAL COMPETENT: NORMAL
BH CV LOWER VASCULAR LEFT COMMON FEMORAL COMPRESS: NORMAL
BH CV LOWER VASCULAR LEFT COMMON FEMORAL PHASIC: NORMAL
BH CV LOWER VASCULAR LEFT COMMON FEMORAL SPONT: NORMAL
BH CV LOWER VASCULAR LEFT DISTAL FEMORAL AUGMENT: NORMAL
BH CV LOWER VASCULAR LEFT DISTAL FEMORAL COMPETENT: NORMAL
BH CV LOWER VASCULAR LEFT DISTAL FEMORAL COMPRESS: NORMAL
BH CV LOWER VASCULAR LEFT DISTAL FEMORAL PHASIC: NORMAL
BH CV LOWER VASCULAR LEFT DISTAL FEMORAL SPONT: NORMAL
BH CV LOWER VASCULAR LEFT DISTAL FEMORAL THROMBUS: NORMAL
BH CV LOWER VASCULAR LEFT GASTRONEMIUS COMPRESS: NORMAL
BH CV LOWER VASCULAR LEFT GREATER SAPH AK COMPRESS: NORMAL
BH CV LOWER VASCULAR LEFT GREATER SAPH BK COMPRESS: NORMAL
BH CV LOWER VASCULAR LEFT MID FEMORAL AUGMENT: NORMAL
BH CV LOWER VASCULAR LEFT MID FEMORAL COMPETENT: NORMAL
BH CV LOWER VASCULAR LEFT MID FEMORAL COMPRESS: NORMAL
BH CV LOWER VASCULAR LEFT MID FEMORAL PHASIC: NORMAL
BH CV LOWER VASCULAR LEFT MID FEMORAL SPONT: NORMAL
BH CV LOWER VASCULAR LEFT PERONEAL COMPRESS: NORMAL
BH CV LOWER VASCULAR LEFT POPLITEAL AUGMENT: NORMAL
BH CV LOWER VASCULAR LEFT POPLITEAL COMPETENT: NORMAL
BH CV LOWER VASCULAR LEFT POPLITEAL COMPRESS: NORMAL
BH CV LOWER VASCULAR LEFT POPLITEAL PHASIC: NORMAL
BH CV LOWER VASCULAR LEFT POPLITEAL SPONT: NORMAL
BH CV LOWER VASCULAR LEFT POSTERIOR TIBIAL COMPRESS: NORMAL
BH CV LOWER VASCULAR LEFT PROXIMAL FEMORAL COMPRESS: NORMAL
BH CV LOWER VASCULAR LEFT SAPHENOFEMORAL JUNCTION COMPRESS: NORMAL
BH CV LOWER VASCULAR LEFT SAPHENOFEMORAL JUNCTION PHASIC: NORMAL
BH CV LOWER VASCULAR LEFT SAPHENOFEMORAL JUNCTION SPONT: NORMAL
BH CV LOWER VASCULAR RIGHT COMMON FEMORAL AUGMENT: NORMAL
BH CV LOWER VASCULAR RIGHT COMMON FEMORAL COMPETENT: NORMAL
BH CV LOWER VASCULAR RIGHT COMMON FEMORAL COMPRESS: NORMAL
BH CV LOWER VASCULAR RIGHT COMMON FEMORAL PHASIC: NORMAL
BH CV LOWER VASCULAR RIGHT COMMON FEMORAL SPONT: NORMAL
GRAM STN SPEC: ABNORMAL

## 2017-04-12 PROCEDURE — 93970 EXTREMITY STUDY: CPT

## 2017-04-12 PROCEDURE — 99232 SBSQ HOSP IP/OBS MODERATE 35: CPT | Performed by: INTERNAL MEDICINE

## 2017-04-12 RX ADMIN — OXYBUTYNIN CHLORIDE 5 MG: 5 TABLET ORAL at 21:11

## 2017-04-12 RX ADMIN — PANTOPRAZOLE SODIUM 40 MG: 40 TABLET, DELAYED RELEASE ORAL at 08:51

## 2017-04-12 RX ADMIN — OXYCODONE HYDROCHLORIDE 15 MG: 15 TABLET ORAL at 21:21

## 2017-04-12 RX ADMIN — ALPRAZOLAM 1 MG: 0.5 TABLET ORAL at 08:51

## 2017-04-12 RX ADMIN — ERTAPENEM SODIUM 1 G: 1 INJECTION, POWDER, LYOPHILIZED, FOR SOLUTION INTRAMUSCULAR; INTRAVENOUS at 13:27

## 2017-04-12 RX ADMIN — ALPRAZOLAM 1 MG: 0.5 TABLET ORAL at 21:31

## 2017-04-12 RX ADMIN — SODIUM CHLORIDE 75 ML/HR: 9 INJECTION, SOLUTION INTRAVENOUS at 04:45

## 2017-04-12 RX ADMIN — OXYCODONE HYDROCHLORIDE 15 MG: 15 TABLET ORAL at 15:22

## 2017-04-12 RX ADMIN — GABAPENTIN 300 MG: 300 CAPSULE ORAL at 21:13

## 2017-04-12 RX ADMIN — OXYCODONE HYDROCHLORIDE AND ACETAMINOPHEN 1 TABLET: 10; 325 TABLET ORAL at 04:32

## 2017-04-12 RX ADMIN — GABAPENTIN 300 MG: 300 CAPSULE ORAL at 08:52

## 2017-04-12 RX ADMIN — OXYCODONE HYDROCHLORIDE 15 MG: 15 TABLET ORAL at 08:52

## 2017-04-12 RX ADMIN — GABAPENTIN 300 MG: 300 CAPSULE ORAL at 15:06

## 2017-04-12 RX ADMIN — OXYBUTYNIN CHLORIDE 5 MG: 5 TABLET ORAL at 08:51

## 2017-04-12 NOTE — PROGRESS NOTES
"Cc  Leaking sp tube  He still notes leakage around sp tube, stable  Moderate severity and long standing  Also chronic testis pain and sp pain,  No fh cap  And i reviewed ct sp in good position. Blood cultures neg so far     LOS: 2 days   Patient Care Team:  Josesito Hall MD as PCP - General (Family Medicine)        Subjective     History of Present Illness    Subjective:  Symptoms:  Stable.  (Wnwd thin contracted bm  nad aox3).    Diet:  Adequate intake.    Activity level: Impaired due to weakness.    Pain:  He complains of pain that is mild.          Objective     Vital Signs  Temp:  [98.1 °F (36.7 °C)-99.9 °F (37.7 °C)] 99.9 °F (37.7 °C)  Heart Rate:  [67-90] 90  Resp:  [18-19] 18  BP: ()/(61-64) 107/63    Objective:  General Appearance:  Comfortable, not in pain and well-appearing.    Vital signs: (most recent): Blood pressure 107/63, pulse 90, temperature 99.9 °F (37.7 °C), temperature source Oral, resp. rate 18, height 73.2\" (185.9 cm), weight 97 lb 14.2 oz (44.4 kg), SpO2 100 %.  Vital signs are normal.    Output: Producing urine.    HEENT: Normal HEENT exam.    Lungs:  Normal respiratory rate and normal effort.    Abdomen: Abdomen is soft, flat, scaphoid and non-distended.  There are no signs of ascites.    Extremities: Decreased range of motion.              Results Review:  New clinical results reviewed.        Assessment/Plan     Active Problems:    Acute UTI (urinary tract infection)      Assessment:  (No nvfc  Leaking sp tube Will try changing sp tube to larger size as per his request.  Will follow).       Bean Santillan MD  04/12/17  6:05 AM            "

## 2017-04-12 NOTE — PLAN OF CARE
Problem: Patient Care Overview (Adult)  Goal: Adult Individualization and Mutuality  Outcome: Ongoing (interventions implemented as appropriate)  Goal: Discharge Needs Assessment  Outcome: Ongoing (interventions implemented as appropriate)    Problem: Adjustment to Hospitalization, Illness, Injury, Treatment (Adult,Pediatric)  Goal: Adjustment to Events/Situations regarding Hospitalization/Illness/Injury/Treatment  Outcome: Ongoing (interventions implemented as appropriate)  Goal: Continued Mastery/Enhancement of Self-Esteem while Adjusting to Hospitalization/Illness/Injury  Outcome: Ongoing (interventions implemented as appropriate)    Problem: Infection, Risk/Actual (Adult)  Goal: Identify Related Risk Factors and Signs and Symptoms  Outcome: Ongoing (interventions implemented as appropriate)  Goal: Infection Prevention/Resolution  Outcome: Ongoing (interventions implemented as appropriate)    Problem: Fluid Volume Deficit (Adult)  Goal: Identify Related Risk Factors and Signs and Symptoms  Outcome: Ongoing (interventions implemented as appropriate)  Goal: Fluid/Electrolyte Balance  Outcome: Ongoing (interventions implemented as appropriate)  Goal: Comfort/Well Being  Outcome: Ongoing (interventions implemented as appropriate)    Problem: Pressure Ulcer (Adult)  Goal: Signs and Symptoms of Listed Potential Problems Will be Absent or Manageable (Pressure Ulcer)  Outcome: Ongoing (interventions implemented as appropriate)

## 2017-04-12 NOTE — PLAN OF CARE
Problem: Patient Care Overview (Adult)  Goal: Plan of Care Review  Outcome: Ongoing (interventions implemented as appropriate)    04/12/17 0561   Coping/Psychosocial Response Interventions   Plan Of Care Reviewed With patient   Outcome Evaluation   Outcome Summary/Follow up Plan Pt still c/o of pain to suprapubic and wounds. new order for consult to plastic sugery to eval wounds and tx. doppler done to left leg to r/o blood clot results still pending        Goal: Adult Individualization and Mutuality  Outcome: Ongoing (interventions implemented as appropriate)  Goal: Discharge Needs Assessment  Outcome: Ongoing (interventions implemented as appropriate)    Problem: Adjustment to Hospitalization, Illness, Injury, Treatment (Adult,Pediatric)  Goal: Adjustment to Events/Situations regarding Hospitalization/Illness/Injury/Treatment  Outcome: Ongoing (interventions implemented as appropriate)  Goal: Continued Mastery/Enhancement of Self-Esteem while Adjusting to Hospitalization/Illness/Injury  Outcome: Ongoing (interventions implemented as appropriate)    Problem: Infection, Risk/Actual (Adult)  Goal: Identify Related Risk Factors and Signs and Symptoms  Outcome: Ongoing (interventions implemented as appropriate)  Goal: Infection Prevention/Resolution  Outcome: Ongoing (interventions implemented as appropriate)    Problem: Fluid Volume Deficit (Adult)  Goal: Identify Related Risk Factors and Signs and Symptoms  Outcome: Ongoing (interventions implemented as appropriate)  Goal: Fluid/Electrolyte Balance  Outcome: Ongoing (interventions implemented as appropriate)  Goal: Comfort/Well Being  Outcome: Ongoing (interventions implemented as appropriate)    Problem: Pressure Ulcer (Adult)  Goal: Signs and Symptoms of Listed Potential Problems Will be Absent or Manageable (Pressure Ulcer)  Outcome: Ongoing (interventions implemented as appropriate)

## 2017-04-12 NOTE — PROGRESS NOTES
INFECTIOUS DISEASES PROGRESS NOTE    CC: f/u ESBL infection    S:   He is tolerating the ertapenem so far without difficulty.  He says that his wounds on his legs appear worse and wants them debrided.  He is not having fevers    O:  Physical Exam:  Temp:  [98.1 °F (36.7 °C)-99.9 °F (37.7 °C)] 99.9 °F (37.7 °C)  Heart Rate:  [80-90] 90  Resp:  [18-19] 19  BP: (101-107)/(61-63) 107/63  Physical Exam   Constitutional: No distress.   Pulmonary/Chest: Effort normal.   Abdominal: Soft. He exhibits no distension. There is no tenderness.   Neurological: He is alert.   He is combative  His left hip ischial ulcer is dressed.  I partially took down the dressing and there is no cellulitic features I can see     Diagnostics:      4/11 Bcx NGTD   4/10 Bcx 1/2 E. Coli   4/10 Ucx >100K Mixed culture      Estimated Creatinine Clearance: 65.5 mL/min (by C-G formula based on Cr of 0.97).    Assessment/Plan   1. ESBL E coli septicemia, suspected urinary source  2. Chronic sacral ulcers  3. Mild acute kidney injury, better. admission Cr 1.19 up from a discharge of 0.76 on 1/23/17  4. Anemia, as per primary  5. Multiple antibiotic allergies    Fortunately his repeat blood cultures are negative.  CT abdomen and pelvis was obtained yesterday and showed minimal bladder wall thickening without evidence of upper urinary tract infection.  Given that his illness started directly after exchanging suprapubic tube I suspect this is from urinary translocation of bacteria into the blood stream.  I did mention that another consideration would be that his port may be infected but he adamantly refused to even consider this possibility.    We will keep on ertapenem to complete two week course.    He is concerned that his wounds may need debridement.  I told him that I would be happy to consult plastic surgery at his request.  I have gone ahead and made a request to have them evaluate him.    Patient towards the end of the visit became upset and started  making unfounded accusations.  He would interrupt me and not let me finish a sentence.  He made innuendos towards taking legal action over his chronic wounds.  He also told nursing that I told him that he should not go and speak to people in wheelchairs which is adamantly false.  He continues to be fixated on pain medicine and mentioned this several times throughout my conversation even though I reminded him numerous times that I do not prescribe analgesia.  I have asked for patient liaison and risk management to come hear his concerns.       Travis Moran MD  12:42 PM  04/12/17

## 2017-04-12 NOTE — PROGRESS NOTES
" DAILY PROGRESS NOTE    CHIEF COMPLAINT:   C/O L THIGH SWELLING    HISTORY:  A 37-year-old  male who has been paraplegic. Other medical problems are chronic anemia, chronic hypertension, recurrent infection, decubitus ulcer, malnutrition, chronic pain syndrome, and gastroesophageal reflux disease who had a suprapubic catheter.  He presented [facility] Emergency Room with abdominal pain which began just yesterday. Workup in the ER revealed recurrent UTI.  Admitted for management. The patient denies any fever or chills, but he does have nausea and more pain. No diarrhea. No other complaint.  It is just hurting.     PHYSICAL EXAMINATION:Blood pressure 107/63, pulse 90, temperature 99.9 °F (37.7 °C), temperature source Oral, resp. rate 19, height 73.2\" (185.9 cm), weight 97 lb 14.2 oz (44.4 kg), SpO2 99 %.    GENERAL: Alert, and oriented.    HEENT: Unremarkable.   NECK: Supple.   LUNGS: Clear.   HEART: S1 and S2 with loud ejection systolic murmur.   ABDOMEN: Soft. Diffuse tenderness.  Suprapubic catheter in place. Bowel sounds positive.   EXTREMITIES: Chronic changes.  Paraplegic with contractures.  Gait and balance:  He does not walk.  Good strength in upper extremities.     DIAGNOSTIC DATA:   Lab Results (last 24 hours)     Procedure Component Value Units Date/Time    Blood Culture [[id number]]  (Normal) Collected:  04/10/17 0119    Specimen:  Blood from Blood, Central Line Updated:  04/12/17 0208     Blood Culture No growth at 2 days    Blood Culture [[id number]]  (Normal) Collected:  04/11/17 1636    Specimen:  Blood from Arm, Right Updated:  04/12/17 0501     Blood Culture No growth at less than 24 hours    Blood Culture [[id number]]  (Normal) Collected:  04/11/17 1726    Specimen:  Blood from Arm, Left Updated:  04/12/17 0601     Blood Culture No growth at less than 24 hours    Blood Culture [[id number]]  (Abnormal)  (Susceptibility) Collected:  04/10/17 0119    Specimen:  Blood from Blood, " Central Line Updated:  04/12/17 0729     Blood Culture Escherichia coli ESBL (C)        Consider infectious disease consult.  Susceptibility results may not correlate to clinical outcomes.        Gram Stain Result Anaerobic Bottle Gram negative bacilli    Susceptibility      Escherichia coli ESBL     AC     Ampicillin >=32 ug/ml Resistant     Ampicillin + Sulbactam <=2 ug/ml Susceptible     Cefazolin >=64 ug/ml Resistant     Cefepime Resistant     Cefoxitin <=4 ug/ml Susceptible     Ceftriaxone Resistant     Ertapenem <=0.5 ug/ml Susceptible     Gentamicin <=1 ug/ml Susceptible     Levofloxacin >=8 ug/ml Resistant     Meropenem <=0.25 ug/ml Susceptible     Piperacillin + Tazobactam <=4 ug/ml Susceptible     Trimethoprim + Sulfamethoxazole >=320 ug/ml Resistant                      White count 11.2, hemoglobin 7.9, platelets 278,000.  Potassium 3.9, BUN 15, creatinine 1.1.  UA is positive for WBCs.  Positive for 4+ bacteria. Cultures are pending, blood and urine.  No x-rays.  No CT.   Imaging Results (last 72 hours)     Procedure Component Value Units Date/Time    CT Abdomen Pelvis Without Contrast [[id number]] Collected:  04/11/17 1329     Updated:  04/11/17 1345    Narrative:       CT ABDOMEN PELVIS WO CONTRAST-     INDICATIONS: Suprapubic catheter, possible kidney stones     TECHNIQUE: Unenhanced ABDOMEN AND PELVIS CT     COMPARISON: 01/17/2017     FINDINGS:      Suprapubic catheter is seen within expected location of the urinary  bladder. Gas in the urinary bladder may be related to catheterization.  The urinary bladder is only partly filled may contribute to. Some  borderline wall thickening, correlate clinically to exclude any  possibility of urinary infection. 2 stones measuring 4 mm are seen in  the right kidney, are nonobstructive, no right hydronephrosis. No other  urolithiasis is identified. Mild left hydroureter is apparent, with  slender borderline left hydronephrosis.     Stable somewhat lobulated  contour of the kidneys is seen.     Otherwise unremarkable unenhanced appearance of the liver, spleen,  adrenal glands, pancreas, kidneys, bladder.     No bowel obstruction or abnormal bowel thickening is identified. Left  lower quadrant colostomy redemonstrated.     No free intraperitoneal gas or free fluid.     Mildly prominent para-aortic lymph nodes appear similar to prior exam,  for example left para-aortic, 9 mm short axis, image 83. Some hyperdense  portacaval lymph nodes are suggested. To the left of the urinary  bladder, a mildly prominent 11 mm short axis lymph node on image 139  appear stable, nonspecific. Stable subcentimeter short axis subcutaneous  left groin lymph node.     Abdominal aorta is not aneurysmal.     The lung bases show minimal bilateral pleural effusions, change from  prior exam, with small likely atelectasis.     Prominent chronic deformity of the bilateral hips with adjacent soft  tissue swimmer's or ulcerations suggested, appearance is similar to  prior exam, could reflect presence of infection, correlate clinically.  Chronic sclerotic change of the bones in the sacroiliac regions, and  thinning of the soft tissues overlying the sacrum and lower back.             Impression:             1. Suprapubic catheter within the urinary bladder. Appearance of  borderline thickening of the urinary bladder wall, likely at least in  part from lack of distention. Nonobstructive right nephrolithiasis. Mild  left hydroureter, slight/borderline left hydronephrosis.  2. Mildly prominent retroperitoneal lymph nodes, not significantly  changed.  3. Chronic changes of the hips and pelvis with soft tissue sores  suggested, correlate clinically.  4. Minimal bilateral pleural effusions, change from prior exam.     This report was finalized on 4/11/2017 1:42 PM by Dr. Medardo Washington MD.             Current Facility-Administered Medications:   •  ALPRAZolam (XANAX) tablet 1 mg, 1 mg, Oral, BID PRN, Jeff  MD Carmelo, 1 mg at 04/12/17 0851  •  bisacodyl (DULCOLAX) EC tablet 5 mg, 5 mg, Oral, Daily PRN, Jeff Rhodes MD  •  ertapenem (INVanz) 1 g/100 mL 0.9% NS VTB (mbp), 1 g, Intravenous, Q24H, Travis Moran MD, 1 g at 04/11/17 1121  •  gabapentin (NEURONTIN) capsule 300 mg, 300 mg, Oral, TID, Jeff Rhodes MD, 300 mg at 04/12/17 0852  •  oxybutynin (DITROPAN) tablet 5 mg, 5 mg, Oral, BID, Jeff Rhodes MD, 5 mg at 04/12/17 0851  •  oxyCODONE (ROXICODONE) immediate release tablet 15 mg, 15 mg, Oral, Q6H PRN, Jeff Rhodes MD, 15 mg at 04/12/17 0852  •  oxyCODONE-acetaminophen (PERCOCET)  MG per tablet 1 tablet, 1 tablet, Oral, Q8H PRN, Jeff Rhodes MD, 1 tablet at 04/12/17 0432  •  pantoprazole (PROTONIX) EC tablet 40 mg, 40 mg, Oral, Daily, Jeff Rhodes MD, 40 mg at 04/12/17 0851  •  Insert peripheral IV, , , Once **AND** sodium chloride 0.9 % flush 10 mL, 10 mL, Intravenous, PRN, Sanjeev Bishop MD  •  sodium chloride 0.9 % infusion, 75 mL/hr, Intravenous, Continuous, Jeff Rhodes MD, Last Rate: 75 mL/hr at 04/12/17 0445, 75 mL/hr at 04/12/17 0445  •  sodium hypochlorite (DAKIN'S 1/4 STRENGTH) 0.125 % topical solution 0.125% solution, , Topical, Daily, Jeff Rhodes MD     ASSESSMENT:  1.  UTI with sepsis.  /E COLI BACTREMIA  2.  Paraplegic.    3.  Neurogenic bladder.    4.  Chronic suprapubic catheter.   5.  Gastroesophageal reflux disease.   6.  Chronic pain syndrome.     PLAN:  1.  CPM  2.  IVF   3.  ABX PER ID  4.  CHECK L THIGH DOPPLER  5.  Continue home medications.   6.  PAIN MANAGE MENT  7.  Stress ulcer and DVT prophylaxis.   8.  Follow closely.  9.  Further recommendations according to hospital course.       Jeff Rhodes M.D.

## 2017-04-12 NOTE — PROGRESS NOTES
[facility] doppler completed. Preliminary report is positive for old DVT called to [facility].

## 2017-04-13 PROCEDURE — 25010000002 ALTEPLASE 2 MG RECONSTITUTED SOLUTION: Performed by: HOSPITALIST

## 2017-04-13 PROCEDURE — 99232 SBSQ HOSP IP/OBS MODERATE 35: CPT | Performed by: INTERNAL MEDICINE

## 2017-04-13 RX ADMIN — ALTEPLASE 2 MG: 2.2 INJECTION, POWDER, LYOPHILIZED, FOR SOLUTION INTRAVENOUS at 02:05

## 2017-04-13 RX ADMIN — ALPRAZOLAM 1 MG: 0.5 TABLET ORAL at 09:54

## 2017-04-13 RX ADMIN — ALPRAZOLAM 1 MG: 0.5 TABLET ORAL at 19:36

## 2017-04-13 RX ADMIN — OXYCODONE HYDROCHLORIDE 15 MG: 15 TABLET ORAL at 03:53

## 2017-04-13 RX ADMIN — ERTAPENEM SODIUM 1 G: 1 INJECTION, POWDER, LYOPHILIZED, FOR SOLUTION INTRAMUSCULAR; INTRAVENOUS at 09:54

## 2017-04-13 RX ADMIN — OXYBUTYNIN CHLORIDE 5 MG: 5 TABLET ORAL at 09:54

## 2017-04-13 RX ADMIN — OXYCODONE HYDROCHLORIDE AND ACETAMINOPHEN 1 TABLET: 10; 325 TABLET ORAL at 02:05

## 2017-04-13 RX ADMIN — GABAPENTIN 300 MG: 300 CAPSULE ORAL at 17:13

## 2017-04-13 RX ADMIN — GABAPENTIN 300 MG: 300 CAPSULE ORAL at 09:54

## 2017-04-13 RX ADMIN — GABAPENTIN 300 MG: 300 CAPSULE ORAL at 20:54

## 2017-04-13 RX ADMIN — PANTOPRAZOLE SODIUM 40 MG: 40 TABLET, DELAYED RELEASE ORAL at 09:54

## 2017-04-13 RX ADMIN — OXYBUTYNIN CHLORIDE 5 MG: 5 TABLET ORAL at 17:13

## 2017-04-13 RX ADMIN — OXYCODONE HYDROCHLORIDE AND ACETAMINOPHEN 1 TABLET: 10; 325 TABLET ORAL at 17:13

## 2017-04-13 RX ADMIN — OXYCODONE HYDROCHLORIDE 15 MG: 15 TABLET ORAL at 09:54

## 2017-04-13 RX ADMIN — OXYCODONE HYDROCHLORIDE 15 MG: 15 TABLET ORAL at 19:37

## 2017-04-13 NOTE — PROGRESS NOTES
"Leak around sp tube  Doing better after change of sp tube  Decreased abd pain and leakage  No nvfc     LOS: 3 days   Patient Care Team:  Josesito Hall MD as PCP - General (Family Medicine)        Subjective     History of Present Illness    Subjective:  Symptoms:  Stable.    Diet:  Adequate intake.    Pain:  He reports no pain.          Objective     Vital Signs  Temp:  [97.1 °F (36.2 °C)-99.3 °F (37.4 °C)] 99.3 °F (37.4 °C)  Heart Rate:  [77-96] 96  Resp:  [16-20] 18  BP: (93-97)/(61-69) 94/62    Objective:  Vital signs: (most recent): Blood pressure 94/62, pulse 96, temperature 99.3 °F (37.4 °C), temperature source Oral, resp. rate 18, height 73.2\" (185.9 cm), weight 97 lb 14.2 oz (44.4 kg), SpO2 100 %.  Vital signs are normal.    HEENT: Normal HEENT exam.    Lungs:  Normal effort.    Abdomen: Abdomen is soft.              Results Review:  New clinical results reviewed.        Assessment/Plan     Active Problems:    Acute UTI (urinary tract infection)      Assessment:  (Leaking sp tube  Doing better will follow).       Bean Santillan MD  04/13/17  7:31 AM            "

## 2017-04-13 NOTE — PROGRESS NOTES
" DAILY PROGRESS NOTE    CHIEF COMPLAINT:   DOING BETTER  NO NEW COMPLAINTS    HISTORY:  A 37-year-old  male who has been paraplegic. Other medical problems are chronic anemia, chronic hypertension, recurrent infection, decubitus ulcer, malnutrition, chronic pain syndrome, and gastroesophageal reflux disease who had a suprapubic catheter.  He presented [facility] Emergency Room with abdominal pain which began just yesterday. Workup in the ER revealed recurrent UTI.  Admitted for management. The patient denies any fever or chills, but he does have nausea and more pain. No diarrhea. No other complaint.  It is just hurting.     PHYSICAL EXAMINATION:Blood pressure 92/64, pulse 96, temperature 98.4 °F (36.9 °C), temperature source Oral, resp. rate 19, height 73.2\" (185.9 cm), weight 97 lb 14.2 oz (44.4 kg), SpO2 100 %.    GENERAL: Alert, and oriented.    HEENT: Unremarkable.   NECK: Supple.   LUNGS: Clear.   HEART: S1 and S2 with loud ejection systolic murmur.   ABDOMEN: Soft. Diffuse tenderness.  Suprapubic catheter in place. Bowel sounds positive.   EXTREMITIES: Chronic changes.  Paraplegic with contractures.  Gait and balance:  He does not walk.  Good strength in upper extremities.     DIAGNOSTIC DATA:   Lab Results (last 24 hours)     Procedure Component Value Units Date/Time    Blood Culture [[id number]]  (Normal) Collected:  04/11/17 1636    Specimen:  Blood from Arm, Right Updated:  04/12/17 1701     Blood Culture No growth at 24 hours    Blood Culture [[id number]]  (Normal) Collected:  04/11/17 1726    Specimen:  Blood from Arm, Left Updated:  04/12/17 1801     Blood Culture No growth at 24 hours    Blood Culture [[id number]]  (Normal) Collected:  04/10/17 0119    Specimen:  Blood from Blood, Central Line Updated:  04/13/17 0207     Blood Culture No growth at 3 days      White count 11.2, hemoglobin 7.9, platelets 278,000.  Potassium 3.9, BUN 15, creatinine 1.1.  UA is positive for WBCs.  " Positive for 4+ bacteria. Cultures are pending, blood and urine.  No x-rays.  No CT.   Imaging Results (last 72 hours)     Procedure Component Value Units Date/Time    CT Abdomen Pelvis Without Contrast [[id number]] Collected:  04/11/17 1329     Updated:  04/11/17 1345    Narrative:       CT ABDOMEN PELVIS WO CONTRAST-     INDICATIONS: Suprapubic catheter, possible kidney stones     TECHNIQUE: Unenhanced ABDOMEN AND PELVIS CT     COMPARISON: 01/17/2017     FINDINGS:      Suprapubic catheter is seen within expected location of the urinary  bladder. Gas in the urinary bladder may be related to catheterization.  The urinary bladder is only partly filled may contribute to. Some  borderline wall thickening, correlate clinically to exclude any  possibility of urinary infection. 2 stones measuring 4 mm are seen in  the right kidney, are nonobstructive, no right hydronephrosis. No other  urolithiasis is identified. Mild left hydroureter is apparent, with  slender borderline left hydronephrosis.     Stable somewhat lobulated contour of the kidneys is seen.     Otherwise unremarkable unenhanced appearance of the liver, spleen,  adrenal glands, pancreas, kidneys, bladder.     No bowel obstruction or abnormal bowel thickening is identified. Left  lower quadrant colostomy redemonstrated.     No free intraperitoneal gas or free fluid.     Mildly prominent para-aortic lymph nodes appear similar to prior exam,  for example left para-aortic, 9 mm short axis, image 83. Some hyperdense  portacaval lymph nodes are suggested. To the left of the urinary  bladder, a mildly prominent 11 mm short axis lymph node on image 139  appear stable, nonspecific. Stable subcentimeter short axis subcutaneous  left groin lymph node.     Abdominal aorta is not aneurysmal.     The lung bases show minimal bilateral pleural effusions, change from  prior exam, with small likely atelectasis.     Prominent chronic deformity of the bilateral hips with adjacent  soft  tissue swimmer's or ulcerations suggested, appearance is similar to  prior exam, could reflect presence of infection, correlate clinically.  Chronic sclerotic change of the bones in the sacroiliac regions, and  thinning of the soft tissues overlying the sacrum and lower back.             Impression:             1. Suprapubic catheter within the urinary bladder. Appearance of  borderline thickening of the urinary bladder wall, likely at least in  part from lack of distention. Nonobstructive right nephrolithiasis. Mild  left hydroureter, slight/borderline left hydronephrosis.  2. Mildly prominent retroperitoneal lymph nodes, not significantly  changed.  3. Chronic changes of the hips and pelvis with soft tissue sores  suggested, correlate clinically.  4. Minimal bilateral pleural effusions, change from prior exam.     This report was finalized on 4/11/2017 1:42 PM by Dr. Medardo Washington MD.             Current Facility-Administered Medications:   •  ALPRAZolam (XANAX) tablet 1 mg, 1 mg, Oral, BID PRN, Jeff Rhodes MD, 1 mg at 04/13/17 0954  •  bisacodyl (DULCOLAX) EC tablet 5 mg, 5 mg, Oral, Daily PRN, Jeff Rhodes MD  •  ertapenem (INVanz) 1 g/100 mL 0.9% NS VTB (mbp), 1 g, Intravenous, Q24H, Travis Moran MD, 1 g at 04/13/17 0954  •  gabapentin (NEURONTIN) capsule 300 mg, 300 mg, Oral, TID, Jeff Rhodes MD, 300 mg at 04/13/17 0954  •  oxybutynin (DITROPAN) tablet 5 mg, 5 mg, Oral, BID, Jeff Rhodes MD, 5 mg at 04/13/17 0954  •  oxyCODONE (ROXICODONE) immediate release tablet 15 mg, 15 mg, Oral, Q6H PRN, Jeff Rhodes MD, 15 mg at 04/13/17 0954  •  oxyCODONE-acetaminophen (PERCOCET)  MG per tablet 1 tablet, 1 tablet, Oral, Q8H PRN, Jeff Rhodes MD, 1 tablet at 04/13/17 0205  •  pantoprazole (PROTONIX) EC tablet 40 mg, 40 mg, Oral, Daily, Jeff MD Carmelo, 40 mg at 04/13/17 0954  •  Insert peripheral IV, , , Once **AND** sodium chloride 0.9 % flush 10 mL, 10 mL, Intravenous, PRN, Sanjeev OLIVAS  MD Dario  •  sodium chloride 0.9 % infusion, 50 mL/hr, Intravenous, Continuous, Jeff Rhodes MD, Last Rate: 50 mL/hr at 04/12/17 1507, 50 mL/hr at 04/12/17 1507  •  sodium hypochlorite (DAKIN'S 1/4 STRENGTH) 0.125 % topical solution 0.125% solution, , Topical, Daily, Jeff Rhodes MD     ASSESSMENT:  1.  UTI with sepsis.  /E COLI BACTREMIA  2.  Paraplegic.    3.  Neurogenic bladder.    4.  Chronic suprapubic catheter.   5.  Gastroesophageal reflux disease.   6.  Chronic pain syndrome.     PLAN:  1.  CPM  2.  IVF   3.  ABX TILL 4/25  4.  SUPPORTIVE CARE  5.  Continue home medications.   6.  PAIN MANAGE MENT  7.  Stress ulcer and DVT prophylaxis.   8.  Follow closely.  9.  Further recommendations according to hospital course.       Jeff Rhodes M.D.

## 2017-04-13 NOTE — PLAN OF CARE
Problem: Patient Care Overview (Adult)  Goal: Plan of Care Review  Outcome: Ongoing (interventions implemented as appropriate)    04/13/17 0612   Coping/Psychosocial Response Interventions   Plan Of Care Reviewed With patient   Patient Care Overview   Progress progress toward functional goals as expected   Outcome Evaluation   Outcome Summary/Follow up Plan Pt still complaining of pain( suprapubic/ wounds). On a swpecialty bed, dressing changes daily on the stage 4 pressure ulcer L hip to buttocks,, every other day for the bilateral leg ulcers       Goal: Discharge Needs Assessment    04/13/17 0612   Discharge Needs Assessment   Concerns To Be Addressed denies needs/concerns at this time   Discharge Disposition still a patient   Self-Care   Equipment Currently Used at Home colostomy/ostomy supplies;bath bench;hospital bed;power chair, (recliner lift);wheelchair, motorized   Living Environment   Transportation Available ambulance         Problem: Adjustment to Hospitalization, Illness, Injury, Treatment (Adult,Pediatric)  Goal: Adjustment to Events/Situations regarding Hospitalization/Illness/Injury/Treatment  Outcome: Ongoing (interventions implemented as appropriate)    04/13/17 0612   Adjustment to Hospitalization, Illness, Injury, Treatment (Adult,Pediatric)   Adjustment to Events/Situations regarding Hospitalization/Illness/Injury/Treatment making progress toward outcome       Goal: Continued Mastery/Enhancement of Self-Esteem while Adjusting to Hospitalization/Illness/Injury  Outcome: Ongoing (interventions implemented as appropriate)    04/13/17 0612   Adjustment to Hospitalization, Illness, Injury, Treatment (Adult,Pediatric)   Continued Mastery/Enhancement of Self-Esteem while Adjusting to Hospitalization/Illness/Injury making progress toward outcome         Problem: Infection, Risk/Actual (Adult)  Goal: Identify Related Risk Factors and Signs and Symptoms  Outcome: Ongoing (interventions implemented as  appropriate)    04/13/17 0612   Infection, Risk/Actual   Infection, Risk/Actual: Related Risk Factors chronic illness/condition   Signs and Symptoms (Infection, Risk/Actual) drainage;body temperature changes       Goal: Infection Prevention/Resolution  Outcome: Ongoing (interventions implemented as appropriate)    04/13/17 0612   Infection, Risk/Actual (Adult)   Infection Prevention/Resolution making progress toward outcome         Problem: Fluid Volume Deficit (Adult)  Goal: Identify Related Risk Factors and Signs and Symptoms  Outcome: Ongoing (interventions implemented as appropriate)    04/13/17 0612   Fluid Volume Deficit   Fluid Volume Deficit: Related Risk Factors infection/sepsis   Signs and Symptoms (Fluid Volume Deficit) muscle weakness       Goal: Fluid/Electrolyte Balance  Outcome: Ongoing (interventions implemented as appropriate)    04/13/17 0612   Fluid Volume Deficit (Adult)   Fluid/Electrolyte Balance making progress toward outcome       Goal: Comfort/Well Being  Outcome: Ongoing (interventions implemented as appropriate)    04/13/17 0612   Fluid Volume Deficit (Adult)   Comfort/Well Being making progress toward outcome         Problem: Pressure Ulcer (Adult)  Goal: Signs and Symptoms of Listed Potential Problems Will be Absent or Manageable (Pressure Ulcer)  Outcome: Ongoing (interventions implemented as appropriate)    04/13/17 0612   Pressure Ulcer   Problems Assessed (Pressure Ulcer) all   Problems Present (Pressure Ulcer) pain;infection;wound progression/extension;wound complications

## 2017-04-13 NOTE — PROGRESS NOTES
INFECTIOUS DISEASES PROGRESS NOTE    CC: f/u sepsis    S:   Pain better  Tolerating ertapenem  No f/c/ns  More cheerful today; smiling, expresses thanks for my care  O:  Physical Exam:  Temp:  [97.1 °F (36.2 °C)-99.3 °F (37.4 °C)] 99.3 °F (37.4 °C)  Heart Rate:  [77-96] 96  Resp:  [16-20] 18  BP: (93-97)/(61-69) 94/62  Physical Exam   Constitutional: He appears well-developed. No distress.   Cardiovascular: Normal rate and regular rhythm.    Pulmonary/Chest: Effort normal and breath sounds normal.   Abdominal: Soft. He exhibits no distension. There is no tenderness.   Neurological: He is alert.   Psychiatric: He has a normal mood and affect. His behavior is normal.        Diagnostics:      LE US: Chronic left lower extremity deep vein thrombosis noted in the distal femoral.    Micro  4/11 Bcx NGTD   4/10 Bcx 1/2 E. Coli   4/10 Ucx >100K Mixed culture    Estimated Creatinine Clearance: 65.5 mL/min (by C-G formula based on Cr of 0.97).    Assessment/Plan   1. ESBL E coli septicemia, suspected urinary source  2. Chronic sacral ulcers, plastics consulted yesterday  3. Multiple antibiotic allergies, tolerating ertapenem without difficulty    Doing well.    Cont ertapenem 1g IV q24 through 4/25  Please obtain CMP and CBC/diff on 4/19 and fax to ID clinic at [phone]  We will not plan to schedule ID f/u given very short course of antibiotics but I will be happy to see any time he needs  Will follow peripherally.      Travis Moran MD  8:11 AM  04/13/17

## 2017-04-13 NOTE — PROGRESS NOTES
Continued Stay Note  [facility]     Patient Name: Joaquín Sherman  MRN: [id number]  Today's Date: 4/13/2017    Admit Date: 4/10/2017          Discharge Plan       04/13/17 1408    Case Management/Social Work Plan    Plan home with [facility] and [facility]    Patient/Family In Agreement With Plan yes    Additional Comments Per ID, patient will need IV Invanz 1 gram through 04/025/17.  Call to [facility] and they will follow with [facility].               Discharge Codes     None            Idalia Ocasio RN

## 2017-04-13 NOTE — PLAN OF CARE
Problem: Patient Care Overview (Adult)  Goal: Plan of Care Review  Outcome: Ongoing (interventions implemented as appropriate)    04/13/17 0612   Outcome Evaluation   Outcome Summary/Follow up Plan Pt still complaining of pain( suprapubic/ wounds). On a swpecialty bed, dressing changes daily on the stage 4 pressure ulcer L hip to buttocks,, every other day for the bilateral leg ulcers       Goal: Discharge Needs Assessment  Outcome: Ongoing (interventions implemented as appropriate)    Problem: Adjustment to Hospitalization, Illness, Injury, Treatment (Adult,Pediatric)  Goal: Adjustment to Events/Situations regarding Hospitalization/Illness/Injury/Treatment  Outcome: Ongoing (interventions implemented as appropriate)    Problem: Infection, Risk/Actual (Adult)  Goal: Identify Related Risk Factors and Signs and Symptoms  Outcome: Ongoing (interventions implemented as appropriate)  Goal: Infection Prevention/Resolution  Outcome: Ongoing (interventions implemented as appropriate)    Problem: Fluid Volume Deficit (Adult)  Goal: Identify Related Risk Factors and Signs and Symptoms  Outcome: Ongoing (interventions implemented as appropriate)  Goal: Fluid/Electrolyte Balance  Outcome: Ongoing (interventions implemented as appropriate)  Goal: Comfort/Well Being  Outcome: Ongoing (interventions implemented as appropriate)    Problem: Pressure Ulcer (Adult)  Goal: Signs and Symptoms of Listed Potential Problems Will be Absent or Manageable (Pressure Ulcer)  Outcome: Ongoing (interventions implemented as appropriate)

## 2017-04-14 RX ADMIN — OXYCODONE HYDROCHLORIDE AND ACETAMINOPHEN 1 TABLET: 10; 325 TABLET ORAL at 21:11

## 2017-04-14 RX ADMIN — ALPRAZOLAM 1 MG: 0.5 TABLET ORAL at 13:33

## 2017-04-14 RX ADMIN — SODIUM CHLORIDE 250 ML: 9 INJECTION, SOLUTION INTRAVENOUS at 18:56

## 2017-04-14 RX ADMIN — OXYCODONE HYDROCHLORIDE 15 MG: 15 TABLET ORAL at 01:55

## 2017-04-14 RX ADMIN — OXYCODONE HYDROCHLORIDE 15 MG: 15 TABLET ORAL at 18:23

## 2017-04-14 RX ADMIN — ERTAPENEM SODIUM 1 G: 1 INJECTION, POWDER, LYOPHILIZED, FOR SOLUTION INTRAMUSCULAR; INTRAVENOUS at 09:47

## 2017-04-14 RX ADMIN — OXYBUTYNIN CHLORIDE 5 MG: 5 TABLET ORAL at 18:15

## 2017-04-14 RX ADMIN — OXYBUTYNIN CHLORIDE 5 MG: 5 TABLET ORAL at 09:47

## 2017-04-14 RX ADMIN — GABAPENTIN 300 MG: 300 CAPSULE ORAL at 09:47

## 2017-04-14 RX ADMIN — PANTOPRAZOLE SODIUM 40 MG: 40 TABLET, DELAYED RELEASE ORAL at 09:47

## 2017-04-14 RX ADMIN — OXYCODONE HYDROCHLORIDE 15 MG: 15 TABLET ORAL at 09:47

## 2017-04-14 RX ADMIN — OXYCODONE HYDROCHLORIDE AND ACETAMINOPHEN 1 TABLET: 10; 325 TABLET ORAL at 13:33

## 2017-04-14 RX ADMIN — GABAPENTIN 300 MG: 300 CAPSULE ORAL at 21:25

## 2017-04-14 RX ADMIN — OXYCODONE HYDROCHLORIDE AND ACETAMINOPHEN 1 TABLET: 10; 325 TABLET ORAL at 06:33

## 2017-04-14 RX ADMIN — GABAPENTIN 300 MG: 300 CAPSULE ORAL at 16:40

## 2017-04-14 RX ADMIN — ALPRAZOLAM 1 MG: 0.5 TABLET ORAL at 06:33

## 2017-04-14 NOTE — PROGRESS NOTES
"leaking sp tube  Doing better  With less leak  No gu sx otherwise  Urine culture with multiple organisms  bc with esbl e coli     LOS: 4 days   Patient Care Team:  Josesito Hall MD as PCP - General (Family Medicine)        Subjective     History of Present Illness    Subjective:  Symptoms:  Improved.          Objective     Vital Signs  Temp:  [98.4 °F (36.9 °C)-99.6 °F (37.6 °C)] 98.7 °F (37.1 °C)  Heart Rate:  [82-98] 82  Resp:  [16-19] 16  BP: ()/(62-64) 92/62    Objective:  General Appearance:  Comfortable.    Vital signs: (most recent): Blood pressure 92/62, pulse 82, temperature 98.7 °F (37.1 °C), temperature source Oral, resp. rate 16, height 73.2\" (185.9 cm), weight 97 lb 14.2 oz (44.4 kg), SpO2 99 %.  Vital signs are normal.    Output: Producing urine.    Abdomen: Abdomen is soft.              Results Review:  New clinical results reviewed.        Assessment/Plan     Active Problems:    Acute UTI (urinary tract infection)      Assessment:  (Doing well with likely urinary source for ecoli    Will sing off pls call if i can help further.).       Bean Santillan MD  04/14/17  6:59 AM              "

## 2017-04-14 NOTE — PROGRESS NOTES
Continued Stay Note  [facility]     Patient Name: Joaquín Sherman  MRN: [id number]  Today's Date: 4/14/2017    Admit Date: 4/10/2017          Discharge Plan       04/14/17 1208    Case Management/Social Work Plan    Additional Comments Spoke with patient in room and he is agreeable to having [facility] and [facility] follow.  He is requesting that IV abx be administered through a pump.   Crystal/[facility] was present during conversation so she is aware. Patient will not need a PICC as meds will be given through his port.  Patient's niece will come over daily to help him with abx.  At OK, will need to notify [facility] and [facility].  Sticky note left for the weekend.               Discharge Codes     None            Idalia Ocasio RN

## 2017-04-14 NOTE — PROGRESS NOTES
" DAILY PROGRESS NOTE    CHIEF COMPLAINT:   DOING BETTER  NO NEW COMPLAINTS    HISTORY:  A 37-year-old  male who has been paraplegic. Other medical problems are chronic anemia, chronic hypertension, recurrent infection, decubitus ulcer, malnutrition, chronic pain syndrome, and gastroesophageal reflux disease who had a suprapubic catheter.  He presented [facility] Emergency Room with abdominal pain which began just yesterday. Workup in the ER revealed recurrent UTI.  Admitted for management. The patient denies any fever or chills, but he does have nausea and more pain. No diarrhea. No other complaint.  It is just hurting.     PHYSICAL EXAMINATION:Blood pressure 92/62, pulse 90, temperature 98.2 °F (36.8 °C), temperature source Oral, resp. rate 16, height 73.2\" (185.9 cm), weight 97 lb 14.2 oz (44.4 kg), SpO2 99 %.    GENERAL: Alert, and oriented.    HEENT: Unremarkable.   NECK: Supple.   LUNGS: Clear.   HEART: S1 and S2 with loud ejection systolic murmur.   ABDOMEN: Soft. Diffuse tenderness.  Suprapubic catheter in place. Bowel sounds positive.   EXTREMITIES: Chronic changes.  Paraplegic with contractures.  Gait and balance:  He does not walk.  Good strength in upper extremities.     DIAGNOSTIC DATA:   Lab Results (last 24 hours)     Procedure Component Value Units Date/Time    Blood Culture [[id number]]  (Normal) Collected:  04/11/17 1636    Specimen:  Blood from Arm, Right Updated:  04/13/17 1701     Blood Culture No growth at 2 days    Blood Culture [[id number]]  (Normal) Collected:  04/11/17 1726    Specimen:  Blood from Arm, Left Updated:  04/13/17 1801     Blood Culture No growth at 2 days    Blood Culture [[id number]]  (Normal) Collected:  04/10/17 0119    Specimen:  Blood from Blood, Central Line Updated:  04/14/17 0207     Blood Culture No growth at 4 days      White count 11.2, hemoglobin 7.9, platelets 278,000.  Potassium 3.9, BUN 15, creatinine 1.1.  UA is positive for WBCs.  " Positive for 4+ bacteria. Cultures are pending, blood and urine.  No x-rays.  No CT.   Imaging Results (last 72 hours)     Procedure Component Value Units Date/Time    CT Abdomen Pelvis Without Contrast [[id number]] Collected:  04/11/17 1329     Updated:  04/11/17 1345    Narrative:       CT ABDOMEN PELVIS WO CONTRAST-     INDICATIONS: Suprapubic catheter, possible kidney stones     TECHNIQUE: Unenhanced ABDOMEN AND PELVIS CT     COMPARISON: 01/17/2017     FINDINGS:      Suprapubic catheter is seen within expected location of the urinary  bladder. Gas in the urinary bladder may be related to catheterization.  The urinary bladder is only partly filled may contribute to. Some  borderline wall thickening, correlate clinically to exclude any  possibility of urinary infection. 2 stones measuring 4 mm are seen in  the right kidney, are nonobstructive, no right hydronephrosis. No other  urolithiasis is identified. Mild left hydroureter is apparent, with  slender borderline left hydronephrosis.     Stable somewhat lobulated contour of the kidneys is seen.     Otherwise unremarkable unenhanced appearance of the liver, spleen,  adrenal glands, pancreas, kidneys, bladder.     No bowel obstruction or abnormal bowel thickening is identified. Left  lower quadrant colostomy redemonstrated.     No free intraperitoneal gas or free fluid.     Mildly prominent para-aortic lymph nodes appear similar to prior exam,  for example left para-aortic, 9 mm short axis, image 83. Some hyperdense  portacaval lymph nodes are suggested. To the left of the urinary  bladder, a mildly prominent 11 mm short axis lymph node on image 139  appear stable, nonspecific. Stable subcentimeter short axis subcutaneous  left groin lymph node.     Abdominal aorta is not aneurysmal.     The lung bases show minimal bilateral pleural effusions, change from  prior exam, with small likely atelectasis.     Prominent chronic deformity of the bilateral hips with adjacent  soft  tissue swimmer's or ulcerations suggested, appearance is similar to  prior exam, could reflect presence of infection, correlate clinically.  Chronic sclerotic change of the bones in the sacroiliac regions, and  thinning of the soft tissues overlying the sacrum and lower back.             Impression:             1. Suprapubic catheter within the urinary bladder. Appearance of  borderline thickening of the urinary bladder wall, likely at least in  part from lack of distention. Nonobstructive right nephrolithiasis. Mild  left hydroureter, slight/borderline left hydronephrosis.  2. Mildly prominent retroperitoneal lymph nodes, not significantly  changed.  3. Chronic changes of the hips and pelvis with soft tissue sores  suggested, correlate clinically.  4. Minimal bilateral pleural effusions, change from prior exam.     This report was finalized on 4/11/2017 1:42 PM by Dr. Medardo Washington MD.             Current Facility-Administered Medications:   •  ALPRAZolam (XANAX) tablet 1 mg, 1 mg, Oral, BID PRN, Jeff Rhodes MD, 1 mg at 04/14/17 0633  •  bisacodyl (DULCOLAX) EC tablet 5 mg, 5 mg, Oral, Daily PRN, Jeff Rhodes MD  •  ertapenem (INVanz) 1 g/100 mL 0.9% NS VTB (mbp), 1 g, Intravenous, Q24H, Travis Moran MD, 1 g at 04/14/17 0947  •  gabapentin (NEURONTIN) capsule 300 mg, 300 mg, Oral, TID, Jeff Rhodes MD, 300 mg at 04/14/17 0947  •  oxybutynin (DITROPAN) tablet 5 mg, 5 mg, Oral, BID, Jeff Rhodes MD, 5 mg at 04/14/17 0947  •  oxyCODONE (ROXICODONE) immediate release tablet 15 mg, 15 mg, Oral, Q6H PRN, Jeff Rhodes MD, 15 mg at 04/14/17 0947  •  oxyCODONE-acetaminophen (PERCOCET)  MG per tablet 1 tablet, 1 tablet, Oral, Q8H PRN, Jeff Rhodes MD, 1 tablet at 04/14/17 0633  •  pantoprazole (PROTONIX) EC tablet 40 mg, 40 mg, Oral, Daily, Jeff MD Carmelo, 40 mg at 04/14/17 0947  •  Insert peripheral IV, , , Once **AND** sodium chloride 0.9 % flush 10 mL, 10 mL, Intravenous, PRN, Sanjeev OLIVAS  MD Dario  •  sodium hypochlorite (DAKIN'S 1/4 STRENGTH) 0.125 % topical solution 0.125% solution, , Topical, Daily, Jeff Rhodes MD     ASSESSMENT:  1.  UTI with sepsis.  /E COLI BACTREMIA  2.  Paraplegic.    3.  Neurogenic bladder.    4.  Chronic suprapubic catheter.   5.  Gastroesophageal reflux disease.   6.  Chronic pain syndrome.     PLAN:  1.  CPM  2.  IVF   3.  ABX TILL 4/25  4.  SUPPORTIVE CARE  5.  Continue home medications.   6.  PAIN MANAGE MENT  7.  Stress ulcer and DVT prophylaxis.   8.  D/C IN AM      Jeff Rhodes M.D.

## 2017-04-14 NOTE — PLAN OF CARE
Problem: Patient Care Overview (Adult)  Goal: Plan of Care Review  Outcome: Ongoing (interventions implemented as appropriate)    04/13/17 0612 04/14/17 1612   Coping/Psychosocial Response Interventions   Plan Of Care Reviewed With --  patient   Patient Care Overview   Progress --  progress toward functional goals as expected   Outcome Evaluation   Outcome Summary/Follow up Plan Pt still complaining of pain( suprapubic/ wounds). On a swpecialty bed, dressing changes daily on the stage 4 pressure ulcer L hip to buttocks,, every other day for the bilateral leg ulcers --        Goal: Adult Individualization and Mutuality  Outcome: Ongoing (interventions implemented as appropriate)    Problem: Adjustment to Hospitalization, Illness, Injury, Treatment (Adult,Pediatric)  Goal: Adjustment to Events/Situations regarding Hospitalization/Illness/Injury/Treatment  Outcome: Ongoing (interventions implemented as appropriate)  Goal: Continued Mastery/Enhancement of Self-Esteem while Adjusting to Hospitalization/Illness/Injury  Outcome: Ongoing (interventions implemented as appropriate)    Problem: Infection, Risk/Actual (Adult)  Goal: Identify Related Risk Factors and Signs and Symptoms  Outcome: Ongoing (interventions implemented as appropriate)  Goal: Infection Prevention/Resolution  Outcome: Ongoing (interventions implemented as appropriate)  Pt receiving IV INVanz at this time for treatment of infection.  Pt noted to have VRE of urine and E-Coli of the blood.      Problem: Pressure Ulcer (Adult)  Goal: Signs and Symptoms of Listed Potential Problems Will be Absent or Manageable (Pressure Ulcer)  Outcome: Ongoing (interventions implemented as appropriate)  Pt with pressure related areas to let hip; ischium; right ischium and buttocks.  Areas also noted to left ankle and penile shaft.  Treatment in place.  Wound care completed on prior shift.  Pt seen by Physician today who stated wounds are significantly improved since last  seen(September 2016).  Dressings noted to be CDI.
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
## 2017-04-15 LAB — BACTERIA SPEC AEROBE CULT: NORMAL

## 2017-04-15 PROCEDURE — 25010000002 HEPARIN FLUSH (PORCINE) 100 UNIT/ML SOLUTION

## 2017-04-15 RX ADMIN — OXYCODONE HYDROCHLORIDE 15 MG: 15 TABLET ORAL at 11:28

## 2017-04-15 RX ADMIN — PANTOPRAZOLE SODIUM 40 MG: 40 TABLET, DELAYED RELEASE ORAL at 11:28

## 2017-04-15 RX ADMIN — OXYCODONE HYDROCHLORIDE AND ACETAMINOPHEN 1 TABLET: 10; 325 TABLET ORAL at 14:51

## 2017-04-15 RX ADMIN — OXYCODONE HYDROCHLORIDE AND ACETAMINOPHEN 1 TABLET: 10; 325 TABLET ORAL at 23:43

## 2017-04-15 RX ADMIN — OXYBUTYNIN CHLORIDE 5 MG: 5 TABLET ORAL at 11:28

## 2017-04-15 RX ADMIN — OXYCODONE HYDROCHLORIDE 15 MG: 15 TABLET ORAL at 00:48

## 2017-04-15 RX ADMIN — OXYCODONE HYDROCHLORIDE 15 MG: 15 TABLET ORAL at 20:43

## 2017-04-15 RX ADMIN — OXYBUTYNIN CHLORIDE 5 MG: 5 TABLET ORAL at 17:42

## 2017-04-15 RX ADMIN — GABAPENTIN 300 MG: 300 CAPSULE ORAL at 11:28

## 2017-04-15 RX ADMIN — GABAPENTIN 300 MG: 300 CAPSULE ORAL at 17:42

## 2017-04-15 RX ADMIN — ERTAPENEM SODIUM 1 G: 1 INJECTION, POWDER, LYOPHILIZED, FOR SOLUTION INTRAMUSCULAR; INTRAVENOUS at 11:27

## 2017-04-15 RX ADMIN — GABAPENTIN 300 MG: 300 CAPSULE ORAL at 20:43

## 2017-04-15 RX ADMIN — ALPRAZOLAM 1 MG: 0.5 TABLET ORAL at 23:43

## 2017-04-15 RX ADMIN — ALPRAZOLAM 1 MG: 0.5 TABLET ORAL at 14:51

## 2017-04-15 RX ADMIN — SODIUM CHLORIDE, PRESERVATIVE FREE 500 UNITS: 5 INJECTION INTRAVENOUS at 12:35

## 2017-04-15 NOTE — PLAN OF CARE
Problem: Patient Care Overview (Adult)  Goal: Plan of Care Review  Outcome: Ongoing (interventions implemented as appropriate)    04/15/17 1928   Coping/Psychosocial Response Interventions   Plan Of Care Reviewed With patient   Patient Care Overview   Progress no change   Outcome Evaluation   Outcome Summary/Follow up Plan Dressings changes on legs complete, changes condom catheter, PRN pain medication, isolation precautions       Goal: Adult Individualization and Mutuality  Outcome: Ongoing (interventions implemented as appropriate)    Problem: Adjustment to Hospitalization, Illness, Injury, Treatment (Adult,Pediatric)  Goal: Adjustment to Events/Situations regarding Hospitalization/Illness/Injury/Treatment  Outcome: Ongoing (interventions implemented as appropriate)  Goal: Continued Mastery/Enhancement of Self-Esteem while Adjusting to Hospitalization/Illness/Injury  Outcome: Ongoing (interventions implemented as appropriate)    Problem: Infection, Risk/Actual (Adult)  Goal: Identify Related Risk Factors and Signs and Symptoms  Outcome: Outcome(s) achieved Date Met:  04/15/17  Goal: Infection Prevention/Resolution  Outcome: Ongoing (interventions implemented as appropriate)    Problem: Pressure Ulcer (Adult)  Goal: Signs and Symptoms of Listed Potential Problems Will be Absent or Manageable (Pressure Ulcer)  Outcome: Ongoing (interventions implemented as appropriate)

## 2017-04-15 NOTE — PROGRESS NOTES
Continued Stay Note  Psychiatric     Patient Name: Joaquín Sherman  MRN: [id number]  Today's Date: 4/15/2017    Admit Date: 4/10/2017          Discharge Plan       04/15/17 1850    Case Management/Social Work Plan    Additional Comments D/W Bárbara Cook/Manager Pt. Mgmt Services and Rachel James/, states patient discharge needs to be handled Monday when administrative staff available to assist with patient complaints/concerns. CCP available should patient decide he wishes to discharge sooner. Will follow.............Mckayla BERRY               Discharge Codes     None        Expected Discharge Date and Time     Expected Discharge Date Expected Discharge Time    Apr 15, 2017             Ermelinda Enriquez, RN

## 2017-04-15 NOTE — PROGRESS NOTES
Continued Stay Note  [facility]     Patient Name: Joaquín Sherman  MRN: [id number]  Today's Date: 4/15/2017    Admit Date: 4/10/2017          Discharge Plan       04/15/17 1506    Case Management/Social Work Plan    Plan Was to DC home with [facility] and [facility], refusing to go home. [facility] and [facility] aware...........Mckayla BERRY     Patient/Family In Agreement With Plan yes    Additional Comments Patient has discharge orders from Dr. Rhodes and cleared by Plastic Surgery and Wound RN. Met with patient at bedside, introduced self and role. Pt. calm at first and voiced concern over returning home, states no one has changed dressing while here in the hospital and he has not seen Wound RN or specialist. When his CCP mentioned Wound RN and Dr. Vital patient states they did see him briefly but did not look at wound thoroughly. Patient pulling at dressing and attempting to remove to expose left trochanter wound. Call to  and this CCP,  and patient's RN all met with patient at bedside and spoke to concerns and answered questions. Patient made several phone calls while staff members at bedside to speak with him. Patient on speaker phone with home health RN and then cousin and having conversations with several people at once. Patient becoming more and more agitated. CCP attempting to discuss with patient that all Physicians have cleared him for discharge and explain Notice of Non-coverage, patient became belligerent and vulgar. Patient declined to sign document, copy left at bedside.  called cousin as patient requested. CCP left bedside as patient is too irate to speak rationally at this time. Will follow-up at a later time..............Mckayla BERRY               Discharge Codes     None        Expected Discharge Date and Time     Expected Discharge Date Expected Discharge Time    Apr 15, 2017             Ermelinda Enriquez, JAMAL

## 2017-04-15 NOTE — PLAN OF CARE
Problem: Patient Care Overview (Adult)  Goal: Plan of Care Review  Outcome: Ongoing (interventions implemented as appropriate)    04/15/17 0529   Coping/Psychosocial Response Interventions   Plan Of Care Reviewed With patient   Patient Care Overview   Progress improving   Outcome Evaluation   Outcome Summary/Follow up Plan Pain control, dressing changed per  order( plastic surgery), contact isolation,       Goal: Adult Individualization and Mutuality  Outcome: Ongoing (interventions implemented as appropriate)  Goal: Discharge Needs Assessment  Outcome: Ongoing (interventions implemented as appropriate)    Problem: Adjustment to Hospitalization, Illness, Injury, Treatment (Adult,Pediatric)  Goal: Adjustment to Events/Situations regarding Hospitalization/Illness/Injury/Treatment  Outcome: Ongoing (interventions implemented as appropriate)  Goal: Continued Mastery/Enhancement of Self-Esteem while Adjusting to Hospitalization/Illness/Injury  Outcome: Ongoing (interventions implemented as appropriate)    Problem: Infection, Risk/Actual (Adult)  Goal: Identify Related Risk Factors and Signs and Symptoms  Outcome: Ongoing (interventions implemented as appropriate)  Goal: Infection Prevention/Resolution  Outcome: Ongoing (interventions implemented as appropriate)    Problem: Fluid Volume Deficit (Adult)  Goal: Identify Related Risk Factors and Signs and Symptoms  Outcome: Ongoing (interventions implemented as appropriate)  Goal: Fluid/Electrolyte Balance  Outcome: Ongoing (interventions implemented as appropriate)  Goal: Comfort/Well Being  Outcome: Ongoing (interventions implemented as appropriate)    Problem: Pressure Ulcer (Adult)  Goal: Signs and Symptoms of Listed Potential Problems Will be Absent or Manageable (Pressure Ulcer)  Outcome: Ongoing (interventions implemented as appropriate)

## 2017-04-15 NOTE — PROGRESS NOTES
" DAILY PROGRESS NOTE    CHIEF COMPLAINT:   DOING BETTER  NO NEW COMPLAINTS    HISTORY:  A 37-year-old  male who has been paraplegic. Other medical problems are chronic anemia, chronic hypertension, recurrent infection, decubitus ulcer, malnutrition, chronic pain syndrome, and gastroesophageal reflux disease who had a suprapubic catheter.  He presented [facility] Emergency Room with abdominal pain which began just yesterday. Workup in the ER revealed recurrent UTI.  Admitted for management. The patient denies any fever or chills, but he does have nausea and more pain. No diarrhea. No other complaint.  It is just hurting.     PHYSICAL EXAMINATION:Blood pressure 99/65, pulse 98, temperature 97.6 °F (36.4 °C), temperature source Axillary, resp. rate 16, height 73.19\" (185.9 cm), weight 97 lb 14.2 oz (44.4 kg), SpO2 99 %.    GENERAL: Alert, and oriented.    HEENT: Unremarkable.   NECK: Supple.   LUNGS: Clear.   HEART: S1 and S2 with loud ejection systolic murmur.   ABDOMEN: Soft. Diffuse tenderness.  Suprapubic catheter in place. Bowel sounds positive.   EXTREMITIES: Chronic changes.  Paraplegic with contractures.  Gait and balance:  He does not walk.  Good strength in upper extremities.     DIAGNOSTIC DATA:   Lab Results (last 24 hours)     Procedure Component Value Units Date/Time    Blood Culture [[id number]]  (Normal) Collected:  04/11/17 1636    Specimen:  Blood from Arm, Right Updated:  04/14/17 1701     Blood Culture No growth at 3 days    Blood Culture [[id number]]  (Normal) Collected:  04/11/17 1726    Specimen:  Blood from Arm, Left Updated:  04/14/17 1801     Blood Culture No growth at 3 days    Blood Culture [[id number]]  (Normal) Collected:  04/10/17 0119    Specimen:  Blood from Blood, Central Line Updated:  04/15/17 0203     Blood Culture No growth at 5 days      White count 11.2, hemoglobin 7.9, platelets 278,000.  Potassium 3.9, BUN 15, creatinine 1.1.  UA is positive for WBCs.  " Positive for 4+ bacteria. Cultures are pending, blood and urine.  No x-rays.  No CT.   Imaging Results (last 72 hours)     Procedure Component Value Units Date/Time    CT Abdomen Pelvis Without Contrast [[id number]] Collected:  04/11/17 1329     Updated:  04/11/17 1345    Narrative:       CT ABDOMEN PELVIS WO CONTRAST-     INDICATIONS: Suprapubic catheter, possible kidney stones     TECHNIQUE: Unenhanced ABDOMEN AND PELVIS CT     COMPARISON: 01/17/2017     FINDINGS:      Suprapubic catheter is seen within expected location of the urinary  bladder. Gas in the urinary bladder may be related to catheterization.  The urinary bladder is only partly filled may contribute to. Some  borderline wall thickening, correlate clinically to exclude any  possibility of urinary infection. 2 stones measuring 4 mm are seen in  the right kidney, are nonobstructive, no right hydronephrosis. No other  urolithiasis is identified. Mild left hydroureter is apparent, with  slender borderline left hydronephrosis.     Stable somewhat lobulated contour of the kidneys is seen.     Otherwise unremarkable unenhanced appearance of the liver, spleen,  adrenal glands, pancreas, kidneys, bladder.     No bowel obstruction or abnormal bowel thickening is identified. Left  lower quadrant colostomy redemonstrated.     No free intraperitoneal gas or free fluid.     Mildly prominent para-aortic lymph nodes appear similar to prior exam,  for example left para-aortic, 9 mm short axis, image 83. Some hyperdense  portacaval lymph nodes are suggested. To the left of the urinary  bladder, a mildly prominent 11 mm short axis lymph node on image 139  appear stable, nonspecific. Stable subcentimeter short axis subcutaneous  left groin lymph node.     Abdominal aorta is not aneurysmal.     The lung bases show minimal bilateral pleural effusions, change from  prior exam, with small likely atelectasis.     Prominent chronic deformity of the bilateral hips with adjacent  soft  tissue swimmer's or ulcerations suggested, appearance is similar to  prior exam, could reflect presence of infection, correlate clinically.  Chronic sclerotic change of the bones in the sacroiliac regions, and  thinning of the soft tissues overlying the sacrum and lower back.             Impression:             1. Suprapubic catheter within the urinary bladder. Appearance of  borderline thickening of the urinary bladder wall, likely at least in  part from lack of distention. Nonobstructive right nephrolithiasis. Mild  left hydroureter, slight/borderline left hydronephrosis.  2. Mildly prominent retroperitoneal lymph nodes, not significantly  changed.  3. Chronic changes of the hips and pelvis with soft tissue sores  suggested, correlate clinically.  4. Minimal bilateral pleural effusions, change from prior exam.     This report was finalized on 4/11/2017 1:42 PM by Dr. Medardo Washington MD.             Current Facility-Administered Medications:   •  ALPRAZolam (XANAX) tablet 1 mg, 1 mg, Oral, BID PRN, Jeff Rhodes MD, 1 mg at 04/14/17 1333  •  bisacodyl (DULCOLAX) EC tablet 5 mg, 5 mg, Oral, Daily PRN, Jeff Rhodes MD  •  ertapenem (INVanz) 1 g/100 mL 0.9% NS VTB (mbp), 1 g, Intravenous, Q24H, Travis Moran MD, 1 g at 04/15/17 1127  •  gabapentin (NEURONTIN) capsule 300 mg, 300 mg, Oral, TID, Jeff Rhodes MD, 300 mg at 04/15/17 1128  •  heparin flush (porcine) 100 UNIT/ML injection  - ADS Override Pull, , , ,   •  heparin flush (porcine) 100 UNIT/ML injection 500 Units, 5 mL, Intracatheter, PRN, Jeff Rhodes MD  •  oxybutynin (DITROPAN) tablet 5 mg, 5 mg, Oral, BID, Jeff Rhodes MD, 5 mg at 04/15/17 1128  •  oxyCODONE (ROXICODONE) immediate release tablet 15 mg, 15 mg, Oral, Q6H PRN, Jeff Rhodes MD, 15 mg at 04/15/17 1128  •  oxyCODONE-acetaminophen (PERCOCET)  MG per tablet 1 tablet, 1 tablet, Oral, Q8H PRN, Jeff Rhodes MD, 1 tablet at 04/14/17 2111  •  pantoprazole (PROTONIX) EC tablet  40 mg, 40 mg, Oral, Daily, Jeff Rhodes MD, 40 mg at 04/15/17 1128  •  Insert peripheral IV, , , Once **AND** sodium chloride 0.9 % flush 10 mL, 10 mL, Intravenous, PRN, Sanjeev Bishop MD  •  sodium hypochlorite (DAKIN'S 1/4 STRENGTH) 0.125 % topical solution 0.125% solution, , Topical, Daily, Jeff Rhodes MD     ASSESSMENT:  1.  UTI with sepsis.  /E COLI BACTREMIA  2.  Paraplegic.    3.  Neurogenic bladder.    4.  Chronic suprapubic catheter.   5.  Gastroesophageal reflux disease.   6.  Chronic pain syndrome.   7. STAGE 4 DECUB ULCER    PLAN:  Discharge   SUMMARY DICTATED    Jeff Rhodes M.D.
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
## 2017-04-16 LAB
BACTERIA SPEC AEROBE CULT: NORMAL
BACTERIA SPEC AEROBE CULT: NORMAL

## 2017-04-16 RX ORDER — SODIUM HYPOCHLORITE 1.25 MG/ML
SOLUTION TOPICAL 2 TIMES DAILY
Status: DISCONTINUED | OUTPATIENT
Start: 2017-04-16 | End: 2017-04-17 | Stop reason: HOSPADM

## 2017-04-16 RX ADMIN — SODIUM HYPOCHLORITE: 1.25 SOLUTION TOPICAL at 15:05

## 2017-04-16 RX ADMIN — OXYBUTYNIN CHLORIDE 5 MG: 5 TABLET ORAL at 18:34

## 2017-04-16 RX ADMIN — GABAPENTIN 300 MG: 300 CAPSULE ORAL at 21:25

## 2017-04-16 RX ADMIN — OXYBUTYNIN CHLORIDE 5 MG: 5 TABLET ORAL at 10:34

## 2017-04-16 RX ADMIN — ERTAPENEM SODIUM 1 G: 1 INJECTION, POWDER, LYOPHILIZED, FOR SOLUTION INTRAMUSCULAR; INTRAVENOUS at 15:04

## 2017-04-16 RX ADMIN — GABAPENTIN 300 MG: 300 CAPSULE ORAL at 10:34

## 2017-04-16 RX ADMIN — PANTOPRAZOLE SODIUM 40 MG: 40 TABLET, DELAYED RELEASE ORAL at 10:34

## 2017-04-16 RX ADMIN — OXYCODONE HYDROCHLORIDE AND ACETAMINOPHEN 1 TABLET: 10; 325 TABLET ORAL at 15:04

## 2017-04-16 RX ADMIN — OXYCODONE HYDROCHLORIDE 15 MG: 15 TABLET ORAL at 10:34

## 2017-04-16 RX ADMIN — ALPRAZOLAM 1 MG: 0.5 TABLET ORAL at 21:25

## 2017-04-16 RX ADMIN — OXYCODONE HYDROCHLORIDE 15 MG: 15 TABLET ORAL at 18:34

## 2017-04-16 RX ADMIN — GABAPENTIN 300 MG: 300 CAPSULE ORAL at 18:34

## 2017-04-16 NOTE — PROGRESS NOTES
" DAILY PROGRESS NOTE    CHIEF COMPLAINT:   DOING SAME  NO NEW COMPLAINTS  PT REFUSED TO LEAVE HOSPITAL    HISTORY:  A 37-year-old  male who has been paraplegic. Other medical problems are chronic anemia, chronic hypertension, recurrent infection, decubitus ulcer, malnutrition, chronic pain syndrome, and gastroesophageal reflux disease who had a suprapubic catheter.  He presented [facility] Emergency Room with abdominal pain which began just yesterday. Workup in the ER revealed recurrent UTI.  Admitted for management. The patient denies any fever or chills, but he does have nausea and more pain. No diarrhea. No other complaint.  It is just hurting.     PHYSICAL EXAMINATION:Blood pressure 108/58, pulse 99, temperature 98.8 °F (37.1 °C), resp. rate 16, height 73.19\" (185.9 cm), weight 97 lb 14.2 oz (44.4 kg), SpO2 100 %.    GENERAL: Alert, and oriented.    HEENT: Unremarkable.   NECK: Supple.   LUNGS: Clear.   HEART: S1 and S2 with loud ejection systolic murmur.   ABDOMEN: Soft. Diffuse tenderness.  Suprapubic catheter in place. Bowel sounds positive.   EXTREMITIES: Chronic changes.  Paraplegic with contractures.  Gait and balance:  He does not walk.  Good strength in upper extremities.     DIAGNOSTIC DATA:   Lab Results (last 24 hours)     Procedure Component Value Units Date/Time    Blood Culture [[id number]]  (Normal) Collected:  04/11/17 1636    Specimen:  Blood from Arm, Right Updated:  04/15/17 1701     Blood Culture No growth at 4 days    Blood Culture [[id number]]  (Normal) Collected:  04/11/17 1726    Specimen:  Blood from Arm, Left Updated:  04/15/17 1801     Blood Culture No growth at 4 days      White count 11.2, hemoglobin 7.9, platelets 278,000.  Potassium 3.9, BUN 15, creatinine 1.1.  UA is positive for WBCs.  Positive for 4+ bacteria. Cultures are pending, blood and urine.  No x-rays.  No CT.   Imaging Results (last 72 hours)     Procedure Component Value Units Date/Time    CT " Abdomen Pelvis Without Contrast [[id number]] Collected:  04/11/17 1329     Updated:  04/11/17 1345    Narrative:       CT ABDOMEN PELVIS WO CONTRAST-     INDICATIONS: Suprapubic catheter, possible kidney stones     TECHNIQUE: Unenhanced ABDOMEN AND PELVIS CT     COMPARISON: 01/17/2017     FINDINGS:      Suprapubic catheter is seen within expected location of the urinary  bladder. Gas in the urinary bladder may be related to catheterization.  The urinary bladder is only partly filled may contribute to. Some  borderline wall thickening, correlate clinically to exclude any  possibility of urinary infection. 2 stones measuring 4 mm are seen in  the right kidney, are nonobstructive, no right hydronephrosis. No other  urolithiasis is identified. Mild left hydroureter is apparent, with  slender borderline left hydronephrosis.     Stable somewhat lobulated contour of the kidneys is seen.     Otherwise unremarkable unenhanced appearance of the liver, spleen,  adrenal glands, pancreas, kidneys, bladder.     No bowel obstruction or abnormal bowel thickening is identified. Left  lower quadrant colostomy redemonstrated.     No free intraperitoneal gas or free fluid.     Mildly prominent para-aortic lymph nodes appear similar to prior exam,  for example left para-aortic, 9 mm short axis, image 83. Some hyperdense  portacaval lymph nodes are suggested. To the left of the urinary  bladder, a mildly prominent 11 mm short axis lymph node on image 139  appear stable, nonspecific. Stable subcentimeter short axis subcutaneous  left groin lymph node.     Abdominal aorta is not aneurysmal.     The lung bases show minimal bilateral pleural effusions, change from  prior exam, with small likely atelectasis.     Prominent chronic deformity of the bilateral hips with adjacent soft  tissue swimmer's or ulcerations suggested, appearance is similar to  prior exam, could reflect presence of infection, correlate clinically.  Chronic sclerotic change  of the bones in the sacroiliac regions, and  thinning of the soft tissues overlying the sacrum and lower back.             Impression:             1. Suprapubic catheter within the urinary bladder. Appearance of  borderline thickening of the urinary bladder wall, likely at least in  part from lack of distention. Nonobstructive right nephrolithiasis. Mild  left hydroureter, slight/borderline left hydronephrosis.  2. Mildly prominent retroperitoneal lymph nodes, not significantly  changed.  3. Chronic changes of the hips and pelvis with soft tissue sores  suggested, correlate clinically.  4. Minimal bilateral pleural effusions, change from prior exam.     This report was finalized on 4/11/2017 1:42 PM by Dr. Medardo Washington MD.             Current Facility-Administered Medications:   •  ALPRAZolam (XANAX) tablet 1 mg, 1 mg, Oral, BID PRN, Jeff Rhodes MD, 1 mg at 04/15/17 2343  •  bisacodyl (DULCOLAX) EC tablet 5 mg, 5 mg, Oral, Daily PRN, Jeff Rhodes MD  •  ertapenem (INVanz) 1 g/100 mL 0.9% NS VTB (mbp), 1 g, Intravenous, Q24H, Travis Moran MD, 1 g at 04/15/17 1127  •  gabapentin (NEURONTIN) capsule 300 mg, 300 mg, Oral, TID, Jeff Rhodes MD, 300 mg at 04/16/17 1034  •  heparin flush (porcine) 100 UNIT/ML injection 500 Units, 5 mL, Intracatheter, PRN, Jeff Rhodes MD, 500 Units at 04/15/17 1235  •  oxybutynin (DITROPAN) tablet 5 mg, 5 mg, Oral, BID, Jeff Rhodes MD, 5 mg at 04/16/17 1034  •  oxyCODONE (ROXICODONE) immediate release tablet 15 mg, 15 mg, Oral, Q6H PRN, Jeff Rhodes MD, 15 mg at 04/16/17 1034  •  oxyCODONE-acetaminophen (PERCOCET)  MG per tablet 1 tablet, 1 tablet, Oral, Q8H PRN, Jeff Rhodes MD, 1 tablet at 04/15/17 2343  •  pantoprazole (PROTONIX) EC tablet 40 mg, 40 mg, Oral, Daily, Jeff Rhodes MD, 40 mg at 04/16/17 1034  •  Insert peripheral IV, , , Once **AND** sodium chloride 0.9 % flush 10 mL, 10 mL, Intravenous, PRN, Sanjeev Bishop MD  •  sodium hypochlorite (DAKIN'S  1/4 STRENGTH) 0.125 % topical solution 0.125% solution, , Topical, BID, Jeff Rhodes MD     ASSESSMENT:  1.  UTI with sepsis.  /E COLI BACTREMIA  2.  Paraplegic.    3.  Neurogenic bladder.    4.  Chronic suprapubic catheter.   5.  Gastroesophageal reflux disease.   6.  Chronic pain syndrome.   7. STAGE 4 DECUB ULCER    PLAN:  HOSPITAL ADMINISTRATION TO SEE PT IN AM  DISCHARGE IN AM  SUMMARY DICTATED YESTERDAY    Jeff Rhodes M.D.

## 2017-04-16 NOTE — PLAN OF CARE
Problem: Patient Care Overview (Adult)  Goal: Plan of Care Review  Outcome: Ongoing (interventions implemented as appropriate)    04/16/17 2935   Coping/Psychosocial Response Interventions   Plan Of Care Reviewed With patient   Patient Care Overview   Progress improving   Outcome Evaluation   Outcome Summary/Follow up Plan Dressing changed to left post trochanter, packed deep wound with moist Kerlix with 1/4 Dakins, aquacell over sacral, ischial region, covered with 4x4, ABD pad and paper tape. Tolerated well, pain control.        Goal: Adult Individualization and Mutuality  Outcome: Ongoing (interventions implemented as appropriate)  Goal: Discharge Needs Assessment  Outcome: Ongoing (interventions implemented as appropriate)    Problem: Adjustment to Hospitalization, Illness, Injury, Treatment (Adult,Pediatric)  Goal: Adjustment to Events/Situations regarding Hospitalization/Illness/Injury/Treatment  Outcome: Ongoing (interventions implemented as appropriate)  Goal: Continued Mastery/Enhancement of Self-Esteem while Adjusting to Hospitalization/Illness/Injury  Outcome: Ongoing (interventions implemented as appropriate)    Problem: Infection, Risk/Actual (Adult)  Goal: Infection Prevention/Resolution  Outcome: Ongoing (interventions implemented as appropriate)    Problem: Fluid Volume Deficit (Adult)  Goal: Identify Related Risk Factors and Signs and Symptoms  Outcome: Ongoing (interventions implemented as appropriate)  Goal: Fluid/Electrolyte Balance  Outcome: Ongoing (interventions implemented as appropriate)  Goal: Comfort/Well Being  Outcome: Ongoing (interventions implemented as appropriate)    Problem: Pressure Ulcer (Adult)  Goal: Signs and Symptoms of Listed Potential Problems Will be Absent or Manageable (Pressure Ulcer)  Outcome: Ongoing (interventions implemented as appropriate)

## 2017-04-17 VITALS
WEIGHT: 97.88 LBS | BODY MASS INDEX: 12.97 KG/M2 | DIASTOLIC BLOOD PRESSURE: 65 MMHG | HEART RATE: 82 BPM | SYSTOLIC BLOOD PRESSURE: 91 MMHG | HEIGHT: 73 IN | RESPIRATION RATE: 19 BRPM | TEMPERATURE: 99.1 F | OXYGEN SATURATION: 96 %

## 2017-04-17 RX ORDER — OXYCODONE AND ACETAMINOPHEN 10; 325 MG/1; MG/1
1 TABLET ORAL EVERY 8 HOURS PRN
Qty: 6 TABLET | Refills: 0 | Status: SHIPPED | OUTPATIENT
Start: 2017-04-17 | End: 2017-04-18

## 2017-04-17 RX ADMIN — PANTOPRAZOLE SODIUM 40 MG: 40 TABLET, DELAYED RELEASE ORAL at 09:51

## 2017-04-17 RX ADMIN — OXYCODONE HYDROCHLORIDE AND ACETAMINOPHEN 1 TABLET: 10; 325 TABLET ORAL at 09:50

## 2017-04-17 RX ADMIN — SODIUM HYPOCHLORITE: 1.25 SOLUTION TOPICAL at 01:08

## 2017-04-17 RX ADMIN — ERTAPENEM SODIUM 1 G: 1 INJECTION, POWDER, LYOPHILIZED, FOR SOLUTION INTRAMUSCULAR; INTRAVENOUS at 09:50

## 2017-04-17 RX ADMIN — ALPRAZOLAM 1 MG: 0.5 TABLET ORAL at 09:50

## 2017-04-17 RX ADMIN — GABAPENTIN 300 MG: 300 CAPSULE ORAL at 09:51

## 2017-04-17 RX ADMIN — OXYCODONE HYDROCHLORIDE 15 MG: 15 TABLET ORAL at 06:28

## 2017-04-17 RX ADMIN — OXYBUTYNIN CHLORIDE 5 MG: 5 TABLET ORAL at 09:51

## 2017-04-17 NOTE — PLAN OF CARE
Problem: Patient Care Overview (Adult)  Goal: Plan of Care Review  Outcome: Ongoing (interventions implemented as appropriate)    04/17/17 1245   Coping/Psychosocial Response Interventions   Plan Of Care Reviewed With patient   Outcome Evaluation   Outcome Summary/Follow up Plan pt discharged home       Goal: Adult Individualization and Mutuality  Outcome: Ongoing (interventions implemented as appropriate)  Goal: Discharge Needs Assessment  Outcome: Ongoing (interventions implemented as appropriate)    Problem: Adjustment to Hospitalization, Illness, Injury, Treatment (Adult,Pediatric)  Goal: Adjustment to Events/Situations regarding Hospitalization/Illness/Injury/Treatment  Outcome: Ongoing (interventions implemented as appropriate)  Goal: Continued Mastery/Enhancement of Self-Esteem while Adjusting to Hospitalization/Illness/Injury  Outcome: Ongoing (interventions implemented as appropriate)    Problem: Infection, Risk/Actual (Adult)  Goal: Infection Prevention/Resolution  Outcome: Ongoing (interventions implemented as appropriate)    Problem: Fluid Volume Deficit (Adult)  Goal: Fluid/Electrolyte Balance  Outcome: Ongoing (interventions implemented as appropriate)  Goal: Comfort/Well Being  Outcome: Ongoing (interventions implemented as appropriate)    Problem: Pressure Ulcer (Adult)  Goal: Signs and Symptoms of Listed Potential Problems Will be Absent or Manageable (Pressure Ulcer)  Outcome: Ongoing (interventions implemented as appropriate)

## 2017-04-17 NOTE — PLAN OF CARE
Problem: Patient Care Overview (Adult)  Goal: Plan of Care Review  Outcome: Ongoing (interventions implemented as appropriate)    04/17/17 0635   Coping/Psychosocial Response Interventions   Plan Of Care Reviewed With patient   Patient Care Overview   Progress no change   Outcome Evaluation   Outcome Summary/Follow up Plan BP runs low, left trochanter and coccyx dressing changed, condom and suprapubic cath in place, pain control, vitals stable, possible d/c today       Goal: Adult Individualization and Mutuality  Outcome: Ongoing (interventions implemented as appropriate)    Problem: Adjustment to Hospitalization, Illness, Injury, Treatment (Adult,Pediatric)  Goal: Adjustment to Events/Situations regarding Hospitalization/Illness/Injury/Treatment  Outcome: Ongoing (interventions implemented as appropriate)  Goal: Continued Mastery/Enhancement of Self-Esteem while Adjusting to Hospitalization/Illness/Injury  Outcome: Ongoing (interventions implemented as appropriate)    Problem: Infection, Risk/Actual (Adult)  Goal: Infection Prevention/Resolution  Outcome: Ongoing (interventions implemented as appropriate)    Problem: Fluid Volume Deficit (Adult)  Goal: Fluid/Electrolyte Balance  Outcome: Ongoing (interventions implemented as appropriate)  Goal: Comfort/Well Being  Outcome: Ongoing (interventions implemented as appropriate)    Problem: Pressure Ulcer (Adult)  Goal: Signs and Symptoms of Listed Potential Problems Will be Absent or Manageable (Pressure Ulcer)  Outcome: Ongoing (interventions implemented as appropriate)

## 2017-04-17 NOTE — PAYOR COMM NOTE
"Joaquín Youssef (37 y.o. Male)                                                              REF# [id number]                                                       CONTACT=   ROBBY ALCALA                                                      FAX   [phone]                                                         [phone]          Date of Birth Social Security Number Address Home Phone MRN    1980  [id number] [address] [phone] [id number]    [employer] Marital Status          None Single       Admission Date Admission Type Admitting Provider Attending Provider Department, Room/Bed    4/10/17 Emergency Jeff Rhodes MD Ahmed, Aftab, MD [address], 611/1    Discharge Date Discharge Disposition Discharge Destination         Home or Self Care             Attending Provider: Jeff Rhodes MD     Allergies:  Amoxicillin-pot Clavulanate, Ibuprofen, Ketorolac Tromethamine, Meropenem, Vancomycin    Isolation:  Contact   Infection:  ESBL E coli (04/12/17), 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)   Code Status:  Prior    Ht:  73.19\" (185.9 cm)   Wt:  97 lb 14.2 oz (44.4 kg)    Admission Cmt:  None   Principal Problem:  None                Active Insurance as of 4/10/2017     Primary Coverage     Payor Plan Insurance Group Employer/Plan Group    PASSPORT PASSPORT      Payor Plan Address Payor Plan Phone Number Effective From Effective To    [address] [phone] 1/1/1998     [place] [address]       Subscriber Name Subscriber Birth Date Member ID       JOAQUÍN YOUSSEF 1980 [id number]                 Emergency Contacts      (Rel.) Home Phone Work Phone Mobile Phone    Marlen Al (Mother) [phone] -- --    Jenni Khoury (Sister) [phone] -- --            Insurance Information                PASSPORT/PASSPORT Phone: [phone]    Subscriber: Joaquín Youssef Subscriber#: [id number]    Group#:  Precert#:           Vital Signs (last 24 hours)       04/16 0700  - "  04/17 0659 04/17 0700  -  04/17 1308   Most Recent    Temp (°F) 98.2 -  100      99.1     99.1 (37.3)    Heart Rate 81 -  99       82    Resp 16 -  18      19     19    BP (!)88/58 -  108/58      91/65     91/65    SpO2 (%) 96 -  100       96          Hospital Medications (active)       Dose Frequency Start End    ALPRAZolam (XANAX) tablet 1 mg 1 mg 2 Times Daily PRN 4/10/2017 4/20/2017    Sig - Route: Take 2 tablets by mouth 2 (Two) Times a Day As Needed for Anxiety. - Oral    bisacodyl (DULCOLAX) EC tablet 5 mg 5 mg Daily PRN 4/10/2017     Sig - Route: Take 1 tablet by mouth Daily As Needed for Constipation. - Oral    ertapenem (INVanz) 1 g/100 mL 0.9% NS VTB (mbp) 1 g Every 24 Hours 4/11/2017 4/25/2017    Sig - Route: Infuse 100 mL into a venous catheter Daily. - Intravenous    gabapentin (NEURONTIN) capsule 300 mg 300 mg 3 Times Daily 4/10/2017     Sig - Route: Take 1 capsule by mouth 3 (Three) Times a Day. - Oral    heparin flush (porcine) 100 UNIT/ML injection 500 Units 5 mL As Needed 4/15/2017     Sig - Route: 5 mL by Intracatheter route As Needed for Line Care (per protocol). - Intracatheter    oxybutynin (DITROPAN) tablet 5 mg 5 mg 2 Times Daily 4/10/2017     Sig - Route: Take 1 tablet by mouth 2 (Two) Times a Day. - Oral    oxyCODONE (ROXICODONE) immediate release tablet 15 mg 15 mg Every 6 Hours PRN 4/10/2017     Sig - Route: Take 1 tablet by mouth Every 6 (Six) Hours As Needed for Moderate Pain (4-6). - Oral    oxyCODONE-acetaminophen (PERCOCET)  MG per tablet 1 tablet 1 tablet Every 8 Hours PRN 4/10/2017     Sig - Route: Take 1 tablet by mouth Every 8 (Eight) Hours As Needed for Moderate Pain (4-6). - Oral    pantoprazole (PROTONIX) EC tablet 40 mg 40 mg Daily 4/10/2017     Sig - Route: Take 1 tablet by mouth Daily. - Oral    sodium chloride 0.9 % flush 10 mL 10 mL As Needed 4/10/2017     Sig - Route: Infuse 10 mL into a venous catheter As Needed for Line Care. - Intravenous    Linked Group 1:   "\"And\" Linked Group Details        sodium hypochlorite (DAKIN'S 1/4 STRENGTH) 0.125 % topical solution 0.125% solution  2 Times Daily 4/16/2017     Sig - Route: Apply  topically 2 (Two) Times a Day. - Topical            Lab Results (last 24 hours)     Procedure Component Value Units Date/Time    Blood Culture [[id number]]  (Normal) Collected:  04/11/17 1636    Specimen:  Blood from Arm, Right Updated:  04/16/17 1701     Blood Culture No growth at 5 days    Blood Culture [[id number]]  (Normal) Collected:  04/11/17 1726    Specimen:  Blood from Arm, Left Updated:  04/16/17 1801     Blood Culture No growth at 5 days           Physician Progress Notes (last 24 hours) (Notes from 4/16/2017  1:08 PM through 4/17/2017  1:08 PM)      Jeff Rhodes MD at 4/17/2017 10:35 AM  Version 1 of 1          DAILY PROGRESS NOTE    CHIEF COMPLAINT:   DOING SAME  NO NEW COMPLAINTS    HISTORY:  A 37-year-old  male who has been paraplegic. Other medical problems are chronic anemia, chronic hypertension, recurrent infection, decubitus ulcer, malnutrition, chronic pain syndrome, and gastroesophageal reflux disease who had a suprapubic catheter.  He presented [facility] Emergency Room with abdominal pain which began just yesterday. Workup in the ER revealed recurrent UTI.  Admitted for management. The patient denies any fever or chills, but he does have nausea and more pain. No diarrhea. No other complaint.  It is just hurting.     PHYSICAL EXAMINATION:Blood pressure 91/65, pulse 82, temperature 99.1 °F (37.3 °C), temperature source Oral, resp. rate 19, height 73.19\" (185.9 cm), weight 97 lb 14.2 oz (44.4 kg), SpO2 96 %.    GENERAL: Alert, and oriented.    HEENT: Unremarkable.   NECK: Supple.   LUNGS: Clear.   HEART: S1 and S2 with loud ejection systolic murmur.   ABDOMEN: Soft. Diffuse tenderness.  Suprapubic catheter in place. Bowel sounds positive.   EXTREMITIES: Chronic changes.  Paraplegic with contractures.  " Gait and balance:  He does not walk.  Good strength in upper extremities.     DIAGNOSTIC DATA:   Lab Results (last 24 hours)     Procedure Component Value Units Date/Time    Blood Culture [[id number]]  (Normal) Collected:  04/11/17 1636    Specimen:  Blood from Arm, Right Updated:  04/16/17 1701     Blood Culture No growth at 5 days    Blood Culture [[id number]]  (Normal) Collected:  04/11/17 1726    Specimen:  Blood from Arm, Left Updated:  04/16/17 1801     Blood Culture No growth at 5 days      White count 11.2, hemoglobin 7.9, platelets 278,000.  Potassium 3.9, BUN 15, creatinine 1.1.  UA is positive for WBCs.  Positive for 4+ bacteria. Cultures are pending, blood and urine.  No x-rays.  No CT.   Imaging Results (last 72 hours)     Procedure Component Value Units Date/Time    CT Abdomen Pelvis Without Contrast [[id number]] Collected:  04/11/17 1329     Updated:  04/11/17 1345    Narrative:       CT ABDOMEN PELVIS WO CONTRAST-     INDICATIONS: Suprapubic catheter, possible kidney stones     TECHNIQUE: Unenhanced ABDOMEN AND PELVIS CT     COMPARISON: 01/17/2017     FINDINGS:      Suprapubic catheter is seen within expected location of the urinary  bladder. Gas in the urinary bladder may be related to catheterization.  The urinary bladder is only partly filled may contribute to. Some  borderline wall thickening, correlate clinically to exclude any  possibility of urinary infection. 2 stones measuring 4 mm are seen in  the right kidney, are nonobstructive, no right hydronephrosis. No other  urolithiasis is identified. Mild left hydroureter is apparent, with  slender borderline left hydronephrosis.     Stable somewhat lobulated contour of the kidneys is seen.     Otherwise unremarkable unenhanced appearance of the liver, spleen,  adrenal glands, pancreas, kidneys, bladder.     No bowel obstruction or abnormal bowel thickening is identified. Left  lower quadrant colostomy redemonstrated.     No free intraperitoneal gas or  free fluid.     Mildly prominent para-aortic lymph nodes appear similar to prior exam,  for example left para-aortic, 9 mm short axis, image 83. Some hyperdense  portacaval lymph nodes are suggested. To the left of the urinary  bladder, a mildly prominent 11 mm short axis lymph node on image 139  appear stable, nonspecific. Stable subcentimeter short axis subcutaneous  left groin lymph node.     Abdominal aorta is not aneurysmal.     The lung bases show minimal bilateral pleural effusions, change from  prior exam, with small likely atelectasis.     Prominent chronic deformity of the bilateral hips with adjacent soft  tissue swimmer's or ulcerations suggested, appearance is similar to  prior exam, could reflect presence of infection, correlate clinically.  Chronic sclerotic change of the bones in the sacroiliac regions, and  thinning of the soft tissues overlying the sacrum and lower back.             Impression:             1. Suprapubic catheter within the urinary bladder. Appearance of  borderline thickening of the urinary bladder wall, likely at least in  part from lack of distention. Nonobstructive right nephrolithiasis. Mild  left hydroureter, slight/borderline left hydronephrosis.  2. Mildly prominent retroperitoneal lymph nodes, not significantly  changed.  3. Chronic changes of the hips and pelvis with soft tissue sores  suggested, correlate clinically.  4. Minimal bilateral pleural effusions, change from prior exam.     This report was finalized on 4/11/2017 1:42 PM by Dr. Medardo Washington MD.             Current Facility-Administered Medications:   •  ALPRAZolam (XANAX) tablet 1 mg, 1 mg, Oral, BID PRN, Jeff Rhodes MD, 1 mg at 04/17/17 0950  •  bisacodyl (DULCOLAX) EC tablet 5 mg, 5 mg, Oral, Daily PRN, Jeff Rhodes MD  •  ertapenem (INVanz) 1 g/100 mL 0.9% NS VTB (mbp), 1 g, Intravenous, Q24H, Travis Moran MD, 1 g at 04/17/17 0950  •  gabapentin (NEURONTIN) capsule 300 mg, 300 mg, Oral,  TID, Jeff Rhodes MD, 300 mg at 04/17/17 0951  •  heparin flush (porcine) 100 UNIT/ML injection 500 Units, 5 mL, Intracatheter, PRN, Jeff Rhodes MD, 500 Units at 04/15/17 1235  •  oxybutynin (DITROPAN) tablet 5 mg, 5 mg, Oral, BID, Jeff Rhodes MD, 5 mg at 04/17/17 0951  •  oxyCODONE (ROXICODONE) immediate release tablet 15 mg, 15 mg, Oral, Q6H PRN, Jeff Rhodes MD, 15 mg at 04/17/17 0628  •  oxyCODONE-acetaminophen (PERCOCET)  MG per tablet 1 tablet, 1 tablet, Oral, Q8H PRN, Jeff Rhodes MD, 1 tablet at 04/17/17 0950  •  pantoprazole (PROTONIX) EC tablet 40 mg, 40 mg, Oral, Daily, Jeff Rhodes MD, 40 mg at 04/17/17 0951  •  Insert peripheral IV, , , Once **AND** sodium chloride 0.9 % flush 10 mL, 10 mL, Intravenous, PRN, Sanjeev Bishop MD  •  sodium hypochlorite (DAKIN'S 1/4 STRENGTH) 0.125 % topical solution 0.125% solution, , Topical, BID, Jeff Rhodes MD     ASSESSMENT:  1.  UTI with sepsis.  /E COLI BACTREMIA  2.  Paraplegic.    3.  Neurogenic bladder.    4.  Chronic suprapubic catheter.   5.  Gastroesophageal reflux disease.   6.  Chronic pain syndrome.   7. STAGE 4 DECUB ULCER    PLAN:  DISCHARGE IN AM  SUMMARY DICTATED SATURDAY    Jeff Rhodes M.D.           Electronically signed by Jeff Rhodes MD at 4/17/2017 10:36 AM

## 2017-04-17 NOTE — PROGRESS NOTES
" DAILY PROGRESS NOTE    CHIEF COMPLAINT:   DOING SAME  NO NEW COMPLAINTS    HISTORY:  A 37-year-old  male who has been paraplegic. Other medical problems are chronic anemia, chronic hypertension, recurrent infection, decubitus ulcer, malnutrition, chronic pain syndrome, and gastroesophageal reflux disease who had a suprapubic catheter.  He presented [facility] Emergency Room with abdominal pain which began just yesterday. Workup in the ER revealed recurrent UTI.  Admitted for management. The patient denies any fever or chills, but he does have nausea and more pain. No diarrhea. No other complaint.  It is just hurting.     PHYSICAL EXAMINATION:Blood pressure 91/65, pulse 82, temperature 99.1 °F (37.3 °C), temperature source Oral, resp. rate 19, height 73.19\" (185.9 cm), weight 97 lb 14.2 oz (44.4 kg), SpO2 96 %.    GENERAL: Alert, and oriented.    HEENT: Unremarkable.   NECK: Supple.   LUNGS: Clear.   HEART: S1 and S2 with loud ejection systolic murmur.   ABDOMEN: Soft. Diffuse tenderness.  Suprapubic catheter in place. Bowel sounds positive.   EXTREMITIES: Chronic changes.  Paraplegic with contractures.  Gait and balance:  He does not walk.  Good strength in upper extremities.     DIAGNOSTIC DATA:   Lab Results (last 24 hours)     Procedure Component Value Units Date/Time    Blood Culture [[id number]]  (Normal) Collected:  04/11/17 1636    Specimen:  Blood from Arm, Right Updated:  04/16/17 1701     Blood Culture No growth at 5 days    Blood Culture [[id number]]  (Normal) Collected:  04/11/17 1726    Specimen:  Blood from Arm, Left Updated:  04/16/17 1801     Blood Culture No growth at 5 days      White count 11.2, hemoglobin 7.9, platelets 278,000.  Potassium 3.9, BUN 15, creatinine 1.1.  UA is positive for WBCs.  Positive for 4+ bacteria. Cultures are pending, blood and urine.  No x-rays.  No CT.   Imaging Results (last 72 hours)     Procedure Component Value Units Date/Time    CT " Abdomen Pelvis Without Contrast [[id number]] Collected:  04/11/17 1329     Updated:  04/11/17 1345    Narrative:       CT ABDOMEN PELVIS WO CONTRAST-     INDICATIONS: Suprapubic catheter, possible kidney stones     TECHNIQUE: Unenhanced ABDOMEN AND PELVIS CT     COMPARISON: 01/17/2017     FINDINGS:      Suprapubic catheter is seen within expected location of the urinary  bladder. Gas in the urinary bladder may be related to catheterization.  The urinary bladder is only partly filled may contribute to. Some  borderline wall thickening, correlate clinically to exclude any  possibility of urinary infection. 2 stones measuring 4 mm are seen in  the right kidney, are nonobstructive, no right hydronephrosis. No other  urolithiasis is identified. Mild left hydroureter is apparent, with  slender borderline left hydronephrosis.     Stable somewhat lobulated contour of the kidneys is seen.     Otherwise unremarkable unenhanced appearance of the liver, spleen,  adrenal glands, pancreas, kidneys, bladder.     No bowel obstruction or abnormal bowel thickening is identified. Left  lower quadrant colostomy redemonstrated.     No free intraperitoneal gas or free fluid.     Mildly prominent para-aortic lymph nodes appear similar to prior exam,  for example left para-aortic, 9 mm short axis, image 83. Some hyperdense  portacaval lymph nodes are suggested. To the left of the urinary  bladder, a mildly prominent 11 mm short axis lymph node on image 139  appear stable, nonspecific. Stable subcentimeter short axis subcutaneous  left groin lymph node.     Abdominal aorta is not aneurysmal.     The lung bases show minimal bilateral pleural effusions, change from  prior exam, with small likely atelectasis.     Prominent chronic deformity of the bilateral hips with adjacent soft  tissue swimmer's or ulcerations suggested, appearance is similar to  prior exam, could reflect presence of infection, correlate clinically.  Chronic sclerotic change  of the bones in the sacroiliac regions, and  thinning of the soft tissues overlying the sacrum and lower back.             Impression:             1. Suprapubic catheter within the urinary bladder. Appearance of  borderline thickening of the urinary bladder wall, likely at least in  part from lack of distention. Nonobstructive right nephrolithiasis. Mild  left hydroureter, slight/borderline left hydronephrosis.  2. Mildly prominent retroperitoneal lymph nodes, not significantly  changed.  3. Chronic changes of the hips and pelvis with soft tissue sores  suggested, correlate clinically.  4. Minimal bilateral pleural effusions, change from prior exam.     This report was finalized on 4/11/2017 1:42 PM by Dr. Medardo Washington MD.             Current Facility-Administered Medications:   •  ALPRAZolam (XANAX) tablet 1 mg, 1 mg, Oral, BID PRN, Jeff Rhodes MD, 1 mg at 04/17/17 0950  •  bisacodyl (DULCOLAX) EC tablet 5 mg, 5 mg, Oral, Daily PRN, Jeff Rhodes MD  •  ertapenem (INVanz) 1 g/100 mL 0.9% NS VTB (mbp), 1 g, Intravenous, Q24H, Travis Moran MD, 1 g at 04/17/17 0950  •  gabapentin (NEURONTIN) capsule 300 mg, 300 mg, Oral, TID, Jeff Rhodes MD, 300 mg at 04/17/17 0951  •  heparin flush (porcine) 100 UNIT/ML injection 500 Units, 5 mL, Intracatheter, PRN, Jeff Rhodes MD, 500 Units at 04/15/17 1235  •  oxybutynin (DITROPAN) tablet 5 mg, 5 mg, Oral, BID, Jeff Rhodes MD, 5 mg at 04/17/17 0951  •  oxyCODONE (ROXICODONE) immediate release tablet 15 mg, 15 mg, Oral, Q6H PRN, Jeff Rhodes MD, 15 mg at 04/17/17 0628  •  oxyCODONE-acetaminophen (PERCOCET)  MG per tablet 1 tablet, 1 tablet, Oral, Q8H PRN, Jeff Rhodes MD, 1 tablet at 04/17/17 0950  •  pantoprazole (PROTONIX) EC tablet 40 mg, 40 mg, Oral, Daily, Jeff Rhodes MD, 40 mg at 04/17/17 0951  •  Insert peripheral IV, , , Once **AND** sodium chloride 0.9 % flush 10 mL, 10 mL, Intravenous, PRN, Sanjeev Bishop MD  •  sodium hypochlorite (DAKIN'S  1/4 STRENGTH) 0.125 % topical solution 0.125% solution, , Topical, BID, Jeff Rhodes MD     ASSESSMENT:  1.  UTI with sepsis.  /E COLI BACTREMIA  2.  Paraplegic.    3.  Neurogenic bladder.    4.  Chronic suprapubic catheter.   5.  Gastroesophageal reflux disease.   6.  Chronic pain syndrome.   7. STAGE 4 DECUB ULCER    PLAN:  DISCHARGE IN AM  SUMMARY DICTATED SATURDAY    Jeff Rhodes M.D.

## 2017-04-17 NOTE — PROGRESS NOTES
Continued Stay Note  [facility]     Patient Name: Joaquín Sherman  MRN: [id number]  Today's Date: 4/17/2017    Admit Date: 4/10/2017          Discharge Plan       04/17/17 1332    Case Management/Social Work Plan    Plan Return home with [facility] and [facility].     Patient/Family In Agreement With Plan yes    Additional Comments Spoke with Maria Elena/[facility] will follow.  Spoke with Terra/[facility] will provide IV antibiotics.  Patient concerned about script for pain meds - script obtained for 6 Percocet and patient states he will F/U with Dr. Hall tomorrow.  [facility] scheduled [facility] 1:00 p.m.    Final Note    Final Note Patient will be [facility]'d [facility] with [facility] following and [facility] providing IV antibiotics.              Discharge Codes       04/17/17 1332    Discharge Codes    Discharge Codes 06  Discharged/transferred to home under care of organized home health service organization in anticipation of skilled care        Expected Discharge Date and Time     Expected Discharge Date Expected Discharge Time    Apr 15, 2017             Becky S. [place], RN

## 2017-04-17 NOTE — PLAN OF CARE
Problem: Patient Care Overview (Adult)  Goal: Plan of Care Review  Outcome: Ongoing (interventions implemented as appropriate)    04/16/17 2004   Coping/Psychosocial Response Interventions   Plan Of Care Reviewed With patient   Patient Care Overview   Progress no change   Outcome Evaluation   Outcome Summary/Follow up Plan BP runs low, left trochanter dressing changed. condom cath in place and suprapbic cath. Pain controlled with PRN meds. on specialty sand bed. alert and oriented and pleasant with me today.       Goal: Adult Individualization and Mutuality  Outcome: Ongoing (interventions implemented as appropriate)    Problem: Adjustment to Hospitalization, Illness, Injury, Treatment (Adult,Pediatric)  Goal: Adjustment to Events/Situations regarding Hospitalization/Illness/Injury/Treatment  Outcome: Ongoing (interventions implemented as appropriate)  Goal: Continued Mastery/Enhancement of Self-Esteem while Adjusting to Hospitalization/Illness/Injury  Outcome: Ongoing (interventions implemented as appropriate)    Problem: Infection, Risk/Actual (Adult)  Goal: Infection Prevention/Resolution  Outcome: Ongoing (interventions implemented as appropriate)    Problem: Fluid Volume Deficit (Adult)  Goal: Identify Related Risk Factors and Signs and Symptoms  Outcome: Outcome(s) achieved Date Met:  04/16/17  Goal: Fluid/Electrolyte Balance  Outcome: Ongoing (interventions implemented as appropriate)  Goal: Comfort/Well Being  Outcome: Ongoing (interventions implemented as appropriate)    Problem: Pressure Ulcer (Adult)  Goal: Signs and Symptoms of Listed Potential Problems Will be Absent or Manageable (Pressure Ulcer)  Outcome: Ongoing (interventions implemented as appropriate)
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
## 2017-04-25 ENCOUNTER — TELEPHONE (OUTPATIENT)
Dept: INFECTIOUS DISEASES | Facility: CLINIC | Age: 37
End: 2017-04-25

## 2017-04-25 NOTE — TELEPHONE ENCOUNTER
"Per phone call from Ollie with [facility], patient refused nurse/labs yesterday. They are going back out today to try and get labs and pull his PICC line since today is his \"stop date\". Will follow up with us if they are unable to see him--CHRISTEN,RN  "

## 2017-04-25 NOTE — TELEPHONE ENCOUNTER
"Good to know. They will still try and go today so they can \"sign off\" on him and will draw his last labs if he will allow them to. Thanks, Caron  "

## 2017-09-27 ENCOUNTER — HOSPITAL ENCOUNTER (INPATIENT)
Facility: HOSPITAL | Age: 37
LOS: 12 days | Discharge: HOME HEALTH | End: 2017-10-09
Attending: EMERGENCY MEDICINE | Admitting: INTERNAL MEDICINE

## 2017-09-27 DIAGNOSIS — G82.20 PARAPLEGIA FOLLOWING SPINAL CORD INJURY (HCC): Chronic | ICD-10-CM

## 2017-09-27 DIAGNOSIS — A41.9 SEPSIS, DUE TO UNSPECIFIED ORGANISM: ICD-10-CM

## 2017-09-27 DIAGNOSIS — S81.809A: ICD-10-CM

## 2017-09-27 DIAGNOSIS — N39.0 ACUTE UTI (URINARY TRACT INFECTION): Primary | ICD-10-CM

## 2017-09-27 DIAGNOSIS — L89.309 DECUBITUS ULCER OF BUTTOCK, UNSPECIFIED LATERALITY, UNSPECIFIED ULCER STAGE: ICD-10-CM

## 2017-09-27 DIAGNOSIS — L89.154 DECUBITUS ULCER OF SACRAL REGION, STAGE 4 (HCC): ICD-10-CM

## 2017-09-27 DIAGNOSIS — E43 SEVERE PROTEIN-CALORIE MALNUTRITION (HCC): ICD-10-CM

## 2017-09-27 DIAGNOSIS — D64.9 SEVERE ANEMIA: ICD-10-CM

## 2017-09-27 PROBLEM — I50.9 CHF (CONGESTIVE HEART FAILURE) (HCC): Status: ACTIVE | Noted: 2017-09-27

## 2017-09-27 PROBLEM — R65.21 SEPTIC SHOCK (HCC): Status: ACTIVE | Noted: 2017-02-21

## 2017-09-27 PROBLEM — E86.0 LUETSCHER'S SYNDROME: Status: ACTIVE | Noted: 2017-02-21

## 2017-09-27 PROBLEM — E87.1 HYPONATREMIA: Status: ACTIVE | Noted: 2017-02-21

## 2017-09-27 PROBLEM — T83.511A UTI (URINARY TRACT INFECTION) DUE TO URINARY INDWELLING CATHETER (HCC): Status: ACTIVE | Noted: 2017-04-10

## 2017-09-27 PROBLEM — M79.5 RETAINED BULLET: Status: ACTIVE | Noted: 2017-09-27

## 2017-09-27 LAB
ABO GROUP BLD: NORMAL
ACANTHOCYTES BLD QL SMEAR: NORMAL
ALBUMIN SERPL-MCNC: 2.3 G/DL (ref 3.5–5.2)
ALBUMIN/GLOB SERPL: 0.5 G/DL
ALP SERPL-CCNC: 100 U/L (ref 39–117)
ALT SERPL W P-5'-P-CCNC: <5 U/L (ref 1–41)
ANION GAP SERPL CALCULATED.3IONS-SCNC: 12 MMOL/L
ANISOCYTOSIS BLD QL: NORMAL
AST SERPL-CCNC: 11 U/L (ref 1–40)
BACTERIA UR QL AUTO: ABNORMAL /HPF
BASOPHILS # BLD AUTO: 0.01 10*3/MM3 (ref 0–0.2)
BASOPHILS NFR BLD AUTO: 0.1 % (ref 0–1.5)
BILIRUB SERPL-MCNC: 0.3 MG/DL (ref 0.1–1.2)
BILIRUB UR QL STRIP: NEGATIVE
BLD GP AB SCN SERPL QL: NEGATIVE
BUN BLD-MCNC: 13 MG/DL (ref 6–20)
BUN/CREAT SERPL: 13 (ref 7–25)
CALCIUM SPEC-SCNC: 8 MG/DL (ref 8.6–10.5)
CHLORIDE SERPL-SCNC: 97 MMOL/L (ref 98–107)
CLARITY UR: ABNORMAL
CO2 SERPL-SCNC: 22 MMOL/L (ref 22–29)
COLOR UR: YELLOW
CREAT BLD-MCNC: 1 MG/DL (ref 0.76–1.27)
D-LACTATE SERPL-SCNC: 2.1 MMOL/L (ref 0.5–2)
DEPRECATED RDW RBC AUTO: 51.7 FL (ref 37–54)
EOSINOPHIL # BLD AUTO: 0.02 10*3/MM3 (ref 0–0.7)
EOSINOPHIL NFR BLD AUTO: 0.3 % (ref 0.3–6.2)
ERYTHROCYTE [DISTWIDTH] IN BLOOD BY AUTOMATED COUNT: 18.8 % (ref 11.5–14.5)
GFR SERPL CREATININE-BSD FRML MDRD: 102 ML/MIN/1.73
GLOBULIN UR ELPH-MCNC: 5.1 GM/DL
GLUCOSE BLD-MCNC: 124 MG/DL (ref 65–99)
GLUCOSE UR STRIP-MCNC: NEGATIVE MG/DL
HCT VFR BLD AUTO: 16.3 % (ref 40.4–52.2)
HGB BLD-MCNC: 4.4 G/DL (ref 13.7–17.6)
HGB UR QL STRIP.AUTO: ABNORMAL
HYALINE CASTS UR QL AUTO: ABNORMAL /LPF
HYPOCHROMIA BLD QL: NORMAL
IMM GRANULOCYTES # BLD: 0.03 10*3/MM3 (ref 0–0.03)
IMM GRANULOCYTES NFR BLD: 0.4 % (ref 0–0.5)
KETONES UR QL STRIP: NEGATIVE
LEUKOCYTE ESTERASE UR QL STRIP.AUTO: ABNORMAL
LYMPHOCYTES # BLD AUTO: 0.8 10*3/MM3 (ref 0.9–4.8)
LYMPHOCYTES NFR BLD AUTO: 11.8 % (ref 19.6–45.3)
MCH RBC QN AUTO: 20.2 PG (ref 27–32.7)
MCHC RBC AUTO-ENTMCNC: 27 G/DL (ref 32.6–36.4)
MCV RBC AUTO: 74.8 FL (ref 79.8–96.2)
MICROCYTES BLD QL: NORMAL
MONOCYTES # BLD AUTO: 0.81 10*3/MM3 (ref 0.2–1.2)
MONOCYTES NFR BLD AUTO: 11.9 % (ref 5–12)
NEUTROPHILS # BLD AUTO: 5.13 10*3/MM3 (ref 1.9–8.1)
NEUTROPHILS NFR BLD AUTO: 75.5 % (ref 42.7–76)
NITRITE UR QL STRIP: NEGATIVE
NRBC BLD MANUAL-RTO: 0 /100 WBC (ref 0–0)
PH UR STRIP.AUTO: 8.5 [PH] (ref 5–8)
PLAT MORPH BLD: NORMAL
PLATELET # BLD AUTO: 358 10*3/MM3 (ref 140–500)
PMV BLD AUTO: 8.4 FL (ref 6–12)
POTASSIUM BLD-SCNC: 4.4 MMOL/L (ref 3.5–5.2)
PROCALCITONIN SERPL-MCNC: 0.35 NG/ML (ref 0.1–0.25)
PROT SERPL-MCNC: 7.4 G/DL (ref 6–8.5)
PROT UR QL STRIP: ABNORMAL
RBC # BLD AUTO: 2.18 10*6/MM3 (ref 4.6–6)
RBC # UR: ABNORMAL /HPF
REF LAB TEST METHOD: ABNORMAL
RH BLD: POSITIVE
SODIUM BLD-SCNC: 131 MMOL/L (ref 136–145)
SP GR UR STRIP: <1.005 (ref 1–1.03)
SPHEROCYTES BLD QL SMEAR: NORMAL
SQUAMOUS #/AREA URNS HPF: ABNORMAL /HPF
UROBILINOGEN UR QL STRIP: ABNORMAL
WBC MORPH BLD: NORMAL
WBC NRBC COR # BLD: 6.8 10*3/MM3 (ref 4.5–10.7)
WBC UR QL AUTO: ABNORMAL /HPF

## 2017-09-27 PROCEDURE — 83605 ASSAY OF LACTIC ACID: CPT | Performed by: EMERGENCY MEDICINE

## 2017-09-27 PROCEDURE — 87150 DNA/RNA AMPLIFIED PROBE: CPT | Performed by: EMERGENCY MEDICINE

## 2017-09-27 PROCEDURE — 86850 RBC ANTIBODY SCREEN: CPT | Performed by: EMERGENCY MEDICINE

## 2017-09-27 PROCEDURE — 82728 ASSAY OF FERRITIN: CPT | Performed by: INTERNAL MEDICINE

## 2017-09-27 PROCEDURE — 85025 COMPLETE CBC W/AUTO DIFF WBC: CPT | Performed by: EMERGENCY MEDICINE

## 2017-09-27 PROCEDURE — 84145 PROCALCITONIN (PCT): CPT | Performed by: EMERGENCY MEDICINE

## 2017-09-27 PROCEDURE — 99291 CRITICAL CARE FIRST HOUR: CPT

## 2017-09-27 PROCEDURE — 83540 ASSAY OF IRON: CPT | Performed by: INTERNAL MEDICINE

## 2017-09-27 PROCEDURE — 87040 BLOOD CULTURE FOR BACTERIA: CPT | Performed by: EMERGENCY MEDICINE

## 2017-09-27 PROCEDURE — 85045 AUTOMATED RETICULOCYTE COUNT: CPT | Performed by: INTERNAL MEDICINE

## 2017-09-27 PROCEDURE — 86923 COMPATIBILITY TEST ELECTRIC: CPT

## 2017-09-27 PROCEDURE — 80053 COMPREHEN METABOLIC PANEL: CPT | Performed by: EMERGENCY MEDICINE

## 2017-09-27 PROCEDURE — 87086 URINE CULTURE/COLONY COUNT: CPT | Performed by: EMERGENCY MEDICINE

## 2017-09-27 PROCEDURE — 87147 CULTURE TYPE IMMUNOLOGIC: CPT | Performed by: EMERGENCY MEDICINE

## 2017-09-27 PROCEDURE — 86900 BLOOD TYPING SEROLOGIC ABO: CPT | Performed by: EMERGENCY MEDICINE

## 2017-09-27 PROCEDURE — 25010000002 VANCOMYCIN PER 500 MG: Performed by: EMERGENCY MEDICINE

## 2017-09-27 PROCEDURE — 86901 BLOOD TYPING SEROLOGIC RH(D): CPT | Performed by: EMERGENCY MEDICINE

## 2017-09-27 PROCEDURE — 81001 URINALYSIS AUTO W/SCOPE: CPT | Performed by: EMERGENCY MEDICINE

## 2017-09-27 PROCEDURE — 25010000002 FENTANYL CITRATE (PF) 100 MCG/2ML SOLUTION: Performed by: EMERGENCY MEDICINE

## 2017-09-27 PROCEDURE — 85007 BL SMEAR W/DIFF WBC COUNT: CPT | Performed by: EMERGENCY MEDICINE

## 2017-09-27 PROCEDURE — 84466 ASSAY OF TRANSFERRIN: CPT | Performed by: INTERNAL MEDICINE

## 2017-09-27 RX ORDER — VANCOMYCIN HYDROCHLORIDE 1 G/200ML
20 INJECTION, SOLUTION INTRAVENOUS ONCE
Status: COMPLETED | OUTPATIENT
Start: 2017-09-27 | End: 2017-09-27

## 2017-09-27 RX ORDER — ACETAMINOPHEN 500 MG
1000 TABLET ORAL ONCE
Status: COMPLETED | OUTPATIENT
Start: 2017-09-27 | End: 2017-09-27

## 2017-09-27 RX ORDER — FENTANYL CITRATE 50 UG/ML
50 INJECTION, SOLUTION INTRAMUSCULAR; INTRAVENOUS ONCE
Status: COMPLETED | OUTPATIENT
Start: 2017-09-27 | End: 2017-09-27

## 2017-09-27 RX ADMIN — VANCOMYCIN HYDROCHLORIDE 1000 MG: 1 INJECTION, SOLUTION INTRAVENOUS at 23:14

## 2017-09-27 RX ADMIN — ACETAMINOPHEN 1000 MG: 500 TABLET ORAL at 21:59

## 2017-09-27 RX ADMIN — SODIUM CHLORIDE 1362 ML: 9 INJECTION, SOLUTION INTRAVENOUS at 22:23

## 2017-09-27 RX ADMIN — FENTANYL CITRATE 50 MCG: 50 INJECTION INTRAMUSCULAR; INTRAVENOUS at 22:24

## 2017-09-28 LAB
ABO + RH BLD: NORMAL
ABO + RH BLD: NORMAL
ANION GAP SERPL CALCULATED.3IONS-SCNC: 14 MMOL/L
BASOPHILS # BLD AUTO: 0.02 10*3/MM3 (ref 0–0.2)
BASOPHILS NFR BLD AUTO: 0.3 % (ref 0–1.5)
BH BB BLOOD EXPIRATION DATE: NORMAL
BH BB BLOOD EXPIRATION DATE: NORMAL
BH BB BLOOD TYPE BARCODE: 8400
BH BB BLOOD TYPE BARCODE: 8400
BH BB DISPENSE STATUS: NORMAL
BH BB DISPENSE STATUS: NORMAL
BH BB PRODUCT CODE: NORMAL
BH BB PRODUCT CODE: NORMAL
BH BB UNIT NUMBER: NORMAL
BH BB UNIT NUMBER: NORMAL
BUN BLD-MCNC: 10 MG/DL (ref 6–20)
BUN/CREAT SERPL: 13.2 (ref 7–25)
CALCIUM SPEC-SCNC: 7.4 MG/DL (ref 8.6–10.5)
CHLORIDE SERPL-SCNC: 108 MMOL/L (ref 98–107)
CO2 SERPL-SCNC: 18 MMOL/L (ref 22–29)
CREAT BLD-MCNC: 0.76 MG/DL (ref 0.76–1.27)
D-LACTATE SERPL-SCNC: 0.6 MMOL/L (ref 0.5–2)
DEPRECATED RDW RBC AUTO: 51.5 FL (ref 37–54)
EOSINOPHIL # BLD AUTO: 0.04 10*3/MM3 (ref 0–0.7)
EOSINOPHIL NFR BLD AUTO: 0.6 % (ref 0.3–6.2)
ERYTHROCYTE [DISTWIDTH] IN BLOOD BY AUTOMATED COUNT: 17.7 % (ref 11.5–14.5)
FERRITIN SERPL-MCNC: 2750 NG/ML (ref 30–400)
GFR SERPL CREATININE-BSD FRML MDRD: 140 ML/MIN/1.73
GLUCOSE BLD-MCNC: 120 MG/DL (ref 65–99)
GLUCOSE BLDC GLUCOMTR-MCNC: 112 MG/DL (ref 70–130)
HCT VFR BLD AUTO: 22 % (ref 40.4–52.2)
HCT VFR BLD AUTO: 24.3 % (ref 40.4–52.2)
HGB BLD-MCNC: 6.4 G/DL (ref 13.7–17.6)
HGB BLD-MCNC: 7.5 G/DL (ref 13.7–17.6)
IMM GRANULOCYTES # BLD: 0.04 10*3/MM3 (ref 0–0.03)
IMM GRANULOCYTES NFR BLD: 0.6 % (ref 0–0.5)
IRON 24H UR-MRATE: 9 MCG/DL (ref 59–158)
IRON SATN MFR SERPL: 6 % (ref 20–50)
LYMPHOCYTES # BLD AUTO: 1.82 10*3/MM3 (ref 0.9–4.8)
LYMPHOCYTES NFR BLD AUTO: 27.2 % (ref 19.6–45.3)
MCH RBC QN AUTO: 23.2 PG (ref 27–32.7)
MCHC RBC AUTO-ENTMCNC: 29.1 G/DL (ref 32.6–36.4)
MCV RBC AUTO: 79.7 FL (ref 79.8–96.2)
MONOCYTES # BLD AUTO: 0.94 10*3/MM3 (ref 0.2–1.2)
MONOCYTES NFR BLD AUTO: 14.1 % (ref 5–12)
NEUTROPHILS # BLD AUTO: 3.83 10*3/MM3 (ref 1.9–8.1)
NEUTROPHILS NFR BLD AUTO: 57.2 % (ref 42.7–76)
NRBC BLD MANUAL-RTO: 0 /100 WBC (ref 0–0)
PLATELET # BLD AUTO: 280 10*3/MM3 (ref 140–500)
PMV BLD AUTO: 8.6 FL (ref 6–12)
POTASSIUM BLD-SCNC: 3.6 MMOL/L (ref 3.5–5.2)
PROCALCITONIN SERPL-MCNC: 0.45 NG/ML (ref 0.1–0.25)
RBC # BLD AUTO: 2.76 10*6/MM3 (ref 4.6–6)
RETICS/RBC NFR AUTO: 1.54 % (ref 0.5–1.5)
SODIUM BLD-SCNC: 140 MMOL/L (ref 136–145)
TIBC SERPL-MCNC: 140 MCG/DL (ref 298–536)
TRANSFERRIN SERPL-MCNC: 94 MG/DL (ref 200–360)
UNIT  ABO: NORMAL
UNIT  ABO: NORMAL
UNIT  RH: NORMAL
UNIT  RH: NORMAL
WBC NRBC COR # BLD: 6.69 10*3/MM3 (ref 4.5–10.7)

## 2017-09-28 PROCEDURE — 82962 GLUCOSE BLOOD TEST: CPT

## 2017-09-28 PROCEDURE — 86900 BLOOD TYPING SEROLOGIC ABO: CPT

## 2017-09-28 PROCEDURE — 85018 HEMOGLOBIN: CPT | Performed by: NURSE PRACTITIONER

## 2017-09-28 PROCEDURE — 87186 SC STD MICRODIL/AGAR DIL: CPT | Performed by: NURSE PRACTITIONER

## 2017-09-28 PROCEDURE — 87070 CULTURE OTHR SPECIMN AEROBIC: CPT | Performed by: NURSE PRACTITIONER

## 2017-09-28 PROCEDURE — 84145 PROCALCITONIN (PCT): CPT | Performed by: INTERNAL MEDICINE

## 2017-09-28 PROCEDURE — P9016 RBC LEUKOCYTES REDUCED: HCPCS

## 2017-09-28 PROCEDURE — 99223 1ST HOSP IP/OBS HIGH 75: CPT | Performed by: INTERNAL MEDICINE

## 2017-09-28 PROCEDURE — 80048 BASIC METABOLIC PNL TOTAL CA: CPT | Performed by: INTERNAL MEDICINE

## 2017-09-28 PROCEDURE — 87077 CULTURE AEROBIC IDENTIFY: CPT | Performed by: NURSE PRACTITIONER

## 2017-09-28 PROCEDURE — 25010000002 HEPARIN (PORCINE) PER 1000 UNITS: Performed by: INTERNAL MEDICINE

## 2017-09-28 PROCEDURE — 83605 ASSAY OF LACTIC ACID: CPT | Performed by: EMERGENCY MEDICINE

## 2017-09-28 PROCEDURE — 87205 SMEAR GRAM STAIN: CPT | Performed by: NURSE PRACTITIONER

## 2017-09-28 PROCEDURE — 36430 TRANSFUSION BLD/BLD COMPNT: CPT

## 2017-09-28 PROCEDURE — 87147 CULTURE TYPE IMMUNOLOGIC: CPT | Performed by: NURSE PRACTITIONER

## 2017-09-28 PROCEDURE — 85025 COMPLETE CBC W/AUTO DIFF WBC: CPT | Performed by: INTERNAL MEDICINE

## 2017-09-28 PROCEDURE — 85014 HEMATOCRIT: CPT | Performed by: NURSE PRACTITIONER

## 2017-09-28 RX ORDER — BISACODYL 5 MG/1
5 TABLET, DELAYED RELEASE ORAL DAILY PRN
Status: DISCONTINUED | OUTPATIENT
Start: 2017-09-28 | End: 2017-10-09 | Stop reason: HOSPADM

## 2017-09-28 RX ORDER — SODIUM CHLORIDE 9 MG/ML
150 INJECTION, SOLUTION INTRAVENOUS CONTINUOUS
Status: DISCONTINUED | OUTPATIENT
Start: 2017-09-28 | End: 2017-10-06

## 2017-09-28 RX ORDER — SODIUM CHLORIDE 0.9 % (FLUSH) 0.9 %
1-10 SYRINGE (ML) INJECTION AS NEEDED
Status: DISCONTINUED | OUTPATIENT
Start: 2017-09-28 | End: 2017-10-09 | Stop reason: HOSPADM

## 2017-09-28 RX ORDER — HEPARIN SODIUM 5000 [USP'U]/ML
5000 INJECTION, SOLUTION INTRAVENOUS; SUBCUTANEOUS EVERY 12 HOURS SCHEDULED
Status: DISCONTINUED | OUTPATIENT
Start: 2017-09-28 | End: 2017-09-28

## 2017-09-28 RX ORDER — ACETAMINOPHEN 325 MG/1
650 TABLET ORAL EVERY 4 HOURS PRN
Status: DISCONTINUED | OUTPATIENT
Start: 2017-09-28 | End: 2017-10-09 | Stop reason: HOSPADM

## 2017-09-28 RX ORDER — ALPRAZOLAM 1 MG/1
1 TABLET ORAL 2 TIMES DAILY PRN
Status: DISCONTINUED | OUTPATIENT
Start: 2017-09-28 | End: 2017-10-09 | Stop reason: HOSPADM

## 2017-09-28 RX ORDER — ONDANSETRON 4 MG/1
4 TABLET, FILM COATED ORAL EVERY 6 HOURS PRN
Status: DISCONTINUED | OUTPATIENT
Start: 2017-09-28 | End: 2017-10-09 | Stop reason: HOSPADM

## 2017-09-28 RX ORDER — ALUMINA, MAGNESIA, AND SIMETHICONE 2400; 2400; 240 MG/30ML; MG/30ML; MG/30ML
15 SUSPENSION ORAL EVERY 6 HOURS PRN
Status: DISCONTINUED | OUTPATIENT
Start: 2017-09-28 | End: 2017-10-09 | Stop reason: HOSPADM

## 2017-09-28 RX ORDER — ONDANSETRON 4 MG/1
4 TABLET, ORALLY DISINTEGRATING ORAL EVERY 6 HOURS PRN
Status: DISCONTINUED | OUTPATIENT
Start: 2017-09-28 | End: 2017-10-09 | Stop reason: HOSPADM

## 2017-09-28 RX ORDER — PANTOPRAZOLE SODIUM 40 MG/1
40 TABLET, DELAYED RELEASE ORAL
Status: DISCONTINUED | OUTPATIENT
Start: 2017-09-28 | End: 2017-10-09 | Stop reason: HOSPADM

## 2017-09-28 RX ORDER — GABAPENTIN 300 MG/1
300 CAPSULE ORAL 3 TIMES DAILY
Status: DISCONTINUED | OUTPATIENT
Start: 2017-09-28 | End: 2017-10-09 | Stop reason: HOSPADM

## 2017-09-28 RX ORDER — ONDANSETRON 2 MG/ML
4 INJECTION INTRAMUSCULAR; INTRAVENOUS EVERY 6 HOURS PRN
Status: DISCONTINUED | OUTPATIENT
Start: 2017-09-28 | End: 2017-10-09 | Stop reason: HOSPADM

## 2017-09-28 RX ORDER — ACETAMINOPHEN 650 MG/1
650 SUPPOSITORY RECTAL EVERY 4 HOURS PRN
Status: DISCONTINUED | OUTPATIENT
Start: 2017-09-28 | End: 2017-10-09 | Stop reason: HOSPADM

## 2017-09-28 RX ORDER — SODIUM HYPOCHLORITE 1.25 MG/ML
SOLUTION TOPICAL EVERY 12 HOURS
Status: DISCONTINUED | OUTPATIENT
Start: 2017-09-28 | End: 2017-10-09 | Stop reason: HOSPADM

## 2017-09-28 RX ORDER — OXYCODONE HYDROCHLORIDE 5 MG/1
15 TABLET ORAL EVERY 6 HOURS PRN
Status: DISCONTINUED | OUTPATIENT
Start: 2017-09-28 | End: 2017-10-03

## 2017-09-28 RX ORDER — OXYBUTYNIN CHLORIDE 5 MG/1
5 TABLET ORAL 3 TIMES DAILY
Status: DISCONTINUED | OUTPATIENT
Start: 2017-09-28 | End: 2017-10-09 | Stop reason: HOSPADM

## 2017-09-28 RX ORDER — SODIUM HYPOCHLORITE 1.25 MG/ML
SOLUTION TOPICAL 2 TIMES DAILY
Status: DISCONTINUED | OUTPATIENT
Start: 2017-09-28 | End: 2017-09-28

## 2017-09-28 RX ADMIN — SODIUM CHLORIDE 150 ML/HR: 9 INJECTION, SOLUTION INTRAVENOUS at 19:38

## 2017-09-28 RX ADMIN — SODIUM CHLORIDE 500 ML: 9 INJECTION, SOLUTION INTRAVENOUS at 00:59

## 2017-09-28 RX ADMIN — GABAPENTIN 300 MG: 300 CAPSULE ORAL at 20:55

## 2017-09-28 RX ADMIN — OXYCODONE HYDROCHLORIDE 15 MG: 5 TABLET ORAL at 13:22

## 2017-09-28 RX ADMIN — NOREPINEPHRINE BITARTRATE 0.02 MCG/KG/MIN: 8 SOLUTION at 06:12

## 2017-09-28 RX ADMIN — SODIUM CHLORIDE 150 ML/HR: 9 INJECTION, SOLUTION INTRAVENOUS at 01:45

## 2017-09-28 RX ADMIN — ALPRAZOLAM 1 MG: 1 TABLET ORAL at 16:10

## 2017-09-28 RX ADMIN — ALPRAZOLAM 1 MG: 1 TABLET ORAL at 07:56

## 2017-09-28 RX ADMIN — OXYBUTYNIN CHLORIDE 5 MG: 5 TABLET ORAL at 08:01

## 2017-09-28 RX ADMIN — OXYBUTYNIN CHLORIDE 5 MG: 5 TABLET ORAL at 16:06

## 2017-09-28 RX ADMIN — OXYCODONE HYDROCHLORIDE 15 MG: 5 TABLET ORAL at 19:38

## 2017-09-28 RX ADMIN — PANTOPRAZOLE SODIUM 40 MG: 40 TABLET, DELAYED RELEASE ORAL at 07:58

## 2017-09-28 RX ADMIN — ERTAPENEM SODIUM 1 G: 1 INJECTION, POWDER, LYOPHILIZED, FOR SOLUTION INTRAMUSCULAR; INTRAVENOUS at 00:04

## 2017-09-28 RX ADMIN — OXYBUTYNIN CHLORIDE 5 MG: 5 TABLET ORAL at 20:55

## 2017-09-28 RX ADMIN — GABAPENTIN 300 MG: 300 CAPSULE ORAL at 08:01

## 2017-09-28 RX ADMIN — OXYCODONE HYDROCHLORIDE 15 MG: 5 TABLET ORAL at 07:12

## 2017-09-28 RX ADMIN — SODIUM CHLORIDE 150 ML/HR: 9 INJECTION, SOLUTION INTRAVENOUS at 10:58

## 2017-09-28 RX ADMIN — GABAPENTIN 300 MG: 300 CAPSULE ORAL at 16:06

## 2017-09-29 LAB
ABO + RH BLD: NORMAL
ANION GAP SERPL CALCULATED.3IONS-SCNC: 10.8 MMOL/L
BACTERIA BLD CULT: ABNORMAL
BACTERIA SPEC AEROBE CULT: NORMAL
BACTERIA SPEC AEROBE CULT: NORMAL
BASOPHILS # BLD AUTO: 0.02 10*3/MM3 (ref 0–0.2)
BASOPHILS NFR BLD AUTO: 0.4 % (ref 0–1.5)
BH BB BLOOD EXPIRATION DATE: NORMAL
BH BB BLOOD TYPE BARCODE: 8400
BH BB DISPENSE STATUS: NORMAL
BH BB PRODUCT CODE: NORMAL
BH BB UNIT NUMBER: NORMAL
BUN BLD-MCNC: 9 MG/DL (ref 6–20)
BUN/CREAT SERPL: 13.2 (ref 7–25)
CALCIUM SPEC-SCNC: 7.5 MG/DL (ref 8.6–10.5)
CHLORIDE SERPL-SCNC: 110 MMOL/L (ref 98–107)
CO2 SERPL-SCNC: 20.2 MMOL/L (ref 22–29)
CREAT BLD-MCNC: 0.68 MG/DL (ref 0.76–1.27)
DEPRECATED RDW RBC AUTO: 52.1 FL (ref 37–54)
EOSINOPHIL # BLD AUTO: 0.11 10*3/MM3 (ref 0–0.7)
EOSINOPHIL NFR BLD AUTO: 2 % (ref 0.3–6.2)
ERYTHROCYTE [DISTWIDTH] IN BLOOD BY AUTOMATED COUNT: 17.2 % (ref 11.5–14.5)
GFR SERPL CREATININE-BSD FRML MDRD: >150 ML/MIN/1.73
GLUCOSE BLD-MCNC: 85 MG/DL (ref 65–99)
HCT VFR BLD AUTO: 23.9 % (ref 40.4–52.2)
HGB BLD-MCNC: 7 G/DL (ref 13.7–17.6)
IMM GRANULOCYTES # BLD: 0.03 10*3/MM3 (ref 0–0.03)
IMM GRANULOCYTES NFR BLD: 0.5 % (ref 0–0.5)
LYMPHOCYTES # BLD AUTO: 1.44 10*3/MM3 (ref 0.9–4.8)
LYMPHOCYTES NFR BLD AUTO: 26.3 % (ref 19.6–45.3)
MCH RBC QN AUTO: 23.9 PG (ref 27–32.7)
MCHC RBC AUTO-ENTMCNC: 29.3 G/DL (ref 32.6–36.4)
MCV RBC AUTO: 81.6 FL (ref 79.8–96.2)
MONOCYTES # BLD AUTO: 0.57 10*3/MM3 (ref 0.2–1.2)
MONOCYTES NFR BLD AUTO: 10.4 % (ref 5–12)
NEUTROPHILS # BLD AUTO: 3.31 10*3/MM3 (ref 1.9–8.1)
NEUTROPHILS NFR BLD AUTO: 60.4 % (ref 42.7–76)
PLATELET # BLD AUTO: 240 10*3/MM3 (ref 140–500)
PMV BLD AUTO: 8.8 FL (ref 6–12)
POTASSIUM BLD-SCNC: 3.9 MMOL/L (ref 3.5–5.2)
RBC # BLD AUTO: 2.93 10*6/MM3 (ref 4.6–6)
SODIUM BLD-SCNC: 141 MMOL/L (ref 136–145)
UNIT  ABO: NORMAL
UNIT  RH: NORMAL
WBC NRBC COR # BLD: 5.48 10*3/MM3 (ref 4.5–10.7)

## 2017-09-29 PROCEDURE — 36430 TRANSFUSION BLD/BLD COMPNT: CPT

## 2017-09-29 PROCEDURE — 85025 COMPLETE CBC W/AUTO DIFF WBC: CPT | Performed by: INTERNAL MEDICINE

## 2017-09-29 PROCEDURE — 99232 SBSQ HOSP IP/OBS MODERATE 35: CPT | Performed by: INTERNAL MEDICINE

## 2017-09-29 PROCEDURE — 80048 BASIC METABOLIC PNL TOTAL CA: CPT | Performed by: INTERNAL MEDICINE

## 2017-09-29 PROCEDURE — P9016 RBC LEUKOCYTES REDUCED: HCPCS

## 2017-09-29 PROCEDURE — 86900 BLOOD TYPING SEROLOGIC ABO: CPT

## 2017-09-29 PROCEDURE — 25010000002 ENOXAPARIN PER 10 MG: Performed by: INTERNAL MEDICINE

## 2017-09-29 PROCEDURE — 87040 BLOOD CULTURE FOR BACTERIA: CPT | Performed by: INTERNAL MEDICINE

## 2017-09-29 RX ORDER — UREA 10 %
140 LOTION (ML) TOPICAL
Status: DISCONTINUED | OUTPATIENT
Start: 2017-09-29 | End: 2017-10-05

## 2017-09-29 RX ORDER — DIPHENOXYLATE HYDROCHLORIDE AND ATROPINE SULFATE 2.5; .025 MG/1; MG/1
1 TABLET ORAL DAILY
Status: DISCONTINUED | OUTPATIENT
Start: 2017-09-29 | End: 2017-10-09 | Stop reason: HOSPADM

## 2017-09-29 RX ADMIN — ERTAPENEM SODIUM 1 G: 1 INJECTION, POWDER, LYOPHILIZED, FOR SOLUTION INTRAMUSCULAR; INTRAVENOUS at 00:54

## 2017-09-29 RX ADMIN — PANTOPRAZOLE SODIUM 40 MG: 40 TABLET, DELAYED RELEASE ORAL at 05:28

## 2017-09-29 RX ADMIN — OXYBUTYNIN CHLORIDE 5 MG: 5 TABLET ORAL at 08:55

## 2017-09-29 RX ADMIN — ERTAPENEM SODIUM 1 G: 1 INJECTION, POWDER, LYOPHILIZED, FOR SOLUTION INTRAMUSCULAR; INTRAVENOUS at 23:37

## 2017-09-29 RX ADMIN — DAKIN'S SOLUTION 0.125% (QUARTER STRENGTH) 1 BOTTLE: 0.12 SOLUTION at 11:45

## 2017-09-29 RX ADMIN — GABAPENTIN 300 MG: 300 CAPSULE ORAL at 16:26

## 2017-09-29 RX ADMIN — Medication 1 TABLET: at 11:24

## 2017-09-29 RX ADMIN — OXYCODONE HYDROCHLORIDE 15 MG: 5 TABLET ORAL at 23:37

## 2017-09-29 RX ADMIN — OXYCODONE HYDROCHLORIDE 15 MG: 5 TABLET ORAL at 08:55

## 2017-09-29 RX ADMIN — OXYBUTYNIN CHLORIDE 5 MG: 5 TABLET ORAL at 21:03

## 2017-09-29 RX ADMIN — OXYBUTYNIN CHLORIDE 5 MG: 5 TABLET ORAL at 16:26

## 2017-09-29 RX ADMIN — OXYCODONE HYDROCHLORIDE 15 MG: 5 TABLET ORAL at 16:26

## 2017-09-29 RX ADMIN — ENOXAPARIN SODIUM 30 MG: 30 INJECTION SUBCUTANEOUS at 21:03

## 2017-09-29 RX ADMIN — GABAPENTIN 300 MG: 300 CAPSULE ORAL at 21:03

## 2017-09-29 RX ADMIN — GABAPENTIN 300 MG: 300 CAPSULE ORAL at 08:55

## 2017-09-29 RX ADMIN — ENOXAPARIN SODIUM 30 MG: 30 INJECTION SUBCUTANEOUS at 00:54

## 2017-09-29 RX ADMIN — OXYCODONE HYDROCHLORIDE 15 MG: 5 TABLET ORAL at 02:46

## 2017-09-29 RX ADMIN — ALPRAZOLAM 1 MG: 1 TABLET ORAL at 08:55

## 2017-09-29 RX ADMIN — DAKIN'S SOLUTION 0.125% (QUARTER STRENGTH): 0.12 SOLUTION at 00:21

## 2017-09-30 LAB
ABO + RH BLD: NORMAL
ANION GAP SERPL CALCULATED.3IONS-SCNC: 10.6 MMOL/L
BACTERIA SPEC AEROBE CULT: ABNORMAL
BACTERIA SPEC AEROBE CULT: ABNORMAL
BASOPHILS # BLD AUTO: 0.03 10*3/MM3 (ref 0–0.2)
BASOPHILS NFR BLD AUTO: 0.5 % (ref 0–1.5)
BH BB BLOOD EXPIRATION DATE: NORMAL
BH BB BLOOD TYPE BARCODE: 8400
BH BB DISPENSE STATUS: NORMAL
BH BB PRODUCT CODE: NORMAL
BH BB UNIT NUMBER: NORMAL
BUN BLD-MCNC: 9 MG/DL (ref 6–20)
BUN/CREAT SERPL: 11.8 (ref 7–25)
CALCIUM SPEC-SCNC: 7.6 MG/DL (ref 8.6–10.5)
CHLORIDE SERPL-SCNC: 106 MMOL/L (ref 98–107)
CO2 SERPL-SCNC: 21.4 MMOL/L (ref 22–29)
CREAT BLD-MCNC: 0.76 MG/DL (ref 0.76–1.27)
DEPRECATED RDW RBC AUTO: 52.9 FL (ref 37–54)
EOSINOPHIL # BLD AUTO: 0.13 10*3/MM3 (ref 0–0.7)
EOSINOPHIL NFR BLD AUTO: 2.2 % (ref 0.3–6.2)
ERYTHROCYTE [DISTWIDTH] IN BLOOD BY AUTOMATED COUNT: 17.8 % (ref 11.5–14.5)
GFR SERPL CREATININE-BSD FRML MDRD: 140 ML/MIN/1.73
GLUCOSE BLD-MCNC: 80 MG/DL (ref 65–99)
GRAM STN SPEC: ABNORMAL
HCT VFR BLD AUTO: 27.5 % (ref 40.4–52.2)
HGB BLD-MCNC: 8.3 G/DL (ref 13.7–17.6)
IMM GRANULOCYTES # BLD: 0.03 10*3/MM3 (ref 0–0.03)
IMM GRANULOCYTES NFR BLD: 0.5 % (ref 0–0.5)
ISOLATED FROM: ABNORMAL
LYMPHOCYTES # BLD AUTO: 1.65 10*3/MM3 (ref 0.9–4.8)
LYMPHOCYTES NFR BLD AUTO: 27.4 % (ref 19.6–45.3)
MCH RBC QN AUTO: 24.4 PG (ref 27–32.7)
MCHC RBC AUTO-ENTMCNC: 30.2 G/DL (ref 32.6–36.4)
MCV RBC AUTO: 80.9 FL (ref 79.8–96.2)
MONOCYTES # BLD AUTO: 0.43 10*3/MM3 (ref 0.2–1.2)
MONOCYTES NFR BLD AUTO: 7.1 % (ref 5–12)
NEUTROPHILS # BLD AUTO: 3.76 10*3/MM3 (ref 1.9–8.1)
NEUTROPHILS NFR BLD AUTO: 62.3 % (ref 42.7–76)
PLATELET # BLD AUTO: 299 10*3/MM3 (ref 140–500)
PMV BLD AUTO: 8.8 FL (ref 6–12)
POTASSIUM BLD-SCNC: 4.6 MMOL/L (ref 3.5–5.2)
RBC # BLD AUTO: 3.4 10*6/MM3 (ref 4.6–6)
SODIUM BLD-SCNC: 138 MMOL/L (ref 136–145)
UNIT  ABO: NORMAL
UNIT  RH: NORMAL
WBC NRBC COR # BLD: 6.03 10*3/MM3 (ref 4.5–10.7)

## 2017-09-30 PROCEDURE — 25010000002 ENOXAPARIN PER 10 MG: Performed by: INTERNAL MEDICINE

## 2017-09-30 PROCEDURE — 80048 BASIC METABOLIC PNL TOTAL CA: CPT | Performed by: INTERNAL MEDICINE

## 2017-09-30 PROCEDURE — 85025 COMPLETE CBC W/AUTO DIFF WBC: CPT | Performed by: INTERNAL MEDICINE

## 2017-09-30 RX ADMIN — ENOXAPARIN SODIUM 30 MG: 30 INJECTION SUBCUTANEOUS at 22:03

## 2017-09-30 RX ADMIN — OXYBUTYNIN CHLORIDE 5 MG: 5 TABLET ORAL at 15:54

## 2017-09-30 RX ADMIN — Medication 140 MG: at 08:24

## 2017-09-30 RX ADMIN — DAKIN'S SOLUTION 0.125% (QUARTER STRENGTH): 0.12 SOLUTION at 15:21

## 2017-09-30 RX ADMIN — ALUMINUM HYDROXIDE, MAGNESIUM HYDROXIDE, AND DIMETHICONE 15 ML: 400; 400; 40 SUSPENSION ORAL at 15:58

## 2017-09-30 RX ADMIN — OXYBUTYNIN CHLORIDE 5 MG: 5 TABLET ORAL at 21:17

## 2017-09-30 RX ADMIN — Medication 1 TABLET: at 08:25

## 2017-09-30 RX ADMIN — ALUMINUM HYDROXIDE, MAGNESIUM HYDROXIDE, AND DIMETHICONE 15 ML: 400; 400; 40 SUSPENSION ORAL at 10:20

## 2017-09-30 RX ADMIN — OXYBUTYNIN CHLORIDE 5 MG: 5 TABLET ORAL at 08:24

## 2017-09-30 RX ADMIN — ALPRAZOLAM 1 MG: 1 TABLET ORAL at 08:58

## 2017-09-30 RX ADMIN — GABAPENTIN 300 MG: 300 CAPSULE ORAL at 21:17

## 2017-09-30 RX ADMIN — GABAPENTIN 300 MG: 300 CAPSULE ORAL at 15:54

## 2017-09-30 RX ADMIN — OXYCODONE HYDROCHLORIDE 15 MG: 5 TABLET ORAL at 21:17

## 2017-09-30 RX ADMIN — PANTOPRAZOLE SODIUM 40 MG: 40 TABLET, DELAYED RELEASE ORAL at 05:34

## 2017-09-30 RX ADMIN — OXYCODONE HYDROCHLORIDE 15 MG: 5 TABLET ORAL at 08:52

## 2017-09-30 RX ADMIN — SODIUM CHLORIDE 150 ML/HR: 9 INJECTION, SOLUTION INTRAVENOUS at 05:55

## 2017-09-30 RX ADMIN — GABAPENTIN 300 MG: 300 CAPSULE ORAL at 08:25

## 2017-09-30 RX ADMIN — DAKIN'S SOLUTION 0.125% (QUARTER STRENGTH): 0.12 SOLUTION at 00:57

## 2017-10-01 LAB
ANION GAP SERPL CALCULATED.3IONS-SCNC: 10.5 MMOL/L
ANISOCYTOSIS BLD QL: NORMAL
BASOPHILS # BLD AUTO: 0.03 10*3/MM3 (ref 0–0.2)
BASOPHILS NFR BLD AUTO: 0.6 % (ref 0–1.5)
BUN BLD-MCNC: 8 MG/DL (ref 6–20)
BUN/CREAT SERPL: 12.9 (ref 7–25)
C3 FRG RBC-MCNC: NORMAL
CALCIUM SPEC-SCNC: 7.7 MG/DL (ref 8.6–10.5)
CHLORIDE SERPL-SCNC: 105 MMOL/L (ref 98–107)
CO2 SERPL-SCNC: 23.5 MMOL/L (ref 22–29)
CREAT BLD-MCNC: 0.62 MG/DL (ref 0.76–1.27)
DEPRECATED RDW RBC AUTO: 54 FL (ref 37–54)
EOSINOPHIL # BLD AUTO: 0.1 10*3/MM3 (ref 0–0.7)
EOSINOPHIL NFR BLD AUTO: 1.9 % (ref 0.3–6.2)
ERYTHROCYTE [DISTWIDTH] IN BLOOD BY AUTOMATED COUNT: 18.1 % (ref 11.5–14.5)
GFR SERPL CREATININE-BSD FRML MDRD: >150 ML/MIN/1.73
GLUCOSE BLD-MCNC: 83 MG/DL (ref 65–99)
HCT VFR BLD AUTO: 28.5 % (ref 40.4–52.2)
HGB BLD-MCNC: 8.3 G/DL (ref 13.7–17.6)
HYPOCHROMIA BLD QL: NORMAL
IMM GRANULOCYTES # BLD: 0.04 10*3/MM3 (ref 0–0.03)
IMM GRANULOCYTES NFR BLD: 0.7 % (ref 0–0.5)
LYMPHOCYTES # BLD AUTO: 1.64 10*3/MM3 (ref 0.9–4.8)
LYMPHOCYTES NFR BLD AUTO: 30.5 % (ref 19.6–45.3)
MCH RBC QN AUTO: 23.6 PG (ref 27–32.7)
MCHC RBC AUTO-ENTMCNC: 29.1 G/DL (ref 32.6–36.4)
MCV RBC AUTO: 81.2 FL (ref 79.8–96.2)
MONOCYTES # BLD AUTO: 0.38 10*3/MM3 (ref 0.2–1.2)
MONOCYTES NFR BLD AUTO: 7.1 % (ref 5–12)
NEUTROPHILS # BLD AUTO: 3.19 10*3/MM3 (ref 1.9–8.1)
NEUTROPHILS NFR BLD AUTO: 59.2 % (ref 42.7–76)
NRBC BLD MANUAL-RTO: 0 /100 WBC (ref 0–0)
PLAT MORPH BLD: NORMAL
PLATELET # BLD AUTO: 299 10*3/MM3 (ref 140–500)
PMV BLD AUTO: 8.4 FL (ref 6–12)
POTASSIUM BLD-SCNC: 4.4 MMOL/L (ref 3.5–5.2)
RBC # BLD AUTO: 3.51 10*6/MM3 (ref 4.6–6)
SODIUM BLD-SCNC: 139 MMOL/L (ref 136–145)
WBC MORPH BLD: NORMAL
WBC NRBC COR # BLD: 5.38 10*3/MM3 (ref 4.5–10.7)

## 2017-10-01 PROCEDURE — 25010000002 ENOXAPARIN PER 10 MG: Performed by: INTERNAL MEDICINE

## 2017-10-01 PROCEDURE — 85007 BL SMEAR W/DIFF WBC COUNT: CPT | Performed by: INTERNAL MEDICINE

## 2017-10-01 PROCEDURE — 99232 SBSQ HOSP IP/OBS MODERATE 35: CPT | Performed by: INTERNAL MEDICINE

## 2017-10-01 PROCEDURE — 85025 COMPLETE CBC W/AUTO DIFF WBC: CPT | Performed by: INTERNAL MEDICINE

## 2017-10-01 PROCEDURE — 80048 BASIC METABOLIC PNL TOTAL CA: CPT | Performed by: INTERNAL MEDICINE

## 2017-10-01 RX ADMIN — GABAPENTIN 300 MG: 300 CAPSULE ORAL at 08:40

## 2017-10-01 RX ADMIN — PANTOPRAZOLE SODIUM 40 MG: 40 TABLET, DELAYED RELEASE ORAL at 05:40

## 2017-10-01 RX ADMIN — GABAPENTIN 300 MG: 300 CAPSULE ORAL at 21:18

## 2017-10-01 RX ADMIN — DAKIN'S SOLUTION 0.125% (QUARTER STRENGTH): 0.12 SOLUTION at 14:00

## 2017-10-01 RX ADMIN — OXYCODONE HYDROCHLORIDE 15 MG: 5 TABLET ORAL at 03:36

## 2017-10-01 RX ADMIN — ENOXAPARIN SODIUM 30 MG: 30 INJECTION SUBCUTANEOUS at 21:18

## 2017-10-01 RX ADMIN — OXYBUTYNIN CHLORIDE 5 MG: 5 TABLET ORAL at 15:54

## 2017-10-01 RX ADMIN — ALPRAZOLAM 1 MG: 1 TABLET ORAL at 15:06

## 2017-10-01 RX ADMIN — GABAPENTIN 300 MG: 300 CAPSULE ORAL at 15:54

## 2017-10-01 RX ADMIN — OXYBUTYNIN CHLORIDE 5 MG: 5 TABLET ORAL at 08:37

## 2017-10-01 RX ADMIN — OXYCODONE HYDROCHLORIDE 15 MG: 5 TABLET ORAL at 19:22

## 2017-10-01 RX ADMIN — ERTAPENEM SODIUM 1 G: 1 INJECTION, POWDER, LYOPHILIZED, FOR SOLUTION INTRAMUSCULAR; INTRAVENOUS at 00:28

## 2017-10-01 RX ADMIN — OXYCODONE HYDROCHLORIDE 15 MG: 5 TABLET ORAL at 13:14

## 2017-10-01 RX ADMIN — DAKIN'S SOLUTION 0.125% (QUARTER STRENGTH): 0.12 SOLUTION at 03:36

## 2017-10-01 RX ADMIN — OXYBUTYNIN CHLORIDE 5 MG: 5 TABLET ORAL at 21:18

## 2017-10-01 RX ADMIN — Medication 140 MG: at 08:37

## 2017-10-01 RX ADMIN — DAKIN'S SOLUTION 0.125% (QUARTER STRENGTH): 0.12 SOLUTION at 21:43

## 2017-10-01 RX ADMIN — ALPRAZOLAM 1 MG: 1 TABLET ORAL at 04:14

## 2017-10-01 NOTE — PLAN OF CARE
Problem: Patient Care Overview (Adult)  Goal: Plan of Care Review  Outcome: Ongoing (interventions implemented as appropriate)    09/30/17 2103   Coping/Psychosocial Response Interventions   Plan Of Care Reviewed With patient   Patient Care Overview   Progress no change   Outcome Evaluation   Outcome Summary/Follow up Plan pain meds as needed, dressing change to left heel and hip, no s/s of distress, suprapubic catheter and condom catheter, to bsd will continue to monitor       Goal: Adult Individualization and Mutuality  Outcome: Ongoing (interventions implemented as appropriate)    Problem: Sepsis (Adult)  Goal: Signs and Symptoms of Listed Potential Problems Will be Absent or Manageable (Sepsis)  Outcome: Ongoing (interventions implemented as appropriate)    Problem: Skin Integrity Impairment, Risk/Actual (Adult)  Goal: Identify Related Risk Factors and Signs and Symptoms  Outcome: Ongoing (interventions implemented as appropriate)  Goal: Skin Integrity/Wound Healing  Outcome: Ongoing (interventions implemented as appropriate)    Problem: Pain, Acute (Adult)  Goal: Identify Related Risk Factors and Signs and Symptoms  Outcome: Ongoing (interventions implemented as appropriate)  Goal: Acceptable Pain Control/Comfort Level  Outcome: Ongoing (interventions implemented as appropriate)    Problem: Pressure Ulcer (Adult)  Goal: Signs and Symptoms of Listed Potential Problems Will be Absent or Manageable (Pressure Ulcer)  Outcome: Ongoing (interventions implemented as appropriate)    Problem: Pain, Chronic (Adult)  Goal: Acceptable Pain Control/Comfort Level  Outcome: Ongoing (interventions implemented as appropriate)

## 2017-10-01 NOTE — PLAN OF CARE
Problem: Patient Care Overview (Adult)  Goal: Plan of Care Review  Outcome: Ongoing (interventions implemented as appropriate)    10/01/17 0443   Coping/Psychosocial Response Interventions   Plan Of Care Reviewed With patient   Patient Care Overview   Progress no change   Outcome Evaluation   Outcome Summary/Follow up Plan pt in bed, changed dressing to left heel and hip, c/o pain and prn medication given, xanax given for anxiety, colostomy bag change per pt. request, suprapubic catheter and condom catheter in place,

## 2017-10-01 NOTE — PROGRESS NOTES
INFECTIOUS DISEASES PROGRESS NOTE    CC: f/u sepsis    S:   Patient says his wound still has painful.  He is not having fevers or chills or night sweats.  Does feel cold.  Discussed with nursing and report that his wounds are improved.    O:  Physical Exam:  Temp:  [97 °F (36.1 °C)-98.2 °F (36.8 °C)] 97.6 °F (36.4 °C)  Heart Rate:  [72-86] 79  Resp:  [18] 18  BP: ()/(63-77) 90/63  Physical Exam  No acute distress  Pulmonary effort normal  Abdomen soft nontender.  He does not want wound examination.     Diagnostics:     wbc 5.4 p59, l31, m7    Microbiology:  9/27 BCx: coag-neg Staph in 1/1 sets  9/27 UCx: Mixed  9/28 WCx: Proteus and Enterococcus  9/28 sacral wound: proteus, pseudomonas and MRSA     Estimated Creatinine Clearance: 92.8 mL/min (by C-G formula based on Cr of 0.62).    Assessment/Plan   1. Septic shock - resolved  2. Chronic sacral decubitus ulcers  3. Chronic surapubic catheter  4. Pyuria  5. Iron deficiency anemia  6. Paraplegia    Hard to deny that the patient has improved.  He is hemodynamically stable and afebrile and was reportedly improving.  Superficial cultures of the wounds have grown several organisms, but he is improved without dedicated treatment towards several of these suggesting that those are not pathogenic and in fact represent colonization.  We know that superficial wound cultures are notoriously inaccurate and do not necessarily reflect deep cultures.  I think we should just give him ertapenem for 7 days since he responded so well to this, stop date October 3, 2017.    Continue local wound care    We will be happy to reevaluate should infectious concerns evolve      Travis Moran MD  11:58 AM  10/01/17

## 2017-10-01 NOTE — PLAN OF CARE
Problem: Patient Care Overview (Adult)  Goal: Plan of Care Review  Outcome: Ongoing (interventions implemented as appropriate)    10/01/17 6771   Coping/Psychosocial Response Interventions   Plan Of Care Reviewed With patient   Patient Care Overview   Progress improving   Outcome Evaluation   Outcome Summary/Follow up Plan VSS. Medicated for pain x1. Drsg to L heel and L hip changed wet to dry. All other dressings changed mepilex changed.. Xanax given at 1500. Continue contact isolation. Continue to monitor.         Problem: Sepsis (Adult)  Goal: Signs and Symptoms of Listed Potential Problems Will be Absent or Manageable (Sepsis)  Outcome: Ongoing (interventions implemented as appropriate)    Problem: Pain, Acute (Adult)  Goal: Identify Related Risk Factors and Signs and Symptoms  Outcome: Ongoing (interventions implemented as appropriate)  Goal: Acceptable Pain Control/Comfort Level  Outcome: Ongoing (interventions implemented as appropriate)    Problem: Pressure Ulcer (Adult)  Goal: Signs and Symptoms of Listed Potential Problems Will be Absent or Manageable (Pressure Ulcer)  Outcome: Ongoing (interventions implemented as appropriate)    Problem: Pain, Chronic (Adult)  Goal: Identify Related Risk Factors and Signs and Symptoms  Outcome: Ongoing (interventions implemented as appropriate)  Goal: Acceptable Pain Control/Comfort Level  Outcome: Ongoing (interventions implemented as appropriate)
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
## 2017-10-02 LAB
ANION GAP SERPL CALCULATED.3IONS-SCNC: 8.3 MMOL/L
BACTERIA SPEC AEROBE CULT: ABNORMAL
BACTERIA SPEC AEROBE CULT: NORMAL
BASOPHILS # BLD AUTO: 0.02 10*3/MM3 (ref 0–0.2)
BASOPHILS NFR BLD AUTO: 0.2 % (ref 0–1.5)
BUN BLD-MCNC: 11 MG/DL (ref 6–20)
BUN/CREAT SERPL: 15.3 (ref 7–25)
CALCIUM SPEC-SCNC: 8 MG/DL (ref 8.6–10.5)
CHLORIDE SERPL-SCNC: 100 MMOL/L (ref 98–107)
CO2 SERPL-SCNC: 26.7 MMOL/L (ref 22–29)
CORTIS SERPL-MCNC: 7.03 MCG/DL
CREAT BLD-MCNC: 0.72 MG/DL (ref 0.76–1.27)
DEPRECATED RDW RBC AUTO: 55.7 FL (ref 37–54)
EOSINOPHIL # BLD AUTO: 0.14 10*3/MM3 (ref 0–0.7)
EOSINOPHIL NFR BLD AUTO: 1.7 % (ref 0.3–6.2)
ERYTHROCYTE [DISTWIDTH] IN BLOOD BY AUTOMATED COUNT: 18.5 % (ref 11.5–14.5)
GFR SERPL CREATININE-BSD FRML MDRD: 149 ML/MIN/1.73
GLUCOSE BLD-MCNC: 103 MG/DL (ref 65–99)
GRAM STN SPEC: ABNORMAL
HCT VFR BLD AUTO: 31.2 % (ref 40.4–52.2)
HGB BLD-MCNC: 9.2 G/DL (ref 13.7–17.6)
IMM GRANULOCYTES # BLD: 0.04 10*3/MM3 (ref 0–0.03)
IMM GRANULOCYTES NFR BLD: 0.5 % (ref 0–0.5)
LYMPHOCYTES # BLD AUTO: 1.75 10*3/MM3 (ref 0.9–4.8)
LYMPHOCYTES NFR BLD AUTO: 21.7 % (ref 19.6–45.3)
MCH RBC QN AUTO: 24.1 PG (ref 27–32.7)
MCHC RBC AUTO-ENTMCNC: 29.5 G/DL (ref 32.6–36.4)
MCV RBC AUTO: 81.7 FL (ref 79.8–96.2)
MONOCYTES # BLD AUTO: 0.61 10*3/MM3 (ref 0.2–1.2)
MONOCYTES NFR BLD AUTO: 7.6 % (ref 5–12)
NEUTROPHILS # BLD AUTO: 5.5 10*3/MM3 (ref 1.9–8.1)
NEUTROPHILS NFR BLD AUTO: 68.3 % (ref 42.7–76)
NRBC BLD MANUAL-RTO: 0 /100 WBC (ref 0–0)
PLATELET # BLD AUTO: 279 10*3/MM3 (ref 140–500)
PMV BLD AUTO: 8.5 FL (ref 6–12)
POTASSIUM BLD-SCNC: 5 MMOL/L (ref 3.5–5.2)
RBC # BLD AUTO: 3.82 10*6/MM3 (ref 4.6–6)
SODIUM BLD-SCNC: 135 MMOL/L (ref 136–145)
WBC NRBC COR # BLD: 8.06 10*3/MM3 (ref 4.5–10.7)

## 2017-10-02 PROCEDURE — 85025 COMPLETE CBC W/AUTO DIFF WBC: CPT | Performed by: INTERNAL MEDICINE

## 2017-10-02 PROCEDURE — 82533 TOTAL CORTISOL: CPT | Performed by: INTERNAL MEDICINE

## 2017-10-02 PROCEDURE — 25010000002 HEPARIN FLUSH (PORCINE) 100 UNIT/ML SOLUTION

## 2017-10-02 PROCEDURE — 80048 BASIC METABOLIC PNL TOTAL CA: CPT | Performed by: INTERNAL MEDICINE

## 2017-10-02 PROCEDURE — 25010000002 ENOXAPARIN PER 10 MG: Performed by: INTERNAL MEDICINE

## 2017-10-02 RX ADMIN — ENOXAPARIN SODIUM 30 MG: 30 INJECTION SUBCUTANEOUS at 21:28

## 2017-10-02 RX ADMIN — PANTOPRAZOLE SODIUM 40 MG: 40 TABLET, DELAYED RELEASE ORAL at 06:22

## 2017-10-02 RX ADMIN — DAKIN'S SOLUTION 0.125% (QUARTER STRENGTH): 0.12 SOLUTION at 08:49

## 2017-10-02 RX ADMIN — ERTAPENEM SODIUM 1 G: 1 INJECTION, POWDER, LYOPHILIZED, FOR SOLUTION INTRAMUSCULAR; INTRAVENOUS at 00:58

## 2017-10-02 RX ADMIN — OXYCODONE HYDROCHLORIDE 15 MG: 5 TABLET ORAL at 12:22

## 2017-10-02 RX ADMIN — OXYBUTYNIN CHLORIDE 5 MG: 5 TABLET ORAL at 08:46

## 2017-10-02 RX ADMIN — Medication 500 UNITS: at 14:43

## 2017-10-02 RX ADMIN — OXYBUTYNIN CHLORIDE 5 MG: 5 TABLET ORAL at 16:00

## 2017-10-02 RX ADMIN — GABAPENTIN 300 MG: 300 CAPSULE ORAL at 08:46

## 2017-10-02 RX ADMIN — ALPRAZOLAM 1 MG: 1 TABLET ORAL at 11:50

## 2017-10-02 RX ADMIN — OXYCODONE HYDROCHLORIDE 15 MG: 5 TABLET ORAL at 19:52

## 2017-10-02 RX ADMIN — GABAPENTIN 300 MG: 300 CAPSULE ORAL at 21:28

## 2017-10-02 RX ADMIN — Medication 500 UNITS: at 09:29

## 2017-10-02 RX ADMIN — DAKIN'S SOLUTION 0.125% (QUARTER STRENGTH): 0.12 SOLUTION at 22:39

## 2017-10-02 RX ADMIN — OXYBUTYNIN CHLORIDE 5 MG: 5 TABLET ORAL at 21:28

## 2017-10-02 RX ADMIN — Medication 1 TABLET: at 08:46

## 2017-10-02 RX ADMIN — OXYCODONE HYDROCHLORIDE 15 MG: 5 TABLET ORAL at 06:21

## 2017-10-02 RX ADMIN — Medication 140 MG: at 08:46

## 2017-10-02 RX ADMIN — GABAPENTIN 300 MG: 300 CAPSULE ORAL at 16:00
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
## 2017-10-02 NOTE — PLAN OF CARE
Problem: Patient Care Overview (Adult)  Goal: Plan of Care Review  Outcome: Ongoing (interventions implemented as appropriate)    10/02/17 1636   Coping/Psychosocial Response Interventions   Plan Of Care Reviewed With patient   Patient Care Overview   Progress improving   Outcome Evaluation   Outcome Summary/Follow up Plan No falls. On special bed and drsg changes as ordered. Pain controlled with po Oxycodone. BP on low side and MD aware. Port right chest with good blood return. Cont to monitor.       Goal: Adult Individualization and Mutuality  Outcome: Ongoing (interventions implemented as appropriate)  Goal: Discharge Needs Assessment  Outcome: Ongoing (interventions implemented as appropriate)    Problem: Sepsis (Adult)  Goal: Signs and Symptoms of Listed Potential Problems Will be Absent or Manageable (Sepsis)  Outcome: Ongoing (interventions implemented as appropriate)    Problem: Skin Integrity Impairment, Risk/Actual (Adult)  Goal: Skin Integrity/Wound Healing  Outcome: Ongoing (interventions implemented as appropriate)    Problem: Pain, Acute (Adult)  Goal: Acceptable Pain Control/Comfort Level  Outcome: Ongoing (interventions implemented as appropriate)    Problem: Pressure Ulcer (Adult)  Goal: Signs and Symptoms of Listed Potential Problems Will be Absent or Manageable (Pressure Ulcer)  Outcome: Ongoing (interventions implemented as appropriate)    Problem: Pain, Chronic (Adult)  Goal: Acceptable Pain Control/Comfort Level  Outcome: Ongoing (interventions implemented as appropriate)

## 2017-10-02 NOTE — PROGRESS NOTES
"Adult Nutrition  Assessment/PES    Patient Name:  Joaquín Sherman  YOB: 1980  MRN: [id number]  Admit Date:  9/27/2017    Assessment Date:  10/2/2017     Comments:  Followed up with patient.  Agreed to Irwin BID to promote wound healing.  Drinks Ensure, has it ordered TID.  25% x 4 meals.  Receiving snacks BID.  Gave patient always available menu to provide him with more options to order from.  Multiple pressure ulcers noted per skin report.      Recommend adding Vit C and Zinc to promote wound healing.    Will continue to follow.          Reason for Assessment       10/02/17 1511    Reason for Assessment    Reason For Assessment/Visit follow up protocol              Nutrition/Diet History       10/02/17 1511    Nutrition/Diet History    Typical Food/Fluid Intake gave always available menu, reports he is not getting to place order every meal, wants to know what else we have    Supplemental Drinks/Foods/Additives likes chocolate and strawberry Ensure, agrees to Irwin            Anthropometrics       10/02/17 1512    Anthropometrics    Height 182.9 cm (72.01\")    Weight 40.2 kg (88 lb 10 oz)    RD Documented Current Weight  40.2 kg (88 lb 10 oz)    Anthropometrics (Special Considerations)    RD Calculated BMI (kg/m2) 11.93    Ideal Body Weight (IBW)    Ideal Body Weight (IBW), Male (kg) 82.09    % Ideal Body Weight 49.07    % Ideal Body Weight Malnutrition less than 70% -severe deficit    Body Mass Index (BMI)    BMI (kg/m2) 12.04    BMI Grade less than 16 low grade III            Labs/Tests/Procedures/Meds       10/02/17 1513    Labs/Tests/Procedures/Meds    Diagnostic Test/Procedure Review reviewed, pertinent    Labs/Tests Review Reviewed;Creat;Hgb Hct    Medication Review Reviewed, pertinent;Multivitamin;Antacid   FeSO4    Significant Vitals reviewed, pertinent            Physical Findings       10/02/17 1514    Physical Findings/Assessment    Additional Documentation Physical Appearance (Group)    " Physical Appearance    Skin pressure ulcer(s);other (see comments)   R lateral leg wound, L knee st III, R knee st III, st II penis, L heel unstageable, lumbar spine st II, transverse ischial tuberosity st IV, B=12              Nutrition Prescription Ordered       10/02/17 1515    Nutrition Prescription PO    Current PO Diet Regular    Supplement Ensure Enlive    Supplement Frequency 3 times a day            Evaluation of Received Nutrient/Fluid Intake       10/02/17 1515    PO Evaluation    Number of Meals 4    % PO Intake 25   0-75%              Problem/Interventions:        Problem 1       10/02/17 1515    Nutrition Diagnoses Problem 1    Problem 1 Increased Nutrient Needs    Macronutrient Kcal;Protein    Etiology (related to) Medical Diagnosis    Skin Pressure ulcer    Signs/Symptoms (evidenced by) Report/Observation                    Intervention Goal       10/02/17 1516    Intervention Goal    General Maintain nutrition;Meet nutritional needs for age/condition;Disease management/therapy    PO Increase intake;PO intake (%);Meet estimated needs    PO Intake % 75 %    Weight Appropriate weight gain            Nutrition Intervention       10/02/17 1516    Nutrition Intervention    RD/Tech Action Follow Tx progress;Care plan reviewd;Encourage intake;Recommend/ordered;Supplement provided;Menu provided    Recommended/Ordered Supplement            Nutrition Prescription       10/02/17 1516    Nutrition Prescription PO    PO Prescription Begin/change supplement    Supplement Ensure Enlive;Irwin    Supplement Frequency 3 times a day;2 times a day    New PO Prescription Ordered? Yes            Education/Evaluation       10/02/17 1516    Education    Education Will Instruct as appropriate    Monitor/Evaluation    Monitor Per protocol;PO intake;Supplement intake;Weight;Skin status    Education Follow-up Reinforce PRN          Electronically signed by:  Darcy Jacobs RD  10/02/17 3:29 PM

## 2017-10-02 NOTE — PLAN OF CARE
Problem: Patient Care Overview (Adult)  Goal: Plan of Care Review  Outcome: Ongoing (interventions implemented as appropriate)    10/02/17 0539   Coping/Psychosocial Response Interventions   Plan Of Care Reviewed With patient   Patient Care Overview   Progress improving   Outcome Evaluation   Outcome Summary/Follow up Plan Pt rested well. Drsg changes as ordered. Pain medication given 1x with moderate relief. Isolation precautions maintained. VSS (except low BP), no s/s acute distress, will continue to monitor       Goal: Adult Individualization and Mutuality  Outcome: Ongoing (interventions implemented as appropriate)    Problem: Sepsis (Adult)  Goal: Signs and Symptoms of Listed Potential Problems Will be Absent or Manageable (Sepsis)  Outcome: Ongoing (interventions implemented as appropriate)    Problem: Skin Integrity Impairment, Risk/Actual (Adult)  Goal: Identify Related Risk Factors and Signs and Symptoms  Outcome: Outcome(s) achieved Date Met:  10/02/17  Goal: Skin Integrity/Wound Healing  Outcome: Ongoing (interventions implemented as appropriate)    Problem: Pain, Acute (Adult)  Goal: Identify Related Risk Factors and Signs and Symptoms  Outcome: Outcome(s) achieved Date Met:  10/02/17  Goal: Acceptable Pain Control/Comfort Level  Outcome: Ongoing (interventions implemented as appropriate)    Problem: Pressure Ulcer (Adult)  Goal: Signs and Symptoms of Listed Potential Problems Will be Absent or Manageable (Pressure Ulcer)  Outcome: Ongoing (interventions implemented as appropriate)    Problem: Pain, Chronic (Adult)  Goal: Identify Related Risk Factors and Signs and Symptoms  Outcome: Outcome(s) achieved Date Met:  10/02/17  Goal: Acceptable Pain Control/Comfort Level  Outcome: Ongoing (interventions implemented as appropriate)

## 2017-10-02 NOTE — PLAN OF CARE
Problem: Pressure Ulcer (Adult)  Intervention: Promote/Optimize Nutrition     Adding Irwin BID to promote wound healing.     Recommend adding Vit C BID x 14 days and Zinc to promote wound healing.

## 2017-10-02 NOTE — PROGRESS NOTES
Continued Stay Note  [facility]     Patient Name: Joaquín Sherman  MRN: [id number]  Today's Date: 10/2/2017    Admit Date: 9/27/2017          Discharge Plan       10/02/17 1503    Case Management/Social Work Plan    Additional Comments New worker for APS is Pina Lombardi [phone].              Discharge Codes     None            Shiloh Madsen RN

## 2017-10-03 LAB
ANION GAP SERPL CALCULATED.3IONS-SCNC: 12.8 MMOL/L
BASOPHILS # BLD AUTO: 0.02 10*3/MM3 (ref 0–0.2)
BASOPHILS NFR BLD AUTO: 0.3 % (ref 0–1.5)
BUN BLD-MCNC: 10 MG/DL (ref 6–20)
BUN/CREAT SERPL: 14.3 (ref 7–25)
CALCIUM SPEC-SCNC: 7.1 MG/DL (ref 8.6–10.5)
CHLORIDE SERPL-SCNC: 106 MMOL/L (ref 98–107)
CO2 SERPL-SCNC: 24.2 MMOL/L (ref 22–29)
CORTIS SERPL-MCNC: 12.9 MCG/DL
CORTIS SERPL-MCNC: 14.3 MCG/DL
CORTIS SERPL-MCNC: 6.5 MCG/DL
CREAT BLD-MCNC: 0.7 MG/DL (ref 0.76–1.27)
DEPRECATED RDW RBC AUTO: 56.3 FL (ref 37–54)
EOSINOPHIL # BLD AUTO: 0.14 10*3/MM3 (ref 0–0.7)
EOSINOPHIL NFR BLD AUTO: 2 % (ref 0.3–6.2)
ERYTHROCYTE [DISTWIDTH] IN BLOOD BY AUTOMATED COUNT: 18.7 % (ref 11.5–14.5)
GFR SERPL CREATININE-BSD FRML MDRD: >150 ML/MIN/1.73
GLUCOSE BLD-MCNC: 88 MG/DL (ref 65–99)
HCT VFR BLD AUTO: 29.8 % (ref 40.4–52.2)
HGB BLD-MCNC: 8.7 G/DL (ref 13.7–17.6)
IMM GRANULOCYTES # BLD: 0.03 10*3/MM3 (ref 0–0.03)
IMM GRANULOCYTES NFR BLD: 0.4 % (ref 0–0.5)
LYMPHOCYTES # BLD AUTO: 1.9 10*3/MM3 (ref 0.9–4.8)
LYMPHOCYTES NFR BLD AUTO: 27.2 % (ref 19.6–45.3)
MCH RBC QN AUTO: 24 PG (ref 27–32.7)
MCHC RBC AUTO-ENTMCNC: 29.2 G/DL (ref 32.6–36.4)
MCV RBC AUTO: 82.3 FL (ref 79.8–96.2)
MONOCYTES # BLD AUTO: 0.63 10*3/MM3 (ref 0.2–1.2)
MONOCYTES NFR BLD AUTO: 9 % (ref 5–12)
NEUTROPHILS # BLD AUTO: 4.27 10*3/MM3 (ref 1.9–8.1)
NEUTROPHILS NFR BLD AUTO: 61.1 % (ref 42.7–76)
NRBC BLD MANUAL-RTO: 0 /100 WBC (ref 0–0)
PLATELET # BLD AUTO: 244 10*3/MM3 (ref 140–500)
PMV BLD AUTO: 8.8 FL (ref 6–12)
POTASSIUM BLD-SCNC: 4 MMOL/L (ref 3.5–5.2)
RBC # BLD AUTO: 3.62 10*6/MM3 (ref 4.6–6)
SODIUM BLD-SCNC: 143 MMOL/L (ref 136–145)
WBC NRBC COR # BLD: 6.99 10*3/MM3 (ref 4.5–10.7)

## 2017-10-03 PROCEDURE — 25010000002 COSYNTROPIN PER 0.25 MG: Performed by: INTERNAL MEDICINE

## 2017-10-03 PROCEDURE — 85025 COMPLETE CBC W/AUTO DIFF WBC: CPT | Performed by: INTERNAL MEDICINE

## 2017-10-03 PROCEDURE — 80048 BASIC METABOLIC PNL TOTAL CA: CPT | Performed by: INTERNAL MEDICINE

## 2017-10-03 PROCEDURE — 82533 TOTAL CORTISOL: CPT | Performed by: INTERNAL MEDICINE

## 2017-10-03 PROCEDURE — 0T29X0Z CHANGE DRAINAGE DEVICE IN URETER, EXTERNAL APPROACH: ICD-10-PCS | Performed by: UROLOGY

## 2017-10-03 PROCEDURE — 25010000002 ENOXAPARIN PER 10 MG: Performed by: INTERNAL MEDICINE

## 2017-10-03 PROCEDURE — 25010000002 HEPARIN FLUSH (PORCINE) 100 UNIT/ML SOLUTION

## 2017-10-03 RX ORDER — COSYNTROPIN 0.25 MG/ML
0.25 INJECTION, POWDER, FOR SOLUTION INTRAMUSCULAR; INTRAVENOUS ONCE
Status: COMPLETED | OUTPATIENT
Start: 2017-10-03 | End: 2017-10-03

## 2017-10-03 RX ORDER — OXYCODONE HYDROCHLORIDE 5 MG/1
15 TABLET ORAL EVERY 4 HOURS PRN
Status: DISCONTINUED | OUTPATIENT
Start: 2017-10-03 | End: 2017-10-09 | Stop reason: HOSPADM

## 2017-10-03 RX ADMIN — COSYNTROPIN 0.25 MG: 0.25 INJECTION, POWDER, LYOPHILIZED, FOR SOLUTION INTRAMUSCULAR; INTRAVENOUS at 20:11

## 2017-10-03 RX ADMIN — GABAPENTIN 300 MG: 300 CAPSULE ORAL at 16:07

## 2017-10-03 RX ADMIN — OXYCODONE HYDROCHLORIDE 15 MG: 5 TABLET ORAL at 07:00

## 2017-10-03 RX ADMIN — GABAPENTIN 300 MG: 300 CAPSULE ORAL at 21:33

## 2017-10-03 RX ADMIN — DAKIN'S SOLUTION 0.125% (QUARTER STRENGTH): 0.12 SOLUTION at 09:59

## 2017-10-03 RX ADMIN — OXYCODONE HYDROCHLORIDE 15 MG: 5 TABLET ORAL at 23:37

## 2017-10-03 RX ADMIN — OXYCODONE HYDROCHLORIDE 15 MG: 5 TABLET ORAL at 12:52

## 2017-10-03 RX ADMIN — OXYBUTYNIN CHLORIDE 5 MG: 5 TABLET ORAL at 16:07

## 2017-10-03 RX ADMIN — Medication 500 UNITS: at 21:39

## 2017-10-03 RX ADMIN — GABAPENTIN 300 MG: 300 CAPSULE ORAL at 09:59

## 2017-10-03 RX ADMIN — Medication 1 TABLET: at 09:59

## 2017-10-03 RX ADMIN — OXYBUTYNIN CHLORIDE 5 MG: 5 TABLET ORAL at 09:59

## 2017-10-03 RX ADMIN — SODIUM CHLORIDE 150 ML/HR: 9 INJECTION, SOLUTION INTRAVENOUS at 00:24

## 2017-10-03 RX ADMIN — ALPRAZOLAM 1 MG: 1 TABLET ORAL at 00:14

## 2017-10-03 RX ADMIN — ALPRAZOLAM 1 MG: 1 TABLET ORAL at 12:17

## 2017-10-03 RX ADMIN — ERTAPENEM SODIUM 1 G: 1 INJECTION, POWDER, LYOPHILIZED, FOR SOLUTION INTRAMUSCULAR; INTRAVENOUS at 00:25

## 2017-10-03 RX ADMIN — ENOXAPARIN SODIUM 30 MG: 30 INJECTION SUBCUTANEOUS at 21:33

## 2017-10-03 RX ADMIN — OXYBUTYNIN CHLORIDE 5 MG: 5 TABLET ORAL at 21:33

## 2017-10-03 RX ADMIN — PANTOPRAZOLE SODIUM 40 MG: 40 TABLET, DELAYED RELEASE ORAL at 06:56

## 2017-10-03 RX ADMIN — OXYCODONE HYDROCHLORIDE 15 MG: 5 TABLET ORAL at 19:26

## 2017-10-03 RX ADMIN — Medication 140 MG: at 09:59

## 2017-10-03 NOTE — PLAN OF CARE
Problem: Patient Care Overview (Adult)  Goal: Plan of Care Review  Outcome: Ongoing (interventions implemented as appropriate)    10/03/17 1592   Coping/Psychosocial Response Interventions   Plan Of Care Reviewed With patient   Patient Care Overview   Progress progress toward functional goals as expected   Outcome Evaluation   Outcome Summary/Follow up Plan no blood return on port. medicated for pain no n/v or soa.

## 2017-10-03 NOTE — SIGNIFICANT NOTE
10/03/17 0827   Rehab Treatment   Discipline physical therapist   Rehab Evaluation   Evaluation Not Performed patient/family declined evaluation  (Educated pt on need for PT. Pt declines all therapy services at this time. Will sign off at this time. )

## 2017-10-03 NOTE — PROGRESS NOTES
Continued Stay Note  [facility]     Patient Name: Joaquín Sherman  MRN: [id number]  Today's Date: 10/3/2017    Admit Date: 9/27/2017          Discharge Plan       10/03/17 1634    Case Management/Social Work Plan    Plan Plans are home with HH.  [facility] will be following pt.  Updated pt @ bedside.  CCP to follow     Additional Comments Call to [facility], they will be able to accept pt.  Plans are to have a MSSW to see pt at home.  [facility] and [facility]  cannot readmit pt @ this time.  CCP to follow.              Discharge Codes     None            Ronna Epperson RN
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
## 2017-10-03 NOTE — PROGRESS NOTES
No subjective fevers overnight    Afebrile.    Abd soft, nondistended  SP tube intact.    WBC 6.99    Bld cx: coag neg staph    Plan:  - SP tube exchanged today  - no additional urologic intervention warranted  - follow up in 6 weeks for SP tube exchange

## 2017-10-03 NOTE — DISCHARGE PLACEMENT REQUEST
"Joaquín Youssef (37 y.o. Male)     Date of Birth Social Security Number Address Home Phone MRN    1980  [id number]  [address] [place], [place] [id number] [phone] [id number]    [employer] Marital Status          None Single       Admission Date Admission Type Admitting Provider Attending Provider Department, Room/Bed    9/27/17 Emergency Andre Conroy MD Saad, Lebnan S, MD [address], 625/1    Discharge Date Discharge Disposition Discharge Destination                      Attending Provider: Andre Conroy MD     Allergies:  Amoxicillin-pot Clavulanate, Ibuprofen, Ketorolac Tromethamine, Meropenem, Vancomycin    Isolation:  Contact   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), 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)   Code Status:  FULL    Ht:  72.01\" (182.9 cm)   Wt:  89 lb 8 oz (40.6 kg)    Admission Cmt:  None   Principal Problem:  None                Active Insurance as of 9/27/2017     Primary Coverage     Payor Plan Insurance Group Employer/Plan Group    PASSPORT PASSPORT      Payor Plan Address Payor Plan Phone Number Effective From Effective To    [address] [phone] 1/1/1998     [place] [address]       Subscriber Name Subscriber Birth Date Member ID       JOAQUÍN YOUSSEF 1980 [id number]                 Emergency Contacts      (Rel.) Home Phone Work Phone Mobile Phone    Marlen Al (Mother) [phone] -- --    PengJenni (Sister) [phone] -- --              "

## 2017-10-04 PROBLEM — Z87.820 HISTORY OF TRAUMATIC BRAIN INJURY: Status: ACTIVE | Noted: 2017-10-04

## 2017-10-04 PROBLEM — G89.21 CHRONIC PAIN DUE TO TRAUMA: Status: ACTIVE | Noted: 2017-10-04

## 2017-10-04 PROBLEM — R94.7: Status: ACTIVE | Noted: 2017-10-04

## 2017-10-04 LAB
ANION GAP SERPL CALCULATED.3IONS-SCNC: 8.2 MMOL/L
ANISOCYTOSIS BLD QL: NORMAL
BACTERIA SPEC AEROBE CULT: NORMAL
BACTERIA SPEC AEROBE CULT: NORMAL
BASOPHILS # BLD AUTO: 0.04 10*3/MM3 (ref 0–0.2)
BASOPHILS NFR BLD AUTO: 0.5 % (ref 0–1.5)
BUN BLD-MCNC: 13 MG/DL (ref 6–20)
BUN/CREAT SERPL: 15.9 (ref 7–25)
CALCIUM SPEC-SCNC: 8.5 MG/DL (ref 8.6–10.5)
CHLORIDE SERPL-SCNC: 100 MMOL/L (ref 98–107)
CO2 SERPL-SCNC: 28.8 MMOL/L (ref 22–29)
CREAT BLD-MCNC: 0.82 MG/DL (ref 0.76–1.27)
DEPRECATED RDW RBC AUTO: 57.8 FL (ref 37–54)
EOSINOPHIL # BLD AUTO: 0.14 10*3/MM3 (ref 0–0.7)
EOSINOPHIL NFR BLD AUTO: 1.9 % (ref 0.3–6.2)
ERYTHROCYTE [DISTWIDTH] IN BLOOD BY AUTOMATED COUNT: 18.7 % (ref 11.5–14.5)
GFR SERPL CREATININE-BSD FRML MDRD: 128 ML/MIN/1.73
GLUCOSE BLD-MCNC: 112 MG/DL (ref 65–99)
HCT VFR BLD AUTO: 31.6 % (ref 40.4–52.2)
HGB BLD-MCNC: 9.1 G/DL (ref 13.7–17.6)
HYPOCHROMIA BLD QL: NORMAL
IMM GRANULOCYTES # BLD: 0.03 10*3/MM3 (ref 0–0.03)
IMM GRANULOCYTES NFR BLD: 0.4 % (ref 0–0.5)
LYMPHOCYTES # BLD AUTO: 1.94 10*3/MM3 (ref 0.9–4.8)
LYMPHOCYTES NFR BLD AUTO: 26.5 % (ref 19.6–45.3)
MCH RBC QN AUTO: 24.1 PG (ref 27–32.7)
MCHC RBC AUTO-ENTMCNC: 28.8 G/DL (ref 32.6–36.4)
MCV RBC AUTO: 83.8 FL (ref 79.8–96.2)
MONOCYTES # BLD AUTO: 0.63 10*3/MM3 (ref 0.2–1.2)
MONOCYTES NFR BLD AUTO: 8.6 % (ref 5–12)
NEUTROPHILS # BLD AUTO: 4.53 10*3/MM3 (ref 1.9–8.1)
NEUTROPHILS NFR BLD AUTO: 62.1 % (ref 42.7–76)
NRBC BLD MANUAL-RTO: 0 /100 WBC (ref 0–0)
PLAT MORPH BLD: NORMAL
PLATELET # BLD AUTO: 215 10*3/MM3 (ref 140–500)
PMV BLD AUTO: 9.1 FL (ref 6–12)
POTASSIUM BLD-SCNC: 4.3 MMOL/L (ref 3.5–5.2)
RBC # BLD AUTO: 3.77 10*6/MM3 (ref 4.6–6)
SODIUM BLD-SCNC: 137 MMOL/L (ref 136–145)
T4 FREE SERPL-MCNC: 0.98 NG/DL (ref 0.93–1.7)
TSH SERPL DL<=0.05 MIU/L-ACNC: 2.17 MIU/ML (ref 0.27–4.2)
WBC MORPH BLD: NORMAL
WBC NRBC COR # BLD: 7.31 10*3/MM3 (ref 4.5–10.7)

## 2017-10-04 PROCEDURE — 99254 IP/OBS CNSLTJ NEW/EST MOD 60: CPT | Performed by: INTERNAL MEDICINE

## 2017-10-04 PROCEDURE — 85025 COMPLETE CBC W/AUTO DIFF WBC: CPT | Performed by: INTERNAL MEDICINE

## 2017-10-04 PROCEDURE — 84443 ASSAY THYROID STIM HORMONE: CPT | Performed by: INTERNAL MEDICINE

## 2017-10-04 PROCEDURE — 25010000002 HEPARIN FLUSH (PORCINE) 100 UNIT/ML SOLUTION

## 2017-10-04 PROCEDURE — 84439 ASSAY OF FREE THYROXINE: CPT | Performed by: INTERNAL MEDICINE

## 2017-10-04 PROCEDURE — 85007 BL SMEAR W/DIFF WBC COUNT: CPT | Performed by: INTERNAL MEDICINE

## 2017-10-04 PROCEDURE — 25010000002 ENOXAPARIN PER 10 MG: Performed by: INTERNAL MEDICINE

## 2017-10-04 PROCEDURE — 80048 BASIC METABOLIC PNL TOTAL CA: CPT | Performed by: INTERNAL MEDICINE

## 2017-10-04 RX ADMIN — Medication 500 UNITS: at 05:07

## 2017-10-04 RX ADMIN — ALUMINUM HYDROXIDE, MAGNESIUM HYDROXIDE, AND DIMETHICONE 15 ML: 400; 400; 40 SUSPENSION ORAL at 09:20

## 2017-10-04 RX ADMIN — OXYCODONE HYDROCHLORIDE 15 MG: 5 TABLET ORAL at 04:00

## 2017-10-04 RX ADMIN — GABAPENTIN 300 MG: 300 CAPSULE ORAL at 09:02

## 2017-10-04 RX ADMIN — Medication 1 TABLET: at 09:02

## 2017-10-04 RX ADMIN — ALPRAZOLAM 1 MG: 1 TABLET ORAL at 12:29

## 2017-10-04 RX ADMIN — OXYCODONE HYDROCHLORIDE 15 MG: 5 TABLET ORAL at 09:02

## 2017-10-04 RX ADMIN — OXYBUTYNIN CHLORIDE 5 MG: 5 TABLET ORAL at 17:39

## 2017-10-04 RX ADMIN — DAKIN'S SOLUTION 0.125% (QUARTER STRENGTH): 0.12 SOLUTION at 03:18

## 2017-10-04 RX ADMIN — GABAPENTIN 300 MG: 300 CAPSULE ORAL at 21:04

## 2017-10-04 RX ADMIN — PANTOPRAZOLE SODIUM 40 MG: 40 TABLET, DELAYED RELEASE ORAL at 04:01

## 2017-10-04 RX ADMIN — OXYCODONE HYDROCHLORIDE 15 MG: 5 TABLET ORAL at 22:25

## 2017-10-04 RX ADMIN — OXYBUTYNIN CHLORIDE 5 MG: 5 TABLET ORAL at 21:04

## 2017-10-04 RX ADMIN — DAKIN'S SOLUTION 0.125% (QUARTER STRENGTH): 0.12 SOLUTION at 15:10

## 2017-10-04 RX ADMIN — OXYCODONE HYDROCHLORIDE 15 MG: 5 TABLET ORAL at 17:39

## 2017-10-04 RX ADMIN — OXYCODONE HYDROCHLORIDE 15 MG: 5 TABLET ORAL at 13:55

## 2017-10-04 RX ADMIN — ENOXAPARIN SODIUM 30 MG: 30 INJECTION SUBCUTANEOUS at 21:03

## 2017-10-04 RX ADMIN — GABAPENTIN 300 MG: 300 CAPSULE ORAL at 17:39

## 2017-10-04 RX ADMIN — OXYBUTYNIN CHLORIDE 5 MG: 5 TABLET ORAL at 09:02

## 2017-10-04 RX ADMIN — Medication 140 MG: at 09:02
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
## 2017-10-04 NOTE — PLAN OF CARE
Problem: Patient Care Overview (Adult)  Goal: Plan of Care Review  Outcome: Ongoing (interventions implemented as appropriate)    10/04/17 0435   Coping/Psychosocial Response Interventions   Plan Of Care Reviewed With patient   Patient Care Overview   Progress no change   Outcome Evaluation   Outcome Summary/Follow up Plan Large dressing change done at 0300-see annotated note. No blood return from mediport-placed hep-lock 5cc into port. Pain meds per MAR.       Goal: Adult Individualization and Mutuality  Outcome: Ongoing (interventions implemented as appropriate)  Goal: Discharge Needs Assessment  Outcome: Ongoing (interventions implemented as appropriate)    Problem: Sepsis (Adult)  Goal: Signs and Symptoms of Listed Potential Problems Will be Absent or Manageable (Sepsis)  Outcome: Ongoing (interventions implemented as appropriate)    Problem: Skin Integrity Impairment, Risk/Actual (Adult)  Goal: Skin Integrity/Wound Healing  Outcome: Ongoing (interventions implemented as appropriate)    Problem: Pain, Acute (Adult)  Goal: Acceptable Pain Control/Comfort Level  Outcome: Ongoing (interventions implemented as appropriate)    Problem: Pressure Ulcer (Adult)  Goal: Signs and Symptoms of Listed Potential Problems Will be Absent or Manageable (Pressure Ulcer)  Outcome: Ongoing (interventions implemented as appropriate)    Problem: Pain, Chronic (Adult)  Goal: Acceptable Pain Control/Comfort Level  Outcome: Ongoing (interventions implemented as appropriate)

## 2017-10-04 NOTE — PLAN OF CARE
Problem: Patient Care Overview (Adult)  Goal: Plan of Care Review  Outcome: Ongoing (interventions implemented as appropriate)    10/04/17 7714   Coping/Psychosocial Response Interventions   Plan Of Care Reviewed With patient   Patient Care Overview   Progress no change   Outcome Evaluation   Outcome Summary/Follow up Plan dressing change done, pain meds given, plastics seen today, no blood return from mediport, will continue to monitor.        Goal: Adult Individualization and Mutuality  Outcome: Ongoing (interventions implemented as appropriate)    Problem: Sepsis (Adult)  Goal: Signs and Symptoms of Listed Potential Problems Will be Absent or Manageable (Sepsis)  Outcome: Ongoing (interventions implemented as appropriate)    Problem: Pain, Acute (Adult)  Goal: Acceptable Pain Control/Comfort Level  Outcome: Ongoing (interventions implemented as appropriate)    Problem: Pressure Ulcer (Adult)  Goal: Signs and Symptoms of Listed Potential Problems Will be Absent or Manageable (Pressure Ulcer)  Outcome: Ongoing (interventions implemented as appropriate)    Problem: Pain, Chronic (Adult)  Goal: Acceptable Pain Control/Comfort Level  Outcome: Ongoing (interventions implemented as appropriate)

## 2017-10-04 NOTE — NURSING NOTE
Dressing change done.  Old dressing removed 1/2 at a time.  Wound base cleansed with sterile NS.  Some bleeding noted from scrotum and upper portion of decub to upper buttocks.  Pt stated that Dakins only went into Left hip packing, so that is what I did.  Wound consisted of left hip, left and right buttock, coccyx, and scrotum.  All other areas got Opticell AG placed to wound bed and then small amount of sterile NS onto Opticell, then ABD pads, then paper tape.  Pt able to turn self in bed with help of trapeze bar and tolerated well.
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
## 2017-10-05 PROBLEM — E53.8 FOLATE DEFICIENCY: Status: ACTIVE | Noted: 2017-10-05

## 2017-10-05 PROBLEM — E55.9 VITAMIN D DEFICIENCY: Status: ACTIVE | Noted: 2017-10-05

## 2017-10-05 LAB
25(OH)D3 SERPL-MCNC: 10 NG/ML (ref 30–100)
ANION GAP SERPL CALCULATED.3IONS-SCNC: 12.1 MMOL/L
BASOPHILS # BLD AUTO: 0.03 10*3/MM3 (ref 0–0.2)
BASOPHILS NFR BLD AUTO: 0.4 % (ref 0–1.5)
BUN BLD-MCNC: 12 MG/DL (ref 6–20)
BUN/CREAT SERPL: 13 (ref 7–25)
CALCIUM SPEC-SCNC: 8.7 MG/DL (ref 8.6–10.5)
CHLORIDE SERPL-SCNC: 99 MMOL/L (ref 98–107)
CO2 SERPL-SCNC: 25.9 MMOL/L (ref 22–29)
CORTIS SERPL-MCNC: 4.19 MCG/DL
CREAT BLD-MCNC: 0.92 MG/DL (ref 0.76–1.27)
DEPRECATED RDW RBC AUTO: 57.3 FL (ref 37–54)
EOSINOPHIL # BLD AUTO: 0.14 10*3/MM3 (ref 0–0.7)
EOSINOPHIL NFR BLD AUTO: 1.8 % (ref 0.3–6.2)
ERYTHROCYTE [DISTWIDTH] IN BLOOD BY AUTOMATED COUNT: 18.4 % (ref 11.5–14.5)
FOLATE SERPL-MCNC: 3.11 NG/ML (ref 4.78–24.2)
FSH SERPL-ACNC: 23.47 MIU/ML
GFR SERPL CREATININE-BSD FRML MDRD: 112 ML/MIN/1.73
GLUCOSE BLD-MCNC: 92 MG/DL (ref 65–99)
HCT VFR BLD AUTO: 31 % (ref 40.4–52.2)
HGB BLD-MCNC: 9 G/DL (ref 13.7–17.6)
IMM GRANULOCYTES # BLD: 0.03 10*3/MM3 (ref 0–0.03)
IMM GRANULOCYTES NFR BLD: 0.4 % (ref 0–0.5)
LH SERPL-ACNC: 15.27 MIU/ML
LYMPHOCYTES # BLD AUTO: 2.06 10*3/MM3 (ref 0.9–4.8)
LYMPHOCYTES NFR BLD AUTO: 27 % (ref 19.6–45.3)
MCH RBC QN AUTO: 24.4 PG (ref 27–32.7)
MCHC RBC AUTO-ENTMCNC: 29 G/DL (ref 32.6–36.4)
MCV RBC AUTO: 84 FL (ref 79.8–96.2)
MONOCYTES # BLD AUTO: 0.87 10*3/MM3 (ref 0.2–1.2)
MONOCYTES NFR BLD AUTO: 11.4 % (ref 5–12)
NEUTROPHILS # BLD AUTO: 4.49 10*3/MM3 (ref 1.9–8.1)
NEUTROPHILS NFR BLD AUTO: 59 % (ref 42.7–76)
PLATELET # BLD AUTO: 203 10*3/MM3 (ref 140–500)
PMV BLD AUTO: 9.2 FL (ref 6–12)
POTASSIUM BLD-SCNC: 4.3 MMOL/L (ref 3.5–5.2)
PROLACTIN SERPL-MCNC: 36.32 NG/ML (ref 4.04–15.2)
RBC # BLD AUTO: 3.69 10*6/MM3 (ref 4.6–6)
SODIUM BLD-SCNC: 137 MMOL/L (ref 136–145)
TESTOST SERPL-MCNC: 240.5 NG/DL (ref 249–836)
VIT B12 BLD-MCNC: 405 PG/ML (ref 211–946)
WBC NRBC COR # BLD: 7.62 10*3/MM3 (ref 4.5–10.7)

## 2017-10-05 PROCEDURE — 82306 VITAMIN D 25 HYDROXY: CPT | Performed by: INTERNAL MEDICINE

## 2017-10-05 PROCEDURE — 80048 BASIC METABOLIC PNL TOTAL CA: CPT | Performed by: INTERNAL MEDICINE

## 2017-10-05 PROCEDURE — 99232 SBSQ HOSP IP/OBS MODERATE 35: CPT | Performed by: INTERNAL MEDICINE

## 2017-10-05 PROCEDURE — 83001 ASSAY OF GONADOTROPIN (FSH): CPT | Performed by: INTERNAL MEDICINE

## 2017-10-05 PROCEDURE — 83002 ASSAY OF GONADOTROPIN (LH): CPT | Performed by: INTERNAL MEDICINE

## 2017-10-05 PROCEDURE — 82024 ASSAY OF ACTH: CPT | Performed by: INTERNAL MEDICINE

## 2017-10-05 PROCEDURE — 84403 ASSAY OF TOTAL TESTOSTERONE: CPT | Performed by: INTERNAL MEDICINE

## 2017-10-05 PROCEDURE — 25010000002 HEPARIN FLUSH (PORCINE) 100 UNIT/ML SOLUTION

## 2017-10-05 PROCEDURE — 82746 ASSAY OF FOLIC ACID SERUM: CPT | Performed by: INTERNAL MEDICINE

## 2017-10-05 PROCEDURE — 25010000002 ENOXAPARIN PER 10 MG: Performed by: INTERNAL MEDICINE

## 2017-10-05 PROCEDURE — 85025 COMPLETE CBC W/AUTO DIFF WBC: CPT | Performed by: INTERNAL MEDICINE

## 2017-10-05 PROCEDURE — 82607 VITAMIN B-12: CPT | Performed by: INTERNAL MEDICINE

## 2017-10-05 PROCEDURE — 82533 TOTAL CORTISOL: CPT | Performed by: INTERNAL MEDICINE

## 2017-10-05 PROCEDURE — 84146 ASSAY OF PROLACTIN: CPT | Performed by: INTERNAL MEDICINE

## 2017-10-05 RX ORDER — DEXAMETHASONE 0.5 MG/1
1 TABLET ORAL
Status: DISCONTINUED | OUTPATIENT
Start: 2017-10-05 | End: 2017-10-09

## 2017-10-05 RX ORDER — MELATONIN
5000 DAILY
Status: DISCONTINUED | OUTPATIENT
Start: 2017-10-05 | End: 2017-10-09 | Stop reason: HOSPADM

## 2017-10-05 RX ADMIN — Medication 500 UNITS: at 05:55

## 2017-10-05 RX ADMIN — DAKIN'S SOLUTION 0.125% (QUARTER STRENGTH): 0.12 SOLUTION at 10:06

## 2017-10-05 RX ADMIN — ALUMINUM HYDROXIDE, MAGNESIUM HYDROXIDE, AND DIMETHICONE 15 ML: 400; 400; 40 SUSPENSION ORAL at 09:01

## 2017-10-05 RX ADMIN — OXYCODONE HYDROCHLORIDE 15 MG: 5 TABLET ORAL at 23:13

## 2017-10-05 RX ADMIN — Medication 1 CAPSULE: at 18:06

## 2017-10-05 RX ADMIN — DAKIN'S SOLUTION 0.125% (QUARTER STRENGTH): 0.12 SOLUTION at 23:17

## 2017-10-05 RX ADMIN — OXYBUTYNIN CHLORIDE 5 MG: 5 TABLET ORAL at 21:12

## 2017-10-05 RX ADMIN — OXYCODONE HYDROCHLORIDE 15 MG: 5 TABLET ORAL at 05:48

## 2017-10-05 RX ADMIN — VITAMIN D, TAB 1000IU (100/BT) 5000 UNITS: 25 TAB at 10:47

## 2017-10-05 RX ADMIN — OXYBUTYNIN CHLORIDE 5 MG: 5 TABLET ORAL at 16:21

## 2017-10-05 RX ADMIN — ENOXAPARIN SODIUM 30 MG: 30 INJECTION SUBCUTANEOUS at 21:12

## 2017-10-05 RX ADMIN — OXYCODONE HYDROCHLORIDE 15 MG: 5 TABLET ORAL at 14:14

## 2017-10-05 RX ADMIN — ALUMINUM HYDROXIDE, MAGNESIUM HYDROXIDE, AND DIMETHICONE 15 ML: 400; 400; 40 SUSPENSION ORAL at 18:06

## 2017-10-05 RX ADMIN — OXYCODONE HYDROCHLORIDE 15 MG: 5 TABLET ORAL at 18:06

## 2017-10-05 RX ADMIN — DAKIN'S SOLUTION 0.125% (QUARTER STRENGTH): 0.12 SOLUTION at 00:56

## 2017-10-05 RX ADMIN — OXYBUTYNIN CHLORIDE 5 MG: 5 TABLET ORAL at 08:57

## 2017-10-05 RX ADMIN — OXYCODONE HYDROCHLORIDE 15 MG: 5 TABLET ORAL at 10:04

## 2017-10-05 RX ADMIN — Medication 1 CAPSULE: at 10:47

## 2017-10-05 RX ADMIN — ALPRAZOLAM 1 MG: 1 TABLET ORAL at 11:04

## 2017-10-05 RX ADMIN — ALPRAZOLAM 1 MG: 1 TABLET ORAL at 21:21

## 2017-10-05 RX ADMIN — DEXAMETHASONE 1 MG: 0.5 TABLET ORAL at 10:47

## 2017-10-05 RX ADMIN — GABAPENTIN 300 MG: 300 CAPSULE ORAL at 21:12

## 2017-10-05 RX ADMIN — Medication 1 TABLET: at 08:56

## 2017-10-05 RX ADMIN — GABAPENTIN 300 MG: 300 CAPSULE ORAL at 16:21

## 2017-10-05 RX ADMIN — GABAPENTIN 300 MG: 300 CAPSULE ORAL at 08:57

## 2017-10-05 RX ADMIN — PANTOPRAZOLE SODIUM 40 MG: 40 TABLET, DELAYED RELEASE ORAL at 05:48

## 2017-10-05 NOTE — CONSULTS
Patient requested a visit.  He shared about his own personal and spiritual history.  He shared with me that he is still seeking God in his life and seeking what God wants him to do.  He shared little about his own present struggles.  When he concluded he was open to prayer.
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
## 2017-10-05 NOTE — NURSING NOTE
CWOCN follow up- we had used a flat barrier ring yesterday around the suprapubic and this seems to be sealing the os preventing leakage. Patient is happy with this. Recommend that he not place it quite so tight around the catheter (patient did it himself yesterday) as I think it is going to be tricky to remove- patient understands and says he will be very careful when removing. There is no drainage on the abd pads. I left the ordering number on his  nurse's vm per his request.

## 2017-10-05 NOTE — PLAN OF CARE
Problem: Patient Care Overview (Adult)  Goal: Plan of Care Review  Outcome: Ongoing (interventions implemented as appropriate)    10/05/17 0333   Coping/Psychosocial Response Interventions   Plan Of Care Reviewed With patient   Patient Care Overview   Progress improving   Outcome Evaluation   Outcome Summary/Follow up Plan Dresing change done to left hip and left ankle with Dakins. Other dressings are Daily or QOD.        Goal: Adult Individualization and Mutuality  Outcome: Ongoing (interventions implemented as appropriate)  Goal: Discharge Needs Assessment  Outcome: Ongoing (interventions implemented as appropriate)    Problem: Sepsis (Adult)  Goal: Signs and Symptoms of Listed Potential Problems Will be Absent or Manageable (Sepsis)  Outcome: Ongoing (interventions implemented as appropriate)    Problem: Skin Integrity Impairment, Risk/Actual (Adult)  Goal: Skin Integrity/Wound Healing  Outcome: Ongoing (interventions implemented as appropriate)    Problem: Pain, Acute (Adult)  Goal: Acceptable Pain Control/Comfort Level  Outcome: Ongoing (interventions implemented as appropriate)    Problem: Pressure Ulcer (Adult)  Goal: Signs and Symptoms of Listed Potential Problems Will be Absent or Manageable (Pressure Ulcer)  Outcome: Ongoing (interventions implemented as appropriate)    Problem: Pain, Chronic (Adult)  Goal: Acceptable Pain Control/Comfort Level  Outcome: Ongoing (interventions implemented as appropriate)

## 2017-10-05 NOTE — PROGRESS NOTES
Discharge Planning Assessment  [facility]     Patient Name: Joaquín Sherman  MRN: [id number]  Today's Date: 10/5/2017    Admit Date: 9/27/2017      Discharge Plan       10/05/17 1459    Case Management/Social Work Plan    Plan Home with [facility]     Additional Comments Spoke with pt at bedside, pt is requesting a trapeze bar for his hospital bed at home. Note left for MD. Pt states his hospital bed is broken at home. Updated him that he will have to call [employer] to re-initiate bed repair claim. Pt stated he doesn't need the number he has it at home. Informed him that it would benefit him to call asap to re-initiate the bed repair claim. Pt plans home with [facility] . Spoke with Thalia/Phyllis, pt will have a  follow him at home to assist with obtaining a waiver for possible bath aide and other assistance. CCP to follow.          Discharge Placement     Facility/Agency Request Status Selected? Address Phone Number Fax Number    [facility] Accepted    [id number], [facility] [address] [phone]    [facility] Declined     [address] [facility] [address] [id number] [phone]    [facility] Premium Declined     [address] [facility] [address] [id number] [phone]              Urszula Caldwell RN

## 2017-10-05 NOTE — PLAN OF CARE
Problem: Patient Care Overview (Adult)  Goal: Plan of Care Review  Outcome: Ongoing (interventions implemented as appropriate)    10/05/17 6611   Coping/Psychosocial Response Interventions   Plan Of Care Reviewed With patient   Patient Care Overview   Progress improving   Outcome Evaluation   Outcome Summary/Follow up Plan No falls. Pain controlled with prn med. Dsg change to hip and ankle. Right chest port reaccessed and dsg changed per IV therapy. On [place] bed. S/P catheter intact, no leaking. Condom catheter intact. Dsg changes to bilateral legs every other day, due tomorrow.. Posible dc in next day or two. Cont to monitor.       Goal: Adult Individualization and Mutuality  Outcome: Ongoing (interventions implemented as appropriate)  Goal: Discharge Needs Assessment  Outcome: Ongoing (interventions implemented as appropriate)    Problem: Sepsis (Adult)  Goal: Signs and Symptoms of Listed Potential Problems Will be Absent or Manageable (Sepsis)  Outcome: Ongoing (interventions implemented as appropriate)    Problem: Skin Integrity Impairment, Risk/Actual (Adult)  Goal: Skin Integrity/Wound Healing  Outcome: Ongoing (interventions implemented as appropriate)    Problem: Pain, Acute (Adult)  Goal: Acceptable Pain Control/Comfort Level  Outcome: Ongoing (interventions implemented as appropriate)    Problem: Pressure Ulcer (Adult)  Goal: Signs and Symptoms of Listed Potential Problems Will be Absent or Manageable (Pressure Ulcer)  Outcome: Ongoing (interventions implemented as appropriate)    Problem: Pain, Chronic (Adult)  Goal: Acceptable Pain Control/Comfort Level  Outcome: Ongoing (interventions implemented as appropriate)

## 2017-10-06 LAB
ACTH PLAS-MCNC: 26.6 PG/ML (ref 7.2–63.3)
ANION GAP SERPL CALCULATED.3IONS-SCNC: 10.7 MMOL/L
BASOPHILS # BLD AUTO: 0.02 10*3/MM3 (ref 0–0.2)
BASOPHILS NFR BLD AUTO: 0.3 % (ref 0–1.5)
BUN BLD-MCNC: 11 MG/DL (ref 6–20)
BUN/CREAT SERPL: 13.4 (ref 7–25)
CALCIUM SPEC-SCNC: 8.7 MG/DL (ref 8.6–10.5)
CHLORIDE SERPL-SCNC: 98 MMOL/L (ref 98–107)
CO2 SERPL-SCNC: 28.3 MMOL/L (ref 22–29)
CORTIS SERPL-MCNC: 1.5 MCG/DL
CREAT BLD-MCNC: 0.82 MG/DL (ref 0.76–1.27)
DEPRECATED RDW RBC AUTO: 55.9 FL (ref 37–54)
EOSINOPHIL # BLD AUTO: 0.04 10*3/MM3 (ref 0–0.7)
EOSINOPHIL NFR BLD AUTO: 0.5 % (ref 0.3–6.2)
ERYTHROCYTE [DISTWIDTH] IN BLOOD BY AUTOMATED COUNT: 18.1 % (ref 11.5–14.5)
FSH SERPL-ACNC: 26.99 MIU/ML
GFR SERPL CREATININE-BSD FRML MDRD: 128 ML/MIN/1.73
GLUCOSE BLD-MCNC: 111 MG/DL (ref 65–99)
HCT VFR BLD AUTO: 31 % (ref 40.4–52.2)
HGB BLD-MCNC: 8.9 G/DL (ref 13.7–17.6)
IMM GRANULOCYTES # BLD: 0.03 10*3/MM3 (ref 0–0.03)
IMM GRANULOCYTES NFR BLD: 0.4 % (ref 0–0.5)
LH SERPL-ACNC: 23.35 MIU/ML
LYMPHOCYTES # BLD AUTO: 1.48 10*3/MM3 (ref 0.9–4.8)
LYMPHOCYTES NFR BLD AUTO: 19 % (ref 19.6–45.3)
MCH RBC QN AUTO: 23.9 PG (ref 27–32.7)
MCHC RBC AUTO-ENTMCNC: 28.7 G/DL (ref 32.6–36.4)
MCV RBC AUTO: 83.1 FL (ref 79.8–96.2)
MONOCYTES # BLD AUTO: 0.7 10*3/MM3 (ref 0.2–1.2)
MONOCYTES NFR BLD AUTO: 9 % (ref 5–12)
NEUTROPHILS # BLD AUTO: 5.53 10*3/MM3 (ref 1.9–8.1)
NEUTROPHILS NFR BLD AUTO: 70.8 % (ref 42.7–76)
NRBC BLD MANUAL-RTO: 0 /100 WBC (ref 0–0)
PLATELET # BLD AUTO: 222 10*3/MM3 (ref 140–500)
PMV BLD AUTO: 9.2 FL (ref 6–12)
POTASSIUM BLD-SCNC: 4.5 MMOL/L (ref 3.5–5.2)
PROLACTIN SERPL-MCNC: 30.21 NG/ML (ref 4.04–15.2)
RBC # BLD AUTO: 3.73 10*6/MM3 (ref 4.6–6)
SODIUM BLD-SCNC: 137 MMOL/L (ref 136–145)
TESTOST SERPL-MCNC: 278.7 NG/DL (ref 249–836)
WBC NRBC COR # BLD: 7.8 10*3/MM3 (ref 4.5–10.7)

## 2017-10-06 PROCEDURE — 80048 BASIC METABOLIC PNL TOTAL CA: CPT | Performed by: INTERNAL MEDICINE

## 2017-10-06 PROCEDURE — 83002 ASSAY OF GONADOTROPIN (LH): CPT | Performed by: INTERNAL MEDICINE

## 2017-10-06 PROCEDURE — 84403 ASSAY OF TOTAL TESTOSTERONE: CPT | Performed by: INTERNAL MEDICINE

## 2017-10-06 PROCEDURE — 85025 COMPLETE CBC W/AUTO DIFF WBC: CPT | Performed by: INTERNAL MEDICINE

## 2017-10-06 PROCEDURE — 25010000002 HEPARIN FLUSH (PORCINE) 100 UNIT/ML SOLUTION

## 2017-10-06 PROCEDURE — 83519 RIA NONANTIBODY: CPT | Performed by: INTERNAL MEDICINE

## 2017-10-06 PROCEDURE — 99232 SBSQ HOSP IP/OBS MODERATE 35: CPT | Performed by: INTERNAL MEDICINE

## 2017-10-06 PROCEDURE — 25010000002 ENOXAPARIN PER 10 MG: Performed by: INTERNAL MEDICINE

## 2017-10-06 PROCEDURE — 83001 ASSAY OF GONADOTROPIN (FSH): CPT | Performed by: INTERNAL MEDICINE

## 2017-10-06 PROCEDURE — 84146 ASSAY OF PROLACTIN: CPT | Performed by: INTERNAL MEDICINE

## 2017-10-06 PROCEDURE — 82533 TOTAL CORTISOL: CPT | Performed by: INTERNAL MEDICINE

## 2017-10-06 RX ADMIN — DEXAMETHASONE 1 MG: 0.5 TABLET ORAL at 08:34

## 2017-10-06 RX ADMIN — ALPRAZOLAM 1 MG: 1 TABLET ORAL at 11:37

## 2017-10-06 RX ADMIN — Medication 1 CAPSULE: at 08:34

## 2017-10-06 RX ADMIN — ENOXAPARIN SODIUM 30 MG: 30 INJECTION SUBCUTANEOUS at 20:59

## 2017-10-06 RX ADMIN — Medication 500 UNITS: at 06:01

## 2017-10-06 RX ADMIN — GABAPENTIN 300 MG: 300 CAPSULE ORAL at 15:57

## 2017-10-06 RX ADMIN — OXYBUTYNIN CHLORIDE 5 MG: 5 TABLET ORAL at 15:57

## 2017-10-06 RX ADMIN — GABAPENTIN 300 MG: 300 CAPSULE ORAL at 20:59

## 2017-10-06 RX ADMIN — OXYBUTYNIN CHLORIDE 5 MG: 5 TABLET ORAL at 20:59

## 2017-10-06 RX ADMIN — PANTOPRAZOLE SODIUM 40 MG: 40 TABLET, DELAYED RELEASE ORAL at 06:01

## 2017-10-06 RX ADMIN — DAKIN'S SOLUTION 0.125% (QUARTER STRENGTH): 0.12 SOLUTION at 08:35

## 2017-10-06 RX ADMIN — OXYCODONE HYDROCHLORIDE 15 MG: 5 TABLET ORAL at 14:59

## 2017-10-06 RX ADMIN — ALUMINUM HYDROXIDE, MAGNESIUM HYDROXIDE, AND DIMETHICONE 15 ML: 400; 400; 40 SUSPENSION ORAL at 08:36

## 2017-10-06 RX ADMIN — GABAPENTIN 300 MG: 300 CAPSULE ORAL at 08:34

## 2017-10-06 RX ADMIN — OXYCODONE HYDROCHLORIDE 15 MG: 5 TABLET ORAL at 18:57

## 2017-10-06 RX ADMIN — ALUMINUM HYDROXIDE, MAGNESIUM HYDROXIDE, AND DIMETHICONE 15 ML: 400; 400; 40 SUSPENSION ORAL at 18:06

## 2017-10-06 RX ADMIN — Medication 1 CAPSULE: at 18:06

## 2017-10-06 RX ADMIN — VITAMIN D, TAB 1000IU (100/BT) 5000 UNITS: 25 TAB at 08:34

## 2017-10-06 RX ADMIN — OXYBUTYNIN CHLORIDE 5 MG: 5 TABLET ORAL at 08:34

## 2017-10-06 RX ADMIN — OXYCODONE HYDROCHLORIDE 15 MG: 5 TABLET ORAL at 10:56

## 2017-10-06 RX ADMIN — Medication 1 TABLET: at 08:35

## 2017-10-06 RX ADMIN — OXYCODONE HYDROCHLORIDE 15 MG: 5 TABLET ORAL at 06:01

## 2017-10-06 NOTE — PAYOR COMM NOTE
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   Maximum 60 mL in 24 hours.    0836                            bisacodyl (DULCOLAX) EC tablet 5 mg  Dose: 5 mg Freq: Daily PRN Route: PO  PRN Reason: Constipation  Start: 09/28/17 0047    Admin Instructions:   Swallow whole. Do not crush, split, or chew tablet.       ondansetron (ZOFRAN) tablet 4 mg  Dose: 4 mg Freq: Every 6 Hours PRN Route: PO  PRN Reasons: Nausea,Vomiting  Start: 09/28/17 0047    Admin Instructions:   If BOTH ondansetron (ZOFRAN) and promethazine (PHENERGAN) are ordered use ondansetron first and THEN promethazine IF ondansetron is ineffective.       Or  ondansetron ODT (ZOFRAN-ODT) disintegrating tablet 4 mg  Dose: 4 mg Freq: Every 6 Hours PRN Route: PO  PRN Reasons: Nausea,Vomiting  Start: 09/28/17 0047    Admin Instructions:   If BOTH ondansetron (ZOFRAN) and promethazine (PHENERGAN) are ordered use ondansetron first and THEN promethazine IF ondansetron is ineffective.  Place on tongue and allow to dissolve.       Or  ondansetron (ZOFRAN) injection 4 mg  Dose: 4 mg Freq: Every 6 Hours PRN Route: IV  PRN Reasons: Nausea,Vomiting  Start: 09/28/17 0047    Admin Instructions:   If BOTH ondansetron (ZOFRAN) and promethazine (PHENERGAN) are  ordered use ondansetron first and THEN promethazine IF ondansetron is ineffective.       oxyCODONE (ROXICODONE) immediate release tablet 15 mg  Dose: 15 mg Freq: Every 4 Hours PRN Route: PO  PRN Reason: Moderate Pain   Start: 10/03/17 1715    Admin Instructions:   If given for pain, use the following pain scale:  Mild Pain = Pain Score of 1-3, CPOT 1-2  Moderate Pain = Pain Score of 4-6, CPOT 3-4  Severe Pain = Pain Score of 7-10, CPOT 5-8    0601     1056     1459                        oxyCODONE (ROXICODONE) immediate release tablet 15 mg  Dose: 15 mg Freq: Every 6 Hours PRN Route: PO  PRN Reason: Moderate Pain   Start: 09/28/17 0047 End: 10/03/17 1709    Admin Instructions:   If given for pain, use the following pain scale:  Mild Pain = Pain Score of 1-3, CPOT 1-2  Moderate Pain = Pain Score of 4-6, CPOT 3-4  Severe Pain = Pain Score of 7-10, CPOT 5-8       sodium chloride 0.9 % flush 1-10 mL  Dose: 1-10 mL Freq: As Needed Route: IV  PRN Reason: Line Care  Start: 09/28/17 0047       Medications 10/06/17
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
## 2017-10-06 NOTE — PLAN OF CARE
Problem: Patient Care Overview (Adult)  Goal: Plan of Care Review  Outcome: Ongoing (interventions implemented as appropriate)    10/06/17 9960   Coping/Psychosocial Response Interventions   Plan Of Care Reviewed With patient   Patient Care Overview   Progress improving   Outcome Evaluation   Outcome Summary/Follow up Plan No falls. Pain controlled wiht prn med. All drsg's changed today. S/P cath intact. Condom cath intact. Colostomy intact. On special sand bed. Started on calorie count today. Cont to monitor.       Goal: Adult Individualization and Mutuality  Outcome: Ongoing (interventions implemented as appropriate)  Goal: Discharge Needs Assessment  Outcome: Ongoing (interventions implemented as appropriate)    Problem: Sepsis (Adult)  Goal: Signs and Symptoms of Listed Potential Problems Will be Absent or Manageable (Sepsis)  Outcome: Ongoing (interventions implemented as appropriate)    Problem: Skin Integrity Impairment, Risk/Actual (Adult)  Goal: Skin Integrity/Wound Healing  Outcome: Ongoing (interventions implemented as appropriate)    Problem: Pain, Acute (Adult)  Goal: Acceptable Pain Control/Comfort Level  Outcome: Ongoing (interventions implemented as appropriate)    Problem: Pressure Ulcer (Adult)  Goal: Signs and Symptoms of Listed Potential Problems Will be Absent or Manageable (Pressure Ulcer)  Outcome: Ongoing (interventions implemented as appropriate)    Problem: Pain, Chronic (Adult)  Goal: Acceptable Pain Control/Comfort Level  Outcome: Ongoing (interventions implemented as appropriate)    Problem: Nutrition, Imbalanced: Inadequate Oral Intake (Adult)  Goal: Identify Related Risk Factors and Signs and Symptoms  Outcome: Ongoing (interventions implemented as appropriate)  Goal: Improved Oral Intake  Outcome: Ongoing (interventions implemented as appropriate)  Goal: Prevent Further Weight Loss  Outcome: Ongoing (interventions implemented as appropriate)

## 2017-10-06 NOTE — NURSING NOTE
CWOCN follow up with patient- provided supplies for home. Discussed wound care- he would like to go to the wound clinic at [facility]. I will make a note for CCP. Also, he can call and get an appt after he is discharged. He has seen Dr Cole there prior and is interested in going back to see Dr Cole or Dr Head.  Provided adhesive remover to assist with changing the flat barrier ring at the suprapubic catheter.
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
## 2017-10-06 NOTE — CONSULTS
Adult Nutrition  Assessment/PES    Patient Name:  Joaquín Sherman  YOB: 1980  MRN: [id number]  Admit Date:  9/27/2017    Assessment Date:  10/6/2017    Comments:  Calorie count to begin today.  Discussed po and supplements with pt.  Hasn't been drinking the Irwin - c/o it upsets his stomach. Wants 2 PB and jelly sandwiches and 2 milks at HS.  Will adjust supplements and snacks and report results of calorie count in am.          Reason for Assessment       10/06/17 0926    Reason for Assessment    Reason For Assessment/Visit physician consult;calorie count order              Nutrition/Diet History       10/06/17 0926    Nutrition/Diet History    Factors Affecting Nutritional Intake Factors    Reported/Observed By Patient    Appetite Good    Other reports irwin upsets his stomach.            Anthropometrics       10/06/17 0927    Anthropometrics    RD Documented Current Weight  40.6 kg (89 lb 8.1 oz)            Labs/Tests/Procedures/Meds       10/06/17 0927    Labs/Tests/Procedures/Meds    Diagnostic Test/Procedure Review reviewed, pertinent    Labs/Tests Review Glucose;Reviewed;Hgb Hct    Medication Review Reviewed, pertinent;Multivitamin;Iron;Steroid;Antacid   Vit D    Significant Vitals reviewed            Physical Findings       10/06/17 0929    Physical Appearance    Overall Physical Appearance paraplegia;loss of subcutaneous fat;loss of muscle mass;cachetic    Gastrointestinal colostomy    Skin pressure ulcer(s)   MULTIPLE wounds - see nursing notes, photos              Nutrition Prescription Ordered       10/06/17 0934    Nutrition Prescription PO    Current PO Diet Regular    Supplement Ensure Enlive;Irwin;Other (comment)   sandwich    Supplement Frequency 3 times a day;2 times a day    Snack Times 10AM;2PM            Evaluation of Received Nutrient/Fluid Intake       10/06/17 0935    PO Evaluation    Number of Meals 2    % PO Intake 75%        Problem/Interventions:        Intervention Goal        10/06/17 0936    Intervention Goal    General Maintain nutrition;Reduce/improve symptoms;Meet nutritional needs for age/condition;Disease management/therapy    PO Tolerate PO;Increase intake;PO intake (%)    PO Intake % 100 %    Weight Appropriate weight gain            Nutrition Intervention       10/06/17 0936    Nutrition Intervention    RD/Tech Action Care plan reviewd;Interview for preference;Advise available snack;Advise alternate selection;Encourage intake;Follow Tx progress;Adjusted supplement;Recommend/ordered    Recommended/Ordered Supplement;Snack            Nutrition Prescription       10/06/17 0937    Nutrition Prescription PO    PO Prescription Discontinue supplement   DC Irwin per pt request    Supplement Other (comment)   2 PB and Jelly sandwiches and 2 milks    Supplement Frequency Snack time    Snack Times Bedtime    New PO Prescription Ordered? Yes            Education/Evaluation       10/06/17 0938    Education    Education Will Instruct as appropriate    Monitor/Evaluation    Monitor Per protocol;Symptoms;I&O;PO intake;Supplement intake;Pertinent labs;Weight;Skin status        Electronically signed by:  Yolanda Duke RD  10/06/17 9:39 AM

## 2017-10-06 NOTE — PLAN OF CARE
Problem: Patient Care Overview (Adult)  Goal: Plan of Care Review  Outcome: Ongoing (interventions implemented as appropriate)    10/06/17 0314   Coping/Psychosocial Response Interventions   Plan Of Care Reviewed With patient   Patient Care Overview   Progress improving   Outcome Evaluation   Outcome Summary/Follow up Plan On Cl[place]ron bed. Dressing change done to L hip and L heel. No leakage from SP cath. Colostomy top pouch changed.        Goal: Adult Individualization and Mutuality  Outcome: Ongoing (interventions implemented as appropriate)  Goal: Discharge Needs Assessment  Outcome: Ongoing (interventions implemented as appropriate)    Problem: Sepsis (Adult)  Goal: Signs and Symptoms of Listed Potential Problems Will be Absent or Manageable (Sepsis)  Outcome: Ongoing (interventions implemented as appropriate)    Problem: Skin Integrity Impairment, Risk/Actual (Adult)  Goal: Skin Integrity/Wound Healing  Outcome: Ongoing (interventions implemented as appropriate)    Problem: Pain, Acute (Adult)  Goal: Acceptable Pain Control/Comfort Level  Outcome: Ongoing (interventions implemented as appropriate)    Problem: Pressure Ulcer (Adult)  Goal: Signs and Symptoms of Listed Potential Problems Will be Absent or Manageable (Pressure Ulcer)  Outcome: Ongoing (interventions implemented as appropriate)    Problem: Pain, Chronic (Adult)  Goal: Acceptable Pain Control/Comfort Level  Outcome: Ongoing (interventions implemented as appropriate)

## 2017-10-06 NOTE — PLAN OF CARE
Problem: Skin Integrity Impairment, Risk/Actual (Adult)  Intervention: Promote/Optimize Nutrition    10/06/17 0942   Nutrition Interventions   Oral Nutrition Promotion calorie dense foods provided;calorie dense liquids provided;nutritional therapy counseling provided           Problem: Nutrition, Imbalanced: Inadequate Oral Intake (Adult)  Intervention: Promote/Optimize Nutrition    10/06/17 0942   Nutrition Interventions   Oral Nutrition Promotion calorie dense foods provided;calorie dense liquids provided;nutritional therapy counseling provided. Calorie Count to begin today. RD following         Goal: Identify Related Risk Factors and Signs and Symptoms  Outcome: Ongoing (interventions implemented as appropriate)  Goal: Improved Oral Intake  Outcome: Ongoing (interventions implemented as appropriate)  Goal: Prevent Further Weight Loss  Outcome: Ongoing (interventions implemented as appropriate)

## 2017-10-07 LAB
ANION GAP SERPL CALCULATED.3IONS-SCNC: 10.6 MMOL/L
BASOPHILS # BLD AUTO: 0.03 10*3/MM3 (ref 0–0.2)
BASOPHILS NFR BLD AUTO: 0.4 % (ref 0–1.5)
BUN BLD-MCNC: 13 MG/DL (ref 6–20)
BUN/CREAT SERPL: 16.3 (ref 7–25)
CALCIUM SPEC-SCNC: 9.1 MG/DL (ref 8.6–10.5)
CHLORIDE SERPL-SCNC: 101 MMOL/L (ref 98–107)
CO2 SERPL-SCNC: 27.4 MMOL/L (ref 22–29)
CREAT BLD-MCNC: 0.8 MG/DL (ref 0.76–1.27)
DEPRECATED RDW RBC AUTO: 57.1 FL (ref 37–54)
EOSINOPHIL # BLD AUTO: 0.04 10*3/MM3 (ref 0–0.7)
EOSINOPHIL NFR BLD AUTO: 0.5 % (ref 0.3–6.2)
ERYTHROCYTE [DISTWIDTH] IN BLOOD BY AUTOMATED COUNT: 18.2 % (ref 11.5–14.5)
FSH SERPL-ACNC: 26.83 MIU/ML
GFR SERPL CREATININE-BSD FRML MDRD: 132 ML/MIN/1.73
GLUCOSE BLD-MCNC: 94 MG/DL (ref 65–99)
HCT VFR BLD AUTO: 31.3 % (ref 40.4–52.2)
HGB BLD-MCNC: 9 G/DL (ref 13.7–17.6)
IMM GRANULOCYTES # BLD: 0.03 10*3/MM3 (ref 0–0.03)
IMM GRANULOCYTES NFR BLD: 0.4 % (ref 0–0.5)
LH SERPL-ACNC: 24 MIU/ML
LYMPHOCYTES # BLD AUTO: 1.89 10*3/MM3 (ref 0.9–4.8)
LYMPHOCYTES NFR BLD AUTO: 23 % (ref 19.6–45.3)
MCH RBC QN AUTO: 24.3 PG (ref 27–32.7)
MCHC RBC AUTO-ENTMCNC: 28.8 G/DL (ref 32.6–36.4)
MCV RBC AUTO: 84.4 FL (ref 79.8–96.2)
MONOCYTES # BLD AUTO: 0.64 10*3/MM3 (ref 0.2–1.2)
MONOCYTES NFR BLD AUTO: 7.8 % (ref 5–12)
NEUTROPHILS # BLD AUTO: 5.57 10*3/MM3 (ref 1.9–8.1)
NEUTROPHILS NFR BLD AUTO: 67.9 % (ref 42.7–76)
NRBC BLD MANUAL-RTO: 0 /100 WBC (ref 0–0)
PLATELET # BLD AUTO: 242 10*3/MM3 (ref 140–500)
PMV BLD AUTO: 9.6 FL (ref 6–12)
POTASSIUM BLD-SCNC: 4.8 MMOL/L (ref 3.5–5.2)
RBC # BLD AUTO: 3.71 10*6/MM3 (ref 4.6–6)
SODIUM BLD-SCNC: 139 MMOL/L (ref 136–145)
TESTOST SERPL-MCNC: 356.2 NG/DL (ref 249–836)
WBC NRBC COR # BLD: 8.2 10*3/MM3 (ref 4.5–10.7)

## 2017-10-07 PROCEDURE — 85025 COMPLETE CBC W/AUTO DIFF WBC: CPT | Performed by: INTERNAL MEDICINE

## 2017-10-07 PROCEDURE — 83002 ASSAY OF GONADOTROPIN (LH): CPT | Performed by: INTERNAL MEDICINE

## 2017-10-07 PROCEDURE — 84403 ASSAY OF TOTAL TESTOSTERONE: CPT | Performed by: INTERNAL MEDICINE

## 2017-10-07 PROCEDURE — 83001 ASSAY OF GONADOTROPIN (FSH): CPT | Performed by: INTERNAL MEDICINE

## 2017-10-07 PROCEDURE — 80048 BASIC METABOLIC PNL TOTAL CA: CPT | Performed by: INTERNAL MEDICINE

## 2017-10-07 PROCEDURE — 84146 ASSAY OF PROLACTIN: CPT | Performed by: INTERNAL MEDICINE

## 2017-10-07 PROCEDURE — 99233 SBSQ HOSP IP/OBS HIGH 50: CPT | Performed by: INTERNAL MEDICINE

## 2017-10-07 PROCEDURE — 25010000002 ENOXAPARIN PER 10 MG: Performed by: INTERNAL MEDICINE

## 2017-10-07 RX ORDER — SODIUM CHLORIDE 0.9 % (FLUSH) 0.9 %
10 SYRINGE (ML) INJECTION EVERY 12 HOURS SCHEDULED
Status: DISCONTINUED | OUTPATIENT
Start: 2017-10-07 | End: 2017-10-09 | Stop reason: HOSPADM

## 2017-10-07 RX ORDER — SODIUM CHLORIDE 0.9 % (FLUSH) 0.9 %
10 SYRINGE (ML) INJECTION AS NEEDED
Status: DISCONTINUED | OUTPATIENT
Start: 2017-10-07 | End: 2017-10-09 | Stop reason: HOSPADM

## 2017-10-07 RX ADMIN — OXYCODONE HYDROCHLORIDE 15 MG: 5 TABLET ORAL at 17:33

## 2017-10-07 RX ADMIN — OXYBUTYNIN CHLORIDE 5 MG: 5 TABLET ORAL at 15:52

## 2017-10-07 RX ADMIN — ALUMINUM HYDROXIDE, MAGNESIUM HYDROXIDE, AND DIMETHICONE 15 ML: 400; 400; 40 SUSPENSION ORAL at 09:47

## 2017-10-07 RX ADMIN — PANTOPRAZOLE SODIUM 40 MG: 40 TABLET, DELAYED RELEASE ORAL at 05:45

## 2017-10-07 RX ADMIN — OXYCODONE HYDROCHLORIDE 15 MG: 5 TABLET ORAL at 00:48

## 2017-10-07 RX ADMIN — Medication 10 ML: at 22:05

## 2017-10-07 RX ADMIN — VITAMIN D, TAB 1000IU (100/BT) 5000 UNITS: 25 TAB at 09:13

## 2017-10-07 RX ADMIN — OXYCODONE HYDROCHLORIDE 15 MG: 5 TABLET ORAL at 08:47

## 2017-10-07 RX ADMIN — DAKIN'S SOLUTION 0.125% (QUARTER STRENGTH): 0.12 SOLUTION at 22:05

## 2017-10-07 RX ADMIN — DAKIN'S SOLUTION 0.125% (QUARTER STRENGTH): 0.12 SOLUTION at 08:25

## 2017-10-07 RX ADMIN — ALPRAZOLAM 1 MG: 1 TABLET ORAL at 00:59

## 2017-10-07 RX ADMIN — ENOXAPARIN SODIUM 30 MG: 30 INJECTION SUBCUTANEOUS at 20:45

## 2017-10-07 RX ADMIN — OXYBUTYNIN CHLORIDE 5 MG: 5 TABLET ORAL at 20:45

## 2017-10-07 RX ADMIN — DEXAMETHASONE 1 MG: 0.5 TABLET ORAL at 08:00

## 2017-10-07 RX ADMIN — ALUMINUM HYDROXIDE, MAGNESIUM HYDROXIDE, AND DIMETHICONE 15 ML: 400; 400; 40 SUSPENSION ORAL at 17:33

## 2017-10-07 RX ADMIN — Medication 1 CAPSULE: at 09:14

## 2017-10-07 RX ADMIN — GABAPENTIN 300 MG: 300 CAPSULE ORAL at 15:52

## 2017-10-07 RX ADMIN — GABAPENTIN 300 MG: 300 CAPSULE ORAL at 20:45

## 2017-10-07 RX ADMIN — OXYCODONE HYDROCHLORIDE 15 MG: 5 TABLET ORAL at 22:28

## 2017-10-07 RX ADMIN — Medication 1 CAPSULE: at 17:28

## 2017-10-07 RX ADMIN — Medication 1 TABLET: at 09:13

## 2017-10-07 RX ADMIN — ALPRAZOLAM 1 MG: 1 TABLET ORAL at 14:16

## 2017-10-07 RX ADMIN — DAKIN'S SOLUTION 0.125% (QUARTER STRENGTH): 0.12 SOLUTION at 01:01

## 2017-10-07 RX ADMIN — GABAPENTIN 300 MG: 300 CAPSULE ORAL at 09:14

## 2017-10-07 RX ADMIN — OXYBUTYNIN CHLORIDE 5 MG: 5 TABLET ORAL at 08:50

## 2017-10-07 RX ADMIN — Medication 10 ML: at 08:50

## 2017-10-07 NOTE — PROGRESS NOTES
Nutrition Services    Patient Name:  Joaquín Sherman  YOB: 1980  MRN: [id number]  Admit Date:  9/27/2017    Calore count 10/6 ~ 1533 calories (85% needs), 43 grams protein (54% needs) from breakfast and 1 snack. Lunch and dinner menus not saved. According to nursing flowsheet, at 100% lunch, 75% dinner. Calorie count continues today. Results to follow.    Electronically signed by:  Suzanne Farias RD  10/07/17 10:49 AM

## 2017-10-07 NOTE — PLAN OF CARE
Problem: Patient Care Overview (Adult)  Goal: Plan of Care Review  Outcome: Ongoing (interventions implemented as appropriate)    10/07/17 1534   Coping/Psychosocial Response Interventions   Plan Of Care Reviewed With patient   Patient Care Overview   Progress improving   Outcome Evaluation   Outcome Summary/Follow up Plan c/o pain oxycodone given with good relief, c/o anxiety xanax given with good relief, dsgs to wounds as ordered, heel boots in place, clinitron bed with trapeze bar, will continue to monitor        Goal: Adult Individualization and Mutuality  Outcome: Ongoing (interventions implemented as appropriate)  Goal: Discharge Needs Assessment  Outcome: Ongoing (interventions implemented as appropriate)    Problem: Sepsis (Adult)  Goal: Signs and Symptoms of Listed Potential Problems Will be Absent or Manageable (Sepsis)  Outcome: Ongoing (interventions implemented as appropriate)    Problem: Skin Integrity Impairment, Risk/Actual (Adult)  Goal: Skin Integrity/Wound Healing  Outcome: Ongoing (interventions implemented as appropriate)    Problem: Pain, Acute (Adult)  Goal: Acceptable Pain Control/Comfort Level  Outcome: Ongoing (interventions implemented as appropriate)    Problem: Pressure Ulcer (Adult)  Goal: Signs and Symptoms of Listed Potential Problems Will be Absent or Manageable (Pressure Ulcer)  Outcome: Ongoing (interventions implemented as appropriate)    Problem: Pain, Chronic (Adult)  Goal: Acceptable Pain Control/Comfort Level  Outcome: Ongoing (interventions implemented as appropriate)    Problem: Nutrition, Imbalanced: Inadequate Oral Intake (Adult)  Goal: Improved Oral Intake  Outcome: Ongoing (interventions implemented as appropriate)  Goal: Prevent Further Weight Loss  Outcome: Ongoing (interventions implemented as appropriate)    Problem: Fall Risk (Adult)  Goal: Absence of Falls  Outcome: Ongoing (interventions implemented as appropriate)
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
## 2017-10-07 NOTE — NURSING NOTE
UTO weight due to Clinitron bed not having a bed scale, no lift with bed scale in hospital after d/w Charge RN; pt is a paraplegic, bedbound.    no back pain/no neck pain no muscle weakness/no muscle cramps/no stiffness/no back pain/no neck pain

## 2017-10-07 NOTE — PLAN OF CARE
Problem: Patient Care Overview (Adult)  Goal: Plan of Care Review  Outcome: Ongoing (interventions implemented as appropriate)    10/07/17 0325   Coping/Psychosocial Response Interventions   Plan Of Care Reviewed With patient   Patient Care Overview   Progress improving   Outcome Evaluation   Outcome Summary/Follow up Plan wound care per Plastics recommendations; calorie count started yesterday; Clinitron bed in place, heel lift boots in place, able to reposition self with use of OHF with trapeze and side rails; NSR on monitor; plan for d/c home with [facility] home health and family assisting with care needs.        Goal: Adult Individualization and Mutuality  Outcome: Ongoing (interventions implemented as appropriate)    Problem: Sepsis (Adult)  Goal: Signs and Symptoms of Listed Potential Problems Will be Absent or Manageable (Sepsis)  Outcome: Ongoing (interventions implemented as appropriate)    Problem: Skin Integrity Impairment, Risk/Actual (Adult)  Goal: Skin Integrity/Wound Healing  Outcome: Ongoing (interventions implemented as appropriate)    Problem: Pain, Acute (Adult)  Goal: Acceptable Pain Control/Comfort Level  Outcome: Ongoing (interventions implemented as appropriate)    Problem: Pressure Ulcer (Adult)  Goal: Signs and Symptoms of Listed Potential Problems Will be Absent or Manageable (Pressure Ulcer)  Outcome: Ongoing (interventions implemented as appropriate)    Problem: Pain, Chronic (Adult)  Goal: Acceptable Pain Control/Comfort Level  Outcome: Ongoing (interventions implemented as appropriate)    Problem: Nutrition, Imbalanced: Inadequate Oral Intake (Adult)  Goal: Identify Related Risk Factors and Signs and Symptoms  Outcome: Outcome(s) achieved Date Met:  10/07/17  Goal: Improved Oral Intake  Outcome: Ongoing (interventions implemented as appropriate)  Goal: Prevent Further Weight Loss  Outcome: Ongoing (interventions implemented as appropriate)    Problem: Fall Risk (Adult)  Goal: Identify Related  Risk Factors and Signs and Symptoms  Outcome: Outcome(s) achieved Date Met:  10/07/17  Goal: Absence of Falls  Outcome: Ongoing (interventions implemented as appropriate)

## 2017-10-08 LAB
ANION GAP SERPL CALCULATED.3IONS-SCNC: 11 MMOL/L
ANISOCYTOSIS BLD QL: NORMAL
BASOPHILS # BLD AUTO: 0.03 10*3/MM3 (ref 0–0.2)
BASOPHILS NFR BLD AUTO: 0.3 % (ref 0–1.5)
BUN BLD-MCNC: 15 MG/DL (ref 6–20)
BUN/CREAT SERPL: 19.2 (ref 7–25)
C3 FRG RBC-MCNC: NORMAL
CALCIUM SPEC-SCNC: 9 MG/DL (ref 8.6–10.5)
CHLORIDE SERPL-SCNC: 99 MMOL/L (ref 98–107)
CO2 SERPL-SCNC: 28 MMOL/L (ref 22–29)
CREAT BLD-MCNC: 0.78 MG/DL (ref 0.76–1.27)
DEPRECATED RDW RBC AUTO: 57.5 FL (ref 37–54)
ELLIPTOCYTES BLD QL SMEAR: NORMAL
EOSINOPHIL # BLD AUTO: 0.03 10*3/MM3 (ref 0–0.7)
EOSINOPHIL NFR BLD AUTO: 0.3 % (ref 0.3–6.2)
ERYTHROCYTE [DISTWIDTH] IN BLOOD BY AUTOMATED COUNT: 18.2 % (ref 11.5–14.5)
GFR SERPL CREATININE-BSD FRML MDRD: 136 ML/MIN/1.73
GLUCOSE BLD-MCNC: 100 MG/DL (ref 65–99)
HCT VFR BLD AUTO: 32 % (ref 40.4–52.2)
HGB BLD-MCNC: 9 G/DL (ref 13.7–17.6)
HYPOCHROMIA BLD QL: NORMAL
IMM GRANULOCYTES # BLD: 0.03 10*3/MM3 (ref 0–0.03)
IMM GRANULOCYTES NFR BLD: 0.3 % (ref 0–0.5)
LYMPHOCYTES # BLD AUTO: 1.68 10*3/MM3 (ref 0.9–4.8)
LYMPHOCYTES NFR BLD AUTO: 14.9 % (ref 19.6–45.3)
MCH RBC QN AUTO: 24.2 PG (ref 27–32.7)
MCHC RBC AUTO-ENTMCNC: 28.1 G/DL (ref 32.6–36.4)
MCV RBC AUTO: 86 FL (ref 79.8–96.2)
MONOCYTES # BLD AUTO: 0.7 10*3/MM3 (ref 0.2–1.2)
MONOCYTES NFR BLD AUTO: 6.2 % (ref 5–12)
NEUTROPHILS # BLD AUTO: 8.83 10*3/MM3 (ref 1.9–8.1)
NEUTROPHILS NFR BLD AUTO: 78 % (ref 42.7–76)
NRBC BLD MANUAL-RTO: 0 /100 WBC (ref 0–0)
PLAT MORPH BLD: NORMAL
PLATELET # BLD AUTO: 225 10*3/MM3 (ref 140–500)
PMV BLD AUTO: 9.7 FL (ref 6–12)
POTASSIUM BLD-SCNC: 4.5 MMOL/L (ref 3.5–5.2)
RBC # BLD AUTO: 3.72 10*6/MM3 (ref 4.6–6)
SODIUM BLD-SCNC: 138 MMOL/L (ref 136–145)
WBC MORPH BLD: NORMAL
WBC NRBC COR # BLD: 11.3 10*3/MM3 (ref 4.5–10.7)

## 2017-10-08 PROCEDURE — 85007 BL SMEAR W/DIFF WBC COUNT: CPT | Performed by: INTERNAL MEDICINE

## 2017-10-08 PROCEDURE — 25010000002 HEPARIN FLUSH (PORCINE) 100 UNIT/ML SOLUTION: Performed by: INTERNAL MEDICINE

## 2017-10-08 PROCEDURE — 85025 COMPLETE CBC W/AUTO DIFF WBC: CPT | Performed by: INTERNAL MEDICINE

## 2017-10-08 PROCEDURE — 80048 BASIC METABOLIC PNL TOTAL CA: CPT | Performed by: INTERNAL MEDICINE

## 2017-10-08 PROCEDURE — 99233 SBSQ HOSP IP/OBS HIGH 50: CPT | Performed by: INTERNAL MEDICINE

## 2017-10-08 PROCEDURE — 25010000002 ENOXAPARIN PER 10 MG: Performed by: INTERNAL MEDICINE

## 2017-10-08 RX ORDER — DEXAMETHASONE 1 MG
1 TABLET ORAL
Qty: 30 TABLET | Refills: 1 | Status: SHIPPED | OUTPATIENT
Start: 2017-10-09 | End: 2017-10-09 | Stop reason: HOSPADM

## 2017-10-08 RX ORDER — DIPHENOXYLATE HYDROCHLORIDE AND ATROPINE SULFATE 2.5; .025 MG/1; MG/1
1 TABLET ORAL DAILY
Qty: 30 TABLET | Refills: 0 | Status: ON HOLD | OUTPATIENT
Start: 2017-10-09 | End: 2018-10-06

## 2017-10-08 RX ADMIN — OXYBUTYNIN CHLORIDE 5 MG: 5 TABLET ORAL at 08:22

## 2017-10-08 RX ADMIN — ALPRAZOLAM 1 MG: 1 TABLET ORAL at 17:19

## 2017-10-08 RX ADMIN — Medication 1 TABLET: at 08:22

## 2017-10-08 RX ADMIN — ALUMINUM HYDROXIDE, MAGNESIUM HYDROXIDE, AND DIMETHICONE 15 ML: 400; 400; 40 SUSPENSION ORAL at 08:26

## 2017-10-08 RX ADMIN — ALUMINUM HYDROXIDE, MAGNESIUM HYDROXIDE, AND DIMETHICONE 15 ML: 400; 400; 40 SUSPENSION ORAL at 17:18

## 2017-10-08 RX ADMIN — SODIUM CHLORIDE, PRESERVATIVE FREE 500 UNITS: 5 INJECTION INTRAVENOUS at 05:37

## 2017-10-08 RX ADMIN — OXYBUTYNIN CHLORIDE 5 MG: 5 TABLET ORAL at 22:40

## 2017-10-08 RX ADMIN — VITAMIN D, TAB 1000IU (100/BT) 5000 UNITS: 25 TAB at 08:22

## 2017-10-08 RX ADMIN — OXYBUTYNIN CHLORIDE 5 MG: 5 TABLET ORAL at 17:19

## 2017-10-08 RX ADMIN — GABAPENTIN 300 MG: 300 CAPSULE ORAL at 08:22

## 2017-10-08 RX ADMIN — PANTOPRAZOLE SODIUM 40 MG: 40 TABLET, DELAYED RELEASE ORAL at 05:43

## 2017-10-08 RX ADMIN — Medication 1 CAPSULE: at 08:22

## 2017-10-08 RX ADMIN — DAKIN'S SOLUTION 0.125% (QUARTER STRENGTH): 0.12 SOLUTION at 09:00

## 2017-10-08 RX ADMIN — OXYCODONE HYDROCHLORIDE 15 MG: 5 TABLET ORAL at 20:55

## 2017-10-08 RX ADMIN — Medication 10 ML: at 22:18

## 2017-10-08 RX ADMIN — OXYCODONE HYDROCHLORIDE 15 MG: 5 TABLET ORAL at 15:51

## 2017-10-08 RX ADMIN — DEXAMETHASONE 1 MG: 0.5 TABLET ORAL at 08:22

## 2017-10-08 RX ADMIN — Medication 1 CAPSULE: at 17:19

## 2017-10-08 RX ADMIN — OXYCODONE HYDROCHLORIDE 15 MG: 5 TABLET ORAL at 05:53

## 2017-10-08 RX ADMIN — GABAPENTIN 300 MG: 300 CAPSULE ORAL at 17:19

## 2017-10-08 RX ADMIN — Medication 10 ML: at 09:00

## 2017-10-08 RX ADMIN — GABAPENTIN 300 MG: 300 CAPSULE ORAL at 20:55

## 2017-10-08 RX ADMIN — OXYCODONE HYDROCHLORIDE 15 MG: 5 TABLET ORAL at 10:04

## 2017-10-08 RX ADMIN — DAKIN'S SOLUTION 0.125% (QUARTER STRENGTH): 0.12 SOLUTION at 22:17

## 2017-10-08 RX ADMIN — ENOXAPARIN SODIUM 30 MG: 30 INJECTION SUBCUTANEOUS at 20:55

## 2017-10-08 RX ADMIN — DAKIN'S SOLUTION 0.125% (QUARTER STRENGTH): 0.12 SOLUTION at 11:00

## 2017-10-08 NOTE — PLAN OF CARE
Problem: Patient Care Overview (Adult)  Goal: Plan of Care Review  Outcome: Ongoing (interventions implemented as appropriate)    10/08/17 0343   Coping/Psychosocial Response Interventions   Plan Of Care Reviewed With patient   Patient Care Overview   Progress improving   Outcome Evaluation   Outcome Summary/Follow up Plan plan for d/c home today with caretenders HH & family, pt to call [facility] regarding current malfunctioning bed at home; wound care & ostomy supplies at bs for take home; calorie count ongoing, drsg changed as ordered last pm; am labs ordered, NSR on monitor, other VSS.        Goal: Adult Individualization and Mutuality  Outcome: Ongoing (interventions implemented as appropriate)  Goal: Discharge Needs Assessment  Outcome: Ongoing (interventions implemented as appropriate)    Problem: Sepsis (Adult)  Goal: Signs and Symptoms of Listed Potential Problems Will be Absent or Manageable (Sepsis)  Outcome: Ongoing (interventions implemented as appropriate)    Problem: Skin Integrity Impairment, Risk/Actual (Adult)  Goal: Skin Integrity/Wound Healing  Outcome: Ongoing (interventions implemented as appropriate)    Problem: Pain, Acute (Adult)  Goal: Acceptable Pain Control/Comfort Level  Outcome: Ongoing (interventions implemented as appropriate)    Problem: Pressure Ulcer (Adult)  Goal: Signs and Symptoms of Listed Potential Problems Will be Absent or Manageable (Pressure Ulcer)  Outcome: Ongoing (interventions implemented as appropriate)    Problem: Pain, Chronic (Adult)  Goal: Acceptable Pain Control/Comfort Level  Outcome: Ongoing (interventions implemented as appropriate)    Problem: Nutrition, Imbalanced: Inadequate Oral Intake (Adult)  Goal: Improved Oral Intake  Outcome: Ongoing (interventions implemented as appropriate)  Goal: Prevent Further Weight Loss  Outcome: Ongoing (interventions implemented as appropriate)    Problem: Fall Risk (Adult)  Goal: Absence of Falls  Outcome: Ongoing  (interventions implemented as appropriate)
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
## 2017-10-08 NOTE — NURSING NOTE
Mother is not ready for him to come home unless all items are needed for dressing change in abundant amounts.  Spoke to Dr. Conryo and he stated for him to go home tomorrow in the AM after [facility] finalized their plans for patient upon DC.

## 2017-10-08 NOTE — CONSULTS
Nutrition Services    Patient Name:  Joaquín Sherman  YOB: 1980  MRN: [id number]  Admit Date:  9/27/2017    Calorie count based on 2 meals recorded, po provided 1405(78%) and 51 grams protein (63%).    Electronically signed by:  Keisha Mcgill RD  10/08/17 1:17 PM
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
## 2017-10-08 NOTE — PLAN OF CARE
Problem: Patient Care Overview (Adult)  Goal: Plan of Care Review  Outcome: Ongoing (interventions implemented as appropriate)    10/08/17 3297   Coping/Psychosocial Response Interventions   Plan Of Care Reviewed With patient   Patient Care Overview   Progress no change   Outcome Evaluation   Outcome Summary/Follow up Plan Pt dressing changes today. Pain level still a 10 then a 5 when not moving after medication. Pt mother will the one caring for him upon DC and mother said that she would not accept him home today because she did not have any supplies at home to care for him and that [facility] did not call her to discuss the plan for her son for homehealth care. Dr Conroy said to DC pt tomorrow in the AM when everything is finalized with HomeHealth and then call him before pt leaves the hospital. Will cont to monitor.         Problem: Sepsis (Adult)  Goal: Signs and Symptoms of Listed Potential Problems Will be Absent or Manageable (Sepsis)  Outcome: Ongoing (interventions implemented as appropriate)    Problem: Skin Integrity Impairment, Risk/Actual (Adult)  Goal: Skin Integrity/Wound Healing  Outcome: Ongoing (interventions implemented as appropriate)    Problem: Pain, Acute (Adult)  Goal: Acceptable Pain Control/Comfort Level  Outcome: Ongoing (interventions implemented as appropriate)    Problem: Pressure Ulcer (Adult)  Goal: Signs and Symptoms of Listed Potential Problems Will be Absent or Manageable (Pressure Ulcer)  Outcome: Ongoing (interventions implemented as appropriate)    Problem: Pain, Chronic (Adult)  Goal: Acceptable Pain Control/Comfort Level  Outcome: Ongoing (interventions implemented as appropriate)    Problem: Nutrition, Imbalanced: Inadequate Oral Intake (Adult)  Goal: Improved Oral Intake  Outcome: Ongoing (interventions implemented as appropriate)  Goal: Prevent Further Weight Loss  Outcome: Ongoing (interventions implemented as appropriate)    Problem: Fall Risk (Adult)  Goal: Absence of  Falls  Outcome: Ongoing (interventions implemented as appropriate)

## 2017-10-09 VITALS
HEART RATE: 85 BPM | OXYGEN SATURATION: 98 % | DIASTOLIC BLOOD PRESSURE: 68 MMHG | TEMPERATURE: 98 F | WEIGHT: 89.5 LBS | BODY MASS INDEX: 12.12 KG/M2 | SYSTOLIC BLOOD PRESSURE: 94 MMHG | RESPIRATION RATE: 16 BRPM | HEIGHT: 72 IN

## 2017-10-09 LAB
ANION GAP SERPL CALCULATED.3IONS-SCNC: 9.2 MMOL/L
BASOPHILS # BLD AUTO: 0.03 10*3/MM3 (ref 0–0.2)
BASOPHILS NFR BLD AUTO: 0.4 % (ref 0–1.5)
BUN BLD-MCNC: 16 MG/DL (ref 6–20)
BUN/CREAT SERPL: 18.8 (ref 7–25)
CALCIUM SPEC-SCNC: 9.4 MG/DL (ref 8.6–10.5)
CHLORIDE SERPL-SCNC: 100 MMOL/L (ref 98–107)
CO2 SERPL-SCNC: 28.8 MMOL/L (ref 22–29)
CREAT BLD-MCNC: 0.85 MG/DL (ref 0.76–1.27)
DEPRECATED RDW RBC AUTO: 56.9 FL (ref 37–54)
EOSINOPHIL # BLD AUTO: 0.08 10*3/MM3 (ref 0–0.7)
EOSINOPHIL NFR BLD AUTO: 0.9 % (ref 0.3–6.2)
ERYTHROCYTE [DISTWIDTH] IN BLOOD BY AUTOMATED COUNT: 18.3 % (ref 11.5–14.5)
GFR SERPL CREATININE-BSD FRML MDRD: 123 ML/MIN/1.73
GLUCOSE BLD-MCNC: 104 MG/DL (ref 65–99)
HCT VFR BLD AUTO: 33.2 % (ref 40.4–52.2)
HGB BLD-MCNC: 9.4 G/DL (ref 13.7–17.6)
IMM GRANULOCYTES # BLD: 0.05 10*3/MM3 (ref 0–0.03)
IMM GRANULOCYTES NFR BLD: 0.6 % (ref 0–0.5)
LYMPHOCYTES # BLD AUTO: 2.04 10*3/MM3 (ref 0.9–4.8)
LYMPHOCYTES NFR BLD AUTO: 23.9 % (ref 19.6–45.3)
MCH RBC QN AUTO: 24.1 PG (ref 27–32.7)
MCHC RBC AUTO-ENTMCNC: 28.3 G/DL (ref 32.6–36.4)
MCV RBC AUTO: 85.1 FL (ref 79.8–96.2)
MONOCYTES # BLD AUTO: 0.73 10*3/MM3 (ref 0.2–1.2)
MONOCYTES NFR BLD AUTO: 8.6 % (ref 5–12)
NEUTROPHILS # BLD AUTO: 5.6 10*3/MM3 (ref 1.9–8.1)
NEUTROPHILS NFR BLD AUTO: 65.6 % (ref 42.7–76)
NRBC BLD MANUAL-RTO: 0 /100 WBC (ref 0–0)
PLATELET # BLD AUTO: 248 10*3/MM3 (ref 140–500)
PMV BLD AUTO: 9.1 FL (ref 6–12)
POTASSIUM BLD-SCNC: 4.9 MMOL/L (ref 3.5–5.2)
RBC # BLD AUTO: 3.9 10*6/MM3 (ref 4.6–6)
SODIUM BLD-SCNC: 138 MMOL/L (ref 136–145)
WBC NRBC COR # BLD: 8.53 10*3/MM3 (ref 4.5–10.7)

## 2017-10-09 PROCEDURE — 80048 BASIC METABOLIC PNL TOTAL CA: CPT | Performed by: INTERNAL MEDICINE

## 2017-10-09 PROCEDURE — 99232 SBSQ HOSP IP/OBS MODERATE 35: CPT | Performed by: INTERNAL MEDICINE

## 2017-10-09 PROCEDURE — 63710000001 PREDNISONE PER 5 MG: Performed by: INTERNAL MEDICINE

## 2017-10-09 PROCEDURE — 85025 COMPLETE CBC W/AUTO DIFF WBC: CPT | Performed by: INTERNAL MEDICINE

## 2017-10-09 PROCEDURE — 25010000002 HEPARIN FLUSH (PORCINE) 100 UNIT/ML SOLUTION: Performed by: INTERNAL MEDICINE

## 2017-10-09 RX ORDER — PREDNISONE 1 MG/1
5 TABLET ORAL
Qty: 30 TABLET | Refills: 0 | Status: ON HOLD | OUTPATIENT
Start: 2017-10-09 | End: 2018-02-15

## 2017-10-09 RX ORDER — PREDNISONE 1 MG/1
5 TABLET ORAL
Status: DISCONTINUED | OUTPATIENT
Start: 2017-10-09 | End: 2017-10-09 | Stop reason: HOSPADM

## 2017-10-09 RX ORDER — PREDNISONE 2.5 MG
2.5 TABLET ORAL
Status: DISCONTINUED | OUTPATIENT
Start: 2017-10-09 | End: 2017-10-09 | Stop reason: HOSPADM

## 2017-10-09 RX ORDER — PREDNISONE 2.5 MG
2.5 TABLET ORAL
Qty: 30 TABLET | Refills: 0 | Status: ON HOLD | OUTPATIENT
Start: 2017-10-09 | End: 2018-02-15

## 2017-10-09 RX ADMIN — VITAMIN D, TAB 1000IU (100/BT) 5000 UNITS: 25 TAB at 09:45

## 2017-10-09 RX ADMIN — Medication 10 ML: at 09:46

## 2017-10-09 RX ADMIN — OXYCODONE HYDROCHLORIDE 15 MG: 5 TABLET ORAL at 11:31

## 2017-10-09 RX ADMIN — DAKIN'S SOLUTION 0.125% (QUARTER STRENGTH): 0.12 SOLUTION at 09:11

## 2017-10-09 RX ADMIN — Medication 1 CAPSULE: at 09:46

## 2017-10-09 RX ADMIN — Medication 1 TABLET: at 09:45

## 2017-10-09 RX ADMIN — PANTOPRAZOLE SODIUM 40 MG: 40 TABLET, DELAYED RELEASE ORAL at 06:12

## 2017-10-09 RX ADMIN — GABAPENTIN 300 MG: 300 CAPSULE ORAL at 09:46

## 2017-10-09 RX ADMIN — PREDNISONE 5 MG: 5 TABLET ORAL at 11:34

## 2017-10-09 RX ADMIN — SODIUM CHLORIDE, PRESERVATIVE FREE 500 UNITS: 5 INJECTION INTRAVENOUS at 15:41

## 2017-10-09 RX ADMIN — SODIUM CHLORIDE, PRESERVATIVE FREE 500 UNITS: 5 INJECTION INTRAVENOUS at 06:12

## 2017-10-09 RX ADMIN — OXYBUTYNIN CHLORIDE 5 MG: 5 TABLET ORAL at 09:46

## 2017-10-09 RX ADMIN — ALUMINUM HYDROXIDE, MAGNESIUM HYDROXIDE, AND DIMETHICONE 15 ML: 400; 400; 40 SUSPENSION ORAL at 10:00

## 2017-10-09 RX ADMIN — OXYCODONE HYDROCHLORIDE 15 MG: 5 TABLET ORAL at 06:19

## 2017-10-09 NOTE — PROGRESS NOTES
Nutrition Services    Patient Name:  Joaquín Sherman  YOB: 1980  MRN: [id number]  Admit Date:  9/27/2017    Calorie count for 10/8/2017- ~1485 Kcal 9 83% needs) and ~ 61 gm Pro (76% needs).    Electronically signed by:  Shereen Cyr RD  10/09/17 3:17 PM

## 2017-10-09 NOTE — PLAN OF CARE
Problem: Patient Care Overview (Adult)  Goal: Plan of Care Review  Outcome: Ongoing (interventions implemented as appropriate)    10/09/17 0349   Coping/Psychosocial Response Interventions   Plan Of Care Reviewed With patient   Patient Care Overview   Progress no change   Outcome Evaluation   Outcome Summary/Follow up Plan verified & corrected listed address for d/c today, pt to d/c home with family today with [facility] via ambulance per pt request due to inability to tolerate sitting upright in vehicle for extended period of time; drsg & colostomy supplies, condom catheters at bs(restocked gauze,tape tonight--previously stocked with supplies in preparation for original d/c home Sunday) which should last pt for several days until CT starts care; CCP reconsulted to discuss further details with CT prior to d/c then contact Dr Conroy prior to leaving hospital; NSR on monitor.        Goal: Adult Individualization and Mutuality  Outcome: Ongoing (interventions implemented as appropriate)  Goal: Discharge Needs Assessment  Outcome: Ongoing (interventions implemented as appropriate)    Problem: Sepsis (Adult)  Goal: Signs and Symptoms of Listed Potential Problems Will be Absent or Manageable (Sepsis)  Outcome: Ongoing (interventions implemented as appropriate)    Problem: Skin Integrity Impairment, Risk/Actual (Adult)  Goal: Skin Integrity/Wound Healing  Outcome: Ongoing (interventions implemented as appropriate)    Problem: Pain, Acute (Adult)  Goal: Acceptable Pain Control/Comfort Level  Outcome: Ongoing (interventions implemented as appropriate)    Problem: Pressure Ulcer (Adult)  Goal: Signs and Symptoms of Listed Potential Problems Will be Absent or Manageable (Pressure Ulcer)  Outcome: Ongoing (interventions implemented as appropriate)    Problem: Pain, Chronic (Adult)  Goal: Acceptable Pain Control/Comfort Level  Outcome: Ongoing (interventions implemented as appropriate)    Problem: Nutrition, Imbalanced:  Inadequate Oral Intake (Adult)  Goal: Improved Oral Intake  Outcome: Ongoing (interventions implemented as appropriate)  Goal: Prevent Further Weight Loss  Outcome: Ongoing (interventions implemented as appropriate)    Problem: Fall Risk (Adult)  Goal: Absence of Falls  Outcome: Ongoing (interventions implemented as appropriate)

## 2017-10-09 NOTE — NURSING NOTE
Patient was refusing to be discharged until pain medication and other narcotics were prescribed. A prescription for 2 days was given for xanax, gabapentin, and oxy IR until he could see his PCP on Wednesday. Supplies were sent with patient to do wound care, care for suprapubic catheter, and ostomy supplies. (at least 4 days worth until home health could see). Dressings changed prior to discharge. Patient was still upset because he believed we were not sending him home with enough supplies. , CCP, unit manager, and patient relations were also involved.
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
## 2017-10-10 LAB — ALDOST SERPL-MCNC: 5.8 NG/DL (ref 0–30)

## 2017-10-10 NOTE — PROGRESS NOTES
Case Management Discharge Note    Final Note: pt dc'd home with [facility]     Discharge Placement     Facility/Agency Request Status Selected? Address Phone Number Fax Number    [facility] Accepted    [address], [facility] [address] [phone]    [facility] Declined     [address], [facility] [address] [phone]    [facility] [place] Declined     [address] [address] [phone]        Other: Other (family via private vehicle )    Discharge Codes: 06  Discharged/transferred to home under care of organized home health service organization in anticipation of skilled care

## 2017-10-12 ENCOUNTER — TELEPHONE (OUTPATIENT)
Dept: SOCIAL WORK | Facility: HOSPITAL | Age: 37
End: 2017-10-12

## 2017-10-12 NOTE — TELEPHONE ENCOUNTER
Call Center related patient didn't have transportation to MD appointment. Called and spoke to patient's mother and he qualifies for ambulance transportation through [facility]. I gave her [facility] phone  number and told her the  time needs to be arranged at least 48 hours in advance. He normally uses [email or web address] and she was aware of these guidelines. He missed his appointment with Dr. Hall yesterday and she is going to reschedule it for him.

## 2017-10-13 LAB
MONOMERIC PROLACTIN (ICMA): 52 NG/ML
PERCENT MACROPROLACTIN: 5 %
PROLACTIN, (ICMA): 55 NG/ML

## 2017-10-14 LAB — 21HYDROXYLASE AB SER-ACNC: <1 U/ML

## 2017-10-19 DIAGNOSIS — R79.89 ELEVATED PROLACTIN LEVEL: Primary | ICD-10-CM

## 2017-10-27 ENCOUNTER — TELEPHONE (OUTPATIENT)
Dept: SOCIAL WORK | Facility: HOSPITAL | Age: 37
End: 2017-10-27

## 2017-10-27 NOTE — TELEPHONE ENCOUNTER
Call Center asked me to call patient to assist with multiple issues. I called patient and mother but there was no answer. Message left for them to call me back if they need further assistance.

## 2017-11-09 ENCOUNTER — APPOINTMENT (OUTPATIENT)
Dept: GENERAL RADIOLOGY | Facility: HOSPITAL | Age: 37
End: 2017-11-09

## 2017-11-09 ENCOUNTER — HOSPITAL ENCOUNTER (INPATIENT)
Facility: HOSPITAL | Age: 37
LOS: 7 days | Discharge: HOME HEALTH | End: 2017-11-17
Attending: EMERGENCY MEDICINE | Admitting: INTERNAL MEDICINE

## 2017-11-09 DIAGNOSIS — A41.9 SEPSIS, DUE TO UNSPECIFIED ORGANISM: Primary | ICD-10-CM

## 2017-11-09 DIAGNOSIS — N39.0 ACUTE UTI: ICD-10-CM

## 2017-11-09 DIAGNOSIS — G82.20 PARAPLEGIA FOLLOWING SPINAL CORD INJURY (HCC): Chronic | ICD-10-CM

## 2017-11-09 DIAGNOSIS — L89.159 DECUBITUS ULCER OF SACRAL REGION, UNSPECIFIED ULCER STAGE: ICD-10-CM

## 2017-11-09 DIAGNOSIS — L89.154 DECUBITUS ULCER OF SACRAL REGION, STAGE 4 (HCC): ICD-10-CM

## 2017-11-09 DIAGNOSIS — D64.9 ANEMIA, UNSPECIFIED TYPE: ICD-10-CM

## 2017-11-09 LAB
ALBUMIN SERPL-MCNC: 2.5 G/DL (ref 3.5–5.2)
ALBUMIN/GLOB SERPL: 0.5 G/DL
ALP SERPL-CCNC: 82 U/L (ref 39–117)
ALT SERPL W P-5'-P-CCNC: <5 U/L (ref 1–41)
ANION GAP SERPL CALCULATED.3IONS-SCNC: 11.1 MMOL/L
AST SERPL-CCNC: 6 U/L (ref 1–40)
BASOPHILS # BLD AUTO: 0.03 10*3/MM3 (ref 0–0.2)
BASOPHILS NFR BLD AUTO: 0.3 % (ref 0–1.5)
BILIRUB SERPL-MCNC: 0.4 MG/DL (ref 0.1–1.2)
BILIRUB UR QL STRIP: NEGATIVE
BUN BLD-MCNC: 13 MG/DL (ref 6–20)
BUN/CREAT SERPL: 13.1 (ref 7–25)
CALCIUM SPEC-SCNC: 8.8 MG/DL (ref 8.6–10.5)
CHLORIDE SERPL-SCNC: 96 MMOL/L (ref 98–107)
CLARITY UR: ABNORMAL
CO2 SERPL-SCNC: 25.9 MMOL/L (ref 22–29)
COLOR UR: YELLOW
CREAT BLD-MCNC: 0.99 MG/DL (ref 0.76–1.27)
D-LACTATE SERPL-SCNC: 1.5 MMOL/L (ref 0.5–2)
DEPRECATED RDW RBC AUTO: 53.5 FL (ref 37–54)
EOSINOPHIL # BLD AUTO: 0.07 10*3/MM3 (ref 0–0.7)
EOSINOPHIL NFR BLD AUTO: 0.6 % (ref 0.3–6.2)
ERYTHROCYTE [DISTWIDTH] IN BLOOD BY AUTOMATED COUNT: 16.5 % (ref 11.5–14.5)
GFR SERPL CREATININE-BSD FRML MDRD: 103 ML/MIN/1.73
GLOBULIN UR ELPH-MCNC: 5.4 GM/DL
GLUCOSE BLD-MCNC: 138 MG/DL (ref 65–99)
GLUCOSE UR STRIP-MCNC: NEGATIVE MG/DL
HCT VFR BLD AUTO: 25 % (ref 40.4–52.2)
HGB BLD-MCNC: 7.4 G/DL (ref 13.7–17.6)
HGB UR QL STRIP.AUTO: ABNORMAL
IMM GRANULOCYTES # BLD: 0.05 10*3/MM3 (ref 0–0.03)
IMM GRANULOCYTES NFR BLD: 0.5 % (ref 0–0.5)
KETONES UR QL STRIP: NEGATIVE
LEUKOCYTE ESTERASE UR QL STRIP.AUTO: ABNORMAL
LIPASE SERPL-CCNC: 8 U/L (ref 13–60)
LYMPHOCYTES # BLD AUTO: 1.84 10*3/MM3 (ref 0.9–4.8)
LYMPHOCYTES NFR BLD AUTO: 16.8 % (ref 19.6–45.3)
MCH RBC QN AUTO: 25.9 PG (ref 27–32.7)
MCHC RBC AUTO-ENTMCNC: 29.6 G/DL (ref 32.6–36.4)
MCV RBC AUTO: 87.4 FL (ref 79.8–96.2)
MONOCYTES # BLD AUTO: 1.14 10*3/MM3 (ref 0.2–1.2)
MONOCYTES NFR BLD AUTO: 10.4 % (ref 5–12)
NEUTROPHILS # BLD AUTO: 7.85 10*3/MM3 (ref 1.9–8.1)
NEUTROPHILS NFR BLD AUTO: 71.4 % (ref 42.7–76)
NITRITE UR QL STRIP: POSITIVE
PH UR STRIP.AUTO: 7 [PH] (ref 5–8)
PLATELET # BLD AUTO: 299 10*3/MM3 (ref 140–500)
PMV BLD AUTO: 8.7 FL (ref 6–12)
POTASSIUM BLD-SCNC: 3.4 MMOL/L (ref 3.5–5.2)
PROCALCITONIN SERPL-MCNC: 0.35 NG/ML (ref 0.1–0.25)
PROT SERPL-MCNC: 7.9 G/DL (ref 6–8.5)
PROT UR QL STRIP: ABNORMAL
RBC # BLD AUTO: 2.86 10*6/MM3 (ref 4.6–6)
SODIUM BLD-SCNC: 133 MMOL/L (ref 136–145)
SP GR UR STRIP: 1.01 (ref 1–1.03)
UROBILINOGEN UR QL STRIP: ABNORMAL
WBC NRBC COR # BLD: 10.98 10*3/MM3 (ref 4.5–10.7)

## 2017-11-09 PROCEDURE — 99285 EMERGENCY DEPT VISIT HI MDM: CPT

## 2017-11-09 PROCEDURE — 71010 HC CHEST PA OR AP: CPT

## 2017-11-09 PROCEDURE — 80053 COMPREHEN METABOLIC PANEL: CPT | Performed by: EMERGENCY MEDICINE

## 2017-11-09 PROCEDURE — 83690 ASSAY OF LIPASE: CPT | Performed by: EMERGENCY MEDICINE

## 2017-11-09 PROCEDURE — 85025 COMPLETE CBC W/AUTO DIFF WBC: CPT | Performed by: EMERGENCY MEDICINE

## 2017-11-09 PROCEDURE — 83605 ASSAY OF LACTIC ACID: CPT | Performed by: EMERGENCY MEDICINE

## 2017-11-09 PROCEDURE — 84145 PROCALCITONIN (PCT): CPT | Performed by: EMERGENCY MEDICINE

## 2017-11-09 PROCEDURE — 87147 CULTURE TYPE IMMUNOLOGIC: CPT | Performed by: EMERGENCY MEDICINE

## 2017-11-09 PROCEDURE — 87040 BLOOD CULTURE FOR BACTERIA: CPT | Performed by: EMERGENCY MEDICINE

## 2017-11-09 PROCEDURE — 87086 URINE CULTURE/COLONY COUNT: CPT | Performed by: EMERGENCY MEDICINE

## 2017-11-09 PROCEDURE — 87150 DNA/RNA AMPLIFIED PROBE: CPT | Performed by: EMERGENCY MEDICINE

## 2017-11-09 PROCEDURE — 81001 URINALYSIS AUTO W/SCOPE: CPT | Performed by: EMERGENCY MEDICINE

## 2017-11-09 RX ORDER — SODIUM CHLORIDE 0.9 % (FLUSH) 0.9 %
10 SYRINGE (ML) INJECTION AS NEEDED
Status: DISCONTINUED | OUTPATIENT
Start: 2017-11-09 | End: 2017-11-17 | Stop reason: HOSPADM

## 2017-11-09 RX ADMIN — SODIUM CHLORIDE 632 ML: 9 INJECTION, SOLUTION INTRAVENOUS at 22:51

## 2017-11-09 RX ADMIN — SODIUM CHLORIDE 1000 ML: 9 INJECTION, SOLUTION INTRAVENOUS at 22:17

## 2017-11-10 ENCOUNTER — APPOINTMENT (OUTPATIENT)
Dept: CT IMAGING | Facility: HOSPITAL | Age: 37
End: 2017-11-10

## 2017-11-10 PROBLEM — A41.9 SEPSIS (HCC): Status: ACTIVE | Noted: 2017-11-10

## 2017-11-10 LAB
ABO GROUP BLD: NORMAL
BACTERIA UR QL AUTO: ABNORMAL /HPF
BLD GP AB SCN SERPL QL: NEGATIVE
GLUCOSE BLDC GLUCOMTR-MCNC: 119 MG/DL (ref 70–130)
GLUCOSE BLDC GLUCOMTR-MCNC: 129 MG/DL (ref 70–130)
HCT VFR BLD AUTO: 30.7 % (ref 40.4–52.2)
HGB BLD-MCNC: 9.4 G/DL (ref 13.7–17.6)
HYALINE CASTS UR QL AUTO: ABNORMAL /LPF
POTASSIUM BLD-SCNC: 3.6 MMOL/L (ref 3.5–5.2)
RBC # UR: ABNORMAL /HPF
REF LAB TEST METHOD: ABNORMAL
RH BLD: POSITIVE
SQUAMOUS #/AREA URNS HPF: ABNORMAL /HPF
WBC UR QL AUTO: ABNORMAL /HPF

## 2017-11-10 PROCEDURE — 84132 ASSAY OF SERUM POTASSIUM: CPT | Performed by: INTERNAL MEDICINE

## 2017-11-10 PROCEDURE — 25010000003 HYDROCORTISONE SOD SUCCINATE PF 250 MG RECONSTITUTED SOLUTION: Performed by: INTERNAL MEDICINE

## 2017-11-10 PROCEDURE — 86901 BLOOD TYPING SEROLOGIC RH(D): CPT | Performed by: INTERNAL MEDICINE

## 2017-11-10 PROCEDURE — 86900 BLOOD TYPING SEROLOGIC ABO: CPT

## 2017-11-10 PROCEDURE — 85018 HEMOGLOBIN: CPT | Performed by: INTERNAL MEDICINE

## 2017-11-10 PROCEDURE — 70470 CT HEAD/BRAIN W/O & W/DYE: CPT

## 2017-11-10 PROCEDURE — 86850 RBC ANTIBODY SCREEN: CPT | Performed by: INTERNAL MEDICINE

## 2017-11-10 PROCEDURE — 82962 GLUCOSE BLOOD TEST: CPT

## 2017-11-10 PROCEDURE — 36430 TRANSFUSION BLD/BLD COMPNT: CPT

## 2017-11-10 PROCEDURE — 25010000002 VANCOMYCIN PER 500 MG: Performed by: EMERGENCY MEDICINE

## 2017-11-10 PROCEDURE — 25010000002 MORPHINE PER 10 MG: Performed by: EMERGENCY MEDICINE

## 2017-11-10 PROCEDURE — 25010000002 ENOXAPARIN PER 10 MG: Performed by: INTERNAL MEDICINE

## 2017-11-10 PROCEDURE — 0 IOPAMIDOL PER 1 ML: Performed by: INTERNAL MEDICINE

## 2017-11-10 PROCEDURE — 85014 HEMATOCRIT: CPT | Performed by: INTERNAL MEDICINE

## 2017-11-10 PROCEDURE — 86900 BLOOD TYPING SEROLOGIC ABO: CPT | Performed by: INTERNAL MEDICINE

## 2017-11-10 PROCEDURE — 25010000002 HYDROCORTISONE SODIUM SUCCINATE 100 MG RECONSTITUTED SOLUTION: Performed by: EMERGENCY MEDICINE

## 2017-11-10 PROCEDURE — 25010000002 FENTANYL CITRATE (PF) 100 MCG/2ML SOLUTION: Performed by: INTERNAL MEDICINE

## 2017-11-10 PROCEDURE — 99255 IP/OBS CONSLTJ NEW/EST HI 80: CPT | Performed by: INTERNAL MEDICINE

## 2017-11-10 PROCEDURE — P9016 RBC LEUKOCYTES REDUCED: HCPCS

## 2017-11-10 PROCEDURE — 74176 CT ABD & PELVIS W/O CONTRAST: CPT

## 2017-11-10 PROCEDURE — 86923 COMPATIBILITY TEST ELECTRIC: CPT

## 2017-11-10 RX ORDER — OXYBUTYNIN CHLORIDE 5 MG/1
10 TABLET ORAL 2 TIMES DAILY
Status: DISCONTINUED | OUTPATIENT
Start: 2017-11-10 | End: 2017-11-11

## 2017-11-10 RX ORDER — FENTANYL CITRATE 50 UG/ML
25 INJECTION, SOLUTION INTRAMUSCULAR; INTRAVENOUS
Status: DISCONTINUED | OUTPATIENT
Start: 2017-11-10 | End: 2017-11-11

## 2017-11-10 RX ORDER — SODIUM CHLORIDE 0.9 % (FLUSH) 0.9 %
1-10 SYRINGE (ML) INJECTION AS NEEDED
Status: DISCONTINUED | OUTPATIENT
Start: 2017-11-10 | End: 2017-11-17 | Stop reason: HOSPADM

## 2017-11-10 RX ORDER — ACETAMINOPHEN 325 MG/1
650 TABLET ORAL EVERY 4 HOURS PRN
Status: DISCONTINUED | OUTPATIENT
Start: 2017-11-10 | End: 2017-11-17 | Stop reason: HOSPADM

## 2017-11-10 RX ORDER — ALUMINA, MAGNESIA, AND SIMETHICONE 2400; 2400; 240 MG/30ML; MG/30ML; MG/30ML
15 SUSPENSION ORAL EVERY 6 HOURS PRN
Status: DISCONTINUED | OUTPATIENT
Start: 2017-11-10 | End: 2017-11-17 | Stop reason: HOSPADM

## 2017-11-10 RX ORDER — FENTANYL CITRATE 50 UG/ML
50 INJECTION, SOLUTION INTRAMUSCULAR; INTRAVENOUS
Status: DISCONTINUED | OUTPATIENT
Start: 2017-11-20 | End: 2017-11-11

## 2017-11-10 RX ORDER — VANCOMYCIN HYDROCHLORIDE 1 G/200ML
20 INJECTION, SOLUTION INTRAVENOUS ONCE
Status: COMPLETED | OUTPATIENT
Start: 2017-11-10 | End: 2017-11-10

## 2017-11-10 RX ORDER — IPRATROPIUM BROMIDE AND ALBUTEROL SULFATE 2.5; .5 MG/3ML; MG/3ML
3 SOLUTION RESPIRATORY (INHALATION) EVERY 6 HOURS PRN
Status: DISCONTINUED | OUTPATIENT
Start: 2017-11-10 | End: 2017-11-17 | Stop reason: HOSPADM

## 2017-11-10 RX ORDER — PANTOPRAZOLE SODIUM 40 MG/1
40 TABLET, DELAYED RELEASE ORAL
Status: DISCONTINUED | OUTPATIENT
Start: 2017-11-10 | End: 2017-11-13

## 2017-11-10 RX ORDER — ACETAMINOPHEN 650 MG/1
650 SUPPOSITORY RECTAL EVERY 4 HOURS PRN
Status: DISCONTINUED | OUTPATIENT
Start: 2017-11-10 | End: 2017-11-17 | Stop reason: HOSPADM

## 2017-11-10 RX ORDER — BISACODYL 5 MG/1
5 TABLET, DELAYED RELEASE ORAL DAILY PRN
Status: DISCONTINUED | OUTPATIENT
Start: 2017-11-10 | End: 2017-11-17 | Stop reason: HOSPADM

## 2017-11-10 RX ORDER — GABAPENTIN 300 MG/1
300 CAPSULE ORAL EVERY 8 HOURS SCHEDULED
Status: DISCONTINUED | OUTPATIENT
Start: 2017-11-10 | End: 2017-11-17 | Stop reason: HOSPADM

## 2017-11-10 RX ORDER — SODIUM HYPOCHLORITE 1.25 MG/ML
SOLUTION TOPICAL 2 TIMES DAILY
Status: DISCONTINUED | OUTPATIENT
Start: 2017-11-10 | End: 2017-11-13

## 2017-11-10 RX ORDER — BISACODYL 10 MG
10 SUPPOSITORY, RECTAL RECTAL DAILY PRN
Status: DISCONTINUED | OUTPATIENT
Start: 2017-11-10 | End: 2017-11-17 | Stop reason: HOSPADM

## 2017-11-10 RX ORDER — OXYCODONE HYDROCHLORIDE 15 MG/1
15 TABLET ORAL EVERY 6 HOURS PRN
Status: DISCONTINUED | OUTPATIENT
Start: 2017-11-10 | End: 2017-11-17

## 2017-11-10 RX ORDER — SODIUM CHLORIDE 9 MG/ML
75 INJECTION, SOLUTION INTRAVENOUS CONTINUOUS
Status: DISCONTINUED | OUTPATIENT
Start: 2017-11-10 | End: 2017-11-12

## 2017-11-10 RX ORDER — ONDANSETRON 4 MG/1
4 TABLET, FILM COATED ORAL EVERY 6 HOURS PRN
Status: DISCONTINUED | OUTPATIENT
Start: 2017-11-10 | End: 2017-11-17 | Stop reason: HOSPADM

## 2017-11-10 RX ORDER — ONDANSETRON 2 MG/ML
4 INJECTION INTRAMUSCULAR; INTRAVENOUS EVERY 6 HOURS PRN
Status: DISCONTINUED | OUTPATIENT
Start: 2017-11-10 | End: 2017-11-17 | Stop reason: HOSPADM

## 2017-11-10 RX ORDER — MORPHINE SULFATE 2 MG/ML
1 INJECTION, SOLUTION INTRAMUSCULAR; INTRAVENOUS ONCE
Status: COMPLETED | OUTPATIENT
Start: 2017-11-10 | End: 2017-11-10

## 2017-11-10 RX ORDER — ONDANSETRON 4 MG/1
4 TABLET, ORALLY DISINTEGRATING ORAL EVERY 6 HOURS PRN
Status: DISCONTINUED | OUTPATIENT
Start: 2017-11-10 | End: 2017-11-17 | Stop reason: HOSPADM

## 2017-11-10 RX ADMIN — FENTANYL CITRATE 25 MCG: 50 INJECTION INTRAMUSCULAR; INTRAVENOUS at 03:28

## 2017-11-10 RX ADMIN — ENOXAPARIN SODIUM 40 MG: 40 INJECTION SUBCUTANEOUS at 08:28

## 2017-11-10 RX ADMIN — VANCOMYCIN HYDROCHLORIDE 1000 MG: 1 INJECTION, SOLUTION INTRAVENOUS at 00:24

## 2017-11-10 RX ADMIN — MORPHINE SULFATE 1 MG: 10 INJECTION, SOLUTION INTRAMUSCULAR; INTRAVENOUS at 00:45

## 2017-11-10 RX ADMIN — OXYBUTYNIN CHLORIDE 10 MG: 5 TABLET ORAL at 22:26

## 2017-11-10 RX ADMIN — HYDROCORTISONE SODIUM SUCCINATE 50 MG: 100 INJECTION, POWDER, FOR SOLUTION INTRAMUSCULAR; INTRAVENOUS at 01:34

## 2017-11-10 RX ADMIN — SODIUM CHLORIDE 500 ML: 9 INJECTION, SOLUTION INTRAVENOUS at 13:25

## 2017-11-10 RX ADMIN — SODIUM CHLORIDE 1000 ML: 9 INJECTION, SOLUTION INTRAVENOUS at 01:04

## 2017-11-10 RX ADMIN — PANTOPRAZOLE SODIUM 40 MG: 40 TABLET, DELAYED RELEASE ORAL at 13:11

## 2017-11-10 RX ADMIN — IOPAMIDOL 50 ML: 755 INJECTION, SOLUTION INTRAVENOUS at 17:14

## 2017-11-10 RX ADMIN — GABAPENTIN 300 MG: 300 CAPSULE ORAL at 21:17

## 2017-11-10 RX ADMIN — DAKIN'S SOLUTION 0.125% (QUARTER STRENGTH): 0.12 SOLUTION at 13:30

## 2017-11-10 RX ADMIN — HYDROCORTISONE SODIUM SUCCINATE 100 MG: 250 INJECTION, POWDER, FOR SOLUTION INTRAMUSCULAR; INTRAVENOUS at 08:28

## 2017-11-10 RX ADMIN — HYDROCORTISONE SODIUM SUCCINATE 100 MG: 250 INJECTION, POWDER, FOR SOLUTION INTRAMUSCULAR; INTRAVENOUS at 18:35

## 2017-11-10 RX ADMIN — AZTREONAM 2 G: 2 INJECTION, POWDER, LYOPHILIZED, FOR SOLUTION INTRAMUSCULAR; INTRAVENOUS at 00:45

## 2017-11-10 RX ADMIN — AZTREONAM 2 G: 2 INJECTION, POWDER, LYOPHILIZED, FOR SOLUTION INTRAMUSCULAR; INTRAVENOUS at 16:40

## 2017-11-10 RX ADMIN — FENTANYL CITRATE 100 MCG: 50 INJECTION INTRAMUSCULAR; INTRAVENOUS at 08:27

## 2017-11-10 RX ADMIN — AZTREONAM 1 G: 1 INJECTION, POWDER, LYOPHILIZED, FOR SOLUTION INTRAMUSCULAR; INTRAVENOUS at 08:21

## 2017-11-10 RX ADMIN — FENTANYL CITRATE 25 MCG: 50 INJECTION INTRAMUSCULAR; INTRAVENOUS at 13:11

## 2017-11-10 RX ADMIN — FENTANYL CITRATE 25 MCG: 50 INJECTION INTRAMUSCULAR; INTRAVENOUS at 18:36

## 2017-11-10 RX ADMIN — SODIUM CHLORIDE 125 ML/HR: 9 INJECTION, SOLUTION INTRAVENOUS at 01:04

## 2017-11-10 RX ADMIN — OXYCODONE HYDROCHLORIDE 15 MG: 5 TABLET ORAL at 21:17

## 2017-11-10 RX ADMIN — METRONIDAZOLE 500 MG: 500 INJECTION, SOLUTION INTRAVENOUS at 00:51

## 2017-11-10 RX ADMIN — METRONIDAZOLE 500 MG: 500 INJECTION, SOLUTION INTRAVENOUS at 16:40

## 2017-11-10 NOTE — PLAN OF CARE
Problem: Fluid Volume Deficit (Adult)  Goal: Identify Related Risk Factors and Signs and Symptoms  Outcome: Ongoing (interventions implemented as appropriate)

## 2017-11-10 NOTE — ED NOTES
"Patient was seen two weeks ago for urosepsis, has been \"feeling bad\" over the last 48 hours, has noted some puss in his drainage bag, patient has a suprapubic cath. Having some pain in lower abdomen.      Idalia Issa RN  11/09/17 7189    "
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
## 2017-11-10 NOTE — PROGRESS NOTES
[facility] FOLLOW UP NOTE:    Patient admitted over night by Dr. Arriaga.  Chart reviewed.  Will broaden for anaerobic coverage.  Source likely urine and infected decub. Ulcer.  Continue abx, fluid resuscitations.  Follow cultures.   as needed.      Luca Naylor MD  [facility]  11/10/17  1:22 PM
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
## 2017-11-10 NOTE — PLAN OF CARE
Problem: Skin Integrity Impairment, Risk/Actual (Adult)  Intervention: Promote/Optimize Nutrition    11/10/17 1128   Nutrition Interventions   Oral Nutrition Promotion calorie dense foods provided;calorie dense liquids provided;nutritional therapy counseling provided         Goal: Identify Related Risk Factors and Signs and Symptoms  Outcome: Ongoing (interventions implemented as appropriate)

## 2017-11-10 NOTE — NURSING NOTE
11/10/17 1429   Wound 07/26/16 Right lateral leg other (see comments)   Date first assessed: 07/26/16   Side: Right  Orientation: lateral  Location: leg  Type: (c) other (see comments)   Wound WDL ex   Dressing Appearance moist drainage   Base reddened;clean;moist;pink   Periwound Area dry   Edges open   Length (cm) 18   Width (cm) 3   Depth (cm) 0.5   Drainage Amount small   Wound Cleaning cleansed with;sterile normal saline   Dressing Dressing changed;gauze;hydrofiber;silver impregnated dressing   Wound 11/09/17 Right leg other (see comments)   Date first assessed: 11/09/17   Side: Right  Location: leg  Type: other (see comments)  Additional Comments: SHIN   Wound WDL WDL   Dressing Appearance moist drainage   Base reddened;moist;granulating   Periwound Area dry   Edges open   Length (cm) 4   Width (cm) 1   Depth (cm) 0.2   Drainage Characteristics/Odor serosanguineous   Drainage Amount small   Wound Cleaning cleansed with;sterile normal saline   Dressing Dressing changed;gauze;hydrofiber;low-adherent   Pressure Ulcer 07/26/16 1600 other (see comments) transverse ischial tuberosity Stage IV   Date first assessed/Time first assessed: 07/26/16 1600   Present On Admission (Pressure Ulcer): yes;picture taken  Side: (c) other (see comments)  Orientation: (c) transverse  Location: (c) ischial tuberosity  Stage: Stage IV  Additional Comments: CHR...   Dressing Appearance moist drainage   Pressure Ulcer Appearance bone;reddened;slough;yellow;moist;pink   Periwound Area moist;pink   Length (Pressure Ulcer) (cm) 35   Width (Pressure Ulcer) (cm) 32   Depth (Pressure Ulcer) (cm) 5   Pressure Ulcer Wound Care cleansed with;sterile normal saline   Dressing Dressing changed;gauze, wet-to-dry  (DAKINS)   Pressure Ulcer 09/28/17 0517 Left knee Stage III   Date first assessed/Time first assessed: 09/28/17 0517   Present On Admission (Pressure Ulcer): yes;picture taken  Side: Left  Location: knee  Stage: Stage III   Dressing  Appearance moist drainage   Pressure Ulcer Appearance pink   Periwound Area dry   Length (Pressure Ulcer) (cm) 3   Width (Pressure Ulcer) (cm) 3   Depth (Pressure Ulcer) (cm) 0   Pressure Ulcer Wound Care cleansed with;sterile normal saline   Dressing Dressing changed;hydrofiber;low-adherent;silver impregnated dressing   Pressure Ulcer 09/28/17 0523 Right lateral knee Stage III   Date first assessed/Time first assessed: 09/28/17 0523   Present On Admission (Pressure Ulcer): yes;picture taken  Side: Right  Orientation: lateral  Location: knee  Stage: Stage III   Dressing Appearance moist drainage   Pressure Ulcer Appearance reddened;slough;yellow;moist   Periwound Area intact   Length (Pressure Ulcer) (cm) 3.5   Width (Pressure Ulcer) (cm) 3   Depth (Pressure Ulcer) (cm) 0   Pressure Ulcer Wound Care cleansed with;sterile normal saline   Dressing Dressing changed;gauze;hydrofiber;silver impregnated dressing   Pressure Ulcer 09/28/17 Left heel unstageable   Date first assessed: 09/28/17   Present On Admission (Pressure Ulcer): yes;picture taken  Side: Left  Location: heel  Stage: unstageable   Dressing Appearance moist drainage   Pressure Ulcer Appearance necrotic;reddened;moist   Length (Pressure Ulcer) (cm) 4   Width (Pressure Ulcer) (cm) 3   Pressure Ulcer Wound Care cleansed with;sterile normal saline   Dressing Dressing changed;gauze, wet-to-dry;low-adherent  (DAKINS)   Pressure Ulcer 11/09/17 Right heel Stage IV   Date first assessed: 11/09/17   Present On Admission (Pressure Ulcer): yes;picture taken  Side: Right  Location: heel  Stage: Stage IV   Dressing Appearance moist drainage   Pressure Ulcer Appearance black eschar;bone;reddened;slough   Periwound Area dry   Length (Pressure Ulcer) (cm) 5.5   Width (Pressure Ulcer) (cm) 4.5   Depth (Pressure Ulcer) (cm) 0.5   Pressure Ulcer Wound Care cleansed with;sterile normal saline   Dressing Dressing changed;gauze;hydrofiber;silver impregnated dressing   Pressure  Ulcer 11/09/17 Right lateral knee Stage II   Date first assessed: 11/09/17   Present On Admission (Pressure Ulcer): yes;picture taken  Side: Right  Orientation: lateral  Location: knee  Stage: Stage II  Additional Comments: DISTAL   Dressing Appearance moist drainage   Pressure Ulcer Appearance reddened;moist;pink;granulating   Periwound Area dry   Length (Pressure Ulcer) (cm) 4.5   Width (Pressure Ulcer) (cm) 3   Depth (Pressure Ulcer) (cm) 0   Pressure Ulcer Wound Care cleansed with;sterile normal saline   Dressing Dressing changed;gauze;hydrofiber;silver impregnated dressing     CWOCN consult. Patient well known to our team. Wounds appear about the same as last admission. Recommend to continue the same treatment to maintain wound bed, manage moisture.   Also changed suprapubic catheter with sterile technique. New catheter easily removed and inserted. Discussed with RN- order noted to change per RN or Urology if we were not able to.  Ostomy supplies in room.  Patient on a clinitron bed.

## 2017-11-10 NOTE — CONSULTS
First Urology Progress Note    SP tube came out and Rn has replaced.  We will check on him Monday.

## 2017-11-11 LAB
ABO + RH BLD: NORMAL
ABO + RH BLD: NORMAL
ANION GAP SERPL CALCULATED.3IONS-SCNC: 13.4 MMOL/L
BACTERIA BLD CULT: ABNORMAL
BACTERIA SPEC AEROBE CULT: NORMAL
BACTERIA SPEC AEROBE CULT: NORMAL
BASOPHILS # BLD AUTO: 0 10*3/MM3 (ref 0–0.2)
BASOPHILS NFR BLD AUTO: 0 % (ref 0–1.5)
BH BB BLOOD EXPIRATION DATE: NORMAL
BH BB BLOOD EXPIRATION DATE: NORMAL
BH BB BLOOD TYPE BARCODE: 8400
BH BB BLOOD TYPE BARCODE: 8400
BH BB DISPENSE STATUS: NORMAL
BH BB DISPENSE STATUS: NORMAL
BH BB PRODUCT CODE: NORMAL
BH BB PRODUCT CODE: NORMAL
BH BB UNIT NUMBER: NORMAL
BH BB UNIT NUMBER: NORMAL
BUN BLD-MCNC: 16 MG/DL (ref 6–20)
BUN/CREAT SERPL: 23.5 (ref 7–25)
CALCIUM SPEC-SCNC: 8 MG/DL (ref 8.6–10.5)
CHLORIDE SERPL-SCNC: 110 MMOL/L (ref 98–107)
CO2 SERPL-SCNC: 17.6 MMOL/L (ref 22–29)
CREAT BLD-MCNC: 0.68 MG/DL (ref 0.76–1.27)
DEPRECATED RDW RBC AUTO: 54.4 FL (ref 37–54)
EOSINOPHIL # BLD AUTO: 0 10*3/MM3 (ref 0–0.7)
EOSINOPHIL NFR BLD AUTO: 0 % (ref 0.3–6.2)
ERYTHROCYTE [DISTWIDTH] IN BLOOD BY AUTOMATED COUNT: 16.2 % (ref 11.5–14.5)
GFR SERPL CREATININE-BSD FRML MDRD: >150 ML/MIN/1.73
GLUCOSE BLD-MCNC: 153 MG/DL (ref 65–99)
HCT VFR BLD AUTO: 30.7 % (ref 40.4–52.2)
HGB BLD-MCNC: 9.3 G/DL (ref 13.7–17.6)
IMM GRANULOCYTES # BLD: 0.07 10*3/MM3 (ref 0–0.03)
IMM GRANULOCYTES NFR BLD: 0.8 % (ref 0–0.5)
LYMPHOCYTES # BLD AUTO: 0.69 10*3/MM3 (ref 0.9–4.8)
LYMPHOCYTES NFR BLD AUTO: 8.1 % (ref 19.6–45.3)
MCH RBC QN AUTO: 27.8 PG (ref 27–32.7)
MCHC RBC AUTO-ENTMCNC: 30.3 G/DL (ref 32.6–36.4)
MCV RBC AUTO: 91.6 FL (ref 79.8–96.2)
MONOCYTES # BLD AUTO: 0.33 10*3/MM3 (ref 0.2–1.2)
MONOCYTES NFR BLD AUTO: 3.9 % (ref 5–12)
NEUTROPHILS # BLD AUTO: 7.39 10*3/MM3 (ref 1.9–8.1)
NEUTROPHILS NFR BLD AUTO: 87.2 % (ref 42.7–76)
PLATELET # BLD AUTO: 217 10*3/MM3 (ref 140–500)
PMV BLD AUTO: 8.8 FL (ref 6–12)
POTASSIUM BLD-SCNC: 3.4 MMOL/L (ref 3.5–5.2)
RBC # BLD AUTO: 3.35 10*6/MM3 (ref 4.6–6)
SODIUM BLD-SCNC: 141 MMOL/L (ref 136–145)
T4 FREE SERPL-MCNC: 1.06 NG/DL (ref 0.93–1.7)
TSH SERPL DL<=0.05 MIU/L-ACNC: 0.59 MIU/ML (ref 0.27–4.2)
UNIT  ABO: NORMAL
UNIT  ABO: NORMAL
UNIT  RH: NORMAL
UNIT  RH: NORMAL
WBC NRBC COR # BLD: 8.48 10*3/MM3 (ref 4.5–10.7)

## 2017-11-11 PROCEDURE — 25010000002 ERTAPENEM PER 500 MG: Performed by: INTERNAL MEDICINE

## 2017-11-11 PROCEDURE — 25010000002 HYDROMORPHONE PER 4 MG: Performed by: INTERNAL MEDICINE

## 2017-11-11 PROCEDURE — 84439 ASSAY OF FREE THYROXINE: CPT | Performed by: INTERNAL MEDICINE

## 2017-11-11 PROCEDURE — 80048 BASIC METABOLIC PNL TOTAL CA: CPT | Performed by: INTERNAL MEDICINE

## 2017-11-11 PROCEDURE — 25010000003 HYDROCORTISONE SOD SUCCINATE PF 250 MG RECONSTITUTED SOLUTION: Performed by: INTERNAL MEDICINE

## 2017-11-11 PROCEDURE — 25010000002 ENOXAPARIN PER 10 MG: Performed by: INTERNAL MEDICINE

## 2017-11-11 PROCEDURE — 87040 BLOOD CULTURE FOR BACTERIA: CPT | Performed by: INTERNAL MEDICINE

## 2017-11-11 PROCEDURE — 85025 COMPLETE CBC W/AUTO DIFF WBC: CPT | Performed by: INTERNAL MEDICINE

## 2017-11-11 PROCEDURE — 99223 1ST HOSP IP/OBS HIGH 75: CPT | Performed by: INTERNAL MEDICINE

## 2017-11-11 PROCEDURE — 84443 ASSAY THYROID STIM HORMONE: CPT | Performed by: INTERNAL MEDICINE

## 2017-11-11 RX ORDER — OXYBUTYNIN CHLORIDE 5 MG/1
10 TABLET ORAL 3 TIMES DAILY
Status: DISCONTINUED | OUTPATIENT
Start: 2017-11-11 | End: 2017-11-17 | Stop reason: HOSPADM

## 2017-11-11 RX ORDER — ALPRAZOLAM 0.5 MG/1
1 TABLET ORAL 2 TIMES DAILY PRN
Status: DISCONTINUED | OUTPATIENT
Start: 2017-11-11 | End: 2017-11-17 | Stop reason: HOSPADM

## 2017-11-11 RX ADMIN — ALPRAZOLAM 1 MG: 0.5 TABLET ORAL at 21:33

## 2017-11-11 RX ADMIN — AZTREONAM 2 G: 2 INJECTION, POWDER, LYOPHILIZED, FOR SOLUTION INTRAMUSCULAR; INTRAVENOUS at 08:59

## 2017-11-11 RX ADMIN — DAKIN'S SOLUTION 0.125% (QUARTER STRENGTH): 0.12 SOLUTION at 18:32

## 2017-11-11 RX ADMIN — HYDROMORPHONE HYDROCHLORIDE 1 MG: 1 INJECTION, SOLUTION INTRAMUSCULAR; INTRAVENOUS; SUBCUTANEOUS at 21:54

## 2017-11-11 RX ADMIN — PANTOPRAZOLE SODIUM 40 MG: 40 TABLET, DELAYED RELEASE ORAL at 05:38

## 2017-11-11 RX ADMIN — GABAPENTIN 300 MG: 300 CAPSULE ORAL at 05:38

## 2017-11-11 RX ADMIN — OXYBUTYNIN CHLORIDE 10 MG: 5 TABLET ORAL at 21:33

## 2017-11-11 RX ADMIN — HYDROMORPHONE HYDROCHLORIDE 1 MG: 1 INJECTION, SOLUTION INTRAMUSCULAR; INTRAVENOUS; SUBCUTANEOUS at 13:46

## 2017-11-11 RX ADMIN — ALPRAZOLAM 1 MG: 0.5 TABLET ORAL at 05:38

## 2017-11-11 RX ADMIN — AZTREONAM 2 G: 2 INJECTION, POWDER, LYOPHILIZED, FOR SOLUTION INTRAMUSCULAR; INTRAVENOUS at 02:37

## 2017-11-11 RX ADMIN — SODIUM CHLORIDE 75 ML/HR: 9 INJECTION, SOLUTION INTRAVENOUS at 14:32

## 2017-11-11 RX ADMIN — OXYBUTYNIN CHLORIDE 10 MG: 5 TABLET ORAL at 09:04

## 2017-11-11 RX ADMIN — HYDROCORTISONE SODIUM SUCCINATE 100 MG: 250 INJECTION, POWDER, FOR SOLUTION INTRAMUSCULAR; INTRAVENOUS at 02:38

## 2017-11-11 RX ADMIN — METRONIDAZOLE 500 MG: 500 INJECTION, SOLUTION INTRAVENOUS at 02:37

## 2017-11-11 RX ADMIN — HYDROCORTISONE SODIUM SUCCINATE 100 MG: 250 INJECTION, POWDER, FOR SOLUTION INTRAMUSCULAR; INTRAVENOUS at 18:32

## 2017-11-11 RX ADMIN — HYDROCORTISONE SODIUM SUCCINATE 100 MG: 250 INJECTION, POWDER, FOR SOLUTION INTRAMUSCULAR; INTRAVENOUS at 09:04

## 2017-11-11 RX ADMIN — GABAPENTIN 300 MG: 300 CAPSULE ORAL at 21:33

## 2017-11-11 RX ADMIN — ENOXAPARIN SODIUM 40 MG: 40 INJECTION SUBCUTANEOUS at 12:21

## 2017-11-11 RX ADMIN — HYDROMORPHONE HYDROCHLORIDE 1 MG: 1 INJECTION, SOLUTION INTRAMUSCULAR; INTRAVENOUS; SUBCUTANEOUS at 18:32

## 2017-11-11 RX ADMIN — OXYBUTYNIN CHLORIDE 10 MG: 5 TABLET ORAL at 18:31

## 2017-11-11 RX ADMIN — WATER 1 G: 1 INJECTION INTRAMUSCULAR; INTRAVENOUS; SUBCUTANEOUS at 18:31

## 2017-11-11 RX ADMIN — OXYCODONE HYDROCHLORIDE 15 MG: 5 TABLET ORAL at 21:53

## 2017-11-11 RX ADMIN — METRONIDAZOLE 500 MG: 500 INJECTION, SOLUTION INTRAVENOUS at 08:59

## 2017-11-11 NOTE — CONSULTS
"Referring Provider: David Arriaga MD  [address]  [address] [address]    Reason for Consultation: sepsis    History of present illness:  Mr Sherman is a 37 YOM w/ paraplegia from [facility] who I am asked to evaluate and give opinion for sepsis. History is obtained from the patient, JAMAL Burger, and review of the old medical records which I summarize/synthesize as follows: He has a chronic L hip and sacral wound for which he is not an operative candidate but has daily wound care. He also has a suprapubic Lockett catheter and a R chest port. I saw him at the end of September for sepsis due to unknown source but most likely either his urine or his wound. He was treated with a 7-day course of ertapenem and improved.     He came back in on 11/9/17 with acute on chronic diffuse abdominal pain and lower back pain. He reports associated fever to 101F and says \"this is the same kidney infection, man.\" He was given 1x dose vancomycin and then kept on aztreonam and Flagyl. He says his chronic wounds are at baseline. No foul smell this time last there was last time I saw him. He says suprapubic catheter has been leaking again.     He is not on pressors and is ready to transfer out of the ICU. 1/2 BCx + coag-neg Staph. It is labeled from the L arm. Patient does not recall where they were drawn from. He denies erythema or pain at port site.    PMH:  Paraplegia  Anemia  GERD  Sacral decubitus ulcer - not surgical candidate per plastics 8/2016  Protein calorie malnutrition  Chronic suprapubic catheter with ESBL E coli colonization  ESBL E coli septicemia      PSH:  Suprapubic catheter  R chest port replaced 2012  Colostomy      Social History:  Smokes 1/2 ppd  No illicits  Single  Lives w/ mother in [place]      Family History:  No 1st degree family members with history of resistant infections  His mother is living      Allergies:    1. Vancomycin - \"increased creatinine\"  2. Meropenem - \"it makes me hallucinate\" - " tolerates ertapenem so I doubt true reaction  3. Amox-clav - unsure of reaction    Medications:    Current Facility-Administered Medications:   •  acetaminophen (TYLENOL) tablet 650 mg, 650 mg, Oral, Q4H PRN **OR** acetaminophen (TYLENOL) suppository 650 mg, 650 mg, Rectal, Q4H PRN, David Arriaga MD  •  ALPRAZolam (XANAX) tablet 1 mg, 1 mg, Oral, BID PRN, Angel Gannon MD, 1 mg at 11/11/17 0538  •  aluminum-magnesium hydroxide-simethicone (MAALOX MAX) 400-400-40 MG/5ML suspension 15 mL, 15 mL, Oral, Q6H PRN, David Arriaga MD  •  bisacodyl (DULCOLAX) EC tablet 5 mg, 5 mg, Oral, Daily PRN, David Arriaga MD  •  bisacodyl (DULCOLAX) suppository 10 mg, 10 mg, Rectal, Daily PRN, David Arriaga MD  •  enoxaparin (LOVENOX) syringe 40 mg, 40 mg, Subcutaneous, Daily, David Arriaga MD, 40 mg at 11/11/17 1221  •  ertapenem (INVANZ) in SWFI 1 GRAM/10mL IV PUSH syringe, 1 g, Intravenous, Q24H, Pancho Miller MD  •  gabapentin (NEURONTIN) capsule 300 mg, 300 mg, Oral, Q8H, Angel Gannon MD, 300 mg at 11/11/17 0538  •  hydrocortisone sod succinate PF (Solu-CORTEF) injection 100 mg, 100 mg, Intravenous, Q8H, David Arriaga MD, 100 mg at 11/11/17 0904  •  HYDROmorphone (DILAUDID) injection 1 mg, 1 mg, Intravenous, Q2H PRN, Farrukh Naylor MD  •  ipratropium-albuterol (DUO-NEB) nebulizer solution 3 mL, 3 mL, Nebulization, Q6H PRN, David Arriaga MD  •  ondansetron (ZOFRAN) tablet 4 mg, 4 mg, Oral, Q6H PRN **OR** ondansetron ODT (ZOFRAN-ODT) disintegrating tablet 4 mg, 4 mg, Oral, Q6H PRN **OR** ondansetron (ZOFRAN) injection 4 mg, 4 mg, Intravenous, Q6H PRN, David Arriaga MD  •  oxybutynin (DITROPAN) tablet 10 mg, 10 mg, Oral, TID, Angel Gannon MD, 10 mg at 11/11/17 0904  •  oxyCODONE (ROXICODONE) immediate release tablet 15 mg, 15 mg, Oral, Q6H PRN, Angel Gannon MD, 15 mg at 11/10/17 2115  •  pantoprazole (PROTONIX) EC tablet 40 mg, 40  mg, Oral, Q AM, Luca Naylor MD, 40 mg at 11/11/17 0538  •  sodium chloride 0.9 % flush 1-10 mL, 1-10 mL, Intravenous, PRN, David Arriaga MD  •  Insert peripheral IV, , , Once **AND** sodium chloride 0.9 % flush 10 mL, 10 mL, Intravenous, PRN, Azalia Austin MD  •  sodium chloride 0.9 % infusion, 75 mL/hr, Intravenous, Continuous, Farrukh Naylor MD, Last Rate: 75 mL/hr at 11/11/17 1230, 75 mL/hr at 11/11/17 1230  •  sodium hypochlorite (DAKIN'S 1/4 STRENGTH) 0.125 % topical solution 0.125% solution, , Topical, BID, David Arriaga MD      Review of Systems  All systems were reviewed and are negative unless otherwise stated above in the HPI    Objective   Vital Signs   Temp:  [97.4 °F (36.3 °C)-97.7 °F (36.5 °C)] 97.7 °F (36.5 °C)  Heart Rate:  [42-72] 46  Resp:  [16] 16  BP: ()/(56-83) 98/61    Physical Exam:   General: awake, chronically ill-appearing  Head: Normocephalic, atraumatic. No longer has his long dredlocks  Eyes: PERRL, EOMI, no scleral icterus, + conjunctival pallor  ENT: MM dry, OP clear, no thrush. Gold dentition.   Neck: Supple, trachea is midline  Cardiovascular: NR, RR, no murmurs; no LE edema  Respiratory: Lungs are clear to ascultation bilaterally, no rales or wheezing; normal work of breathing on ambient air   GI: Abdomen is thin, soft, non-tender, non-distended, normal bowel sounds in all four quadrants; no hepatosplenomegaly, no masses palpated  : condom and suprapubic catheters present  Musculoskeletal: large L hip and sacral ulcer; deep area near hip joint; scant drainage w/ some blood  Skin: large areas of denuded skin on the left hip, sacrum, scrotum and buttocks  Neurological: Alert and oriented x 3,  motor strength 5/5 in upper extremities; 0/5 lower  Psychiatric: Normal mood and affect   Lymph: no pre-auricular, post-auricular, submandibular, cervical, supraclavicular LAD  Vasc: no cyanosis; R chest port w/o erythema    Labs:     Lab Results   Component Value  Date    WBC 8.48 11/11/2017    HGB 9.3 (L) 11/11/2017    HCT 30.7 (L) 11/11/2017    MCV 91.6 11/11/2017     11/11/2017       Lab Results   Component Value Date    GLUCOSE 138 (H) 11/09/2017    BUN 13 11/09/2017    CREATININE 0.99 11/09/2017    EGFRIFAFRI 103 11/09/2017    BCR 13.1 11/09/2017    CO2 25.9 11/09/2017    CALCIUM 8.8 11/09/2017    ALBUMIN 2.50 (L) 11/09/2017    LABIL2 0.5 11/09/2017    AST 6 11/09/2017    ALT <5 11/09/2017       Microbiology:  11/9 BCx: coag-neg Staph in 1/2 sets (labeled as L arm); other culture is negative (not labeled)  11/9 UCx: mixed tal    Radiology (personally reviewed images/report):  CT suggestive mild pyelo dilation of R renal pelvis and supraubic cathter in place    Assessment/Plan   1. Sepsis likely secondary to  source  -stop aztreonam and Flagyl  -start ertapenem 1 g IV q24h  -confirm suprapubic catheter has been changed this admission    2. Blood culture + coag-neg Staph  -probably a contaminant. Chart says drawn from left arm rather than port.  -will repeat BCx. He received 1x dose of vancomycin about 36 hours ago    3. Paraplegia    4. Suprapubic catheter    5. 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 ID will follow. I discussed the patients findings and my recommendations with Dr AURORA Naylor.

## 2017-11-11 NOTE — NURSING NOTE
"Pt refused his dressing change this morning stating that\" the night shift RN had just done it\". He also stated that we are not adequitely controlling his pain during his changes.   I reported this information to MD AURORA Naylor, who changed his orders to what the patient was requesting after seeing him this AM.  I once again offered to change the dressing, and the patient again refused.  Around noon pt requested his dilaudid and dressing change, I gave the pain medicine and attempted to change the dressing.  I had another RN GIANLUCA Harrington at the bedside to assist.    The patient became verbally abusive, when he claimed that I was not doing it correctly.  I explained to the pt that I was following the wound care recommendations / Dr order.  I was also duplicating what the RN before me had done, that he claimed was done correctly.  Pt began to touch the wound bed, and persisted even after I explained that it should be done in a sterile fashion.  He continued to be verbally abusive to both me and the other RN in the room.  He then called the Charge Nurse into the room, and then called his mother.  He continued to be verbally abusive  throughout the exchange until I exited the room.  "

## 2017-11-11 NOTE — PROGRESS NOTES
[facility]     Mar/chart reviewed  F/u sepsis. Critical care management  Complains of pain    Vital Sign Min/Max for last 24 hours  Temp  Min: 96.7 °F (35.9 °C)  Max: 97.7 °F (36.5 °C)   BP  Min: 73/57  Max: 116/80   Pulse  Min: 42  Max: 72   Resp  Min: 14  Max: 16   SpO2  Min: 67 %  Max: 100 %   No Data Recorded   No Data Recorded     Appears ill, axox3  perrl, eomi, no icterus,  mmm, no jvd, trachea midline, neck supple,  chest cta bilaterally, no crackles, no wheezes,   rrr,   soft, nt, nd +bs,  no c/c/ e    Labs:  U/a c/w infection  Cultures: reviewed: await final speciation;   Ct abd/pelvis -- possible pyelo    A/P:  1. Sepsis -- improving; decrease iv fluids; repeat labs; continue antibiotics; looks like ID was consulted by Dr. Arriaga,   2. UTI, compliated -- indwelling suprapubic catheter, fell out and replaced  3. Decub ulcer -- wound care saw and recommendations made; concern for ability of patient to get proper wound care at home -- per wound care note bone was visible; which raises concern for osteo  4. Anemia -- monitor  5. Steroid dependence -- endocrine following, currently on stress dose steroids  6. Chronic pain -- will change pain meds; iv dilaudid with dressing changes  7. parapalegia  8. Neurogenic bladder    Pt with recent admission with similar; I am concerned that cycle may repeat if he returns home, would he be better served in SNF?

## 2017-11-11 NOTE — PLAN OF CARE
Problem: Fluid Volume Deficit (Adult)  Goal: Identify Related Risk Factors and Signs and Symptoms  Outcome: Ongoing (interventions implemented as appropriate)  Goal: Fluid/Electrolyte Balance  Outcome: Ongoing (interventions implemented as appropriate)  Goal: Comfort/Well Being  Outcome: Ongoing (interventions implemented as appropriate)    Problem: Skin Integrity Impairment, Risk/Actual (Adult)  Goal: Identify Related Risk Factors and Signs and Symptoms  Outcome: Ongoing (interventions implemented as appropriate)  Goal: Skin Integrity/Wound Healing  Outcome: Ongoing (interventions implemented as appropriate)    Problem: Patient Care Overview (Adult)  Goal: Plan of Care Review  Outcome: Ongoing (interventions implemented as appropriate)    11/10/17 1900   Coping/Psychosocial Response Interventions   Plan Of Care Reviewed With patient   Patient Care Overview   Progress improving   Outcome Evaluation   Outcome Summary/Follow up Plan BP improved after 2u PRBC with systolic ranging in 80-90's. Suprapubic catheter and pressure ulcer drsgs changed per [facility] RN's earlier this shift. CT of head completed. Med for pain x2 with moderate results. Bed exchanged to [facility]. Continue to encourage nutrition.       Goal: Discharge Needs Assessment  Outcome: Ongoing (interventions implemented as appropriate)    Problem: Pain, Acute (Adult)  Goal: Identify Related Risk Factors and Signs and Symptoms  Outcome: Ongoing (interventions implemented as appropriate)  Goal: Acceptable Pain Control/Comfort Level  Outcome: Ongoing (interventions implemented as appropriate)    Problem: Sepsis (Adult)  Goal: Signs and Symptoms of Listed Potential Problems Will be Absent or Manageable (Sepsis)  Outcome: Ongoing (interventions implemented as appropriate)

## 2017-11-12 LAB
ANION GAP SERPL CALCULATED.3IONS-SCNC: 8.5 MMOL/L
BACTERIA SPEC AEROBE CULT: ABNORMAL
BACTERIA SPEC AEROBE CULT: ABNORMAL
BASOPHILS # BLD AUTO: 0 10*3/MM3 (ref 0–0.2)
BASOPHILS NFR BLD AUTO: 0 % (ref 0–1.5)
BUN BLD-MCNC: 18 MG/DL (ref 6–20)
BUN/CREAT SERPL: 26.9 (ref 7–25)
CALCIUM SPEC-SCNC: 7.9 MG/DL (ref 8.6–10.5)
CHLORIDE SERPL-SCNC: 110 MMOL/L (ref 98–107)
CO2 SERPL-SCNC: 22.5 MMOL/L (ref 22–29)
CREAT BLD-MCNC: 0.67 MG/DL (ref 0.76–1.27)
DEPRECATED RDW RBC AUTO: 55.3 FL (ref 37–54)
EOSINOPHIL # BLD AUTO: 0 10*3/MM3 (ref 0–0.7)
EOSINOPHIL NFR BLD AUTO: 0 % (ref 0.3–6.2)
ERYTHROCYTE [DISTWIDTH] IN BLOOD BY AUTOMATED COUNT: 16.6 % (ref 11.5–14.5)
GFR SERPL CREATININE-BSD FRML MDRD: >150 ML/MIN/1.73
GLUCOSE BLD-MCNC: 125 MG/DL (ref 65–99)
GRAM STN SPEC: ABNORMAL
HCT VFR BLD AUTO: 29.2 % (ref 40.4–52.2)
HGB BLD-MCNC: 8.7 G/DL (ref 13.7–17.6)
IMM GRANULOCYTES # BLD: 0.06 10*3/MM3 (ref 0–0.03)
IMM GRANULOCYTES NFR BLD: 0.7 % (ref 0–0.5)
LYMPHOCYTES # BLD AUTO: 0.76 10*3/MM3 (ref 0.9–4.8)
LYMPHOCYTES NFR BLD AUTO: 9.5 % (ref 19.6–45.3)
MCH RBC QN AUTO: 27 PG (ref 27–32.7)
MCHC RBC AUTO-ENTMCNC: 29.8 G/DL (ref 32.6–36.4)
MCV RBC AUTO: 90.7 FL (ref 79.8–96.2)
MONOCYTES # BLD AUTO: 0.51 10*3/MM3 (ref 0.2–1.2)
MONOCYTES NFR BLD AUTO: 6.3 % (ref 5–12)
NEUTROPHILS # BLD AUTO: 6.71 10*3/MM3 (ref 1.9–8.1)
NEUTROPHILS NFR BLD AUTO: 83.5 % (ref 42.7–76)
PLATELET # BLD AUTO: 219 10*3/MM3 (ref 140–500)
PMV BLD AUTO: 9.5 FL (ref 6–12)
POTASSIUM BLD-SCNC: 4.2 MMOL/L (ref 3.5–5.2)
RBC # BLD AUTO: 3.22 10*6/MM3 (ref 4.6–6)
SODIUM BLD-SCNC: 141 MMOL/L (ref 136–145)
WBC NRBC COR # BLD: 8.04 10*3/MM3 (ref 4.5–10.7)

## 2017-11-12 PROCEDURE — 25010000002 HYDROCORTISONE SODIUM SUCCINATE 100 MG RECONSTITUTED SOLUTION: Performed by: INTERNAL MEDICINE

## 2017-11-12 PROCEDURE — 99233 SBSQ HOSP IP/OBS HIGH 50: CPT | Performed by: INTERNAL MEDICINE

## 2017-11-12 PROCEDURE — 85025 COMPLETE CBC W/AUTO DIFF WBC: CPT | Performed by: INTERNAL MEDICINE

## 2017-11-12 PROCEDURE — 25010000002 ERTAPENEM PER 500 MG: Performed by: INTERNAL MEDICINE

## 2017-11-12 PROCEDURE — 25010000002 ENOXAPARIN PER 10 MG: Performed by: INTERNAL MEDICINE

## 2017-11-12 PROCEDURE — 25010000003 HYDROCORTISONE SOD SUCCINATE PF 250 MG RECONSTITUTED SOLUTION: Performed by: INTERNAL MEDICINE

## 2017-11-12 PROCEDURE — 25010000002 HYDROMORPHONE PER 4 MG: Performed by: INTERNAL MEDICINE

## 2017-11-12 PROCEDURE — 99232 SBSQ HOSP IP/OBS MODERATE 35: CPT | Performed by: INTERNAL MEDICINE

## 2017-11-12 PROCEDURE — 80048 BASIC METABOLIC PNL TOTAL CA: CPT | Performed by: INTERNAL MEDICINE

## 2017-11-12 RX ADMIN — HYDROCORTISONE SODIUM SUCCINATE 100 MG: 250 INJECTION, POWDER, FOR SOLUTION INTRAMUSCULAR; INTRAVENOUS at 01:29

## 2017-11-12 RX ADMIN — PANTOPRAZOLE SODIUM 40 MG: 40 TABLET, DELAYED RELEASE ORAL at 06:26

## 2017-11-12 RX ADMIN — HYDROMORPHONE HYDROCHLORIDE 1 MG: 1 INJECTION, SOLUTION INTRAMUSCULAR; INTRAVENOUS; SUBCUTANEOUS at 13:41

## 2017-11-12 RX ADMIN — HYDROMORPHONE HYDROCHLORIDE 1 MG: 1 INJECTION, SOLUTION INTRAMUSCULAR; INTRAVENOUS; SUBCUTANEOUS at 01:29

## 2017-11-12 RX ADMIN — DAKIN'S SOLUTION 0.125% (QUARTER STRENGTH): 0.12 SOLUTION at 14:00

## 2017-11-12 RX ADMIN — ALPRAZOLAM 1 MG: 0.5 TABLET ORAL at 08:11

## 2017-11-12 RX ADMIN — GABAPENTIN 300 MG: 300 CAPSULE ORAL at 21:38

## 2017-11-12 RX ADMIN — HYDROMORPHONE HYDROCHLORIDE 1.2 MG: 1 INJECTION, SOLUTION INTRAMUSCULAR; INTRAVENOUS; SUBCUTANEOUS at 16:27

## 2017-11-12 RX ADMIN — HYDROCORTISONE SODIUM SUCCINATE 100 MG: 250 INJECTION, POWDER, FOR SOLUTION INTRAMUSCULAR; INTRAVENOUS at 09:43

## 2017-11-12 RX ADMIN — OXYCODONE HYDROCHLORIDE 15 MG: 5 TABLET ORAL at 06:26

## 2017-11-12 RX ADMIN — HYDROCORTISONE SODIUM SUCCINATE 50 MG: 100 INJECTION, POWDER, FOR SOLUTION INTRAMUSCULAR; INTRAVENOUS at 17:05

## 2017-11-12 RX ADMIN — GABAPENTIN 300 MG: 300 CAPSULE ORAL at 06:26

## 2017-11-12 RX ADMIN — WATER 1 G: 1 INJECTION INTRAMUSCULAR; INTRAVENOUS; SUBCUTANEOUS at 15:01

## 2017-11-12 RX ADMIN — OXYBUTYNIN CHLORIDE 10 MG: 5 TABLET ORAL at 09:44

## 2017-11-12 RX ADMIN — OXYBUTYNIN CHLORIDE 10 MG: 5 TABLET ORAL at 21:38

## 2017-11-12 RX ADMIN — GABAPENTIN 300 MG: 300 CAPSULE ORAL at 15:01

## 2017-11-12 RX ADMIN — HYDROMORPHONE HYDROCHLORIDE 1 MG: 1 INJECTION, SOLUTION INTRAMUSCULAR; INTRAVENOUS; SUBCUTANEOUS at 09:43

## 2017-11-12 RX ADMIN — OXYBUTYNIN CHLORIDE 10 MG: 5 TABLET ORAL at 17:06

## 2017-11-12 RX ADMIN — ENOXAPARIN SODIUM 40 MG: 40 INJECTION SUBCUTANEOUS at 09:43

## 2017-11-12 RX ADMIN — SODIUM CHLORIDE 75 ML/HR: 9 INJECTION, SOLUTION INTRAVENOUS at 06:26

## 2017-11-12 NOTE — NURSING NOTE
Drsg change at 0030 pt putting hands on wounds and instructing nurse frequently on drsg change. Pt given gloves. Pt reports pulling off mepilex drsg and checking wounds without gloves on. Pt instructed on hand hygiene. Pt given hand  and soap and water.

## 2017-11-12 NOTE — PROGRESS NOTES
LOS: 2 days     Chief Complaint:  Follow-up sepsis    Interval History:  Moved from ICU to med-surg floor. No new complaints today. Wound is stable. Tolerating ertapenem w/o rash or diarrhea. WBC normal. No fevers.    ROS; no n/v/d    Vital Signs  Temp:  [95.4 °F (35.2 °C)-98 °F (36.7 °C)] 97.3 °F (36.3 °C)  Heart Rate:  [43-59] 43  Resp:  [16] 16  BP: ()/(49-82) 104/54    Physical Exam:  General: awake, chronically ill-appearing  Head: Normocephalic, No longer has his long dredlocks  Eyes: no scleral icterus, + conjunctival pallor  ENT: MMM, OP clear, no thrush. Gold dentition.   Neck: Supple  Cardiovascular: NR, RR, no murmurs; no LE edema  Respiratory: normal work of breathing on ambient air   GI: Abdomen is thin, soft, non-tender, non-distended  : condom and suprapubic catheters present  Musculoskeletal: large L hip and sacral ulcer; deep area near hip joint; scant drainage w/ some blood  Skin: large areas of denuded skin on the left hip, sacrum, scrotum and buttocks  Neurological: Alert and oriented x 3,  motor strength 5/5 in upper extremities; 0/5 lower  Psychiatric: Normal mood and affect   Vasc: no cyanosis; R chest port w/o erythema    Antibiotics:  •  ertapenem (INVANZ) in SWFI 1 GRAM/10mL IV PUSH syringe, 1 g, Intravenous, Q24H, Pancho Miller MD, 1 g at 11/11/17 1831    LABS:  CBC, BMP, micro reviewed today  Lab Results   Component Value Date    WBC 8.04 11/12/2017    HGB 8.7 (L) 11/12/2017    HCT 29.2 (L) 11/12/2017    MCV 90.7 11/12/2017     11/12/2017     Lab Results   Component Value Date    GLUCOSE 125 (H) 11/12/2017    BUN 18 11/12/2017    CREATININE 0.67 (L) 11/12/2017    EGFRIFAFRI >150 11/12/2017    BCR 26.9 (H) 11/12/2017    K 4.2 11/12/2017    CO2 22.5 11/12/2017    CALCIUM 7.9 (L) 11/12/2017    ALBUMIN 2.50 (L) 11/09/2017    LABIL2 0.5 11/09/2017    AST 6 11/09/2017    ALT <5 11/09/2017     Microbiology:  11/9 BCx: coag-neg Staph in 1/2 sets (labeled as L arm);  other culture is negative (not labeled)  11/9 UCx: mixed tal  11/11 BCx: NGTD     Radiology (prior)  CT suggestive mild pyelo dilation of R renal pelvis and supraubic cathter in place    Assessment/Plan   1. Sepsis likely secondary to  source  -continue ertapenem 1 g IV q24h through 11/17/17  -suprapubic catheter has been changed this admission     2. Blood culture + coag-negative Staph   -probably a contaminant. Chart says drawn from left arm rather than port.  -Repeat BCx NGTD. He received 1x dose of vancomycin     3. Paraplegia     4. Suprapubic catheter     5. 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 ID will follow.

## 2017-11-12 NOTE — NURSING NOTE
When patient arrived on the unit the patient was still upset about what happened on CCU on 11/11/17 and was ready to leave. The patient kept asking for discharge paperwork and stated that his dressing change in the afternoon was not done properly according to what the wound care nurse told him how it should have been done. The patient went on to say that the staff threw his dinner plate away so he did not have anything to eat and was hungry. The patient was has been very argumentative and has been cursing a lot since he has been on this unit.

## 2017-11-12 NOTE — PROGRESS NOTES
[facility]      Mar/chart reviewed  F/u sepsis. Critical care management  Complains of pain    Vital Sign Min/Max for last 24 hours  Temp  Min: 95.4 °F (35.2 °C)  Max: 98 °F (36.7 °C)   BP  Min: 93/78  Max: 115/74   Pulse  Min: 43  Max: 55   Resp  Min: 16  Max: 16   SpO2  Min: 98 %  Max: 100 %   No Data Recorded   No Data Recorded     Appears ill, axox3  perrl, eomi, no icterus,  mmm, no jvd, trachea midline, neck supple,  chest cta bilaterally, no crackles, no wheezes,   rrr,   soft, nt, nd +bs,  no c/c/ e    Lab:  Cr 0.67  Bicarb 22  Wbc 8  hgb 8.7  plts 219    A/P:  1. Sepsis -- resolved  2. UTI, compliated -- indwelling suprapubic catheter, fell out and replaced -- antibiotics through 11/17/2017  3. Decub ulcer --continue wound care  4. Anemia -- monitor  5. Steroid dependence -- endocrine following, currently on stress dose steroids  6. Chronic pain -- will change pain meds; iv dilaudid with dressing changes  7. parapalegia  8. Neurogenic bladder    May need to arrange picc and outpatient antibiotic therapy.  He is likely to refuse placement  Maybe discharge Monday.      Needs home health at the least -- he says they don't help

## 2017-11-12 NOTE — PLAN OF CARE
Problem: Patient Care Overview (Adult)  Goal: Plan of Care Review  Outcome: Ongoing (interventions implemented as appropriate)    11/12/17 3688   Coping/Psychosocial Response Interventions   Plan Of Care Reviewed With patient   Patient Care Overview   Progress improving   Outcome Evaluation   Outcome Summary/Follow up Plan VSS, pt started shift stating he was going to AMA and was upset regarding care, pt agreeable to stay, right leg, and traverse ischial dressing changes done per pt request to redo, pt with leakage around suprapubic cath, pt requested he do own drsg change, pt asst with supplies, no further leakage noted, colostomy intact, prn meds for pain given with relief per pt, safety maintained
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
## 2017-11-12 NOTE — NURSING NOTE
Pt verbalized very upset regarding care from CCU.  Jennyfer notified x2 and to come talk with pt this evening. Pt verbalized wanting to leave AMA or transfer to different hospital due to care from ICU and pt upset regarding dressing changes.   Jennyfer notified. Pt explained options with son and nephew present.  Pt agreeable to stay at [facility]. I spoke with his mother on the phone x2. She verbalized pt upset regarding care. We discussed pt on different unit and we would start new. Pt upset meal tray was thrown away.  Pt reports upset CCU staff threw away his meal. Pt given meal tray pt reports not happy with what we provided. Pt's family went to restaurant and brought pt food. Pt repeatedly tells nurse he did not agree or like the way the nurses in CCU did his dressing change.  Nurse offered multiple times to change dressings.  After multiple offers pt agreed.  Dressing changes supplies not in room. Nurse called CCU room already cleaned.  Pt reports upset his dressing changes did not get transferred with him.  Supplies reordered.  Pt reports upset suprapubic cath leaking and did not like care in CCU. Pt requests to do own dressing change to suprapubic.  Pt asst with drsg change to suprapubic cath. Pt request stoma adhesive, nurse had to order. Pt did drsg change with drain sponges, abd pad, and silk tape. Total time spent with pt approx 2.5 hours.

## 2017-11-13 PROCEDURE — 25010000002 HYDROCORTISONE SODIUM SUCCINATE 100 MG RECONSTITUTED SOLUTION: Performed by: INTERNAL MEDICINE

## 2017-11-13 PROCEDURE — 25010000002 ENOXAPARIN PER 10 MG: Performed by: INTERNAL MEDICINE

## 2017-11-13 PROCEDURE — 25010000002 HYDROMORPHONE PER 4 MG: Performed by: INTERNAL MEDICINE

## 2017-11-13 PROCEDURE — 25010000002 HEPARIN FLUSH (PORCINE) 100 UNIT/ML SOLUTION: Performed by: INTERNAL MEDICINE

## 2017-11-13 PROCEDURE — 99233 SBSQ HOSP IP/OBS HIGH 50: CPT | Performed by: INTERNAL MEDICINE

## 2017-11-13 PROCEDURE — 99232 SBSQ HOSP IP/OBS MODERATE 35: CPT | Performed by: INTERNAL MEDICINE

## 2017-11-13 PROCEDURE — 25010000002 ERTAPENEM PER 500 MG: Performed by: INTERNAL MEDICINE

## 2017-11-13 RX ORDER — SODIUM HYPOCHLORITE 1.25 MG/ML
SOLUTION TOPICAL EVERY 12 HOURS SCHEDULED
Status: DISCONTINUED | OUTPATIENT
Start: 2017-11-13 | End: 2017-11-17 | Stop reason: HOSPADM

## 2017-11-13 RX ORDER — SODIUM CHLORIDE 0.9 % (FLUSH) 0.9 %
10 SYRINGE (ML) INJECTION EVERY 12 HOURS SCHEDULED
Status: DISCONTINUED | OUTPATIENT
Start: 2017-11-13 | End: 2017-11-17 | Stop reason: HOSPADM

## 2017-11-13 RX ORDER — PANTOPRAZOLE SODIUM 40 MG/1
40 TABLET, DELAYED RELEASE ORAL
Status: DISCONTINUED | OUTPATIENT
Start: 2017-11-13 | End: 2017-11-17 | Stop reason: HOSPADM

## 2017-11-13 RX ORDER — SODIUM CHLORIDE 0.9 % (FLUSH) 0.9 %
10 SYRINGE (ML) INJECTION AS NEEDED
Status: DISCONTINUED | OUTPATIENT
Start: 2017-11-13 | End: 2017-11-17 | Stop reason: HOSPADM

## 2017-11-13 RX ORDER — PANTOPRAZOLE SODIUM 40 MG/1
40 TABLET, DELAYED RELEASE ORAL
Status: DISCONTINUED | OUTPATIENT
Start: 2017-11-14 | End: 2017-11-13

## 2017-11-13 RX ADMIN — Medication 10 ML: at 22:22

## 2017-11-13 RX ADMIN — HYDROCORTISONE SODIUM SUCCINATE 50 MG: 100 INJECTION, POWDER, FOR SOLUTION INTRAMUSCULAR; INTRAVENOUS at 22:22

## 2017-11-13 RX ADMIN — HYDROMORPHONE HYDROCHLORIDE 1.2 MG: 1 INJECTION, SOLUTION INTRAMUSCULAR; INTRAVENOUS; SUBCUTANEOUS at 06:31

## 2017-11-13 RX ADMIN — SODIUM CHLORIDE 1 G: 900 INJECTION INTRAVENOUS at 14:23

## 2017-11-13 RX ADMIN — PANTOPRAZOLE SODIUM 40 MG: 40 TABLET, DELAYED RELEASE ORAL at 06:35

## 2017-11-13 RX ADMIN — Medication 500 UNITS: at 22:21

## 2017-11-13 RX ADMIN — HYDROMORPHONE HYDROCHLORIDE 1.2 MG: 1 INJECTION, SOLUTION INTRAMUSCULAR; INTRAVENOUS; SUBCUTANEOUS at 22:25

## 2017-11-13 RX ADMIN — ENOXAPARIN SODIUM 40 MG: 40 INJECTION SUBCUTANEOUS at 08:13

## 2017-11-13 RX ADMIN — OXYCODONE HYDROCHLORIDE 15 MG: 5 TABLET ORAL at 14:23

## 2017-11-13 RX ADMIN — GABAPENTIN 300 MG: 300 CAPSULE ORAL at 22:22

## 2017-11-13 RX ADMIN — HYDROMORPHONE HYDROCHLORIDE 1.2 MG: 1 INJECTION, SOLUTION INTRAMUSCULAR; INTRAVENOUS; SUBCUTANEOUS at 00:45

## 2017-11-13 RX ADMIN — DAKIN'S SOLUTION 0.125% (QUARTER STRENGTH): 0.12 SOLUTION at 12:27

## 2017-11-13 RX ADMIN — GABAPENTIN 300 MG: 300 CAPSULE ORAL at 14:23

## 2017-11-13 RX ADMIN — ALPRAZOLAM 1 MG: 0.5 TABLET ORAL at 09:29

## 2017-11-13 RX ADMIN — HYDROMORPHONE HYDROCHLORIDE 1.2 MG: 1 INJECTION, SOLUTION INTRAMUSCULAR; INTRAVENOUS; SUBCUTANEOUS at 17:37

## 2017-11-13 RX ADMIN — HYDROMORPHONE HYDROCHLORIDE 1.2 MG: 1 INJECTION, SOLUTION INTRAMUSCULAR; INTRAVENOUS; SUBCUTANEOUS at 14:34

## 2017-11-13 RX ADMIN — OXYBUTYNIN CHLORIDE 10 MG: 5 TABLET ORAL at 22:22

## 2017-11-13 RX ADMIN — OXYBUTYNIN CHLORIDE 10 MG: 5 TABLET ORAL at 17:21

## 2017-11-13 RX ADMIN — HYDROCORTISONE SODIUM SUCCINATE 50 MG: 100 INJECTION, POWDER, FOR SOLUTION INTRAMUSCULAR; INTRAVENOUS at 04:32

## 2017-11-13 RX ADMIN — PANTOPRAZOLE SODIUM 40 MG: 40 TABLET, DELAYED RELEASE ORAL at 18:11

## 2017-11-13 RX ADMIN — OXYBUTYNIN CHLORIDE 10 MG: 5 TABLET ORAL at 08:13

## 2017-11-13 RX ADMIN — GABAPENTIN 300 MG: 300 CAPSULE ORAL at 06:35

## 2017-11-13 RX ADMIN — HYDROMORPHONE HYDROCHLORIDE 1.2 MG: 1 INJECTION, SOLUTION INTRAMUSCULAR; INTRAVENOUS; SUBCUTANEOUS at 09:29
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
## 2017-11-13 NOTE — DISCHARGE PLACEMENT REQUEST
"Joaquín Youssef (37 y.o. Male)     Date of Birth Social Security Number Address Home Phone MRN    1980  [id number] [facility]  [address] [place], [place] [id number] [phone] [id number]    Yazidi Marital Status          None Single       Admission Date Admission Type Admitting Provider Attending Provider Department, Room/Bed    11/9/17 Emergency David Arriaga MD Esterle, Mark Edwin, MD [address], [facility]/1    Discharge Date Discharge Disposition Discharge Destination                      Attending Provider: David Arriaga MD     Allergies:  Amoxicillin-pot Clavulanate, Ibuprofen, Ketorolac Tromethamine, Meropenem, Vancomycin    Isolation:  Contact   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), 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)   Code Status:  FULL    Ht:  72.99\" (185.4 cm)   Wt:  89 lb 1.6 oz (40.4 kg)    Admission Cmt:  None   Principal Problem:  None                Active Insurance as of 11/9/2017     Primary Coverage     Payor Plan Insurance Group Employer/Plan Group    P[employer]ORT PAS[employer]ORT MEDICAID     Payor Plan Address Payor Plan Phone Number Effective From Effective To    [address] [phone] 1/1/1998     [place] [address]       Subscriber Name Subscriber Birth Date Member ID       YOUSSEF,JOAQUÍN OMER 1980 [id number]                 Emergency Contacts      (Rel.) Home Phone Work Phone Mobile Phone    Marlen Al (Mother) [phone] -- --    PengJenni (Sister) [phone] -- --              "

## 2017-11-13 NOTE — PROGRESS NOTES
Continued Stay Note  [facility]     Patient Name: Joaquín Sherman  MRN: [id number]  Today's Date: 11/13/2017    Admit Date: 11/9/2017          Discharge Plan       11/13/17 5608    Case Management/Social Work Plan    Plan Plans home with Home Health    Additional Comments Ana with [facility] states she spoke with her supervisor.  They are unable to take patient since he currently in in a new apartment without a bed.  He has a clinitron bed at his other address that is mold infected and has been given the information it will be 500 dollars to clean and he has not done this.  He also canceled his past two apointments at the [facility].  He states now he would like home MD visits.  [phone] and they could work with another  agency.  CCP will follow up in am ......................Cindy Valles RN      11/13/17 3374    Case Management/Social Work Plan    Plan Plans to return home with Home Health    Final Note    Final Note Met with patient again to discuss need for skilled care.  Patient refused.  He did agree to a referral with [facility] and a referral was called to Ana #[id number]...........Cindy Valles RN              Discharge Codes     None            Cindy Valles RN

## 2017-11-13 NOTE — PLAN OF CARE
Problem: Patient Care Overview (Adult)  Goal: Adult Individualization and Mutuality  Outcome: Ongoing (interventions implemented as appropriate)  Pt very cooperative with care after initial verbalizations of unhappiness-wound care with pt guidance and skin care recommendations on monitor -rest of assessment per flowsheet    Problem: Pain, Acute (Adult)  Goal: Acceptable Pain Control/Comfort Level  Outcome: Ongoing (interventions implemented as appropriate)  Medicated with pain med as noted with good relief    Problem: Sepsis (Adult)  Goal: Signs and Symptoms of Listed Potential Problems Will be Absent or Manageable (Sepsis)  Outcome: Ongoing (interventions implemented as appropriate)  vss-pt receiving IV antibiotics as ordered    Problem: Pressure Ulcer Risk (Shamar Scale) (Adult,Obstetrics,Pediatric)  Goal: Skin Integrity  Outcome: Ongoing (interventions implemented as appropriate)  Wound care completed per skin recommendations and pt assistance-pt tolerated well

## 2017-11-13 NOTE — PROGRESS NOTES
Continued Stay Note  [facility]     Patient Name: Joaquín Sherman  MRN: [id number]  Today's Date: 11/13/2017    Admit Date: 11/9/2017          Discharge Plan       11/13/17 1319    Case Management/Social Work Plan    Plan Plans to return home with Home Health    Final Note    Final Note Met with patient again to discuss need for skilled care.  Patient refused.  He did agree to a referral with [facility] and a referral was called to Ana #[id number]...........Cindy Valles RN      11/13/17 1257    Case Management/Social Work Plan    Plan plans to return home    Additional Comments Facesheet verified with patient.  Patient states he now sees Dr Jaramillo # [phone] or one of his nurse practitioners. Call placed to [facility] [phone] to verify.  He states he did not want [facility] agency because they only came one time and is not interested in another.  He refuses IV meds to be set up at home at this time.  He plans to return home.  He states he does not have a motorized hospital bed and he stopped seeing Dr Hall because he would not write a prescription for one.  Patient states he plans to follow up with Dr Jaramillo for his need for a new power wheelchair.  [facility] medical supplies and measures weekly his ostomy supplies.  He has a bath bench and old power chiar.  He will need ambulance transfer at [place].  Area list provided for patient  and CCP will follow up and assist with DC needs........................Cindy Valles RN              Discharge Codes     None            Cindy Valles RN

## 2017-11-13 NOTE — PROGRESS NOTES
LOS: 3 days     Chief Complaint:  Follow-up sepsis    Interval History:  Some swelling in R hand. Better w/ elevation. No heat or erythema or palpable cords there. Tolerating ertapenem w/o rash or diarrhea. No fevers. Remains on stress dose steroids.    ROS; no n/v/d    Vital Signs  Temp:  [97.4 °F (36.3 °C)-97.9 °F (36.6 °C)] 97.5 °F (36.4 °C)  Heart Rate:  [43-96] 96  Resp:  [16-18] 18  BP: ()/(69-91) 101/69    Physical Exam:  General: awake, chronically ill-appearing  Head: Normocephalic, No longer has his long dredlocks  Eyes: no scleral icterus, + conjunctival pallor  ENT: MMM, OP clear, no thrush. Gold dentition.   Neck: Supple  Cardiovascular: NR, RR, no murmurs; no LE edema; mild R hand edema  Respiratory: normal work of breathing on ambient air   GI: Abdomen is thin, soft, non-tender, non-distended  : condom and suprapubic catheters present  Musculoskeletal: large L hip and sacral ulcer; deep area near hip joint; scant drainage w/ some blood  Skin: large areas of denuded skin on the left hip, sacrum, scrotum and buttocks  Neurological: Alert and oriented x 3,  motor strength 5/5 in upper extremities; 0/5 lower  Psychiatric: Normal mood and affect   Vasc: no cyanosis; R chest port w/o erythema    Antibiotics:  •  ertapenem (INVANZ) in SWFI 1 GRAM/10mL IV PUSH syringe, 1 g, Intravenous, Q24H, Pancho Miller MD, 1 g at 11/11/17 1831    LABS:  micro reviewed today  Lab Results   Component Value Date    WBC 8.04 11/12/2017    HGB 8.7 (L) 11/12/2017    HCT 29.2 (L) 11/12/2017    MCV 90.7 11/12/2017     11/12/2017     Lab Results   Component Value Date    GLUCOSE 125 (H) 11/12/2017    BUN 18 11/12/2017    CREATININE 0.67 (L) 11/12/2017    EGFRIFAFRI >150 11/12/2017    BCR 26.9 (H) 11/12/2017    K 4.2 11/12/2017    CO2 22.5 11/12/2017    CALCIUM 7.9 (L) 11/12/2017    ALBUMIN 2.50 (L) 11/09/2017    LABIL2 0.5 11/09/2017    AST 6 11/09/2017    ALT <5 11/09/2017     Microbiology:  11/9 BCx:  coag-neg Staph in 1/2 sets (labeled as L arm); other culture is negative (not labeled)  11/9 UCx: mixed tal  11/11 BCx: NGTD     Radiology (prior)  CT suggestive mild pyelo dilation of R renal pelvis and supraubic cathter in place    Assessment/Plan   1. Sepsis likely secondary to  source  -continue ertapenem 1 g IV q24h through 11/17/17  -no PICC needed; he can use the port  -suprapubic catheter has been changed this admission  -on high dose steroids per endocrinology who is managing     2. Blood culture + coag-negative Staph   -probably a contaminant. Chart says drawn from left arm rather than port.  -Repeat BCx NGTD     3. Paraplegia     4. Suprapubic catheter     5. 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 Infectious diseases will sign off at this time with antibiotics plan in place but please call me at [phone] if any further questions.

## 2017-11-13 NOTE — PLAN OF CARE
Problem: Skin Integrity Impairment, Risk/Actual (Adult)  Goal: Identify Related Risk Factors and Signs and Symptoms  Outcome: Ongoing (interventions implemented as appropriate)  Goal: Skin Integrity/Wound Healing  Outcome: Ongoing (interventions implemented as appropriate)    Problem: Patient Care Overview (Adult)  Goal: Plan of Care Review  Outcome: Ongoing (interventions implemented as appropriate)    11/13/17 0433   Coping/Psychosocial Response Interventions   Plan Of Care Reviewed With patient   Outcome Evaluation   Outcome Summary/Follow up Plan dressing changes per orders and pt's instructions. pain meds prn. colostomy intact, condom cath and suprapubic cath to bsd drainage. SB in the 40's. no acute distress noted.        Goal: Adult Individualization and Mutuality  Outcome: Ongoing (interventions implemented as appropriate)  Goal: Discharge Needs Assessment  Outcome: Ongoing (interventions implemented as appropriate)

## 2017-11-13 NOTE — PROGRESS NOTES
[facility]       Mar/chart reviewed  F/u sepsis. Critical care management  Complains of swelling in right hand    Vital Sign Min/Max for last 24 hours  Temp  Min: 97.4 °F (36.3 °C)  Max: 97.9 °F (36.6 °C)   BP  Min: 101/69  Max: 135/91   Pulse  Min: 43  Max: 96   Resp  Min: 16  Max: 18   SpO2  Min: 98 %  Max: 98 %   No Data Recorded   No Data Recorded     Appears ill, axox3  perrl, eomi, no icterus,  mmm, no jvd, trachea midline, neck supple,  chest cta bilaterally, no crackles, no wheezes,   rrr,   soft, nt, nd +bs,  no c/c/ e    Labs:  Nothing new    A/P:  1. Sepsis -- resolved  2. UTI, compliated -- indwelling suprapubic catheter, fell out and replaced -- antibiotics through 11/17/2017  3. Decub ulcer --continue wound care  4. Anemia -- monitor  5. Steroid dependence -- endocrine following, currently on stress dose steroids  6. Chronic pain -- will change pain meds; iv dilaudid with dressing changes  7. parapalegia  8. Neurogenic bladder    Hopefully discharge home tomorrow  He refuses pretty much everything in terms of outpatient help.    D/w RN and with CCP

## 2017-11-13 NOTE — PLAN OF CARE
"Problem: Patient Care Overview (Adult)  Goal: Plan of Care Review  Outcome: Ongoing (interventions implemented as appropriate)    11/13/17 6554   Coping/Psychosocial Response Interventions   Plan Of Care Reviewed With patient   Patient Care Overview   Progress no change   Outcome Evaluation   Outcome Summary/Follow up Plan Dressing change per pt's instructions, pt assisted in dressing change as well. Pt wants dressing change done the way he does it at home with his assistance and will not allow it to be done any other way. Pt states he is \"frustrated\" and \"wants to go home\" and \"doesn't understand why he needs home health since they never come\". When explained why he needs home health he starts using profanity and states he already knows how to give himself IV antibiotics. Pt pain controlled with IV and PO meds. Complaint of indigestion, Protonix changed to BID per MD. PT had swollen right hand this AM, pt reports waking up and hand being dependent. MD aware. Continue to monitor.          Problem: Pain, Acute (Adult)  Goal: Acceptable Pain Control/Comfort Level  Outcome: Ongoing (interventions implemented as appropriate)    Problem: Sepsis (Adult)  Goal: Signs and Symptoms of Listed Potential Problems Will be Absent or Manageable (Sepsis)  Outcome: Ongoing (interventions implemented as appropriate)    Problem: Pressure Ulcer Risk (Shamar Scale) (Adult,Obstetrics,Pediatric)  Goal: Identify Related Risk Factors and Signs and Symptoms  Outcome: Outcome(s) achieved Date Met:  11/13/17  Goal: Skin Integrity  Outcome: Ongoing (interventions implemented as appropriate)      "

## 2017-11-13 NOTE — PROGRESS NOTES
Discharge Planning Assessment  [facility]     Patient Name: Joaquín Sherman  MRN: [id number]  Today's Date: 11/13/2017    Admit Date: 11/9/2017          Discharge Needs Assessment       11/13/17 1246    Living Environment    Lives With parent(s);sibling(s)    Living Arrangements apartment    Home Accessibility no concerns    Transportation Available ambulance    Living Environment    Provides Primary Care For no one    Quality Of Family Relationships unable to assess    Discharge Needs Assessment    Concerns To Be Addressed patient refuses services;discharge planning concerns   patient refusing IV antibiotics at this time and home health    Readmission Within The Last 30 Days no previous admission in last 30 days    Equipment Currently Used at Home colostomy/ostomy supplies;bath bench;hospital bed;wheelchair   patient states he does not have a reclining lift or a motorized hospital bed and is due for a new motorized wheelchair.     Equipment Needed After Discharge other (see comments)   ostomy supplies are provided by [facility] and are ordered weekly and measured weekely.  He also would like a new motorized wheelchair and motorized hospital bed.              Discharge Plan       11/13/17 7543    Case Management/Social Work Plan    Plan plans to return home    Additional Comments Facesheet verified with patient.  Patient states he now sees Dr Jaramillo # [phone] or one of his nurse practitioners. Call placed to [facility] [phone] to verify.  He states he did not want [facility] agency because they only came one time and is not interested in another.  He refuses IV meds to be set up at home at this time.  He plans to return home.  He states he does not have a motorized hospital bed and he stopped seeing Dr Hall because he would not write a prescription for one.  Patient states he plans to follow up with Dr Jaramillo for his need for a new power wheelchair.  [facility] medical supplies and  measures weekly his ostomy supplies.  He has a bath bench and old power chiar.  He will need ambulance transfer at [place].  Area list provided for patient  and CCP will follow up and assist with DC needs........................Cindy Valles RN        Discharge Placement     No information found                Demographic Summary       11/13/17 1239    Referral Information    Admission Type inpatient    Arrived From home or self-care;home healthcare service   [facility]    Referral Source admission list    Contact Information    Permission Granted to Share Information With family/designee    Comments mother, Marlen Al     Primary Care Physician Information    Name --   Dr. Salvador Jaramillo contact info is [phone].   Patient states he no longer sees Dr Hall.  He sees Dr Jaramillo or one of his APRNs but did not know which one.  He states he has been to both [facility] and [facility]             Functional Status     None            Psychosocial     None            Abuse/Neglect     None            Legal     None            Substance Abuse     None            Patient Forms     None          Cindy Valles RN

## 2017-11-14 LAB — BACTERIA SPEC AEROBE CULT: NORMAL

## 2017-11-14 PROCEDURE — 25010000002 ENOXAPARIN PER 10 MG: Performed by: INTERNAL MEDICINE

## 2017-11-14 PROCEDURE — 25010000002 ERTAPENEM PER 500 MG: Performed by: INTERNAL MEDICINE

## 2017-11-14 PROCEDURE — 99232 SBSQ HOSP IP/OBS MODERATE 35: CPT | Performed by: INTERNAL MEDICINE

## 2017-11-14 PROCEDURE — 25010000002 HYDROMORPHONE PER 4 MG: Performed by: INTERNAL MEDICINE

## 2017-11-14 PROCEDURE — 25010000002 HEPARIN FLUSH (PORCINE) 100 UNIT/ML SOLUTION: Performed by: INTERNAL MEDICINE

## 2017-11-14 PROCEDURE — 25010000002 HYDROCORTISONE SODIUM SUCCINATE 100 MG RECONSTITUTED SOLUTION: Performed by: INTERNAL MEDICINE

## 2017-11-14 RX ORDER — HYDROCORTISONE 10 MG/1
30 TABLET ORAL 2 TIMES DAILY WITH MEALS
Status: DISCONTINUED | OUTPATIENT
Start: 2017-11-14 | End: 2017-11-16

## 2017-11-14 RX ADMIN — OXYCODONE HYDROCHLORIDE 15 MG: 5 TABLET ORAL at 00:41

## 2017-11-14 RX ADMIN — Medication 500 UNITS: at 20:59

## 2017-11-14 RX ADMIN — HYDROMORPHONE HYDROCHLORIDE 1 MG: 1 INJECTION, SOLUTION INTRAMUSCULAR; INTRAVENOUS; SUBCUTANEOUS at 20:59

## 2017-11-14 RX ADMIN — HYDROMORPHONE HYDROCHLORIDE 1.2 MG: 1 INJECTION, SOLUTION INTRAMUSCULAR; INTRAVENOUS; SUBCUTANEOUS at 08:35

## 2017-11-14 RX ADMIN — GABAPENTIN 300 MG: 300 CAPSULE ORAL at 14:39

## 2017-11-14 RX ADMIN — GABAPENTIN 300 MG: 300 CAPSULE ORAL at 21:00

## 2017-11-14 RX ADMIN — OXYCODONE HYDROCHLORIDE 15 MG: 5 TABLET ORAL at 08:35

## 2017-11-14 RX ADMIN — ALPRAZOLAM 1 MG: 0.5 TABLET ORAL at 08:35

## 2017-11-14 RX ADMIN — HYDROMORPHONE HYDROCHLORIDE 1.2 MG: 1 INJECTION, SOLUTION INTRAMUSCULAR; INTRAVENOUS; SUBCUTANEOUS at 13:36

## 2017-11-14 RX ADMIN — Medication 10 ML: at 20:59

## 2017-11-14 RX ADMIN — Medication 10 ML: at 08:52

## 2017-11-14 RX ADMIN — OXYBUTYNIN CHLORIDE 10 MG: 5 TABLET ORAL at 08:35

## 2017-11-14 RX ADMIN — DAKIN'S SOLUTION 0.125% (QUARTER STRENGTH): 0.12 SOLUTION at 23:54

## 2017-11-14 RX ADMIN — HYDROMORPHONE HYDROCHLORIDE 1.2 MG: 1 INJECTION, SOLUTION INTRAMUSCULAR; INTRAVENOUS; SUBCUTANEOUS at 23:54

## 2017-11-14 RX ADMIN — OXYCODONE HYDROCHLORIDE 15 MG: 5 TABLET ORAL at 13:36

## 2017-11-14 RX ADMIN — DAKIN'S SOLUTION 0.125% (QUARTER STRENGTH): 0.12 SOLUTION at 09:00

## 2017-11-14 RX ADMIN — ALPRAZOLAM 1 MG: 0.5 TABLET ORAL at 17:27

## 2017-11-14 RX ADMIN — Medication 10 ML: at 01:41

## 2017-11-14 RX ADMIN — PANTOPRAZOLE SODIUM 40 MG: 40 TABLET, DELAYED RELEASE ORAL at 08:52

## 2017-11-14 RX ADMIN — HYDROCORTISONE SODIUM SUCCINATE 50 MG: 100 INJECTION, POWDER, FOR SOLUTION INTRAMUSCULAR; INTRAVENOUS at 08:48

## 2017-11-14 RX ADMIN — OXYBUTYNIN CHLORIDE 10 MG: 5 TABLET ORAL at 20:58

## 2017-11-14 RX ADMIN — HYDROMORPHONE HYDROCHLORIDE 1.2 MG: 1 INJECTION, SOLUTION INTRAMUSCULAR; INTRAVENOUS; SUBCUTANEOUS at 01:41

## 2017-11-14 RX ADMIN — HYDROMORPHONE HYDROCHLORIDE 1.2 MG: 1 INJECTION, SOLUTION INTRAMUSCULAR; INTRAVENOUS; SUBCUTANEOUS at 17:17

## 2017-11-14 RX ADMIN — HYDROCORTISONE 30 MG: 10 TABLET ORAL at 23:54

## 2017-11-14 RX ADMIN — GABAPENTIN 300 MG: 300 CAPSULE ORAL at 05:31

## 2017-11-14 RX ADMIN — OXYBUTYNIN CHLORIDE 10 MG: 5 TABLET ORAL at 17:10

## 2017-11-14 RX ADMIN — SODIUM CHLORIDE 1 G: 900 INJECTION INTRAVENOUS at 17:10

## 2017-11-14 RX ADMIN — Medication 500 UNITS: at 08:36

## 2017-11-14 RX ADMIN — HYDROMORPHONE HYDROCHLORIDE 1.2 MG: 1 INJECTION, SOLUTION INTRAMUSCULAR; INTRAVENOUS; SUBCUTANEOUS at 05:31

## 2017-11-14 RX ADMIN — OXYCODONE HYDROCHLORIDE 15 MG: 5 TABLET ORAL at 20:58

## 2017-11-14 RX ADMIN — DAKIN'S SOLUTION 0.125% (QUARTER STRENGTH): 0.12 SOLUTION at 00:41

## 2017-11-14 RX ADMIN — PANTOPRAZOLE SODIUM 40 MG: 40 TABLET, DELAYED RELEASE ORAL at 17:10

## 2017-11-14 RX ADMIN — Medication 10 ML: at 05:31

## 2017-11-14 RX ADMIN — ENOXAPARIN SODIUM 40 MG: 40 INJECTION SUBCUTANEOUS at 08:35

## 2017-11-14 NOTE — PROGRESS NOTES
Continued Stay Note  [facility]     Patient Name: Joaquín Sherman  MRN: [id number]  Today's Date: 11/14/2017    Admit Date: 11/9/2017          Discharge Plan       11/14/17 1552    Case Management/Social Work Plan    Additional Comments Called the Abuse hotline and spoke with Meeta. She states that patient is current with APS. Worker, Pina Plascencialding---[phone] . Spoke with Pina. She states that  [facility]  is  current with patient but she is in the process of closing the case. Per APS , they found patient living in a house with his mother. ......  BALDO Mccall      11/14/17 1518    Case Management/Social Work Plan    Additional Comments Spoke with patient who states he is a patient of  Dr. [phone]. . Called Dr. MENESESs office and spoke with [phone] option 5. She confirmed that patient had a completed visit with their office on 10-26-17. If HH is indicated on discharge, Dr. MENESESs office works with [facility] or [facility] . Called [facility] and am waiting for Lizbeth to call  back. CCP to follow      11/14/17 1351    Case Management/Social Work Plan    Plan Home with HH    Additional Comments Pt reused [facility].  Per Dr Al plan to keep inpatient for completion of IV antibiotics.  Will need home health for dressing changes.  CCP continues to assist.................Cindy Valles RN              Discharge Codes     None            BALDO Mccall

## 2017-11-14 NOTE — PROGRESS NOTES
Continued Stay Note  [facility]     Patient Name: Joaquín Sherman  MRN: [id number]  Today's Date: 11/14/2017    Admit Date: 11/9/2017          Discharge Plan       11/14/17 1646    Case Management/Social Work Plan    Additional Comments [facility] states they can not provide HH care  for patient on discharge. Asked patient re placement at [facility] and he said he did not feel he needed placement at discharge..He wants to go home with family to assist ............    BALDO Mccall      11/14/17 1552    Case Management/Social Work Plan    Additional Comments Called the Abuse hotline and spoke with Meeta. she states that patient is current with [facility]PS. Worker, Pina Lombardi---[phone] . Spoke with Pina. She states that  [facility]  is  current with patient but she is in the process of closing the case. Per APS , they found patient living in a house with his mother. ......  BALDO Mccall      11/14/17 1518    Case Management/Social Work Plan    Additional Comments Spoke with patient who states he is a patient of  Dr. [phone]. . Called Dr. RICHARDS's office and spoke with [phone] option 5. She confirmed that patient had a completed visit with their office on 10-26-17. If HH is indicated on discharge, Dr. RICHARDS's office works with [facility] or [facility] . Called [facility] and am waiting for Lizbeth to call  back. CCP to follow      11/14/17 1351    Case Management/Social Work Plan    Plan Home with HH    Additional Comments Pt reused [facility].  Per Dr Al plan to keep inpatient for completion of IV antibiotics.  Will need home health for dressing changes.  CCP continues to assist.................Cindy Valles RN              Discharge Codes     None            BALDO Mccall

## 2017-11-14 NOTE — PROGRESS NOTES
Awake, alert, comfortable.    AVSS  Abd soft  Urine clear from SP tube and condom catheter    Cr 0.67    Plan:  - SP tube exchanged last week  - Plan SP tube exchange every 6 weeks

## 2017-11-14 NOTE — PROGRESS NOTES
"Continued Stay Note  [facility]     Patient Name: Joaquín Sherman  MRN: [id number]  Today's Date: 11/14/2017    Admit Date: 11/9/2017          Discharge Plan       11/14/17 1240    Case Management/Social Work Plan    Plan to be determined    Additional Comments Spoke with Tasha with [facility] she states that if an MD order is obtained for a hospital bed it would require precert with his insurance and if he had another would need to be less than 5 years old.  ..................Cindy Valles RN      11/14/17 1229    Case Management/Social Work Plan    Plan to be determined    Additional Comments Spoke with patient and informed [facility] will not accept back. Discussed need to find PCP.  Patient states he has not missed appointment with [facility].  He would like visiting MD.  Patient states he had a hospital bed at his other address \"his old house\" but would not provide me with this address.  He stated \"they \" were supposed to fix his bed and became.  He placed a call for me to speak with his mother. Marlen Al identified herself as his mother and stated that she lives with him and he stated he does not have a bed and he sleeps on a couch.  Discussed DC plans with both and mother states she is unable to do dressing changes 2 times a day.  She also states that they can not do antibiotics at home that they tried that before and it did not work out but would not elaborate how.  She stated home health refused to change his dressings on the couch and would not come back.  Suggested possible DC to [facility].  Patient refused to discuss.  Mother stated he knows what he needs to do but she thought he would not agree.  She stated he has been there  before.  Spoke with Dr Al and  notified .  CCP will continue to follow and assist...........................Cindy Valles RN              Discharge Codes     None            Cindy Valles RN    "

## 2017-11-14 NOTE — PLAN OF CARE
"Problem: Fluid Volume Deficit (Adult)  Goal: Identify Related Risk Factors and Signs and Symptoms  Outcome: Outcome(s) achieved Date Met:  11/14/17  Goal: Fluid/Electrolyte Balance  Outcome: Ongoing (interventions implemented as appropriate)  Goal: Comfort/Well Being  Outcome: Ongoing (interventions implemented as appropriate)    Problem: Skin Integrity Impairment, Risk/Actual (Adult)  Goal: Identify Related Risk Factors and Signs and Symptoms  Outcome: Outcome(s) achieved Date Met:  11/14/17  Goal: Skin Integrity/Wound Healing  Outcome: Ongoing (interventions implemented as appropriate)    Problem: Patient Care Overview (Adult)  Goal: Plan of Care Review  Outcome: Ongoing (interventions implemented as appropriate)    11/14/17 0041 11/14/17 0535   Coping/Psychosocial Response Interventions   Plan Of Care Reviewed With patient --    Patient Care Overview   Progress --  no change   Outcome Evaluation   Outcome Summary/Follow up Plan --  Dressing change per pt's instructions. Pt requesting IV dilaudid frequently. Encouraged pt to have PO pain medicine. Pt states he is aware that the IV dilaudid will be discontinued when he is sent home. VSS. Safety maintained. Pt demanding of staff at times. Pt refusing to turn even after extensive education. Pt states \"I don't need to turn with this mattress.\" Continue to monitor.        Goal: Adult Individualization and Mutuality  Outcome: Ongoing (interventions implemented as appropriate)  Goal: Discharge Needs Assessment  Outcome: Ongoing (interventions implemented as appropriate)    Problem: Pain, Acute (Adult)  Goal: Identify Related Risk Factors and Signs and Symptoms  Outcome: Outcome(s) achieved Date Met:  11/14/17  Goal: Acceptable Pain Control/Comfort Level  Outcome: Ongoing (interventions implemented as appropriate)    Problem: Sepsis (Adult)  Goal: Signs and Symptoms of Listed Potential Problems Will be Absent or Manageable (Sepsis)  Outcome: Ongoing (interventions " implemented as appropriate)    Problem: Pressure Ulcer Risk (Shamar Scale) (Adult,Obstetrics,Pediatric)  Goal: Skin Integrity  Outcome: Ongoing (interventions implemented as appropriate)

## 2017-11-14 NOTE — PROGRESS NOTES
"Continued Stay Note  [facility]     Patient Name: Joaquín Sherman  MRN: [id number]  Today's Date: 11/14/2017    Admit Date: 11/9/2017          Discharge Plan       11/14/17 1229    Case Management/Social Work Plan    Plan to be determined    Additional Comments Spoke with patient and informed [facility] will not accept back. Discussed need to find PCP.  Patient states he has not missed appointment with [facility].  He would like visiting MD.  Patient states he had a hospital bed at his other address \"his old house\" but would not provide me with this address.  He stated \"they \" were supposed to fix his bed and became.  He placed a call for me to speak with his mother. Marlenhector Al identified herself as his mother and stated that she lives with him and he stated he does not have a bed and he sleeps on a couch.  Discussed DC plans with both and mother states she is unable to do dressing changes 2 times a day.  She also states that they can not do antibiotics at home that they tried that before and it did not work out but would not elaborate how.  She stated home health refused to change his dressings on the couch and would not come back.  Suggested possible DC to [facility].  Patient refused to discuss.  Mother stated he knows what he needs to do but she thought he would not agree.  She stated he has been there  before.  Spoke with Dr Al and  notified .  CCP will continue to follow and assist...........................Cindy Valles RN              Discharge Codes     None            Cindy Valles RN    "

## 2017-11-14 NOTE — PROGRESS NOTES
[facility]       Mar/chart reviewed  F/u sepsis. Critical care management  Complains of swelling in right hand -- better today    Vital Sign Min/Max for last 24 hours  Temp  Min: 97.5 °F (36.4 °C)  Max: 98.2 °F (36.8 °C)   BP  Min: 101/69  Max: 110/81   Pulse  Min: 62  Max: 112   Resp  Min: 18  Max: 18   No Data Recorded   No Data Recorded   Weight  Min: 88 lb 9.6 oz (40.2 kg)  Max: 88 lb 9.6 oz (40.2 kg)     Appears ill, axox3  perrl, eomi, no icterus,  mmm, no jvd, trachea midline, neck supple,  chest cta bilaterally, no crackles, no wheezes,   rrr,   soft, nt, nd +bs,  no c/c/ e -- less swelling in hand    Labs: nothing    A/P:  1. Sepsis -- resolved  2. UTI, compliated -- indwelling suprapubic catheter, fell out and replaced -- antibiotics through 11/17/2017  3. Decub ulcer --continue wound care  4. Anemia -- monitor  5. Steroid dependence -- endocrine following, currently on stress dose steroids  6. Chronic pain -- will change pain meds; iv dilaudid with dressing changes  7. parapalegia  8. Neurogenic bladder    Looks like he refuses LTAC and can't find a home health agency that will take him.  Will keep him here to finish antibiotics.
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
## 2017-11-14 NOTE — PROGRESS NOTES
Continued Stay Note  [facility]     Patient Name: Joaquín Sherman  MRN: [id number]  Today's Date: 11/14/2017    Admit Date: 11/9/2017          Discharge Plan       11/14/17 1518    Case Management/Social Work Plan    Additional Comments Spoke with patient who states he is a patient of  Dr. [phone]. . Called Dr. RICHARDS's office and spoke with [id number] option [phone]. She confirmed that patient had a completed visit with their office on 10-26-17. If HH is indicated on discharge, Dr. RICHARDS's office works with [facility] or [facility] . Called [facility] and am waiting for Lizbeth to call  back to see if they can follow patient on discharge . CCP to follow........ BALDO Mccall      11/14/17 1351    Case Management/Social Work Plan    Plan Home with HH    Additional Comments Pt reused [facility].  Per Dr Al plan to keep inpatient for completion of IV antibiotics.  Will need home health for dressing changes.  CCP continues to assist.................Cindy Valles RN      11/14/17 1240    Case Management/Social Work Plan    Plan to be determined    Additional Comments Spoke with Tasha with [facility] she states that if an MD order is obtained for a hospital bed it would require precert with his insurance and if he had another would need to be less than 5 years old.  ..................Cindy Valles RN              Discharge Codes     None            BALDO Mccall

## 2017-11-14 NOTE — PROGRESS NOTES
"Continued Stay Note  [facility]     Patient Name: Joaquín Sherman  MRN: [id number]  Today's Date: 11/14/2017    Admit Date: 11/9/2017          Discharge Plan       11/14/17 1351    Case Management/Social Work Plan    Plan Home with     Additional Comments Pt reused [facility].  Per Dr Al plan to keep inpatient for completion of IV antibiotics.  Will need home health for dressing changes.  CCP continues to assist.................Cindy Valles RN      11/14/17 1240    Case Management/Social Work Plan    Plan to be determined    Additional Comments Spoke with Tasha with [facility] she states that if an MD order is obtained for a hospital bed it would require precert with his insurance and if he had another would need to be less than 5 years old.  ..................Cindy Valles RN      11/14/17 1229    Case Management/Social Work Plan    Plan to be determined    Additional Comments Spoke with patient and informed [facility] will not accept back. Discussed need to find PCP.  Patient states he has not missed appointment with [facility].  He would like visiting MD.  Patient states he had a hospital bed at his other address \"his old house\" but would not provide me with this address.  He stated \"they \" were supposed to fix his bed and became.  He placed a call for me to speak with his mother. Marlen Al identified herself as his mother and stated that she lives with him and he stated he does not have a bed and he sleeps on a couch.  Discussed DC plans with both and mother states she is unable to do dressing changes 2 times a day.  She also states that they can not do antibiotics at home that they tried that before and it did not work out but would not elaborate how.  She stated home health refused to change his dressings on the couch and would not come back.  Suggested possible DC to [facility].  Patient refused to discuss.  Mother stated he knows what he needs to do but she thought he would not agree.  She " stated he has been there  before.  Spoke with Dr Al and  notified .  CCP will continue to follow and assist...........................Cindy Valles RN              Discharge Codes     None            Cindy Valles RN

## 2017-11-15 ENCOUNTER — APPOINTMENT (OUTPATIENT)
Dept: GENERAL RADIOLOGY | Facility: HOSPITAL | Age: 37
End: 2017-11-15
Attending: INTERNAL MEDICINE

## 2017-11-15 PROCEDURE — 25010000002 HYDROMORPHONE PER 4 MG: Performed by: INTERNAL MEDICINE

## 2017-11-15 PROCEDURE — 25010000002 ENOXAPARIN PER 10 MG: Performed by: INTERNAL MEDICINE

## 2017-11-15 PROCEDURE — 25010000002 HEPARIN FLUSH (PORCINE) 100 UNIT/ML SOLUTION: Performed by: INTERNAL MEDICINE

## 2017-11-15 PROCEDURE — 99232 SBSQ HOSP IP/OBS MODERATE 35: CPT | Performed by: INTERNAL MEDICINE

## 2017-11-15 PROCEDURE — 25010000002 ERTAPENEM PER 500 MG: Performed by: INTERNAL MEDICINE

## 2017-11-15 RX ADMIN — GABAPENTIN 300 MG: 300 CAPSULE ORAL at 09:32

## 2017-11-15 RX ADMIN — HYDROMORPHONE HYDROCHLORIDE 1 MG: 1 INJECTION, SOLUTION INTRAMUSCULAR; INTRAVENOUS; SUBCUTANEOUS at 20:37

## 2017-11-15 RX ADMIN — ALPRAZOLAM 1 MG: 0.5 TABLET ORAL at 23:18

## 2017-11-15 RX ADMIN — HYDROMORPHONE HYDROCHLORIDE 1 MG: 1 INJECTION, SOLUTION INTRAMUSCULAR; INTRAVENOUS; SUBCUTANEOUS at 09:32

## 2017-11-15 RX ADMIN — Medication 500 UNITS: at 23:08

## 2017-11-15 RX ADMIN — OXYBUTYNIN CHLORIDE 10 MG: 5 TABLET ORAL at 09:33

## 2017-11-15 RX ADMIN — ENOXAPARIN SODIUM 40 MG: 40 INJECTION SUBCUTANEOUS at 09:33

## 2017-11-15 RX ADMIN — HYDROCORTISONE 30 MG: 10 TABLET ORAL at 18:38

## 2017-11-15 RX ADMIN — OXYCODONE HYDROCHLORIDE 15 MG: 5 TABLET ORAL at 13:26

## 2017-11-15 RX ADMIN — PANTOPRAZOLE SODIUM 40 MG: 40 TABLET, DELAYED RELEASE ORAL at 09:33

## 2017-11-15 RX ADMIN — HYDROMORPHONE HYDROCHLORIDE 1 MG: 1 INJECTION, SOLUTION INTRAMUSCULAR; INTRAVENOUS; SUBCUTANEOUS at 16:41

## 2017-11-15 RX ADMIN — OXYCODONE HYDROCHLORIDE 15 MG: 5 TABLET ORAL at 20:36

## 2017-11-15 RX ADMIN — Medication 10 ML: at 23:09

## 2017-11-15 RX ADMIN — DAKIN'S SOLUTION 0.125% (QUARTER STRENGTH): 0.12 SOLUTION at 23:18

## 2017-11-15 RX ADMIN — HYDROCORTISONE 30 MG: 10 TABLET ORAL at 09:32

## 2017-11-15 RX ADMIN — OXYBUTYNIN CHLORIDE 10 MG: 5 TABLET ORAL at 23:07

## 2017-11-15 RX ADMIN — DAKIN'S SOLUTION 0.125% (QUARTER STRENGTH): 0.12 SOLUTION at 16:59

## 2017-11-15 RX ADMIN — GABAPENTIN 300 MG: 300 CAPSULE ORAL at 16:41

## 2017-11-15 RX ADMIN — OXYBUTYNIN CHLORIDE 10 MG: 5 TABLET ORAL at 18:38

## 2017-11-15 RX ADMIN — Medication 500 UNITS: at 09:33

## 2017-11-15 RX ADMIN — HYDROMORPHONE HYDROCHLORIDE 1.2 MG: 1 INJECTION, SOLUTION INTRAMUSCULAR; INTRAVENOUS; SUBCUTANEOUS at 13:35

## 2017-11-15 RX ADMIN — GABAPENTIN 300 MG: 300 CAPSULE ORAL at 23:06

## 2017-11-15 RX ADMIN — SODIUM CHLORIDE 1 G: 900 INJECTION INTRAVENOUS at 14:00

## 2017-11-15 RX ADMIN — HYDROMORPHONE HYDROCHLORIDE 1 MG: 1 INJECTION, SOLUTION INTRAMUSCULAR; INTRAVENOUS; SUBCUTANEOUS at 03:35

## 2017-11-15 RX ADMIN — OXYCODONE HYDROCHLORIDE 15 MG: 5 TABLET ORAL at 03:35

## 2017-11-15 NOTE — PROGRESS NOTES
Continued Stay Note  [facility]     Patient Name: Joaquín Sherman  MRN: [id number]  Today's Date: 11/15/2017    Admit Date: 11/9/2017          Discharge Plan       11/15/17 1000    Case Management/Social Work Plan    Plan Home with Home Health    Additional Comments Spoke to patient at length about HH vs Sub acute rehab.  Pt states he cares for himself and 6 children at home  He has no need to go to rehab and he won't go there.  I explained that he may not be safe at home unless he is stronger  He states he will not go.  He states his siter assists him if he needs  Agreed to a referral to [facility] per PMD  Azalia notified .  She will notify CCP if they can accept.                Discharge Codes     None            Amarilys Anton RN
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
## 2017-11-15 NOTE — NURSING NOTE
"Nurse entered room. Upon entering room nurse smelled cigarette smoke. Pt reported that his friend who had been visiting had smoked but he had not. Pt stated \"I told him he couldn't.\" Pt educated on smoking policy. Pt informed to call staff if his friend returned and smoked again.   "

## 2017-11-15 NOTE — PROGRESS NOTES
[facility]       Mar/chart reviewed  F/u sepsis. Critical care management  Complains of swelling in right hand -- better today    Vital Sign Min/Max for last 24 hours  Temp  Min: 96.5 °F (35.8 °C)  Max: 97.7 °F (36.5 °C)   BP  Min: 111/71  Max: 125/83   Pulse  Min: 56  Max: 77   Resp  Min: 16  Max: 18   No Data Recorded   No Data Recorded   No Data Recorded     Appears ill, axox3  perrl, eomi, no icterus,  mmm, no jvd, trachea midline, neck supple,  chest cta bilaterally, no crackles, no wheezes,   rrr,   soft, nt, nd +bs,  no c/c/ e -- less swelling in hand     A/P:  1. Sepsis -- resolved  2. UTI, compliated -- indwelling suprapubic catheter, fell out and replaced -- antibiotics through 11/17/2017  3. Decub ulcer --continue wound care  4. Anemia -- monitor  5. Steroid dependence -- endocrine following, currently on stress dose steroids  6. Chronic pain -- will change pain meds; iv dilaudid with dressing changes  7. parapalegia  8. Neurogenic bladder     Looks like he refuses LTAC  Awaiting  Completion of antibiotics

## 2017-11-15 NOTE — PROGRESS NOTES
Continued Stay Note  [facility]     Patient Name: Joaquín Sherman  MRN: [id number]  Today's Date: 11/15/2017    Admit Date: 11/9/2017          Discharge Plan       11/15/17 1005    Case Management/Social Work Plan    Plan Home HH    Additional Comments Messaged left for [facility] [phone]        11/15/17 1000    Case Management/Social Work Plan    Plan Home with Home Health    Additional Comments Spoke to patient at length about HH vs Sub acute rehab.  Pt states he cares for himself and 6 children at home  He has no need to go to rehab and he won't go there.  I explained that he may not be safe at home unless he is stronger  He states he will not go.  He states his siter assists him if he needs  Agreed to a referral to [facility] per PMD  Azalia notified .  She will notify [facility] if they can accept.                Discharge Codes     None            Amarilys Anton RN

## 2017-11-15 NOTE — PLAN OF CARE
Problem: Fluid Volume Deficit (Adult)  Goal: Fluid/Electrolyte Balance  Outcome: Ongoing (interventions implemented as appropriate)  Goal: Comfort/Well Being  Outcome: Ongoing (interventions implemented as appropriate)    Problem: Skin Integrity Impairment, Risk/Actual (Adult)  Goal: Skin Integrity/Wound Healing  Outcome: Ongoing (interventions implemented as appropriate)    Problem: Patient Care Overview (Adult)  Goal: Plan of Care Review  Outcome: Ongoing (interventions implemented as appropriate)    11/14/17 2058 11/15/17 0538   Coping/Psychosocial Response Interventions   Plan Of Care Reviewed With patient --    Patient Care Overview   Progress --  no change   Outcome Evaluation   Outcome Summary/Follow up Plan --  Pt continues to refuse turns. Dressing changes done per RN due to pt sleeping. Pt slept through shift. VSS. Pt pain reported to be 10. No other changes clinically.        Goal: Adult Individualization and Mutuality  Outcome: Ongoing (interventions implemented as appropriate)  Goal: Discharge Needs Assessment  Outcome: Ongoing (interventions implemented as appropriate)    Problem: Pain, Acute (Adult)  Goal: Acceptable Pain Control/Comfort Level  Outcome: Ongoing (interventions implemented as appropriate)    Problem: Sepsis (Adult)  Goal: Signs and Symptoms of Listed Potential Problems Will be Absent or Manageable (Sepsis)  Outcome: Ongoing (interventions implemented as appropriate)    Problem: Pressure Ulcer Risk (Shamar Scale) (Adult,Obstetrics,Pediatric)  Goal: Skin Integrity  Outcome: Ongoing (interventions implemented as appropriate)

## 2017-11-15 NOTE — TREATMENT PLAN
Spoke to [facility] medical [facility] care, she wishes to be called when pt is discharged with wound orders. Phone [phone].

## 2017-11-15 NOTE — NURSING NOTE
Changed ostomy appliance using a two piece jeniffer system with 2 1/4inch flange.  Stoma pink; peristomal skin intact and without erythema; large amt of soft formed stool in pouch.  Pt changed gauze drsg around leaking suprapubic catheter; uses an adapt seal around opening in effort to minimize leakage at site.  Extra ostomy supplies left at bedside

## 2017-11-15 NOTE — PLAN OF CARE
Problem: Patient Care Overview (Adult)  Goal: Plan of Care Review  Outcome: Ongoing (interventions implemented as appropriate)    11/14/17 1940   Coping/Psychosocial Response Interventions   Plan Of Care Reviewed With patient   Patient Care Overview   Progress no change   Outcome Evaluation   Outcome Summary/Follow up Plan Pt continues to refuse turns insisting that he doesn't need to turn with his specialized bed. Dressing changes done per what pt does at home, pt assisted with his own dressing changes as much as possible. VSS, continue to monitor.          Problem: Pain, Acute (Adult)  Goal: Acceptable Pain Control/Comfort Level  Outcome: Ongoing (interventions implemented as appropriate)    Problem: Sepsis (Adult)  Goal: Signs and Symptoms of Listed Potential Problems Will be Absent or Manageable (Sepsis)  Outcome: Ongoing (interventions implemented as appropriate)    Problem: Pressure Ulcer Risk (Shamar Scale) (Adult,Obstetrics,Pediatric)  Goal: Skin Integrity  Outcome: Ongoing (interventions implemented as appropriate)

## 2017-11-16 ENCOUNTER — APPOINTMENT (OUTPATIENT)
Dept: GENERAL RADIOLOGY | Facility: HOSPITAL | Age: 37
End: 2017-11-16

## 2017-11-16 LAB — BACTERIA SPEC AEROBE CULT: NORMAL

## 2017-11-16 PROCEDURE — 25010000002 ENOXAPARIN PER 10 MG: Performed by: INTERNAL MEDICINE

## 2017-11-16 PROCEDURE — 73130 X-RAY EXAM OF HAND: CPT

## 2017-11-16 PROCEDURE — 25010000002 HEPARIN FLUSH (PORCINE) 100 UNIT/ML SOLUTION: Performed by: INTERNAL MEDICINE

## 2017-11-16 PROCEDURE — 25010000002 HYDROMORPHONE PER 4 MG: Performed by: INTERNAL MEDICINE

## 2017-11-16 PROCEDURE — 25010000002 ERTAPENEM PER 500 MG: Performed by: INTERNAL MEDICINE

## 2017-11-16 RX ORDER — HYDROCORTISONE 10 MG/1
5 TABLET ORAL
Status: DISCONTINUED | OUTPATIENT
Start: 2017-11-16 | End: 2017-11-17 | Stop reason: HOSPADM

## 2017-11-16 RX ORDER — HYDROCORTISONE 10 MG/1
10 TABLET ORAL
Status: DISCONTINUED | OUTPATIENT
Start: 2017-11-17 | End: 2017-11-17 | Stop reason: HOSPADM

## 2017-11-16 RX ADMIN — OXYBUTYNIN CHLORIDE 10 MG: 5 TABLET ORAL at 09:43

## 2017-11-16 RX ADMIN — GABAPENTIN 300 MG: 300 CAPSULE ORAL at 21:50

## 2017-11-16 RX ADMIN — OXYBUTYNIN CHLORIDE 10 MG: 5 TABLET ORAL at 21:28

## 2017-11-16 RX ADMIN — PANTOPRAZOLE SODIUM 40 MG: 40 TABLET, DELAYED RELEASE ORAL at 09:43

## 2017-11-16 RX ADMIN — HYDROMORPHONE HYDROCHLORIDE 1 MG: 1 INJECTION, SOLUTION INTRAMUSCULAR; INTRAVENOUS; SUBCUTANEOUS at 21:41

## 2017-11-16 RX ADMIN — HYDROMORPHONE HYDROCHLORIDE 1 MG: 1 INJECTION, SOLUTION INTRAMUSCULAR; INTRAVENOUS; SUBCUTANEOUS at 14:14

## 2017-11-16 RX ADMIN — Medication 10 ML: at 09:44

## 2017-11-16 RX ADMIN — ALPRAZOLAM 1 MG: 0.5 TABLET ORAL at 18:49

## 2017-11-16 RX ADMIN — ENOXAPARIN SODIUM 40 MG: 40 INJECTION SUBCUTANEOUS at 09:44

## 2017-11-16 RX ADMIN — OXYCODONE HYDROCHLORIDE 15 MG: 5 TABLET ORAL at 18:48

## 2017-11-16 RX ADMIN — HYDROCORTISONE 30 MG: 10 TABLET ORAL at 09:43

## 2017-11-16 RX ADMIN — GABAPENTIN 300 MG: 300 CAPSULE ORAL at 07:41

## 2017-11-16 RX ADMIN — HYDROMORPHONE HYDROCHLORIDE 1 MG: 1 INJECTION, SOLUTION INTRAMUSCULAR; INTRAVENOUS; SUBCUTANEOUS at 02:34

## 2017-11-16 RX ADMIN — OXYBUTYNIN CHLORIDE 10 MG: 5 TABLET ORAL at 18:49

## 2017-11-16 RX ADMIN — HYDROCORTISONE 5 MG: 10 TABLET ORAL at 18:50

## 2017-11-16 RX ADMIN — Medication 10 ML: at 21:44

## 2017-11-16 RX ADMIN — PANTOPRAZOLE SODIUM 40 MG: 40 TABLET, DELAYED RELEASE ORAL at 18:53

## 2017-11-16 RX ADMIN — HYDROMORPHONE HYDROCHLORIDE 1 MG: 1 INJECTION, SOLUTION INTRAMUSCULAR; INTRAVENOUS; SUBCUTANEOUS at 09:43

## 2017-11-16 RX ADMIN — DAKIN'S SOLUTION 0.125% (QUARTER STRENGTH): 0.12 SOLUTION at 21:28

## 2017-11-16 RX ADMIN — Medication 500 UNITS: at 21:28

## 2017-11-16 RX ADMIN — SODIUM CHLORIDE 1 G: 900 INJECTION INTRAVENOUS at 15:00

## 2017-11-16 RX ADMIN — Medication 500 UNITS: at 09:53

## 2017-11-16 NOTE — PROGRESS NOTES
37 y.o.   LOS: 6 days   Patient Care Team:  Salvador Jaramillo MD as PCP - General (Family Medicine)    Chief Complaint:  Adrenal insufficiency    Chief Complaint   Patient presents with   • Abdominal Pain       Subjective  patient is currently on the oral steroids but is extremely concerned that the edema in his right arm and in his body is secondary to the steroids that he is receiving.      Interval History:    Review of Systems:   Review of Systems   Constitutional: Positive for fatigue.   Gastrointestinal: Negative for abdominal pain, constipation, nausea and vomiting.   Endocrine: Negative for cold intolerance, polydipsia and polyuria.   Musculoskeletal: Positive for back pain and myalgias.   Skin: Positive for wound.   Neurological: Positive for weakness and numbness.     Objective     Vital Signs   Temp:  [96.5 °F (35.8 °C)-97.4 °F (36.3 °C)] 97.4 °F (36.3 °C)  Heart Rate:  [52-75] 62  Resp:  [16] 16  BP: (119-156)/() 149/97      Physical Exam   Alert and oriented ×3, thin male  Thyroid palpable  Clear to auscultation  S1, S2 heard  No abdominal tenderness, bowel sounds heard, colostomy site intact  Paraplegic, decubitus ulcers noted, right arm edema noted.    Results Review:     I reviewed the patient's new clinical results.      No results found for: GLUCOSE  Lab Results (last 72 hours)     Procedure Component Value Units Date/Time    Potassium [[id number]]  (Normal) Collected:  11/10/17 1454    Specimen:  Blood Updated:  11/10/17 1525     Potassium 3.6 mmol/L     Hemoglobin & Hematocrit, Blood [[id number]]  (Abnormal) Collected:  11/10/17 1454    Specimen:  Blood Updated:  11/10/17 1531     Hemoglobin 9.4 (L) g/dL      Hematocrit 30.7 (L) %     POC Glucose Fingerstick [[id number]]  (Normal) Collected:  11/10/17 1958    Specimen:  Blood Updated:  11/10/17 2021     Glucose 119 mg/dL     Narrative:       Meter: [id number] : [id number] Mike Webber    Urine Culture - Urine, Urine, Clean Catch  [[id number]] Collected:  11/09/17 2324    Specimen:  Urine from Urine, Catheter Updated:  11/11/17 0800     Urine Culture --      Mixed Tal Isolated    Narrative:         Specimen contains mixed organisms of questionable pathogenicity which indicates contamination with commensal tal.  Further identification is unlikely to provide clinically useful information.  Suggest recollection.    Blood Culture ID, PCR - Blood, [[id number]]  (Abnormal) Collected:  11/09/17 2206    Specimen:  Blood from Arm, Left Updated:  11/11/17 0944     BCID, PCR Staphylococcus spp, not aureus. Identification by BCID PCR. (C)    CBC & Differential [[id number]] Collected:  11/11/17 1234    Specimen:  Blood Updated:  11/11/17 1322    Narrative:       The following orders were created for panel order CBC & Differential.  Procedure                               Abnormality         Status                     ---------                               -----------         ------                     CBC Auto Differential[[id number]]        Abnormal            Final result                 Please view results for these tests on the individual orders.    CBC Auto Differential [[id number]]  (Abnormal) Collected:  11/11/17 1234    Specimen:  Blood Updated:  11/11/17 1322     WBC 8.48 10*3/mm3      RBC 3.35 (L) 10*6/mm3      Hemoglobin 9.3 (L) g/dL      Hematocrit 30.7 (L) %      MCV 91.6 fL      MCH 27.8 pg      MCHC 30.3 (L) g/dL      RDW 16.2 (H) %      RDW-SD 54.4 (H) fl      MPV 8.8 fL      Platelets 217 10*3/mm3      Neutrophil % 87.2 (H) %      Lymphocyte % 8.1 (L) %      Monocyte % 3.9 (L) %      Eosinophil % 0.0 (L) %      Basophil % 0.0 %      Immature Grans % 0.8 (H) %      Neutrophils, Absolute 7.39 10*3/mm3      Lymphocytes, Absolute 0.69 (L) 10*3/mm3      Monocytes, Absolute 0.33 10*3/mm3      Eosinophils, Absolute 0.00 10*3/mm3      Basophils, Absolute 0.00 10*3/mm3      Immature Grans, Absolute 0.07 (H) 10*3/mm3     Basic Metabolic Panel  [[id number]]  (Abnormal) Collected:  11/11/17 1234    Specimen:  Blood Updated:  11/11/17 1348     Glucose 153 (H) mg/dL      BUN 16 mg/dL      Creatinine 0.68 (L) mg/dL      Sodium 141 mmol/L      Potassium 3.4 (L) mmol/L      Chloride 110 (H) mmol/L      CO2 17.6 (L) mmol/L      Calcium 8.0 (L) mg/dL      eGFR  African Amer >150 mL/min/1.73      BUN/Creatinine Ratio 23.5     Anion Gap 13.4 mmol/L     Narrative:       GFR Normal >60  Chronic Kidney Disease <60  Kidney Failure <15    TSH [[id number]]  (Normal) Collected:  11/11/17 1324    Specimen:  Blood Updated:  11/11/17 2232     TSH 0.593 mIU/mL     T4, Free [[id number]]  (Normal) Collected:  11/11/17 1324    Specimen:  Blood Updated:  11/11/17 2232     Free T4 1.06 ng/dL     CBC & Differential [[id number]] Collected:  11/12/17 0627    Specimen:  Blood Updated:  11/12/17 0731    Narrative:       The following orders were created for panel order CBC & Differential.  Procedure                               Abnormality         Status                     ---------                               -----------         ------                     CBC Auto Differential[[id number]]        Abnormal            Final result                 Please view results for these tests on the individual orders.    CBC Auto Differential [[id number]]  (Abnormal) Collected:  11/12/17 0627    Specimen:  Blood Updated:  11/12/17 0731     WBC 8.04 10*3/mm3      RBC 3.22 (L) 10*6/mm3      Hemoglobin 8.7 (L) g/dL      Hematocrit 29.2 (L) %      MCV 90.7 fL      MCH 27.0 pg      MCHC 29.8 (L) g/dL      RDW 16.6 (H) %      RDW-SD 55.3 (H) fl      MPV 9.5 fL      Platelets 219 10*3/mm3      Neutrophil % 83.5 (H) %      Lymphocyte % 9.5 (L) %      Monocyte % 6.3 %      Eosinophil % 0.0 (L) %      Basophil % 0.0 %      Immature Grans % 0.7 (H) %      Neutrophils, Absolute 6.71 10*3/mm3      Lymphocytes, Absolute 0.76 (L) 10*3/mm3      Monocytes, Absolute 0.51 10*3/mm3      Eosinophils, Absolute 0.00  10*3/mm3      Basophils, Absolute 0.00 10*3/mm3      Immature Grans, Absolute 0.06 (H) 10*3/mm3     Basic Metabolic Panel [[id number]]  (Abnormal) Collected:  11/12/17 0627    Specimen:  Blood Updated:  11/12/17 0746     Glucose 125 (H) mg/dL      BUN 18 mg/dL      Creatinine 0.67 (L) mg/dL      Sodium 141 mmol/L      Potassium 4.2 mmol/L      Chloride 110 (H) mmol/L      CO2 22.5 mmol/L      Calcium 7.9 (L) mg/dL      eGFR  African Amer >150 mL/min/1.73      BUN/Creatinine Ratio 26.9 (H)     Anion Gap 8.5 mmol/L     Narrative:       GFR Normal >60  Chronic Kidney Disease <60  Kidney Failure <15    Blood Culture - Blood, [[id number]]  (Abnormal) Collected:  11/09/17 2206    Specimen:  Blood from Arm, Left Updated:  11/12/17 0829     Blood Culture --      Staphylococcus, coagulase negative (A)      Probable contaminant requires clinical correlation, susceptibility not performed unless requested by physician.          Gram Stain Result Anaerobic Bottle Gram positive cocci in clusters    Blood Culture - Blood, [[id number]]  (Normal) Collected:  11/11/17 1234    Specimen:  Blood from Hand, Left Updated:  11/12/17 1316     Blood Culture No growth at 24 hours    Blood Culture - Blood, [[id number]]  (Normal) Collected:  11/09/17 2247    Specimen:  Blood from Arm, Left Updated:  11/12/17 2331     Blood Culture No growth at 3 days        Imaging Results (last 72 hours)     Procedure Component Value Units Date/Time    CT Head With & Without Contrast [[id number]] Collected:  11/10/17 1722     Updated:  11/10/17 1733    Narrative:       CT HEAD W WO CONTRAST-     INDICATIONS: Possible pituitary mass, unable to have MRI.     TECHNIQUE: Radiation dose reduction techniques were utilized, including  automated exposure control and exposure modulation based on body size.      Noncontrast head CT     COMPARISON: None available     FINDINGS:      No obvious large pituitary mass is identified, but the pituitary is not  well evaluated with  this technique, continued follow-up is suggested as  indications persist.     No enhancing lesions are identified.     No acute intracranial hemorrhage, midline shift or mass effect. No acute  territorial infarct is identified.        Ventricles, cisterns, cerebral sulci are unremarkable for patient age.     The visualized paranasal sinuses, orbits, mastoid air cells are  unremarkable.                   Impression:          No obvious large pituitary mass is identified, but the pituitary is not  well evaluated with this technique, continued follow-up is suggested as  indications persist.     This report was finalized on 11/10/2017 5:30 PM by Dr. Medardo Washington MD.       XR Chest 1 View [[id number]] Collected:  11/09/17 2211     Updated:  11/11/17 0123    Narrative:       X-RAY CHEST 1 VIEW.     HISTORY: Evaluate for pneumonia.     COMPARISON: 09/27/2016.     FINDINGS:  Cardiomediastinal silhouette is within normal limits. Right central  venous catheter is unchanged.     There is no consolidation or effusion. Mild emphysema is suspected, lung  volumes are low. Persistent opacities in the right lung concerning for  atelectasis.          Impression:       No acute findings.         This report was finalized on 11/11/2017 1:20 AM by Dr. Moises Copeland MD.       CT Abdomen Pelvis Without Contrast [[id number]] Collected:  11/10/17 0130     Updated:  11/11/17 0142    Narrative:       CT ABDOMEN AND PELVIS WITHOUT CONTRAST.     TECHNIQUE: Radiation dose reduction techniques were utilized, including  automated exposure control and exposure modulation based on body size. A  routine CT scan of the abdomen and pelvis was performed with coronal and  sagittal reconstructed images.     HISTORY: Abdominal pain.     COMPARISON: 04/11/2017.     FINDINGS:  Lung bases demonstrate no consolidation or effusion.          Liver demonstrates homogeneous parenchyma, no definite mass.  Unremarkable gallbladder. No intra or extrahepatic  biliary ductal  dilatation.      The spleen is unremarkable. Pancreas is unremarkable, no pancreatic  ductal dilatation. Adrenal glands are within normal limits.     Mild dilatation of the right renal pelvis and the ureter, with some  mural thickening. Similar changes are not seen in the left kidney. 0.3  cm nonobstructing stone of the right kidney. Circumferential thickening  of the urinary bladder wall. A suprapubic cystostomy is in position.         Ileus of the bowel loops with gaseous distention. Ostomy in the left  lower quadrant.     No significant retroperitoneal lymphadenopathy. Chronic changes of the  pelvic bones and hip joints.          Impression:       1.  Findings of severe UTI involving the right renal collecting system,  pyelonephritis cannot be excluded on noncontrast study. Please  clinically correlate.   2.  Nonobstructing 0.3 cm stone of the right kidney.  3.  Moderate cystitis of the urinary bladder cannot be excluded.     This report was finalized on 11/11/2017 1:39 AM by Dr. Moises Copeland MD.             Medication Review:  done      Current Facility-Administered Medications:   •  acetaminophen (TYLENOL) tablet 650 mg, 650 mg, Oral, Q4H PRN **OR** acetaminophen (TYLENOL) suppository 650 mg, 650 mg, Rectal, Q4H PRN, David Arriaga MD  •  ALPRAZolam (XANAX) tablet 1 mg, 1 mg, Oral, BID PRN, Angel Gannon MD, 1 mg at 11/15/17 9129  •  aluminum-magnesium hydroxide-simethicone (MAALOX MAX) 400-400-40 MG/5ML suspension 15 mL, 15 mL, Oral, Q6H PRN, David Arriaga MD  •  bisacodyl (DULCOLAX) EC tablet 5 mg, 5 mg, Oral, Daily PRN, David Arriaga MD  •  bisacodyl (DULCOLAX) suppository 10 mg, 10 mg, Rectal, Daily PRN, David Arriaga MD  •  enoxaparin (LOVENOX) syringe 40 mg, 40 mg, Subcutaneous, Daily, David Arriaga MD, 40 mg at 11/16/17 6753  •  ertapenem (INVanz) MBP 1 g in 100 NS, 1 g, Intravenous, Q24H, Pancho Miller MD, Last Rate: 200 mL/hr at  11/15/17 1400, 1 g at 11/15/17 1400  •  gabapentin (NEURONTIN) capsule 300 mg, 300 mg, Oral, Q8H, Angel Gannon MD, 300 mg at 11/16/17 0741  •  heparin flush (porcine) 100 UNIT/ML injection 500 Units, 5 mL, Intracatheter, Q12H, Farrukh Naylor MD, 500 Units at 11/16/17 0953  •  heparin flush (porcine) 100 UNIT/ML injection 500 Units, 5 mL, Intracatheter, PRN, Farrukh Naylor MD  •  hydrocortisone (CORTEF) tablet 30 mg, 30 mg, Oral, BID With Meals, Cheyenne Ragsdale MD, 30 mg at 11/16/17 0943  •  HYDROmorphone (DILAUDID) injection 1 mg, 1 mg, Intravenous, Q3H PRN, 1 mg at 11/16/17 0943 **OR** HYDROmorphone (DILAUDID) injection 1.2 mg, 1.2 mg, Intravenous, Q3H PRN, Farrukh Naylor MD, 1.2 mg at 11/15/17 1335  •  ipratropium-albuterol (DUO-NEB) nebulizer solution 3 mL, 3 mL, Nebulization, Q6H PRN, David Arriaga MD  •  ondansetron (ZOFRAN) tablet 4 mg, 4 mg, Oral, Q6H PRN **OR** ondansetron ODT (ZOFRAN-ODT) disintegrating tablet 4 mg, 4 mg, Oral, Q6H PRN **OR** ondansetron (ZOFRAN) injection 4 mg, 4 mg, Intravenous, Q6H PRN, David Arriaga MD  •  oxybutynin (DITROPAN) tablet 10 mg, 10 mg, Oral, TID, Angel Gannon MD, 10 mg at 11/16/17 0943  •  oxyCODONE (ROXICODONE) immediate release tablet 15 mg, 15 mg, Oral, Q6H PRN, Angel Gannon MD, 15 mg at 11/15/17 2036  •  pantoprazole (PROTONIX) EC tablet 40 mg, 40 mg, Oral, BID AC, Phoenix Nolasco MD, 40 mg at 11/16/17 0943  •  sodium chloride 0.9 % flush 1-10 mL, 1-10 mL, Intravenous, PRN, David Arriaga MD  •  Insert peripheral IV, , , Once **AND** sodium chloride 0.9 % flush 10 mL, 10 mL, Intravenous, PRN, Azalia Austin MD, 10 mL at 11/14/17 0531  •  sodium chloride 0.9 % flush 10 mL, 10 mL, Intracatheter, Q12H, Farrukh Naylor MD, 10 mL at 11/16/17 0944  •  sodium chloride 0.9 % flush 10 mL, 10 mL, Intracatheter, PRN, Farrukh Naylor MD  •  sodium hypochlorite (DAKIN'S 1/4 STRENGTH) 0.125 % topical solution 0.125% solution, ,  "Topical, Q12H, Pancho Miller MD    Assessment/Plan     Active Hospital Problems (** Indicates Principal Problem)    Diagnosis Date Noted   • Sepsis [A41.9] 11/10/2017      Resolved Hospital Problems    Diagnosis Date Noted Date Resolved   No resolved problems to display.        ? Adrenal insufficiency  Could be related to his traumatic brain injury or could be related to pain medications as well - opiates.    Patient's blood pressure could be labile due to his spinal cord injury and he could be demonstrating autonomic dysfunction.  Will change hydrocortisone to 10 mg in the morning and 5 mg in the evening.  Explained to the patient that this hydrocortisone dosage is the physiological dose and due to his low cortisol levels it should not contribute to edema and that he would be needing the medication.  Discussed with the patient that even after changing the dosage to 10 in the morning and 5 in the evening if he continues to have the edema we could stop the medication and see if his edema improves.    Hyperprolactinemia  Noted that macro prolactin levels are normal.  Reviewed if patient has any medication interaction that is contributing to high prolactin levels which he does not have any at this time.  CT scan of the brain did not show pituitary adenoma or stalk effect.    Pt is asymptomatic with the current prolactin levels - will hold off on cabergoline for now.      HPA axis  TSH, free T4 levels normal, will repeat  Testosterone levels, FSH, LH are within normal limits-based on the latest blood work up patient does not need to testosterone replacement.     Rest per primary team.    15 minutes out of 25 minutes face to face spent on floor managing care, discussing plan with nursing and counseling patient/family on treatment options, side effects of the medications.          Cheyenne Ragsdale MD.  11/16/17  10:44 AM      [facility] Dragon / transcription disclaimer:    \"Dictated utilizing Dragon dictation\".   "

## 2017-11-16 NOTE — PLAN OF CARE
Problem: Fluid Volume Deficit (Adult)  Goal: Fluid/Electrolyte Balance  Outcome: Ongoing (interventions implemented as appropriate)  Goal: Comfort/Well Being  Outcome: Ongoing (interventions implemented as appropriate)    Problem: Skin Integrity Impairment, Risk/Actual (Adult)  Goal: Skin Integrity/Wound Healing  Outcome: Ongoing (interventions implemented as appropriate)    Problem: Patient Care Overview (Adult)  Goal: Plan of Care Review  Outcome: Ongoing (interventions implemented as appropriate)    Problem: Pain, Acute (Adult)  Goal: Acceptable Pain Control/Comfort Level  Outcome: Ongoing (interventions implemented as appropriate)

## 2017-11-16 NOTE — PROGRESS NOTES
[facility]       Mar/chart reviewed  F/u sepsis. Critical care management  Complains of swelling in right hand -- better today    Vital Sign Min/Max for last 24 hours  Temp  Min: 96.5 °F (35.8 °C)  Max: 97.4 °F (36.3 °C)   BP  Min: 119/76  Max: 156/129   Pulse  Min: 52  Max: 75   Resp  Min: 16  Max: 16   No Data Recorded   No Data Recorded   Weight  Min: 87 lb (39.5 kg)  Max: 87 lb (39.5 kg)     Appears ill, axox3  perrl, eomi, no icterus,  mmm, no jvd, trachea midline, neck supple,  chest cta bilaterally, no crackles, no wheezes,   rrr,   soft, nt, nd +bs,  no c/c/ e -- less swelling in hand    A/P:  1. Sepsis -- resolved  2. UTI, compliated -- indwelling suprapubic catheter, fell out and replaced -- antibiotics through 11/17/2017  3. Decub ulcer --continue wound care  4. Anemia -- monitor  5. Steroid dependence -- endocrine following, currently on stress dose steroids  6. Chronic pain -- will change pain meds; iv dilaudid with dressing changes  7. parapalegia  8. Neurogenic bladder  9. Swelling of right hand -- check xray; check ultrasound.    Awaiting  Completion of antibiotics

## 2017-11-16 NOTE — PAYOR COMM NOTE
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 Collected:  11/10/17 1454    Specimen:  Blood Updated:  11/10/17 1525     Potassium 3.6 mmol/L     Hemoglobin & Hematocrit, Blood [[id number]]  (Abnormal) Collected:  11/10/17 1454    Specimen:  Blood Updated:  11/10/17 1531     Hemoglobin 9.4 (L) g/dL      Hematocrit 30.7 (L) %     POC Glucose Fingerstick [[id number]]  (Normal) Collected:  11/10/17 1958    Specimen:  Blood Updated:  11/10/17 2021     Glucose 119 mg/dL     Narrative:       Meter: [id number] : [id number] Mike Lawanda    Urine Culture - Urine, Urine, Clean Catch [[id number]] Collected:  11/09/17 2324    Specimen:  Urine from Urine, Catheter Updated:  11/11/17 0800     Urine Culture --      Mixed Tal Isolated    Narrative:         Specimen contains mixed organisms of questionable pathogenicity which indicates contamination with commensal tal.  Further identification is unlikely to provide clinically useful information.  Suggest recollection.    Blood Culture ID, PCR - Blood, [[id number]]  (Abnormal) Collected:  11/09/17 2206    Specimen:  Blood from Arm, Left Updated:  11/11/17 0944     BCID, PCR Staphylococcus spp, not aureus. Identification by BCID  PCR. (C)    CBC & Differential [[id number]] Collected:  11/11/17 1234    Specimen:  Blood Updated:  11/11/17 1322    Narrative:       The following orders were created for panel order CBC & Differential.  Procedure                               Abnormality         Status                     ---------                               -----------         ------                     CBC Auto Differential[[id number]]        Abnormal            Final result                 Please view results for these tests on the individual orders.    CBC Auto Differential [[id number]]  (Abnormal) Collected:  11/11/17 1234    Specimen:  Blood Updated:  11/11/17 1322     WBC 8.48 10*3/mm3      RBC 3.35 (L) 10*6/mm3      Hemoglobin 9.3 (L) g/dL      Hematocrit 30.7 (L) %      MCV 91.6 fL      MCH 27.8 pg      MCHC 30.3 (L) g/dL      RDW 16.2 (H) %      RDW-SD 54.4 (H) fl      MPV 8.8 fL      Platelets 217 10*3/mm3      Neutrophil % 87.2 (H) %      Lymphocyte % 8.1 (L) %      Monocyte % 3.9 (L) %      Eosinophil % 0.0 (L) %      Basophil % 0.0 %      Immature Grans % 0.8 (H) %      Neutrophils, Absolute 7.39 10*3/mm3      Lymphocytes, Absolute 0.69 (L) 10*3/mm3      Monocytes, Absolute 0.33 10*3/mm3      Eosinophils, Absolute 0.00 10*3/mm3      Basophils, Absolute 0.00 10*3/mm3      Immature Grans, Absolute 0.07 (H) 10*3/mm3     Basic Metabolic Panel [[id number]]  (Abnormal) Collected:  11/11/17 1234    Specimen:  Blood Updated:  11/11/17 1348     Glucose 153 (H) mg/dL      BUN 16 mg/dL      Creatinine 0.68 (L) mg/dL      Sodium 141 mmol/L      Potassium 3.4 (L) mmol/L      Chloride 110 (H) mmol/L      CO2 17.6 (L) mmol/L      Calcium 8.0 (L) mg/dL      eGFR  African Amer >150 mL/min/1.73      BUN/Creatinine Ratio 23.5     Anion Gap 13.4 mmol/L     Narrative:       GFR Normal >60  Chronic Kidney Disease <60  Kidney Failure <15    TSH [[id number]]  (Normal) Collected:  11/11/17 1324    Specimen:  Blood Updated:  11/11/17 2232     TSH 0.593 mIU/mL      T4, Free [[id number]]  (Normal) Collected:  11/11/17 1324    Specimen:  Blood Updated:  11/11/17 2232     Free T4 1.06 ng/dL     CBC & Differential [[id number]] Collected:  11/12/17 0627    Specimen:  Blood Updated:  11/12/17 0731    Narrative:       The following orders were created for panel order CBC & Differential.  Procedure                               Abnormality         Status                     ---------                               -----------         ------                     CBC Auto Differential[[id number]]        Abnormal            Final result                 Please view results for these tests on the individual orders.    CBC Auto Differential [[id number]]  (Abnormal) Collected:  11/12/17 0627    Specimen:  Blood Updated:  11/12/17 0731     WBC 8.04 10*3/mm3      RBC 3.22 (L) 10*6/mm3      Hemoglobin 8.7 (L) g/dL      Hematocrit 29.2 (L) %      MCV 90.7 fL      MCH 27.0 pg      MCHC 29.8 (L) g/dL      RDW 16.6 (H) %      RDW-SD 55.3 (H) fl      MPV 9.5 fL      Platelets 219 10*3/mm3      Neutrophil % 83.5 (H) %      Lymphocyte % 9.5 (L) %      Monocyte % 6.3 %      Eosinophil % 0.0 (L) %      Basophil % 0.0 %      Immature Grans % 0.7 (H) %      Neutrophils, Absolute 6.71 10*3/mm3      Lymphocytes, Absolute 0.76 (L) 10*3/mm3      Monocytes, Absolute 0.51 10*3/mm3      Eosinophils, Absolute 0.00 10*3/mm3      Basophils, Absolute 0.00 10*3/mm3      Immature Grans, Absolute 0.06 (H) 10*3/mm3     Basic Metabolic Panel [[id number]]  (Abnormal) Collected:  11/12/17 0627    Specimen:  Blood Updated:  11/12/17 0746     Glucose 125 (H) mg/dL      BUN 18 mg/dL      Creatinine 0.67 (L) mg/dL      Sodium 141 mmol/L      Potassium 4.2 mmol/L      Chloride 110 (H) mmol/L      CO2 22.5 mmol/L      Calcium 7.9 (L) mg/dL      eGFR  African Amer >150 mL/min/1.73      BUN/Creatinine Ratio 26.9 (H)     Anion Gap 8.5 mmol/L     Narrative:       GFR Normal >60  Chronic Kidney Disease <60  Kidney Failure <15    Blood  Culture - Blood, [[id number]]  (Abnormal) Collected:  11/09/17 2206    Specimen:  Blood from Arm, Left Updated:  11/12/17 0829     Blood Culture --      Staphylococcus, coagulase negative (A)      Probable contaminant requires clinical correlation, susceptibility not performed unless requested by physician.          Gram Stain Result Anaerobic Bottle Gram positive cocci in clusters    Blood Culture - Blood, [[id number]]  (Normal) Collected:  11/11/17 1234    Specimen:  Blood from Hand, Left Updated:  11/12/17 1316     Blood Culture No growth at 24 hours    Blood Culture - Blood, [[id number]]  (Normal) Collected:  11/09/17 2247    Specimen:  Blood from Arm, Left Updated:  11/12/17 2331     Blood Culture No growth at 3 days        Imaging Results (last 72 hours)     Procedure Component Value Units Date/Time    CT Head With & Without Contrast [[id number]] Collected:  11/10/17 1722     Updated:  11/10/17 1733    Narrative:       CT HEAD W WO CONTRAST-     INDICATIONS: Possible pituitary mass, unable to have MRI.     TECHNIQUE: Radiation dose reduction techniques were utilized, including  automated exposure control and exposure modulation based on body size.      Noncontrast head CT     COMPARISON: None available     FINDINGS:      No obvious large pituitary mass is identified, but the pituitary is not  well evaluated with this technique, continued follow-up is suggested as  indications persist.     No enhancing lesions are identified.     No acute intracranial hemorrhage, midline shift or mass effect. No acute  territorial infarct is identified.        Ventricles, cisterns, cerebral sulci are unremarkable for patient age.     The visualized paranasal sinuses, orbits, mastoid air cells are  unremarkable.                   Impression:          No obvious large pituitary mass is identified, but the pituitary is not  well evaluated with this technique, continued follow-up is suggested as  indications persist.     This report  was finalized on 11/10/2017 5:30 PM by Dr. Medardo Washington MD.       XR Chest 1 View [[id number]] Collected:  11/09/17 2211     Updated:  11/11/17 0123    Narrative:       X-RAY CHEST 1 VIEW.     HISTORY: Evaluate for pneumonia.     COMPARISON: 09/27/2016.     FINDINGS:  Cardiomediastinal silhouette is within normal limits. Right central  venous catheter is unchanged.     There is no consolidation or effusion. Mild emphysema is suspected, lung  volumes are low. Persistent opacities in the right lung concerning for  atelectasis.          Impression:       No acute findings.         This report was finalized on 11/11/2017 1:20 AM by Dr. Moises Copeland MD.       CT Abdomen Pelvis Without Contrast [[id number]] Collected:  11/10/17 0130     Updated:  11/11/17 0142    Narrative:       CT ABDOMEN AND PELVIS WITHOUT CONTRAST.     TECHNIQUE: Radiation dose reduction techniques were utilized, including  automated exposure control and exposure modulation based on body size. A  routine CT scan of the abdomen and pelvis was performed with coronal and  sagittal reconstructed images.     HISTORY: Abdominal pain.     COMPARISON: 04/11/2017.     FINDINGS:  Lung bases demonstrate no consolidation or effusion.          Liver demonstrates homogeneous parenchyma, no definite mass.  Unremarkable gallbladder. No intra or extrahepatic biliary ductal  dilatation.      The spleen is unremarkable. Pancreas is unremarkable, no pancreatic  ductal dilatation. Adrenal glands are within normal limits.     Mild dilatation of the right renal pelvis and the ureter, with some  mural thickening. Similar changes are not seen in the left kidney. 0.3  cm nonobstructing stone of the right kidney. Circumferential thickening  of the urinary bladder wall. A suprapubic cystostomy is in position.         Ileus of the bowel loops with gaseous distention. Ostomy in the left  lower quadrant.     No significant retroperitoneal lymphadenopathy. Chronic changes of  the  pelvic bones and hip joints.          Impression:       1.  Findings of severe UTI involving the right renal collecting system,  pyelonephritis cannot be excluded on noncontrast study. Please  clinically correlate.   2.  Nonobstructing 0.3 cm stone of the right kidney.  3.  Moderate cystitis of the urinary bladder cannot be excluded.     This report was finalized on 11/11/2017 1:39 AM by Dr. Moises Copeland MD.             Medication Review:  done      Current Facility-Administered Medications:   •  acetaminophen (TYLENOL) tablet 650 mg, 650 mg, Oral, Q4H PRN **OR** acetaminophen (TYLENOL) suppository 650 mg, 650 mg, Rectal, Q4H PRN, David Arriaga MD  •  ALPRAZolam (XANAX) tablet 1 mg, 1 mg, Oral, BID PRN, Angel Gannon MD, 1 mg at 11/15/17 2318  •  aluminum-magnesium hydroxide-simethicone (MAALOX MAX) 400-400-40 MG/5ML suspension 15 mL, 15 mL, Oral, Q6H PRN, David Arriaga MD  •  bisacodyl (DULCOLAX) EC tablet 5 mg, 5 mg, Oral, Daily PRN, David Arriaga MD  •  bisacodyl (DULCOLAX) suppository 10 mg, 10 mg, Rectal, Daily PRN, David Arriaga MD  •  enoxaparin (LOVENOX) syringe 40 mg, 40 mg, Subcutaneous, Daily, David Arriaga MD, 40 mg at 11/16/17 0944  •  ertapenem (INVanz) MBP 1 g in 100 NS, 1 g, Intravenous, Q24H, Pancho Miller MD, Last Rate: 200 mL/hr at 11/15/17 1400, 1 g at 11/15/17 1400  •  gabapentin (NEURONTIN) capsule 300 mg, 300 mg, Oral, Q8H, Angel Gannon MD, 300 mg at 11/16/17 0741  •  heparin flush (porcine) 100 UNIT/ML injection 500 Units, 5 mL, Intracatheter, Q12H, Farrukh Naylor MD, 500 Units at 11/16/17 0953  •  heparin flush (porcine) 100 UNIT/ML injection 500 Units, 5 mL, Intracatheter, PRN, Farrukh Naylor MD  •  hydrocortisone (CORTEF) tablet 30 mg, 30 mg, Oral, BID With Meals, Cheyenne Ragsdale MD, 30 mg at 11/16/17 0943  •  HYDROmorphone (DILAUDID) injection 1 mg, 1 mg, Intravenous, Q3H PRN, 1 mg at 11/16/17 0943 **OR**  HYDROmorphone (DILAUDID) injection 1.2 mg, 1.2 mg, Intravenous, Q3H PRN, Farrukh Naylor MD, 1.2 mg at 11/15/17 1335  •  ipratropium-albuterol (DUO-NEB) nebulizer solution 3 mL, 3 mL, Nebulization, Q6H PRN, David Arriaga MD  •  ondansetron (ZOFRAN) tablet 4 mg, 4 mg, Oral, Q6H PRN **OR** ondansetron ODT (ZOFRAN-ODT) disintegrating tablet 4 mg, 4 mg, Oral, Q6H PRN **OR** ondansetron (ZOFRAN) injection 4 mg, 4 mg, Intravenous, Q6H PRN, David Arriaga MD  •  oxybutynin (DITROPAN) tablet 10 mg, 10 mg, Oral, TID, Angel Gannon MD, 10 mg at 11/16/17 0943  •  oxyCODONE (ROXICODONE) immediate release tablet 15 mg, 15 mg, Oral, Q6H PRN, Angel Gannon MD, 15 mg at 11/15/17 2036  •  pantoprazole (PROTONIX) EC tablet 40 mg, 40 mg, Oral, BID AC, Phoenix Nolasco MD, 40 mg at 11/16/17 0943  •  sodium chloride 0.9 % flush 1-10 mL, 1-10 mL, Intravenous, PRN, David Arriaga MD  •  Insert peripheral IV, , , Once **AND** sodium chloride 0.9 % flush 10 mL, 10 mL, Intravenous, PRN, Azalia Austin MD, 10 mL at 11/14/17 0531  •  sodium chloride 0.9 % flush 10 mL, 10 mL, Intracatheter, Q12H, Farrukh Naylor MD, 10 mL at 11/16/17 0944  •  sodium chloride 0.9 % flush 10 mL, 10 mL, Intracatheter, PRN, Farrukh Naylor MD  •  sodium hypochlorite (DAKIN'S 1/4 STRENGTH) 0.125 % topical solution 0.125% solution, , Topical, Q12H, Pancho Miller MD    Assessment/Plan     Active Hospital Problems (** Indicates Principal Problem)    Diagnosis Date Noted   • Sepsis [A41.9] 11/10/2017      Resolved Hospital Problems    Diagnosis Date Noted Date Resolved   No resolved problems to display.        ? Adrenal insufficiency  Could be related to his traumatic brain injury or could be related to pain medications as well - opiates.    Patient's blood pressure could be labile due to his spinal cord injury and he could be demonstrating autonomic dysfunction.  Will change hydrocortisone to 10 mg in the morning and 5 mg  "in the evening.  Explained to the patient that this hydrocortisone dosage is the physiological dose and due to his low cortisol levels it should not contribute to edema and that he would be needing the medication.  Discussed with the patient that even after changing the dosage to 10 in the morning and 5 in the evening if he continues to have the edema we could stop the medication and see if his edema improves.    Hyperprolactinemia  Noted that macro prolactin levels are normal.  Reviewed if patient has any medication interaction that is contributing to high prolactin levels which he does not have any at this time.  CT scan of the brain did not show pituitary adenoma or stalk effect.    Pt is asymptomatic with the current prolactin levels - will hold off on cabergoline for now.      HPA axis  TSH, free T4 levels normal, will repeat  Testosterone levels, FSH, LH are within normal limits-based on the latest blood work up patient does not need to testosterone replacement.     Rest per primary team.    15 minutes out of 25 minutes face to face spent on floor managing care, discussing plan with nursing and counseling patient/family on treatment options, side effects of the medications.          Cheyenne Ragsdale MD.  11/16/17  10:44 AM      [facility] Dragon / transcription disclaimer:    \"Dictated utilizing Dragon dictation\".      Electronically signed by Cheyenne Ragsdale MD at 11/16/2017  1:13 PM        All medication doses during the admission are shown, including meds that are no longer on order.     Scheduled Meds Sorted by Name for Joaquín Sherman as of 11/14/17 through 11/16/17       1 Day 3 Days 7 Days 10 Days < Today >    Legend:                          Inactive     Active     Other Encounter    Linked               Medications 11/14/17 11/15/17 11/16/17   aztreonam (AZACTAM) in SWFI 1 gram/10ml IV PUSH syringe  Dose: 1 g Freq: Every 8 Hours Route: IV  Indications of Use: SEPSIS  Start: 11/10/17 0800 End: 11/10/17 1502 "    Admin Instructions:   Administer IV PUSH over 3-5 minutes. Refrigerate.         aztreonam (AZACTAM) in SWFI 2 grams/10ml IV PUSH syringe  Dose: 2 g Freq: Every 8 Hours Route: IV  Indications of Use: SEPSIS  Start: 11/10/17 1600 End: 11/11/17 1223    Admin Instructions:   Administer IV PUSH over 3-5 minutes. Refrigerate.         aztreonam (AZACTAM) in SWFI 2 grams/10ml IV PUSH syringe  Dose: 2 g Freq: Once Route: IV  Indications of Use: SEPSIS  Start: 11/10/17 0024 End: 11/10/17 0050    Admin Instructions:   Administer IV PUSH over 3-5 minutes. Refrigerate.         enoxaparin (LOVENOX) syringe 40 mg  Dose: 40 mg Freq: Daily Route: SC  Start: 11/10/17 0900    Admin Instructions:   Give subcutaneous in abdomen only. Do not massage site after injection. Do not change dose time until after 48 hrs.    0835            0933            0944              ertapenem (INVANZ) in SWFI 1 GRAM/10mL IV PUSH syringe  Dose: 1 g Freq: Every 24 Hours Route: IV  Indications of Use: SEPSIS,URINARY TRACT INFECTION  Start: 11/11/17 1330 End: 11/13/17 1033    Admin Instructions:   Administer IV PUSH over 5 minutes. Refrigerate.         ertapenem (INVanz) MBP 1 g in 100 NS  Dose: 1 g Freq: Every 24 Hours Route: IV  Indications of Use: SEPSIS,URINARY TRACT INFECTION  Last Dose: 1 g (11/15/17 1400)  Start: 11/13/17 1330 End: 11/18/17 1329    Admin Instructions:   Activate vial before using.    1710            1400            1330              gabapentin (NEURONTIN) capsule 300 mg  Dose: 300 mg Freq: Every 8 Hours Scheduled Route: PO  Start: 11/10/17 2200    0531     1439     2100        0932     1641     2306        0741     1400     2200          heparin flush (porcine) 100 UNIT/ML injection 500 Units  Dose: 5 mL Freq: Every 12 Hours Scheduled Route: IK  Start: 11/13/17 2245    0836     2059          0933     2308          0953     2100            hydrocortisone (CORTEF) tablet 10 mg  Dose: 10 mg Freq: Daily With Breakfast Route: PO  Start:  11/17/17 0800    Admin Instructions:   Give with food.         hydrocortisone (CORTEF) tablet 30 mg  Dose: 30 mg Freq: 2 Times Daily With Meals Route: PO  Start: 11/14/17 1800 End: 11/16/17 1315    Admin Instructions:   Give with food.    2354            0932     1838          0943     1315-D/C'd        hydrocortisone (CORTEF) tablet 5 mg  Dose: 5 mg Freq: Daily With Dinner Route: PO  Start: 11/16/17 1800    Admin Instructions:   Give with food.      1800              hydrocortisone sod succinate PF (Solu-CORTEF) injection 100 mg  Dose: 100 mg Freq: Every 8 Hours Route: IV  Start: 11/10/17 0900 End: 11/12/17 1259         hydrocortisone sodium succinate (Solu-CORTEF) injection 50 mg  Dose: 50 mg Freq: Every 12 Hours Route: IV  Start: 11/13/17 2016 End: 11/14/17 1626    0848     1626-D/C'd          hydrocortisone sodium succinate (Solu-CORTEF) injection 50 mg  Dose: 50 mg Freq: Every 8 Hours Route: IV  Start: 11/12/17 1700 End: 11/13/17 1532         hydrocortisone sodium succinate (Solu-CORTEF) injection 50 mg  Dose: 50 mg Freq: Once Route: IV  Start: 11/10/17 0044 End: 11/10/17 0134         iopamidol (ISOVUE-370) 76 % injection 50 mL  Dose: 50 mL Freq: Once in Imaging Route: IV  Start: 11/10/17 1745 End: 11/10/17 1714         metroNIDAZOLE (FLAGYL) IVPB 500 mg  Dose: 500 mg Freq: Every 8 Hours Route: IV  Indications of Use: SEPSIS  Start: 11/10/17 1600 End: 11/11/17 1223    Admin Instructions:   Do not refrigerate.         metroNIDAZOLE (FLAGYL) IVPB 500 mg  Dose: 500 mg Freq: Every 8 Hours Route: IV  Indications of Use: SEPSIS  Start: 11/11/17 0800 End: 11/10/17 1510    Admin Instructions:   Do not refrigerate.         metroNIDAZOLE (FLAGYL) IVPB 500 mg  Dose: 500 mg Freq: Once Route: IV  Indications of Use: SEPSIS  Start: 11/10/17 0024 End: 11/10/17 0151    Admin Instructions:   Do not refrigerate.         morphine injection 1 mg  Dose: 1 mg Freq: Once Route: IV  Start: 11/10/17 0040 End: 11/10/17 0045    Admin  Instructions:   If given for pain, use the following pain scale:  Mild Pain = Pain Score of 1-3, CPOT 1-2  Moderate Pain = Pain Score of 4-6, CPOT 3-4  Severe Pain = Pain Score of 7-10, CPOT 5-8         oxybutynin (DITROPAN) tablet 10 mg  Dose: 10 mg Freq: 3 Times Daily Route: PO  Start: 11/11/17 0900    0835     1710     2058        0933     1838     2301        0943     1600     2100          oxybutynin (DITROPAN) tablet 10 mg  Dose: 10 mg Freq: 2 Times Daily Route: PO  Start: 11/10/17 2115 End: 11/11/17 0048         pantoprazole (PROTONIX) EC tablet 40 mg  Dose: 40 mg Freq: 2 Times Daily Before Meals Route: PO  Start: 11/13/17 1830    Admin Instructions:   Swallow whole; do not crush, split, or chew.    0852     1710          [phone]            pantoprazole (PROTONIX) EC tablet 40 mg  Dose: 40 mg Freq: 2 Times Daily Before Meals Route: PO  Start: 11/14/17 0730 End: 11/13/17 1753    Admin Instructions:   Swallow whole; do not crush, split, or chew.         pantoprazole (PROTONIX) EC tablet 40 mg  Dose: 40 mg Freq: Every Early Morning Route: PO  Start: 11/10/17 1400 End: 11/13/17 1749    Admin Instructions:   Swallow whole; do not crush, split, or chew.         sodium chloride 0.9 % bolus 1,000 mL  Dose: 1,000 mL Freq: Once Route: IV  Last Dose: 1,000 mL (11/10/17 0104)  Start: 11/10/17 0104 End: 11/10/17 0204         sodium chloride 0.9 % bolus 500 mL  Dose: 500 mL Freq: Once Route: IV  Start: 11/10/17 1315 End: 11/10/17 1325         sodium chloride 0.9 % flush 10 mL  Dose: 10 mL Freq: Every 12 Hours Scheduled Route: IK  Start: 11/13/17 2245    0852     2059          0900     2309          0944     2100            sodium hypochlorite (DAKIN'S 1/4 STRENGTH) 0.125 % topical solution 0.125% solution  Freq: Every 12 Hours Scheduled Route: TOP  Start: 11/13/17 2100    Admin Instructions:   Apply to sacral decub wound    0041 [C]     6300     1057 [C]        0770 [phone]      2100            sodium hypochlorite (DAKIN'S 1/4 STRENGTH) 0.125 % topical solution 0.125% solution  Freq: 2 Times Daily Route: TOP  Start: 11/10/17 0900 End: 11/13/17 1244    Admin Instructions:   Apply to sacral decub wound         vancomycin (VANCOCIN) in iso-osmotic dextrose IVPB 1 g (premix) 200 mL  Dose: 20 mg/kg Freq: Once Route: IV  Indications of Use: SEPSIS  Start: 11/10/17 0024 End: 11/10/17 0024         Medications 11/14/17 11/15/17 11/16/17         Continuous Meds Sorted by Name for Joaquín Sherman as of 11/14/17 through 11/16/17      Legend:                          Inactive     Active     Other Encounter    Linked               Medications 11/14/17 11/15/17 11/16/17   sodium chloride 0.9 % bolus 1,632 mL  Rate: 1,632 mL/hr Freq: Continuous Route: IV  Last Dose: 632 mL (11/09/17 2251)  Start: 11/09/17 2151 End: 11/10/17 0016         sodium chloride 0.9 % infusion  Rate: 75 mL/hr Freq: Continuous Route: IV  Last Dose: 75 mL/hr (11/12/17 0626)  Start: 11/10/17 0104 End: 11/12/17 1557               PRN Meds Sorted by Name for Joaquín Sherman as of 11/14/17 through 11/16/17      Legend:                          Inactive     Active     Other Encounter    Linked               Medications 11/14/17 11/15/17 11/16/17   acetaminophen (TYLENOL) tablet 650 mg  Dose: 650 mg Freq: Every 4 Hours PRN Route: PO  PRN Reasons: Headache,Fever  PRN Comment: fever greater than 101.5 F  Start: 11/10/17 0101    Admin Instructions:   Do not exceed 4 grams of acetaminophen in a 24 hr period.    If given for pain, use the following pain scale:   Mild Pain = Pain Score of 1-3, CPOT 1-2  Moderate Pain = Pain Score of 4-6, CPOT 3-4  Severe Pain = Pain Score of 7-10, CPOT 5-8         Or  acetaminophen (TYLENOL) suppository 650 mg  Dose: 650 mg Freq: Every 4 Hours PRN Route: RE  PRN Reason: Fever  PRN Comment: temperature greater than 101.5 F or headache  Start: 11/10/17 0101    Admin Instructions:   Do not exceed 4 grams of  acetaminophen in a 24 hr period.    If given for pain, use the following pain scale:   Mild Pain = Pain Score of 1-3, CPOT 1-2  Moderate Pain = Pain Score of 4-6, CPOT 3-4  Severe Pain = Pain Score of 7-10, CPOT 5-8         ALPRAZolam (XANAX) tablet 1 mg  Dose: 1 mg Freq: 2 Times Daily PRN Route: PO  PRN Reasons: Anxiety,Sleep  Start: 11/11/17 0455 End: 11/21/17 0454    Admin Instructions:   Avoid grapefruit juice    0835     1727          2318               aluminum-magnesium hydroxide-simethicone (MAALOX MAX) 400-400-40 MG/5ML suspension 15 mL  Dose: 15 mL Freq: Every 6 Hours PRN Route: PO  PRN Reason: Heartburn  Start: 11/10/17 0101    Admin Instructions:   Maximum 60 mL in 24 hours.         bisacodyl (DULCOLAX) EC tablet 5 mg  Dose: 5 mg Freq: Daily PRN Route: PO  PRN Reason: Constipation  Start: 11/10/17 0101    Admin Instructions:   Swallow whole. Do not crush, split, or chew tablet.         bisacodyl (DULCOLAX) suppository 10 mg  Dose: 10 mg Freq: Daily PRN Route: RE  PRN Reason: Constipation  Start: 11/10/17 0101         fentaNYL citrate (PF) (SUBLIMAZE) injection 25 mcg  Dose: 25 mcg Freq: Every 2 Hours PRN Route: IV  PRN Reason: Severe Pain   Start: 11/10/17 0322 End: 11/11/17 1122    Admin Instructions:   Use filter needle to withdraw dose from ampule.  If given for pain, use the following pain scale:  Mild Pain = Pain Score of 1-3, CPOT 1-2  Moderate Pain = Pain Score of 4-6, CPOT 3-4  Severe Pain = Pain Score of 7-10, CPOT 5-8         Followed by  fentaNYL citrate (PF) (SUBLIMAZE) injection 50 mcg  Dose: 50 mcg Freq: Every 1 Hour PRN Route: IV  PRN Reason: Severe Pain   Start: 11/20/17 0322 End: 11/11/17 1122    Admin Instructions:   Use filter needle to withdraw dose from ampule.  If given for pain, use the following pain scale:  Mild Pain = Pain Score of 1-3, CPOT 1-2  Moderate Pain = Pain Score of 4-6, CPOT 3-4  Severe Pain = Pain Score of 7-10, CPOT 5-8         heparin flush (porcine) 100 UNIT/ML  injection 500 Units  Dose: 5 mL Freq: As Needed Route: IK  PRN Reason: Line Care  PRN Comment: After Medication Administration or Blood Draw If Not Going to Be Utilized Immediately  Start: 11/13/17 2206         HYDROmorphone (DILAUDID) injection 1 mg  Dose: 1 mg Freq: Every 3 Hours PRN Route: IV  PRN Reason: Severe Pain   Start: 11/14/17 1321 End: 11/24/17 1320    Admin Instructions:   If given for pain, use the following pain scale:  Mild Pain = Pain Score of 1-3, CPOT 1-2  Moderate Pain = Pain Score of 4-6, CPOT 3-4  Severe Pain = Pain Score of 7-10, CPOT 5-8      2059               0335      0932        1641     2037        0233     0943            Or  HYDROmorphone (DILAUDID) injection 1.2 mg  Dose: 1.2 mg Freq: Every 3 Hours PRN Route: IV  PRN Reason: Severe Pain   Start: 11/14/17 1321 End: 11/24/17 1320    Admin Instructions:   If given for pain, use the following pain scale:  Mild Pain = Pain Score of 1-3, CPOT 1-2  Moderate Pain = Pain Score of 4-6, CPOT 3-4  Severe Pain = Pain Score of 7-10, CPOT 5-8    8049 [id number] ([id number]) [C]        2352                   HYDROmorphone (DILAUDID) injection 1 mg  Dose: 1 mg Freq: Every 2 Hours PRN Route: IV  PRN Reason: Severe Pain   PRN Comment: with dressing changes  Start: 11/11/17 1122 End: 11/12/17 1607    Admin Instructions:   If given for pain, use the following pain scale:  Mild Pain = Pain Score of 1-3, CPOT 1-2  Moderate Pain = Pain Score of 4-6, CPOT 3-4  Severe Pain = Pain Score of 7-10, CPOT 5-8         HYDROmorphone (DILAUDID) injection 1.2 mg  Dose: 1.2 mg Freq: Every 3 Hours PRN Route: IV  PRN Reason: Severe Pain   PRN Comment: with dressing changes  Start: 11/12/17 1615 End: 11/14/17 1323    Admin Instructions:   If given for pain, use the following pain scale:  Mild Pain = Pain Score of 1-3, CPOT 1-2  Moderate Pain = Pain Score of 4-6, CPOT 3-4  Severe Pain = Pain Score of 7-10, CPOT 5-8    0141     0587     0858       1323-D/C'd             ipratropium-albuterol (DUO-NEB) nebulizer solution 3 mL  Dose: 3 mL Freq: Every 6 Hours PRN Route: NEBULIZATION  PRN Reasons: Wheezing,Shortness of Air  Start: 11/10/17 0101         ondansetron (ZOFRAN) tablet 4 mg  Dose: 4 mg Freq: Every 6 Hours PRN Route: PO  PRN Reasons: Nausea,Vomiting  Start: 11/10/17 0101    Admin Instructions:   If BOTH ondansetron (ZOFRAN) and promethazine (PHENERGAN) are ordered use ondansetron first and THEN promethazine IF ondansetron is ineffective.         Or  ondansetron ODT (ZOFRAN-ODT) disintegrating tablet 4 mg  Dose: 4 mg Freq: Every 6 Hours PRN Route: PO  PRN Reasons: Nausea,Vomiting  Start: 11/10/17 0101    Admin Instructions:   If BOTH ondansetron (ZOFRAN) and promethazine (PHENERGAN) are ordered use ondansetron first and THEN promethazine IF ondansetron is ineffective.  Place on tongue and allow to dissolve.         Or  ondansetron (ZOFRAN) injection 4 mg  Dose: 4 mg Freq: Every 6 Hours PRN Route: IV  PRN Reasons: Nausea,Vomiting  Start: 11/10/17 0101    Admin Instructions:   If BOTH ondansetron (ZOFRAN) and promethazine (PHENERGAN) are ordered use ondansetron first and THEN promethazine IF ondansetron is ineffective.         oxyCODONE (ROXICODONE) immediate release tablet 15 mg  Dose: 15 mg Freq: Every 6 Hours PRN Route: PO  PRN Reason: Moderate Pain   Start: 11/10/17 2031 End: 11/20/17 2030    Admin Instructions:   If given for pain, use the following pain scale:  Mild Pain = Pain Score of 1-3, CPOT 1-2  Moderate Pain = Pain Score of 4-6, CPOT 3-4  Severe Pain = Pain Score of 7-10, CPOT 5-8    0041     0835     2268 [C]       8108 1120     1325     2036           sodium chloride 0.9 % flush 1-10 mL  Dose: 1-10 mL Freq: As Needed Route: IV  PRN Reason: Line Care  Start: 11/10/17 0101         sodium chloride 0.9 % flush 10 mL  Dose: 10 mL Freq: As Needed Route: IK  PRN Reason: Line Care  PRN Comment: After Medication Administration or Blood Draw  Start:  11/13/17 2206         sodium chloride 0.9 % flush 10 mL  Dose: 10 mL Freq: As Needed Route: IV  PRN Reason: Line Care  Start: 11/09/17 2143    0141     0531              Medications 11/14/17 11/15/17 11/16/17

## 2017-11-16 NOTE — PROGRESS NOTES
Malnutrition Severity Assessment    Patient Name:  Joaquín Sherman  YOB: 1980  MRN: [id number]  Admit Date:  11/9/2017    Patient meets criteria for : Moderate malnutrition    Moderate malnutrition-mild fat loss to orbital region, mild muscle loss to temporalis; BMI-11.4, 51% adjusted IBW for paraplegia. Average po intake 50% of meals. Agreed to supplement TID    Malnutrition Type: Chronic Illness Malnutrition     Malnutrition Type (last 8 hours)      Malnutrition Severity Assessment       11/16/17 1146    Malnutrition Severity Assessment    Malnutrition Type Chronic Illness Malnutrition      11/16/17 1146    Physical Signs of Malnutrition (Chronic)    Muscle Wasting Mild    Fat Loss Mild      11/16/17 1146    Weight Status (Chronic)    BMI Severe (<16)    %IBW Severe (<70%)   51% IBW      11/16/17 1146    Energy Intake Status (Chronic)    Energy Intake Mild (<75% / 5d)      11/16/17 1146    Criteria Met (Must meet criteria for severity in at least 2 of these categories: M Wasting, Fat Loss, Fluid, Secondary Signs, Wt. Status, Intake)    Patient meets criteria for  Moderate malnutrition          Electronically signed by:  Katiana Colby RD  11/16/17 11:48 AM

## 2017-11-16 NOTE — PLAN OF CARE
Problem: Skin Integrity Impairment, Risk/Actual (Adult)  Goal: Skin Integrity/Wound Healing  Outcome: Ongoing (interventions implemented as appropriate)  Dressing changes to buttocks and bilateral legs per pt assistance-as recommended by wound nurse-pt tlerated well       Problem: Patient Care Overview (Adult)  Goal: Adult Individualization and Mutuality  Outcome: Ongoing (interventions implemented as appropriate)  vss-treatments completed as ordered-pt eating well-progressing slowly    Problem: Pain, Acute (Adult)  Goal: Acceptable Pain Control/Comfort Level  Outcome: Ongoing (interventions implemented as appropriate)  Pt medicated with dilaudid as ordered at 0930,1330 and 1640 each time for generalized pain of 10-also medicated with oxycodone @ 1325 for pain -pt appeared to be resting comfortably after receiving pain medications    Problem: Sepsis (Adult)  Goal: Signs and Symptoms of Listed Potential Problems Will be Absent or Manageable (Sepsis)  Outcome: Outcome(s) achieved Date Met:  11/15/17  Continuing to receive IV antibiotics-vss    Problem: Pressure Ulcer Risk (Shamar Scale) (Adult,Obstetrics,Pediatric)  Goal: Skin Integrity  Outcome: Ongoing (interventions implemented as appropriate)  Pt in specialty bed -turned with assistance

## 2017-11-17 ENCOUNTER — APPOINTMENT (OUTPATIENT)
Dept: CARDIOLOGY | Facility: HOSPITAL | Age: 37
End: 2017-11-17
Attending: INTERNAL MEDICINE

## 2017-11-17 VITALS
OXYGEN SATURATION: 97 % | SYSTOLIC BLOOD PRESSURE: 122 MMHG | HEIGHT: 73 IN | RESPIRATION RATE: 16 BRPM | DIASTOLIC BLOOD PRESSURE: 75 MMHG | HEART RATE: 81 BPM | WEIGHT: 87 LBS | BODY MASS INDEX: 11.53 KG/M2 | TEMPERATURE: 97.5 F

## 2017-11-17 PROBLEM — A41.9 SEPSIS (HCC): Status: RESOLVED | Noted: 2017-11-10 | Resolved: 2017-11-17

## 2017-11-17 PROCEDURE — 25010000002 ERTAPENEM PER 500 MG: Performed by: INTERNAL MEDICINE

## 2017-11-17 PROCEDURE — 99232 SBSQ HOSP IP/OBS MODERATE 35: CPT | Performed by: INTERNAL MEDICINE

## 2017-11-17 PROCEDURE — 25010000002 ENOXAPARIN PER 10 MG: Performed by: INTERNAL MEDICINE

## 2017-11-17 PROCEDURE — 25010000002 HYDROMORPHONE PER 4 MG: Performed by: INTERNAL MEDICINE

## 2017-11-17 PROCEDURE — 25010000002 HEPARIN FLUSH (PORCINE) 100 UNIT/ML SOLUTION: Performed by: INTERNAL MEDICINE

## 2017-11-17 RX ORDER — OXYCODONE HYDROCHLORIDE 15 MG/1
15 TABLET ORAL
Status: DISCONTINUED | OUTPATIENT
Start: 2017-11-17 | End: 2017-11-17 | Stop reason: HOSPADM

## 2017-11-17 RX ORDER — OXYCODONE HYDROCHLORIDE 15 MG/1
15 TABLET ORAL EVERY 4 HOURS PRN
Status: ON HOLD
Start: 2017-11-17 | End: 2018-10-06

## 2017-11-17 RX ORDER — HYDROMORPHONE HYDROCHLORIDE 4 MG/1
4 TABLET ORAL
Start: 2017-11-17 | End: 2017-11-27

## 2017-11-17 RX ORDER — HYDROMORPHONE HYDROCHLORIDE 2 MG/1
4 TABLET ORAL
Status: DISCONTINUED | OUTPATIENT
Start: 2017-11-17 | End: 2017-11-17 | Stop reason: HOSPADM

## 2017-11-17 RX ADMIN — ENOXAPARIN SODIUM 40 MG: 40 INJECTION SUBCUTANEOUS at 10:03

## 2017-11-17 RX ADMIN — OXYCODONE HYDROCHLORIDE 15 MG: 5 TABLET ORAL at 00:27

## 2017-11-17 RX ADMIN — OXYCODONE HYDROCHLORIDE 15 MG: 5 TABLET ORAL at 06:34

## 2017-11-17 RX ADMIN — GABAPENTIN 300 MG: 300 CAPSULE ORAL at 06:34

## 2017-11-17 RX ADMIN — HYDROMORPHONE HYDROCHLORIDE 1 MG: 1 INJECTION, SOLUTION INTRAMUSCULAR; INTRAVENOUS; SUBCUTANEOUS at 01:09

## 2017-11-17 RX ADMIN — Medication 10 ML: at 10:04

## 2017-11-17 RX ADMIN — HYDROMORPHONE HYDROCHLORIDE 4 MG: 2 TABLET ORAL at 11:19

## 2017-11-17 RX ADMIN — ALPRAZOLAM 1 MG: 0.5 TABLET ORAL at 10:28

## 2017-11-17 RX ADMIN — SODIUM CHLORIDE 1 G: 900 INJECTION INTRAVENOUS at 14:10

## 2017-11-17 RX ADMIN — PANTOPRAZOLE SODIUM 40 MG: 40 TABLET, DELAYED RELEASE ORAL at 06:34

## 2017-11-17 RX ADMIN — Medication 500 UNITS: at 10:05

## 2017-11-17 RX ADMIN — OXYBUTYNIN CHLORIDE 10 MG: 5 TABLET ORAL at 10:03

## 2017-11-17 RX ADMIN — Medication 500 UNITS: at 01:09

## 2017-11-17 RX ADMIN — DAKIN'S SOLUTION 0.125% (QUARTER STRENGTH): 0.12 SOLUTION at 10:04

## 2017-11-17 RX ADMIN — HYDROCORTISONE 10 MG: 10 TABLET ORAL at 10:03

## 2017-11-17 NOTE — PROGRESS NOTES
37 y.o.   LOS: 7 days   Patient Care Team:  Salvador Jaramillo MD as PCP - General (Family Medicine)    Chief Complaint:  Adrenal insufficiency    Chief Complaint   Patient presents with   • Abdominal Pain       Subjective  patient is going to be discharged home with home health.  Tolerating the oral steroids with no issues.    Interval History:    Review of Systems:   Review of Systems   Constitutional: Positive for fatigue. Negative for appetite change.   Gastrointestinal: Negative for abdominal pain, constipation, nausea and vomiting.   Endocrine: Negative for cold intolerance, polydipsia and polyuria.   Musculoskeletal: Negative for myalgias and neck pain.   Skin: Positive for wound.   Neurological: Positive for weakness and numbness.     Objective     Vital Signs   Temp:  [97.2 °F (36.2 °C)-97.6 °F (36.4 °C)] 97.4 °F (36.3 °C)  Heart Rate:  [55-65] 55  Resp:  [16] 16  BP: (113-144)/(73-80) 144/80      Physical Exam   Alert and oriented ×3, thin male  Thyroid palpable  Clear to auscultation  S1-S2 heard  Abdominal tenderness, bowel sounds heard, colostomy site intact  Paraplegic, decubitus ulcers noted.    Results Review:     I reviewed the patient's new clinical results.      No results found for: GLUCOSE  Lab Results (last 72 hours)     Procedure Component Value Units Date/Time    Potassium [[id number]]  (Normal) Collected:  11/10/17 1454    Specimen:  Blood Updated:  11/10/17 1525     Potassium 3.6 mmol/L     Hemoglobin & Hematocrit, Blood [[id number]]  (Abnormal) Collected:  11/10/17 1454    Specimen:  Blood Updated:  11/10/17 1531     Hemoglobin 9.4 (L) g/dL      Hematocrit 30.7 (L) %     POC Glucose Fingerstick [[id number]]  (Normal) Collected:  11/10/17 1958    Specimen:  Blood Updated:  11/10/17 2021     Glucose 119 mg/dL     Narrative:       Meter: [id number] : [id number] Mike Webber    Urine Culture - Urine, Urine, Clean Catch [[id number]] Collected:  11/09/17 2324    Specimen:  Urine from Urine,  Catheter Updated:  11/11/17 0800     Urine Culture --      Mixed Tal Isolated    Narrative:         Specimen contains mixed organisms of questionable pathogenicity which indicates contamination with commensal tal.  Further identification is unlikely to provide clinically useful information.  Suggest recollection.    Blood Culture ID, PCR - Blood, [[id number]]  (Abnormal) Collected:  11/09/17 2206    Specimen:  Blood from Arm, Left Updated:  11/11/17 0944     BCID, PCR Staphylococcus spp, not aureus. Identification by BCID PCR. (C)    CBC & Differential [[id number]] Collected:  11/11/17 1234    Specimen:  Blood Updated:  11/11/17 1322    Narrative:       The following orders were created for panel order CBC & Differential.  Procedure                               Abnormality         Status                     ---------                               -----------         ------                     CBC Auto Differential[[id number]]        Abnormal            Final result                 Please view results for these tests on the individual orders.    CBC Auto Differential [[id number]]  (Abnormal) Collected:  11/11/17 1234    Specimen:  Blood Updated:  11/11/17 1322     WBC 8.48 10*3/mm3      RBC 3.35 (L) 10*6/mm3      Hemoglobin 9.3 (L) g/dL      Hematocrit 30.7 (L) %      MCV 91.6 fL      MCH 27.8 pg      MCHC 30.3 (L) g/dL      RDW 16.2 (H) %      RDW-SD 54.4 (H) fl      MPV 8.8 fL      Platelets 217 10*3/mm3      Neutrophil % 87.2 (H) %      Lymphocyte % 8.1 (L) %      Monocyte % 3.9 (L) %      Eosinophil % 0.0 (L) %      Basophil % 0.0 %      Immature Grans % 0.8 (H) %      Neutrophils, Absolute 7.39 10*3/mm3      Lymphocytes, Absolute 0.69 (L) 10*3/mm3      Monocytes, Absolute 0.33 10*3/mm3      Eosinophils, Absolute 0.00 10*3/mm3      Basophils, Absolute 0.00 10*3/mm3      Immature Grans, Absolute 0.07 (H) 10*3/mm3     Basic Metabolic Panel [[id number]]  (Abnormal) Collected:  11/11/17 1234    Specimen:  Blood  Updated:  11/11/17 1348     Glucose 153 (H) mg/dL      BUN 16 mg/dL      Creatinine 0.68 (L) mg/dL      Sodium 141 mmol/L      Potassium 3.4 (L) mmol/L      Chloride 110 (H) mmol/L      CO2 17.6 (L) mmol/L      Calcium 8.0 (L) mg/dL      eGFR  African Amer >150 mL/min/1.73      BUN/Creatinine Ratio 23.5     Anion Gap 13.4 mmol/L     Narrative:       GFR Normal >60  Chronic Kidney Disease <60  Kidney Failure <15    TSH [[id number]]  (Normal) Collected:  11/11/17 1324    Specimen:  Blood Updated:  11/11/17 2232     TSH 0.593 mIU/mL     T4, Free [[id number]]  (Normal) Collected:  11/11/17 1324    Specimen:  Blood Updated:  11/11/17 2232     Free T4 1.06 ng/dL     CBC & Differential [[id number]] Collected:  11/12/17 0627    Specimen:  Blood Updated:  11/12/17 0731    Narrative:       The following orders were created for panel order CBC & Differential.  Procedure                               Abnormality         Status                     ---------                               -----------         ------                     CBC Auto Differential[[id number]]        Abnormal            Final result                 Please view results for these tests on the individual orders.    CBC Auto Differential [[id number]]  (Abnormal) Collected:  11/12/17 0627    Specimen:  Blood Updated:  11/12/17 0731     WBC 8.04 10*3/mm3      RBC 3.22 (L) 10*6/mm3      Hemoglobin 8.7 (L) g/dL      Hematocrit 29.2 (L) %      MCV 90.7 fL      MCH 27.0 pg      MCHC 29.8 (L) g/dL      RDW 16.6 (H) %      RDW-SD 55.3 (H) fl      MPV 9.5 fL      Platelets 219 10*3/mm3      Neutrophil % 83.5 (H) %      Lymphocyte % 9.5 (L) %      Monocyte % 6.3 %      Eosinophil % 0.0 (L) %      Basophil % 0.0 %      Immature Grans % 0.7 (H) %      Neutrophils, Absolute 6.71 10*3/mm3      Lymphocytes, Absolute 0.76 (L) 10*3/mm3      Monocytes, Absolute 0.51 10*3/mm3      Eosinophils, Absolute 0.00 10*3/mm3      Basophils, Absolute 0.00 10*3/mm3      Immature Grans, Absolute  0.06 (H) 10*3/mm3     Basic Metabolic Panel [[id number]]  (Abnormal) Collected:  11/12/17 0627    Specimen:  Blood Updated:  11/12/17 0746     Glucose 125 (H) mg/dL      BUN 18 mg/dL      Creatinine 0.67 (L) mg/dL      Sodium 141 mmol/L      Potassium 4.2 mmol/L      Chloride 110 (H) mmol/L      CO2 22.5 mmol/L      Calcium 7.9 (L) mg/dL      eGFR  African Amer >150 mL/min/1.73      BUN/Creatinine Ratio 26.9 (H)     Anion Gap 8.5 mmol/L     Narrative:       GFR Normal >60  Chronic Kidney Disease <60  Kidney Failure <15    Blood Culture - Blood, [[id number]]  (Abnormal) Collected:  11/09/17 2206    Specimen:  Blood from Arm, Left Updated:  11/12/17 0829     Blood Culture --      Staphylococcus, coagulase negative (A)      Probable contaminant requires clinical correlation, susceptibility not performed unless requested by physician.          Gram Stain Result Anaerobic Bottle Gram positive cocci in clusters    Blood Culture - Blood, [[id number]]  (Normal) Collected:  11/11/17 1234    Specimen:  Blood from Hand, Left Updated:  11/12/17 1316     Blood Culture No growth at 24 hours    Blood Culture - Blood, [[id number]]  (Normal) Collected:  11/09/17 2247    Specimen:  Blood from Arm, Left Updated:  11/12/17 2331     Blood Culture No growth at 3 days        Imaging Results (last 72 hours)     Procedure Component Value Units Date/Time    CT Head With & Without Contrast [[id number]] Collected:  11/10/17 1722     Updated:  11/10/17 1733    Narrative:       CT HEAD W WO CONTRAST-     INDICATIONS: Possible pituitary mass, unable to have MRI.     TECHNIQUE: Radiation dose reduction techniques were utilized, including  automated exposure control and exposure modulation based on body size.      Noncontrast head CT     COMPARISON: None available     FINDINGS:      No obvious large pituitary mass is identified, but the pituitary is not  well evaluated with this technique, continued follow-up is suggested as  indications persist.     No  enhancing lesions are identified.     No acute intracranial hemorrhage, midline shift or mass effect. No acute  territorial infarct is identified.        Ventricles, cisterns, cerebral sulci are unremarkable for patient age.     The visualized paranasal sinuses, orbits, mastoid air cells are  unremarkable.                   Impression:          No obvious large pituitary mass is identified, but the pituitary is not  well evaluated with this technique, continued follow-up is suggested as  indications persist.     This report was finalized on 11/10/2017 5:30 PM by Dr. Medardo Washington MD.       XR Chest 1 View [[id number]] Collected:  11/09/17 2211     Updated:  11/11/17 0123    Narrative:       X-RAY CHEST 1 VIEW.     HISTORY: Evaluate for pneumonia.     COMPARISON: 09/27/2016.     FINDINGS:  Cardiomediastinal silhouette is within normal limits. Right central  venous catheter is unchanged.     There is no consolidation or effusion. Mild emphysema is suspected, lung  volumes are low. Persistent opacities in the right lung concerning for  atelectasis.          Impression:       No acute findings.         This report was finalized on 11/11/2017 1:20 AM by Dr. Moises Copeland MD.       CT Abdomen Pelvis Without Contrast [[id number]] Collected:  11/10/17 0130     Updated:  11/11/17 0142    Narrative:       CT ABDOMEN AND PELVIS WITHOUT CONTRAST.     TECHNIQUE: Radiation dose reduction techniques were utilized, including  automated exposure control and exposure modulation based on body size. A  routine CT scan of the abdomen and pelvis was performed with coronal and  sagittal reconstructed images.     HISTORY: Abdominal pain.     COMPARISON: 04/11/2017.     FINDINGS:  Lung bases demonstrate no consolidation or effusion.          Liver demonstrates homogeneous parenchyma, no definite mass.  Unremarkable gallbladder. No intra or extrahepatic biliary ductal  dilatation.      The spleen is unremarkable. Pancreas is  unremarkable, no pancreatic  ductal dilatation. Adrenal glands are within normal limits.     Mild dilatation of the right renal pelvis and the ureter, with some  mural thickening. Similar changes are not seen in the left kidney. 0.3  cm nonobstructing stone of the right kidney. Circumferential thickening  of the urinary bladder wall. A suprapubic cystostomy is in position.         Ileus of the bowel loops with gaseous distention. Ostomy in the left  lower quadrant.     No significant retroperitoneal lymphadenopathy. Chronic changes of the  pelvic bones and hip joints.          Impression:       1.  Findings of severe UTI involving the right renal collecting system,  pyelonephritis cannot be excluded on noncontrast study. Please  clinically correlate.   2.  Nonobstructing 0.3 cm stone of the right kidney.  3.  Moderate cystitis of the urinary bladder cannot be excluded.     This report was finalized on 11/11/2017 1:39 AM by Dr. Moises Copeland MD.             Medication Review:  done      Current Facility-Administered Medications:   •  acetaminophen (TYLENOL) tablet 650 mg, 650 mg, Oral, Q4H PRN **OR** acetaminophen (TYLENOL) suppository 650 mg, 650 mg, Rectal, Q4H PRN, David Arriaga MD  •  ALPRAZolam (XANAX) tablet 1 mg, 1 mg, Oral, BID PRN, Angel Gannon MD, 1 mg at 11/17/17 1028  •  aluminum-magnesium hydroxide-simethicone (MAALOX MAX) 400-400-40 MG/5ML suspension 15 mL, 15 mL, Oral, Q6H PRN, David Arriaga MD  •  bisacodyl (DULCOLAX) EC tablet 5 mg, 5 mg, Oral, Daily PRN, David Arriaga MD  •  bisacodyl (DULCOLAX) suppository 10 mg, 10 mg, Rectal, Daily PRN, David Arriaga MD  •  enoxaparin (LOVENOX) syringe 40 mg, 40 mg, Subcutaneous, Daily, David Arriaga MD, 40 mg at 11/17/17 1003  •  ertapenem (INVanz) MBP 1 g in 100 NS, 1 g, Intravenous, Q24H, Pancho Miller MD, Last Rate: 200 mL/hr at 11/16/17 1500, 1 g at 11/16/17 1500  •  gabapentin (NEURONTIN) capsule 300  mg, 300 mg, Oral, Q8H, Angel Gannon MD, 300 mg at 11/17/17 0634  •  heparin flush (porcine) 100 UNIT/ML injection 500 Units, 5 mL, Intracatheter, Q12H, Farrukh Naylor MD, 500 Units at 11/17/17 1005  •  heparin flush (porcine) 100 UNIT/ML injection 500 Units, 5 mL, Intracatheter, PRN, Farrukh Naylor MD, 500 Units at 11/17/17 0109  •  hydrocortisone (CORTEF) tablet 10 mg, 10 mg, Oral, Daily With Breakfast, Cheyenne Ragsdale MD, 10 mg at 11/17/17 1003  •  hydrocortisone (CORTEF) tablet 5 mg, 5 mg, Oral, Daily With Dinner, Cheyenne Ragsdale MD, 5 mg at 11/16/17 1850  •  HYDROmorphone (DILAUDID) injection 1 mg, 1 mg, Intravenous, Q3H PRN, 1 mg at 11/17/17 0109 **OR** HYDROmorphone (DILAUDID) injection 1.2 mg, 1.2 mg, Intravenous, Q3H PRN, Farrukh Naylor MD, 1.2 mg at 11/15/17 1335  •  HYDROmorphone (DILAUDID) tablet 4 mg, 4 mg, Oral, Q3H PRN, Farrukh Naylor MD, 4 mg at 11/17/17 1119  •  ipratropium-albuterol (DUO-NEB) nebulizer solution 3 mL, 3 mL, Nebulization, Q6H PRN, David Arriaga MD  •  ondansetron (ZOFRAN) tablet 4 mg, 4 mg, Oral, Q6H PRN **OR** ondansetron ODT (ZOFRAN-ODT) disintegrating tablet 4 mg, 4 mg, Oral, Q6H PRN **OR** ondansetron (ZOFRAN) injection 4 mg, 4 mg, Intravenous, Q6H PRN, David Arriaga MD  •  oxybutynin (DITROPAN) tablet 10 mg, 10 mg, Oral, TID, Angel Gannon MD, 10 mg at 11/17/17 1003  •  oxyCODONE (ROXICODONE) immediate release tablet 15 mg, 15 mg, Oral, Q3H PRN, Farrukh Naylor MD  •  pantoprazole (PROTONIX) EC tablet 40 mg, 40 mg, Oral, BID AC, Phoenix Nolasco MD, 40 mg at 11/17/17 0634  •  sodium chloride 0.9 % flush 1-10 mL, 1-10 mL, Intravenous, PRN, David Arriaga MD  •  Insert peripheral IV, , , Once **AND** sodium chloride 0.9 % flush 10 mL, 10 mL, Intravenous, PRN, Azalia Austin MD, 10 mL at 11/14/17 0531  •  sodium chloride 0.9 % flush 10 mL, 10 mL, Intracatheter, Q12H, Farrukh Naylor MD, 10 mL at 11/17/17 1004  •  sodium chloride 0.9 % flush 10  "mL, 10 mL, Intracatheter, PRN, Farrukh Naylor MD  •  sodium hypochlorite (DAKIN'S 1/4 STRENGTH) 0.125 % topical solution 0.125% solution, , Topical, Q12H, Pancho Miller MD    Assessment/Plan     Active Hospital Problems (** Indicates Principal Problem)    Diagnosis Date Noted   No active problems to display.      Resolved Hospital Problems    Diagnosis Date Noted Date Resolved   • Sepsis [A41.9] 11/10/2017 11/17/2017        ? Adrenal insufficiency  Could be related to his traumatic brain injury or could be related to pain medications as well - opiates.    Patient's blood pressure could be labile due to his spinal cord injury and he could be demonstrating autonomic dysfunction.  Continue hydrocortisone 10 mg in the morning and 5 mg in the evening.  Currently on physiological dose of steroid replacement.  Patient still needs to be educated on the sick day rules and the IM steroid injections - this would be done as an outpatient.    Hyperprolactinemia  Noted that macro prolactin levels are normal.  Reviewed if patient has any medication interaction that is contributing to high prolactin levels which he does not have any at this time.  CT scan of the brain did not show pituitary adenoma or stalk effect.    Pt is asymptomatic with the current prolactin levels - will hold off on cabergoline for now.      HPA axis  TSH, free T4 levels normal, will repeat  Testosterone levels, FSH, LH are within normal limits-based on the latest blood work up patient does not need to testosterone replacement.     Rest per primary team.    15 minutes out of 25 minutes face to face spent on floor managing care, discussing plan with nursing and counseling patient/family on treatment options, side effects of the medications.            Cheyenne Ragsdale MD.  11/17/17  10:44 AM      EM[facility] Dragon / transcription disclaimer:    \"Dictated utilizing Dragon dictation\".   "

## 2017-11-17 NOTE — PLAN OF CARE
Problem: Skin Integrity Impairment, Risk/Actual (Adult)  Goal: Skin Integrity/Wound Healing  Outcome: Ongoing (interventions implemented as appropriate)    Problem: Patient Care Overview (Adult)  Goal: Plan of Care Review  Outcome: Ongoing (interventions implemented as appropriate)    11/17/17 0231   Coping/Psychosocial Response Interventions   Plan Of Care Reviewed With patient   Patient Care Overview   Progress no change   Outcome Evaluation   Outcome Summary/Follow up Plan VSS, pain meds PRN, drsg changes X8 to chronic wounds, patient is pleasant and cooperative, wound care following, will continue to monitor.

## 2017-11-17 NOTE — PROGRESS NOTES
Continued Stay Note  [facility]     Patient Name: Joaquín Sherman  MRN: [id number]  Today's Date: 11/17/2017    Admit Date: 11/9/2017          Discharge Plan       11/17/17 1535    Case Management/Social Work Plan    Additional Comments Azalia Lopez notified that patient will DC  Call out to Dr Naylor for WC orders.                Discharge Codes     None        Expected Discharge Date and Time     Expected Discharge Date Expected Discharge Time    Nov 17, 2017             Amarilys Anton RN

## 2017-11-17 NOTE — PROGRESS NOTES
Continued Stay Note  [facility]     Patient Name: Joaquín Sherman  MRN: [id number]  Today's Date: 11/17/2017    Admit Date: 11/9/2017          Discharge Plan       11/17/17 1047    Case Management/Social Work Plan    Plan Home with [facility]    Additional Comments faxed clinical information to [employer] for supplies needed at DC  Messaged Mary for information still needed       11/17/17 1036    Case Management/Social Work Plan    Additional Comments Spoke with patient about hospital bed he had requested.  [facility] denied a new bed.  He obtained a clinitron bed paidd for by [facility], that was then repaired in lte 2016.  Pt then obtained a new bed in march of 2017 with a low loss mattress.  [facility] [place] paid for that bed as well.  Pt states he has not had any of the fore mentioned beds.  He is aware [facility] will not pay for a bed at this time              Discharge Codes     None            Amarilys Anton RN
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
## 2017-11-17 NOTE — SIGNIFICANT NOTE
"Appliance removed from ring and emptied.  Appliance full of stool.  Area around stoma cleansed with warm water and wash cloth.  Pt became very upset that nurse was \"getting stool all over him\". Pt removed ostomy ring from around stoma and threw it away. He asked for another ring and cut the opening out around stoma, which was larger than stoma.  Pt applied new ring with exposed skin showing around stoma. New appliance also applied. Pt refused to let nurse measure new ring or apply appliance.  Stated he had been doing this for 11 years and knew how to do it.    "
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
## 2017-11-17 NOTE — PLAN OF CARE
Problem: Fluid Volume Deficit (Adult)  Goal: Fluid/Electrolyte Balance  Outcome: Ongoing (interventions implemented as appropriate)    11/16/17 1934   Fluid Volume Deficit (Adult)   Fluid/Electrolyte Balance making progress toward outcome       Goal: Comfort/Well Being  Outcome: Ongoing (interventions implemented as appropriate)    Problem: Skin Integrity Impairment, Risk/Actual (Adult)  Goal: Skin Integrity/Wound Healing  Outcome: Ongoing (interventions implemented as appropriate)    Problem: Patient Care Overview (Adult)  Goal: Plan of Care Review  Outcome: Ongoing (interventions implemented as appropriate)  Goal: Adult Individualization and Mutuality  Outcome: Ongoing (interventions implemented as appropriate)  Goal: Discharge Needs Assessment  Outcome: Ongoing (interventions implemented as appropriate)    Problem: Pain, Acute (Adult)  Goal: Acceptable Pain Control/Comfort Level  Outcome: Ongoing (interventions implemented as appropriate)    Problem: Pressure Ulcer Risk (Shamar Scale) (Adult,Obstetrics,Pediatric)  Goal: Skin Integrity  Outcome: Ongoing (interventions implemented as appropriate)

## 2017-11-17 NOTE — PROGRESS NOTES
Continued Stay Note  [facility]     Patient Name: Joaquín Sherman  MRN: [id number]  Today's Date: 11/17/2017    Admit Date: 11/9/2017          Discharge Plan       11/17/17 1036    Case Management/Social Work Plan    Additional Comments Spoke with patient about hospital bed he had requested.  [facility] denied a new bed.  He obtained a clinitron bed paidd for by [facility], that was then repaired in lte 2016.  Pt then obtained a new bed in march of 2017 with a low loss mattress.  [facility] paid for that bed as well.  Pt states he has not had any of the fore mentioned beds.  He is aware [facility] will not pay for a bed at this time              Discharge Codes     None            Amarilys Anton RN

## 2017-11-20 NOTE — PROGRESS NOTES
Case Management Discharge Note    Final Note: Home with Home Health  [facility], Wound care instructions sent to Aileen Chen [facility].  Order signed Dr Guzman.      Discharge Placement     Facility/Agency Request Status Selected? Address Phone Number Fax Number    [facility] Accepted    Yes [address] [facility] [address] [facility] [address] [phone]        Amarilys Anton RN 11/15/2017 09:56    Spoke with [facility]   not safe at home     [address] [facility] [address] [id number] [phone]    [facility] Declined     [address] [facility] [address], [facility] [id number] [phone]        Other: Other    Discharge Codes: DD  Home with [facility] other

## 2018-02-14 ENCOUNTER — APPOINTMENT (OUTPATIENT)
Dept: CT IMAGING | Facility: HOSPITAL | Age: 38
End: 2018-02-14

## 2018-02-14 ENCOUNTER — APPOINTMENT (OUTPATIENT)
Dept: GENERAL RADIOLOGY | Facility: HOSPITAL | Age: 38
End: 2018-02-14

## 2018-02-14 ENCOUNTER — HOSPITAL ENCOUNTER (INPATIENT)
Facility: HOSPITAL | Age: 38
LOS: 6 days | Discharge: HOME HEALTH | End: 2018-02-20
Attending: EMERGENCY MEDICINE | Admitting: INTERNAL MEDICINE

## 2018-02-14 DIAGNOSIS — L89.154 DECUBITUS ULCER OF SACRAL REGION, STAGE 4 (HCC): ICD-10-CM

## 2018-02-14 DIAGNOSIS — D64.9 SEVERE ANEMIA: ICD-10-CM

## 2018-02-14 DIAGNOSIS — G82.20 PARAPLEGIA FOLLOWING SPINAL CORD INJURY (HCC): Chronic | ICD-10-CM

## 2018-02-14 DIAGNOSIS — A41.9 SEVERE SEPSIS (HCC): ICD-10-CM

## 2018-02-14 DIAGNOSIS — R65.20 SEVERE SEPSIS (HCC): ICD-10-CM

## 2018-02-14 DIAGNOSIS — E87.1 HYPONATREMIA: ICD-10-CM

## 2018-02-14 DIAGNOSIS — E86.0 DEHYDRATION: Primary | ICD-10-CM

## 2018-02-14 DIAGNOSIS — M86.00 ACUTE HEMATOGENOUS OSTEOMYELITIS, UNSPECIFIED SITE (HCC): ICD-10-CM

## 2018-02-14 DIAGNOSIS — I95.9 HYPOTENSION, UNSPECIFIED HYPOTENSION TYPE: ICD-10-CM

## 2018-02-14 LAB
ABO GROUP BLD: NORMAL
ALBUMIN SERPL-MCNC: 2 G/DL (ref 3.5–5.2)
ALBUMIN/GLOB SERPL: 0.4 G/DL
ALP SERPL-CCNC: 96 U/L (ref 39–117)
ALT SERPL W P-5'-P-CCNC: <5 U/L (ref 1–41)
ANION GAP SERPL CALCULATED.3IONS-SCNC: 10.9 MMOL/L
ANISOCYTOSIS BLD QL: NORMAL
APTT PPP: 44 SECONDS (ref 22.7–35.4)
ARTERIAL PATENCY WRIST A: NORMAL
AST SERPL-CCNC: 6 U/L (ref 1–40)
ATMOSPHERIC PRESS: 753.3 MMHG
ATMOSPHERIC PRESS: NORMAL MMHG
BACTERIA UR QL AUTO: ABNORMAL /HPF
BASE EXCESS BLDA CALC-SCNC: NORMAL MMOL/L (ref 0–2)
BASE EXCESS BLDV CALC-SCNC: -4.7 MMOL/L
BASOPHILS # BLD AUTO: 0.01 10*3/MM3 (ref 0–0.2)
BASOPHILS # BLD AUTO: 0.02 10*3/MM3 (ref 0–0.2)
BASOPHILS NFR BLD AUTO: 0.1 % (ref 0–1.5)
BASOPHILS NFR BLD AUTO: 0.2 % (ref 0–1.5)
BDY SITE: ABNORMAL
BDY SITE: NORMAL
BILIRUB SERPL-MCNC: 0.2 MG/DL (ref 0.1–1.2)
BILIRUB UR QL STRIP: NEGATIVE
BLD GP AB SCN SERPL QL: NEGATIVE
BUN BLD-MCNC: 27 MG/DL (ref 6–20)
BUN/CREAT SERPL: 23.9 (ref 7–25)
CA-I BLD-MCNC: 5 MG/DL (ref 4.6–5.4)
CA-I SERPL ISE-MCNC: 1.25 MMOL/L (ref 1.15–1.35)
CALCIUM SPEC-SCNC: 8.5 MG/DL (ref 8.6–10.5)
CHLORIDE SERPL-SCNC: 90 MMOL/L (ref 98–107)
CLARITY UR: ABNORMAL
CO2 SERPL-SCNC: 23.1 MMOL/L (ref 22–29)
COLOR UR: YELLOW
CORTIS SERPL-MCNC: 18.93 MCG/DL
CREAT BLD-MCNC: 1.13 MG/DL (ref 0.76–1.27)
CRP SERPL-MCNC: 20.8 MG/DL (ref 0–0.5)
D-LACTATE SERPL-SCNC: 0.6 MMOL/L (ref 0.5–2)
DEPRECATED RDW RBC AUTO: 55 FL (ref 37–54)
DEPRECATED RDW RBC AUTO: 55.9 FL (ref 37–54)
EOSINOPHIL # BLD AUTO: 0.02 10*3/MM3 (ref 0–0.7)
EOSINOPHIL # BLD AUTO: 0.04 10*3/MM3 (ref 0–0.7)
EOSINOPHIL NFR BLD AUTO: 0.2 % (ref 0.3–6.2)
EOSINOPHIL NFR BLD AUTO: 0.4 % (ref 0.3–6.2)
ERYTHROCYTE [DISTWIDTH] IN BLOOD BY AUTOMATED COUNT: 18.6 % (ref 11.5–14.5)
ERYTHROCYTE [DISTWIDTH] IN BLOOD BY AUTOMATED COUNT: 18.6 % (ref 11.5–14.5)
GFR SERPL CREATININE-BSD FRML MDRD: 88 ML/MIN/1.73
GLOBULIN UR ELPH-MCNC: 5.4 GM/DL
GLUCOSE BLD-MCNC: 94 MG/DL (ref 65–99)
GLUCOSE BLDC GLUCOMTR-MCNC: 100 MG/DL (ref 70–130)
GLUCOSE BLDC GLUCOMTR-MCNC: 93 MG/DL (ref 70–130)
GLUCOSE UR STRIP-MCNC: NEGATIVE MG/DL
HCO3 BLDA-SCNC: NORMAL MMOL/L (ref 22–28)
HCO3 BLDV-SCNC: 20.2 MMOL/L (ref 22–28)
HCT VFR BLD AUTO: 12.7 % (ref 40.4–52.2)
HCT VFR BLD AUTO: 13 % (ref 40.4–52.2)
HGB BLD-MCNC: 3.3 G/DL (ref 13.7–17.6)
HGB BLD-MCNC: 3.4 G/DL (ref 13.7–17.6)
HGB UR QL STRIP.AUTO: NEGATIVE
HOLD SPECIMEN: NORMAL
HOROWITZ INDEX BLD+IHG-RTO: NORMAL %
HYALINE CASTS UR QL AUTO: ABNORMAL /LPF
HYPOCHROMIA BLD QL: NORMAL
HYPOCHROMIA BLD QL: NORMAL
IMM GRANULOCYTES # BLD: 0.09 10*3/MM3 (ref 0–0.03)
IMM GRANULOCYTES # BLD: 0.16 10*3/MM3 (ref 0–0.03)
IMM GRANULOCYTES NFR BLD: 0.8 % (ref 0–0.5)
IMM GRANULOCYTES NFR BLD: 1.2 % (ref 0–0.5)
INR PPP: 1.35 (ref 0.9–1.1)
KETONES UR QL STRIP: NEGATIVE
LEUKOCYTE ESTERASE UR QL STRIP.AUTO: ABNORMAL
LYMPHOCYTES # BLD AUTO: 1.68 10*3/MM3 (ref 0.9–4.8)
LYMPHOCYTES # BLD AUTO: 2.5 10*3/MM3 (ref 0.9–4.8)
LYMPHOCYTES NFR BLD AUTO: 14.8 % (ref 19.6–45.3)
LYMPHOCYTES NFR BLD AUTO: 19.1 % (ref 19.6–45.3)
MAGNESIUM SERPL-MCNC: 1.9 MG/DL (ref 1.6–2.6)
MCH RBC QN AUTO: 21.3 PG (ref 27–32.7)
MCH RBC QN AUTO: 21.4 PG (ref 27–32.7)
MCHC RBC AUTO-ENTMCNC: 26 G/DL (ref 32.6–36.4)
MCHC RBC AUTO-ENTMCNC: 26.2 G/DL (ref 32.6–36.4)
MCV RBC AUTO: 81.8 FL (ref 79.8–96.2)
MCV RBC AUTO: 81.9 FL (ref 79.8–96.2)
MODALITY: ABNORMAL
MODALITY: NORMAL
MONOCYTES # BLD AUTO: 0.93 10*3/MM3 (ref 0.2–1.2)
MONOCYTES # BLD AUTO: 0.96 10*3/MM3 (ref 0.2–1.2)
MONOCYTES NFR BLD AUTO: 7.1 % (ref 5–12)
MONOCYTES NFR BLD AUTO: 8.5 % (ref 5–12)
NEUTROPHILS # BLD AUTO: 8.56 10*3/MM3 (ref 1.9–8.1)
NEUTROPHILS # BLD AUTO: 9.46 10*3/MM3 (ref 1.9–8.1)
NEUTROPHILS NFR BLD AUTO: 72.2 % (ref 42.7–76)
NEUTROPHILS NFR BLD AUTO: 75.4 % (ref 42.7–76)
NITRITE UR QL STRIP: NEGATIVE
NRBC BLD MANUAL-RTO: 0 /100 WBC (ref 0–0)
NRBC BLD MANUAL-RTO: 0 /100 WBC (ref 0–0)
O2 A-A PPRESDIFF RESPIRATORY: NORMAL MMHG
PCO2 BLDA: NORMAL MM HG (ref 35–45)
PCO2 BLDV: 35.2 MM HG (ref 41–51)
PH BLDA: NORMAL PH UNITS (ref 7.35–7.45)
PH BLDV: 7.37 PH UNITS (ref 7.31–7.41)
PH UR STRIP.AUTO: 8.5 [PH] (ref 5–8)
PHOSPHATE SERPL-MCNC: 4.5 MG/DL (ref 2.5–4.5)
PLAT MORPH BLD: NORMAL
PLAT MORPH BLD: NORMAL
PLATELET # BLD AUTO: 253 10*3/MM3 (ref 140–500)
PLATELET # BLD AUTO: 280 10*3/MM3 (ref 140–500)
PMV BLD AUTO: 8.2 FL (ref 6–12)
PMV BLD AUTO: 8.2 FL (ref 6–12)
PO2 BLDA: NORMAL MM HG (ref 80–100)
PO2 BLDV: 22.5 MM HG (ref 35–45)
POTASSIUM BLD-SCNC: 3.9 MMOL/L (ref 3.5–5.2)
PROT SERPL-MCNC: 7.4 G/DL (ref 6–8.5)
PROT UR QL STRIP: ABNORMAL
PROTHROMBIN TIME: 16.5 SECONDS (ref 11.7–14.2)
RBC # BLD AUTO: 1.55 10*6/MM3 (ref 4.6–6)
RBC # BLD AUTO: 1.59 10*6/MM3 (ref 4.6–6)
RBC # UR: ABNORMAL /HPF
REF LAB TEST METHOD: ABNORMAL
RH BLD: POSITIVE
SAO2 % BLDCOA: 37.4 % (ref 92–99)
SAO2 % BLDCOA: NORMAL % (ref 92–99)
SET MECH RESP RATE: 24
SODIUM BLD-SCNC: 124 MMOL/L (ref 136–145)
SP GR UR STRIP: 1.01 (ref 1–1.03)
SQUAMOUS #/AREA URNS HPF: ABNORMAL /HPF
TOTAL RATE: 18 BREATHS/MINUTE
TOTAL RATE: 29 BREATHS/MINUTE
UROBILINOGEN UR QL STRIP: ABNORMAL
VT ON VENT VENT: 680 ML
WBC MORPH BLD: NORMAL
WBC MORPH BLD: NORMAL
WBC NRBC COR # BLD: 11.34 10*3/MM3 (ref 4.5–10.7)
WBC NRBC COR # BLD: 13.09 10*3/MM3 (ref 4.5–10.7)
WBC UR QL AUTO: ABNORMAL /HPF
WHOLE BLOOD HOLD SPECIMEN: NORMAL
WHOLE BLOOD HOLD SPECIMEN: NORMAL

## 2018-02-14 PROCEDURE — 85025 COMPLETE CBC W/AUTO DIFF WBC: CPT | Performed by: EMERGENCY MEDICINE

## 2018-02-14 PROCEDURE — 25010000002 VANCOMYCIN 10 G RECONSTITUTED SOLUTION: Performed by: EMERGENCY MEDICINE

## 2018-02-14 PROCEDURE — 74176 CT ABD & PELVIS W/O CONTRAST: CPT

## 2018-02-14 PROCEDURE — 81001 URINALYSIS AUTO W/SCOPE: CPT | Performed by: EMERGENCY MEDICINE

## 2018-02-14 PROCEDURE — 87040 BLOOD CULTURE FOR BACTERIA: CPT | Performed by: EMERGENCY MEDICINE

## 2018-02-14 PROCEDURE — 85610 PROTHROMBIN TIME: CPT | Performed by: EMERGENCY MEDICINE

## 2018-02-14 PROCEDURE — 82533 TOTAL CORTISOL: CPT | Performed by: INTERNAL MEDICINE

## 2018-02-14 PROCEDURE — 83605 ASSAY OF LACTIC ACID: CPT | Performed by: EMERGENCY MEDICINE

## 2018-02-14 PROCEDURE — 86901 BLOOD TYPING SEROLOGIC RH(D): CPT | Performed by: EMERGENCY MEDICINE

## 2018-02-14 PROCEDURE — 84100 ASSAY OF PHOSPHORUS: CPT | Performed by: EMERGENCY MEDICINE

## 2018-02-14 PROCEDURE — 82803 BLOOD GASES ANY COMBINATION: CPT

## 2018-02-14 PROCEDURE — 36415 COLL VENOUS BLD VENIPUNCTURE: CPT

## 2018-02-14 PROCEDURE — 93005 ELECTROCARDIOGRAM TRACING: CPT | Performed by: EMERGENCY MEDICINE

## 2018-02-14 PROCEDURE — P9016 RBC LEUKOCYTES REDUCED: HCPCS

## 2018-02-14 PROCEDURE — 83735 ASSAY OF MAGNESIUM: CPT | Performed by: EMERGENCY MEDICINE

## 2018-02-14 PROCEDURE — 82962 GLUCOSE BLOOD TEST: CPT

## 2018-02-14 PROCEDURE — 86140 C-REACTIVE PROTEIN: CPT | Performed by: EMERGENCY MEDICINE

## 2018-02-14 PROCEDURE — 87186 SC STD MICRODIL/AGAR DIL: CPT | Performed by: EMERGENCY MEDICINE

## 2018-02-14 PROCEDURE — 82330 ASSAY OF CALCIUM: CPT | Performed by: EMERGENCY MEDICINE

## 2018-02-14 PROCEDURE — 30233N1 TRANSFUSION OF NONAUTOLOGOUS RED BLOOD CELLS INTO PERIPHERAL VEIN, PERCUTANEOUS APPROACH: ICD-10-PCS | Performed by: INTERNAL MEDICINE

## 2018-02-14 PROCEDURE — 99291 CRITICAL CARE FIRST HOUR: CPT

## 2018-02-14 PROCEDURE — 85007 BL SMEAR W/DIFF WBC COUNT: CPT | Performed by: EMERGENCY MEDICINE

## 2018-02-14 PROCEDURE — 25010000003 HYDROCORTISONE SOD SUCCINATE PF 250 MG RECONSTITUTED SOLUTION: Performed by: EMERGENCY MEDICINE

## 2018-02-14 PROCEDURE — 87086 URINE CULTURE/COLONY COUNT: CPT | Performed by: EMERGENCY MEDICINE

## 2018-02-14 PROCEDURE — 85730 THROMBOPLASTIN TIME PARTIAL: CPT | Performed by: EMERGENCY MEDICINE

## 2018-02-14 PROCEDURE — 36430 TRANSFUSION BLD/BLD COMPNT: CPT

## 2018-02-14 PROCEDURE — 36600 WITHDRAWAL OF ARTERIAL BLOOD: CPT

## 2018-02-14 PROCEDURE — 80053 COMPREHEN METABOLIC PANEL: CPT | Performed by: EMERGENCY MEDICINE

## 2018-02-14 PROCEDURE — 86900 BLOOD TYPING SEROLOGIC ABO: CPT | Performed by: EMERGENCY MEDICINE

## 2018-02-14 PROCEDURE — 86923 COMPATIBILITY TEST ELECTRIC: CPT

## 2018-02-14 PROCEDURE — 25010000002 ERTAPENEM PER 500 MG: Performed by: EMERGENCY MEDICINE

## 2018-02-14 PROCEDURE — 93010 ELECTROCARDIOGRAM REPORT: CPT | Performed by: INTERNAL MEDICINE

## 2018-02-14 PROCEDURE — 71045 X-RAY EXAM CHEST 1 VIEW: CPT

## 2018-02-14 PROCEDURE — 86900 BLOOD TYPING SEROLOGIC ABO: CPT

## 2018-02-14 PROCEDURE — 87147 CULTURE TYPE IMMUNOLOGIC: CPT | Performed by: EMERGENCY MEDICINE

## 2018-02-14 PROCEDURE — 25010000002 HYDROCORTISONE SODIUM SUCCINATE 100 MG RECONSTITUTED SOLUTION: Performed by: INTERNAL MEDICINE

## 2018-02-14 PROCEDURE — 51798 US URINE CAPACITY MEASURE: CPT

## 2018-02-14 PROCEDURE — 51702 INSERT TEMP BLADDER CATH: CPT

## 2018-02-14 PROCEDURE — 86850 RBC ANTIBODY SCREEN: CPT | Performed by: EMERGENCY MEDICINE

## 2018-02-14 RX ORDER — HYDROMORPHONE HYDROCHLORIDE 4 MG/1
4 TABLET ORAL EVERY 8 HOURS PRN
Status: ON HOLD | COMMUNITY
End: 2018-10-06

## 2018-02-14 RX ORDER — ONDANSETRON HYDROCHLORIDE 8 MG/1
TABLET, FILM COATED ORAL EVERY 6 HOURS PRN
Status: ON HOLD | COMMUNITY
End: 2018-02-15

## 2018-02-14 RX ORDER — PANTOPRAZOLE SODIUM 40 MG/10ML
80 INJECTION, POWDER, LYOPHILIZED, FOR SOLUTION INTRAVENOUS ONCE
Status: COMPLETED | OUTPATIENT
Start: 2018-02-14 | End: 2018-02-14

## 2018-02-14 RX ORDER — HYDROCODONE BITARTRATE AND ACETAMINOPHEN 5; 325 MG/1; MG/1
1 TABLET ORAL EVERY 4 HOURS PRN
Status: ON HOLD | COMMUNITY
End: 2018-02-15

## 2018-02-14 RX ORDER — CETIRIZINE HYDROCHLORIDE 10 MG/1
10 TABLET ORAL DAILY
Status: ON HOLD | COMMUNITY
End: 2018-02-15

## 2018-02-14 RX ORDER — POLYETHYLENE GLYCOL 3350 17 G/17G
17 POWDER, FOR SOLUTION ORAL DAILY
Status: ON HOLD | COMMUNITY
End: 2018-02-15

## 2018-02-14 RX ORDER — SODIUM CHLORIDE 0.9 % (FLUSH) 0.9 %
10 SYRINGE (ML) INJECTION AS NEEDED
Status: DISCONTINUED | OUTPATIENT
Start: 2018-02-14 | End: 2018-02-20 | Stop reason: HOSPADM

## 2018-02-14 RX ORDER — OXYCODONE AND ACETAMINOPHEN 10; 325 MG/1; MG/1
1 TABLET ORAL EVERY 6 HOURS PRN
Status: ON HOLD | COMMUNITY
End: 2018-02-15

## 2018-02-14 RX ORDER — SODIUM CHLORIDE 9 MG/ML
125 INJECTION, SOLUTION INTRAVENOUS CONTINUOUS
Status: DISCONTINUED | OUTPATIENT
Start: 2018-02-14 | End: 2018-02-20

## 2018-02-14 RX ORDER — FERROUS SULFATE 325(65) MG
325 TABLET ORAL
Status: ON HOLD | COMMUNITY
End: 2018-10-06

## 2018-02-14 RX ORDER — OXYCODONE AND ACETAMINOPHEN 7.5; 325 MG/1; MG/1
1 TABLET ORAL 2 TIMES DAILY
Status: ON HOLD | COMMUNITY
End: 2018-02-15

## 2018-02-14 RX ADMIN — SODIUM CHLORIDE 1000 ML: 9 INJECTION, SOLUTION INTRAVENOUS at 15:55

## 2018-02-14 RX ADMIN — HYDROCORTISONE SODIUM SUCCINATE 100 MG: 250 INJECTION, POWDER, FOR SOLUTION INTRAMUSCULAR; INTRAVENOUS at 17:05

## 2018-02-14 RX ADMIN — SODIUM CHLORIDE 125 ML/HR: 9 INJECTION, SOLUTION INTRAVENOUS at 20:15

## 2018-02-14 RX ADMIN — Medication 0.05 MCG/KG/MIN: at 20:17

## 2018-02-14 RX ADMIN — HYDROCORTISONE SODIUM SUCCINATE 100 MG: 100 INJECTION, POWDER, FOR SOLUTION INTRAMUSCULAR; INTRAVENOUS at 20:23

## 2018-02-14 RX ADMIN — SODIUM CHLORIDE 1 G: 900 INJECTION INTRAVENOUS at 15:55

## 2018-02-14 RX ADMIN — VANCOMYCIN HYDROCHLORIDE 750 MG: 100 INJECTION, POWDER, LYOPHILIZED, FOR SOLUTION INTRAVENOUS at 17:16

## 2018-02-14 RX ADMIN — SODIUM CHLORIDE 250 ML: 0.9 INJECTION, SOLUTION INTRAVENOUS at 17:48

## 2018-02-14 RX ADMIN — PANTOPRAZOLE SODIUM 80 MG: 40 INJECTION, POWDER, FOR SOLUTION INTRAVENOUS at 20:24

## 2018-02-14 RX ADMIN — SODIUM CHLORIDE 1224 ML: 9 INJECTION, SOLUTION INTRAVENOUS at 16:57

## 2018-02-14 NOTE — ED NOTES
Complete bed change performed.  Patient had wound drainage and stool on pillows from home.  Bagged pillows in trash bags.  Wiped intact skin with hygiene wipes.       Marissa Phillip RN  02/14/18 0533

## 2018-02-14 NOTE — ED NOTES
Notified nursing team.  Report to ALYCE Perla RN.  All Care explained to pt.      Veronica Gutierrez RN  02/14/18 1384

## 2018-02-14 NOTE — ED NOTES
Spoke with wound care nurse and notified of need for specialty bed.       Marissa Phillip, RN  02/14/18 6758
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
## 2018-02-14 NOTE — ED NOTES
Charge RN notified of severe sepsis screen positive. IV RN phoned for second IV access site.     Minda Del Valle RN  02/14/18 4490

## 2018-02-14 NOTE — ED NOTES
Wound pictures taken by charge JAMAL Harrison. Temporary dressings applied by MD/ charge as follows:   ( sterile normal saline, 4x4 gauze and kerlex per wound RN request.)     Minda Del Valle RN  02/14/18 2059
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
## 2018-02-14 NOTE — ED NOTES
Dr. Austin at bedside while dressing where removed.  Please see ER MD note for description of wounds.  Hemoccult performed at bedside from colostomy.  Wound pictures were taken by charge nurse Vivian Harrison RN.     Marissa Phillip RN  02/14/18 3752
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
## 2018-02-14 NOTE — NURSING NOTE
CWEMILYN ordered a Clinitron Bed from patient from [facility] to be delivered to ER. Will follow up with patient.  Confirmation number [id number]

## 2018-02-14 NOTE — ED NOTES
"Wound RN phoned for assistance with pt's multiple infected wounds. Wound RN states \" Yeah he is very non compliant. Just use sterile NS and gauze. That's all you really need.\"     Minda Del Valle RN  02/14/18 1812    "

## 2018-02-14 NOTE — ED NOTES
Changed bedside drainage bag to suprapubic catheter due to foul smell.  Attempted to irrigate catheter and was unable to instill saline through suprapubic catheter.  Notified Dr. Austin.  Lumen of catheter was cracked but re-inforced with tape until urology is consulted.       Marissa Phillip, JAMAL  02/14/18 9009

## 2018-02-15 PROBLEM — J18.9 PNEUMONIA: Status: ACTIVE | Noted: 2018-02-15

## 2018-02-15 PROBLEM — M86.9 OSTEOMYELITIS (HCC): Status: ACTIVE | Noted: 2018-02-15

## 2018-02-15 PROBLEM — E27.40 CHRONIC ADRENAL INSUFFICIENCY (HCC): Status: ACTIVE | Noted: 2018-02-15

## 2018-02-15 LAB
ABO + RH BLD: NORMAL
ABO + RH BLD: NORMAL
BACTERIA SPEC AEROBE CULT: NORMAL
BACTERIA SPEC AEROBE CULT: NORMAL
BH BB BLOOD EXPIRATION DATE: NORMAL
BH BB BLOOD EXPIRATION DATE: NORMAL
BH BB BLOOD TYPE BARCODE: 8400
BH BB BLOOD TYPE BARCODE: 8400
BH BB DISPENSE STATUS: NORMAL
BH BB DISPENSE STATUS: NORMAL
BH BB PRODUCT CODE: NORMAL
BH BB PRODUCT CODE: NORMAL
BH BB UNIT NUMBER: NORMAL
BH BB UNIT NUMBER: NORMAL
CREAT BLD-MCNC: 0.84 MG/DL (ref 0.76–1.27)
GFR SERPL CREATININE-BSD FRML MDRD: 125 ML/MIN/1.73
GLUCOSE BLDC GLUCOMTR-MCNC: 135 MG/DL (ref 70–130)
GLUCOSE BLDC GLUCOMTR-MCNC: 142 MG/DL (ref 70–130)
GLUCOSE BLDC GLUCOMTR-MCNC: 142 MG/DL (ref 70–130)
HCT VFR BLD AUTO: 18.1 % (ref 40.4–52.2)
HCT VFR BLD AUTO: 30.3 % (ref 40.4–52.2)
HGB BLD-MCNC: 5.5 G/DL (ref 13.7–17.6)
HGB BLD-MCNC: 9.5 G/DL (ref 13.7–17.6)
PROCALCITONIN SERPL-MCNC: 0.45 NG/ML (ref 0.1–0.25)
UNIT  ABO: NORMAL
UNIT  ABO: NORMAL
UNIT  RH: NORMAL
UNIT  RH: NORMAL
VANCOMYCIN SERPL-MCNC: 9.5 MCG/ML (ref 5–40)

## 2018-02-15 PROCEDURE — 25010000002 VANCOMYCIN PER 500 MG: Performed by: INTERNAL MEDICINE

## 2018-02-15 PROCEDURE — 87186 SC STD MICRODIL/AGAR DIL: CPT | Performed by: INTERNAL MEDICINE

## 2018-02-15 PROCEDURE — 87070 CULTURE OTHR SPECIMN AEROBIC: CPT | Performed by: INTERNAL MEDICINE

## 2018-02-15 PROCEDURE — 86900 BLOOD TYPING SEROLOGIC ABO: CPT

## 2018-02-15 PROCEDURE — 99254 IP/OBS CNSLTJ NEW/EST MOD 60: CPT | Performed by: INTERNAL MEDICINE

## 2018-02-15 PROCEDURE — 87205 SMEAR GRAM STAIN: CPT | Performed by: INTERNAL MEDICINE

## 2018-02-15 PROCEDURE — 36430 TRANSFUSION BLD/BLD COMPNT: CPT

## 2018-02-15 PROCEDURE — 25010000002 ERTAPENEM: Performed by: INTERNAL MEDICINE

## 2018-02-15 PROCEDURE — 80202 ASSAY OF VANCOMYCIN: CPT | Performed by: INTERNAL MEDICINE

## 2018-02-15 PROCEDURE — 99223 1ST HOSP IP/OBS HIGH 75: CPT | Performed by: INTERNAL MEDICINE

## 2018-02-15 PROCEDURE — 85018 HEMOGLOBIN: CPT | Performed by: INTERNAL MEDICINE

## 2018-02-15 PROCEDURE — P9016 RBC LEUKOCYTES REDUCED: HCPCS

## 2018-02-15 PROCEDURE — 85014 HEMATOCRIT: CPT | Performed by: INTERNAL MEDICINE

## 2018-02-15 PROCEDURE — 84145 PROCALCITONIN (PCT): CPT | Performed by: INTERNAL MEDICINE

## 2018-02-15 PROCEDURE — 25010000002 HYDROCORTISONE SODIUM SUCCINATE 100 MG RECONSTITUTED SOLUTION: Performed by: INTERNAL MEDICINE

## 2018-02-15 PROCEDURE — 82962 GLUCOSE BLOOD TEST: CPT

## 2018-02-15 PROCEDURE — 82565 ASSAY OF CREATININE: CPT | Performed by: INTERNAL MEDICINE

## 2018-02-15 PROCEDURE — 25010000002 VANCOMYCIN: Performed by: INTERNAL MEDICINE

## 2018-02-15 RX ORDER — ACETAMINOPHEN 325 MG/1
650 TABLET ORAL EVERY 6 HOURS PRN
Status: DISCONTINUED | OUTPATIENT
Start: 2018-02-15 | End: 2018-02-20 | Stop reason: HOSPADM

## 2018-02-15 RX ORDER — MORPHINE SULFATE 15 MG/1
15 TABLET, FILM COATED, EXTENDED RELEASE ORAL EVERY 12 HOURS SCHEDULED
Status: DISCONTINUED | OUTPATIENT
Start: 2018-02-15 | End: 2018-02-20 | Stop reason: HOSPADM

## 2018-02-15 RX ORDER — HYDROCODONE BITARTRATE AND ACETAMINOPHEN 10; 325 MG/1; MG/1
1 TABLET ORAL EVERY 4 HOURS PRN
Status: DISCONTINUED | OUTPATIENT
Start: 2018-02-15 | End: 2018-02-15 | Stop reason: ALTCHOICE

## 2018-02-15 RX ORDER — OXYCODONE HYDROCHLORIDE 5 MG/1
15 TABLET ORAL EVERY 4 HOURS PRN
Status: DISCONTINUED | OUTPATIENT
Start: 2018-02-15 | End: 2018-02-20 | Stop reason: HOSPADM

## 2018-02-15 RX ADMIN — HYDROCORTISONE SODIUM SUCCINATE 100 MG: 100 INJECTION, POWDER, FOR SOLUTION INTRAMUSCULAR; INTRAVENOUS at 13:39

## 2018-02-15 RX ADMIN — MORPHINE SULFATE 15 MG: 15 TABLET, EXTENDED RELEASE ORAL at 20:34

## 2018-02-15 RX ADMIN — OXYCODONE HYDROCHLORIDE 15 MG: 5 TABLET ORAL at 15:20

## 2018-02-15 RX ADMIN — HYDROCORTISONE SODIUM SUCCINATE 100 MG: 100 INJECTION, POWDER, FOR SOLUTION INTRAMUSCULAR; INTRAVENOUS at 02:00

## 2018-02-15 RX ADMIN — MORPHINE SULFATE 15 MG: 15 TABLET, EXTENDED RELEASE ORAL at 15:20

## 2018-02-15 RX ADMIN — HYDROCORTISONE SODIUM SUCCINATE 100 MG: 100 INJECTION, POWDER, FOR SOLUTION INTRAMUSCULAR; INTRAVENOUS at 07:30

## 2018-02-15 RX ADMIN — VANCOMYCIN HYDROCHLORIDE 500 MG: 5 INJECTION, POWDER, LYOPHILIZED, FOR SOLUTION INTRAVENOUS at 20:34

## 2018-02-15 RX ADMIN — HYDROCORTISONE SODIUM SUCCINATE 100 MG: 100 INJECTION, POWDER, FOR SOLUTION INTRAMUSCULAR; INTRAVENOUS at 20:34

## 2018-02-15 RX ADMIN — OXYCODONE HYDROCHLORIDE 15 MG: 5 TABLET ORAL at 20:34

## 2018-02-15 RX ADMIN — HYDROCODONE BITARTRATE AND ACETAMINOPHEN 1 TABLET: 10; 325 TABLET ORAL at 10:35

## 2018-02-15 RX ADMIN — SODIUM CHLORIDE 1 G: 900 INJECTION, SOLUTION INTRAVENOUS at 19:00

## 2018-02-15 RX ADMIN — VANCOMYCIN HYDROCHLORIDE 500 MG: 500 INJECTION, POWDER, LYOPHILIZED, FOR SOLUTION INTRAVENOUS at 10:35

## 2018-02-15 NOTE — NURSING NOTE
02/15/18 1600   Pressure Ulcer 02/14/18 1809 Right posterior other (see comments) Stage III   Date first assessed/Time first assessed: 02/14/18 1809   Present On Admission (Pressure Ulcer): yes;picture taken  Side: Right  Orientation: posterior  Location: (c) other (see comments)  Stage: Stage III   Dressing Appearance moist drainage   Pressure Ulcer Appearance reddened;tendon;clean;moist;pink   Periwound Area pink   Length (Pressure Ulcer) (cm) 14   Width (Pressure Ulcer) (cm) 4   Depth (Pressure Ulcer) (cm) 0.3   Pressure Ulcer Wound Care cleansed with;sterile normal saline   Dressing Dressing changed;gauze, wet-to-dry   Picture taken On admission   Pressure Ulcer 02/14/18 1800 Right lateral knee Stage IV   Date first assessed/Time first assessed: 02/14/18 1800   Present On Admission (Pressure Ulcer): yes;picture taken  Side: Right  Orientation: lateral  Location: knee  Stage: Stage IV   Dressing Appearance dried drainage;moist drainage   Pressure Ulcer Appearance black eschar;bleeding;bone;reddened;tendon;moist   Periwound Area intact;dry   Length (Pressure Ulcer) (cm) 11.5   Width (Pressure Ulcer) (cm) 9   Depth (Pressure Ulcer) (cm) 0.3   Pressure Ulcer Wound Care cleansed with;sterile normal saline   Dressing Dressing changed;gauze, wet-to-dry;hydrofiber;silver impregnated dressing   Picture taken On admission   Pressure Ulcer 02/14/18 1800 Left medial knee Stage IV   Date first assessed/Time first assessed: 02/14/18 1800   Present On Admission (Pressure Ulcer): yes;picture taken  Side: Left  Orientation: medial  Location: knee  Stage: Stage IV   Dressing Appearance moist drainage   Pressure Ulcer Appearance bone;reddened;moist   Periwound Area moist;pink   Length (Pressure Ulcer) (cm) 12   Width (Pressure Ulcer) (cm) 5.5   Depth (Pressure Ulcer) (cm) 3   Pressure Ulcer Wound Care cleansed with;sterile normal saline   Dressing Dressing changed;gauze, wet-to-dry;hydrofiber;silver impregnated dressing   Picture  taken On admission   Pressure Ulcer 02/14/18 1800 Right anterior  Stage II   Date first assessed/Time first assessed: 02/14/18 1800   Present On Admission (Pressure Ulcer): yes;picture taken  Side: Right  Orientation: anterior  Location: (c)   Stage: Stage II   Dressing Appearance moist drainage   Pressure Ulcer Appearance reddened;slough;yellow   Periwound Area moist   Length (Pressure Ulcer) (cm) 2   Width (Pressure Ulcer) (cm) 3   Depth (Pressure Ulcer) (cm) 0.1   Pressure Ulcer Wound Care cleansed with;sterile normal saline   Dressing Dressing changed;gauze;hydrofiber;silver impregnated dressing   Picture taken On admission   Pressure Ulcer 02/14/18 1800 Right anterior  Stage II   Date first assessed/Time first assessed: 02/14/18 1800   Present On Admission (Pressure Ulcer): yes;picture taken  Side: Right  Orientation: anterior  Location: (c)   Stage: Stage II   Pressure Ulcer Appearance clean;moist;pink   Periwound Area moist;pink   Picture taken On admission   Pressure Ulcer 07/26/16 1600 other (see comments) transverse ischial tuberosity Stage IV   Date first assessed/Time first assessed: 07/26/16 1600   Present On Admission (Pressure Ulcer): yes;picture taken  Side: (c) other (see comments)  Orientation: (c) transverse  Location: (c) ischial tuberosity  Stage: Stage IV  Additional Comments: CHR...   Dressing Appearance moist drainage   Pressure Ulcer Appearance bone;epithelialization;reddened;white;moist;pink  (acetabulum more protruding than last admission)   Periwound Area contracted   Length (Pressure Ulcer) (cm) 30   Width (Pressure Ulcer) (cm) 18   Depth (Pressure Ulcer) (cm) 2  (greatest depth at right trochanter)   Pressure Ulcer Wound Care cleansed with;sterile normal saline   Dressing Dressing changed;gauze, wet-to-dry;silver impregnated dressing   Picture taken On admission   Pressure Ulcer 02/14/18 1800 Right posterior knee unstageable   Date first assessed/Time first assessed: 02/14/18 1800    Present On Admission (Pressure Ulcer): yes  Side: Right  Orientation: posterior  Location: knee  Stage: unstageable   Dressing Appearance intact   Pressure Ulcer Appearance black eschar;reddened;white;moist   Periwound Area pink   Length (Pressure Ulcer) (cm) 7.5   Width (Pressure Ulcer) (cm) 3   Pressure Ulcer Wound Care cleansed with;sterile normal saline   Dressing Dressing changed;gauze, wet-to-dry   Pressure Ulcer 02/14/18 1800 Left posterior knee unstageable   Date first assessed/Time first assessed: 02/14/18 1800   Present On Admission (Pressure Ulcer): yes  Side: Left  Orientation: posterior  Location: knee  Stage: unstageable   Dressing Appearance intact   Pressure Ulcer Appearance black eschar;dry;white   Periwound Area dry   Length (Pressure Ulcer) (cm) 9   Width (Pressure Ulcer) (cm) 2   Pressure Ulcer Wound Care cleansed with;sterile normal saline   Dressing Dressing changed;gauze, wet-to-dry;hydrogel;silver impregnated dressing   Pressure Ulcer 02/14/18 1800 Left heel Stage IV   Date first assessed/Time first assessed: 02/14/18 1800   Present On Admission (Pressure Ulcer): yes;picture taken  Side: Left  Location: heel  Stage: Stage IV   Dressing Appearance intact   Pressure Ulcer Appearance bone;moist;pink   Periwound Area intact;pink   Length (Pressure Ulcer) (cm) 5.5   Width (Pressure Ulcer) (cm) 4   Depth (Pressure Ulcer) (cm) 0.2   Pressure Ulcer Wound Care cleansed with;sterile normal saline   Dressing Dressing changed;gauze, wet-to-dry;hydrofiber;silver impregnated dressing   Picture taken On admission   Pressure Ulcer 02/14/18 1800 Right heel Stage IV   Date first assessed/Time first assessed: 02/14/18 1800   Present On Admission (Pressure Ulcer): yes  Side: Right  Location: heel  Stage: Stage IV   Dressing Appearance intact   Pressure Ulcer Appearance bone;reddened   Periwound Area pink   Length (Pressure Ulcer) (cm) 3   Width (Pressure Ulcer) (cm) 3   Depth (Pressure Ulcer) (cm) 0.1   Pressure  Ulcer Wound Care cleansed with;sterile normal saline   Dressing Dressing changed;gauze, wet-to-dry;hydrofiber;silver impregnated dressing     CWOCN assessed and changed dressings with Dr Bowman. Wound care dressing changes updated per Dr Cole's noted. Culture taken of left iliac crest/coccyx area and assessed by Dr Cole. Please refer to his note.

## 2018-02-15 NOTE — NURSING NOTE
Patient c/o not being able to eat this morning to family.  Explained he was NPO incase further tests were needed to determine source of his low blood count.  Patient has since been started on a diet and his pain has been addressed as well.  Patient has also c/o his lunch and he was brought up a burger and fries as requested, but the food got too cold and patient wanted it reheated, but explained he is in isolation and we cannot heat the meal up.  He was told he has a regular diet and his family can get him something else if they choose.
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
## 2018-02-15 NOTE — PLAN OF CARE
Problem: Patient Care Overview (Adult)  Goal: Plan of Care Review  Outcome: Ongoing (interventions implemented as appropriate)      Problem: Fall Risk (Adult)  Goal: Identify Related Risk Factors and Signs and Symptoms  Outcome: Ongoing (interventions implemented as appropriate)      Problem: Mobility, Physical Impaired (Adult)  Goal: Identify Related Risk Factors and Signs and Symptoms  Outcome: Ongoing (interventions implemented as appropriate)      Problem: Pain, Chronic (Adult)  Goal: Identify Related Risk Factors and Signs and Symptoms  Outcome: Ongoing (interventions implemented as appropriate)      Problem: Pressure Ulcer (Adult)  Goal: Signs and Symptoms of Listed Potential Problems Will be Absent or Manageable (Pressure Ulcer)  Outcome: Ongoing (interventions implemented as appropriate)

## 2018-02-15 NOTE — NURSING NOTE
Suprapubic brambila catheter removed and replaced per RN using sterile technique per MD order. #18 FR brambila inserted without difficulty.  Urine returned and site is still leaking as it was previously.  JAMAL Grubbs with Wound Care present at time of insertion and noted in the past site has leaked before and she will bring a seal/device used by patient in the past to aid in prohibiting leakage.
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
## 2018-02-15 NOTE — CONSULTS
"Referring Provider: Stew Conroy MD  [id number] [address]    Reason for Consultation: sepsis due to infected sacral ulcer    History of present illness:  Mr Sherman is a 38 YO who I am asked to evaluate and give opinion for sepsis due to infected sacral ulcer. History is obtained from the patient, RN, and review of the old medical records which I summarize/synthesize as follows: I know Mr Sherman from prior hospital stays. He says about 2 days ago he started 'feeling worse' with fatigue, cough, and sweats. He says his wounds have been draining more than usual. His sister and cousin have been trying to care for the wounds. He says, however, that when he presses on the wounds now that pus comes out. He came to the ER and was not febrile though a bit hypothermic. He has very low BP at baseline but was lower than usual. He was admitted to the ICU and started on vancomycin and ertapenem. He says he feels less fatigued now. He has required intermittent pressors. He also has worsening lesions on his knee and ankle. He says his suprapubic catheter has not been changed in about 30 days.     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 Vancomycin - \"increased creatinine\"  2. Meropenem - kidney injury and \"iit makes me hallucinate\" - tolerates ertapenem so I doubt true reaction  3. Amox-clav - unsure of reaction    **I have now removed vanco and meropenem from allergy list**      Medications:    Current Facility-Administered Medications:   •  ertapenem (INVanz) 1 g/100 mL 0.9% NS VTB (mbp), 1 g, " Intravenous, Q24H, Stew Conroy MD  •  HYDROcodone-acetaminophen (NORCO)  MG per tablet 1 tablet, 1 tablet, Oral, Q4H PRN, Stew Conroy MD  •  hydrocortisone sodium succinate (Solu-CORTEF) injection 100 mg, 100 mg, Intravenous, Q6H, Stew Conroy MD, 100 mg at 02/15/18 0730  •  norepinephrine (LEVOPHED) 8 mg/250 mL (32 mcg/mL) in sodium chloride 0.9% infusion (premix), 0.02-0.3 mcg/kg/min, Intravenous, Titrated, Stew Conroy MD, Last Rate: 6.89 mL/hr at 02/15/18 0203, 0.09 mcg/kg/min at 02/15/18 0203  •  [COMPLETED] pantoprazole (PROTONIX) injection 80 mg, 80 mg, Intravenous, Once, 80 mg at 02/14/18 2024 **AND** pantoprazole (PROTONIX) 40 mg/100 mL (0.4 mg/mL) in 0.9% NS IVPB, 8 mg/hr, Intravenous, Continuous, Stew Conroy MD  •  Pharmacy to dose vancomycin, , Does not apply, Continuous PRN, Stew Conroy MD  •  sodium chloride 0.9 % flush 10 mL, 10 mL, Intravenous, PRN, Azalia Austin MD  •  sodium chloride 0.9 % infusion, 125 mL/hr, Intravenous, Continuous, Stew Conroy MD, Last Rate: 125 mL/hr at 02/14/18 2015, 125 mL/hr at 02/14/18 2015  •  vancomycin 500 mg/100 mL 0.9% NS IVPB ([facility]), 15 mg/kg, Intravenous, Once, David Arriaga MD      Review of Systems  All systems were reviewed and are negative unless otherwise stated above in the HPI    Objective   Vital Signs   Temp:  [96 °F (35.6 °C)-98.3 °F (36.8 °C)] 97.3 °F (36.3 °C)  Heart Rate:  [] 98  Resp:  [16-24] 24  BP: ()/(41-76) 90/65    Physical Exam:   General: awake, chronically ill-appearing  Head: Normocephalic, atraumatic. No longer has his long dredlocks  Eyes: PERRL, EOMI, no scleral icterus, + conjunctival pallor  ENT: MM dry, OP clear, no thrush. Gold dentition.   Neck: Supple, trachea is midline  Cardiovascular: NR, RR, no murmurs; no LE edema  Respiratory: Lungs are clear to ascultation bilaterally, no rales or wheezing; normal work of breathing on ambient air; some cough during exam   GI: Abdomen is thin, soft, non-tender, non-distended,  normal bowel sounds in all four quadrants; ostomy bag distended w/ gas  : condom and suprapubic catheters present  Musculoskeletal: large L hip and sacral ulcer are worse; they are draining; left knee and ankle sores are also noted  Skin: no rashes, see MSK above  Neurological: Alert and oriented x 3,  motor strength 4/5 in upper extremities; 0/5 lower  Psychiatric: Normal mood and affect   Lymph: no pre-auricular, post-auricular, submandibular, cervical, supraclavicular LAD  Vasc: no cyanosis; R chest port w/o erythema       Labs:     Lab Results   Component Value Date    WBC 13.09 (H) 02/14/2018    HGB 5.5 (C) 02/15/2018    HCT 18.1 (C) 02/15/2018    MCV 81.9 02/14/2018     02/14/2018       Lab Results   Component Value Date    GLUCOSE 94 02/14/2018    BUN 27 (H) 02/14/2018    CREATININE 0.84 02/15/2018    EGFRIFAFRI 125 02/15/2018    BCR 23.9 02/14/2018    CO2 23.1 02/14/2018    CALCIUM 8.5 (L) 02/14/2018    ALBUMIN 2.00 (L) 02/14/2018    LABIL2 0.4 02/14/2018    AST 6 02/14/2018    ALT <5 02/14/2018     Lab Results   Component Value Date    VANCORANDOM 9.50 02/15/2018     Lab Results   Component Value Date    CRP 20.80 (H) 02/14/2018       Procal 0.45    Microbiology:  2/14 BCx: NGTD  2/14 UCx: 100k mixed    Radiology (personally reviewed images/report):  CT with gas foci along the R side of the spinal canal near the sacrum; probable chronic osteomyelitis; R hydronephrosis and hydrourteter    Assessment/Plan   1. Septic shock secondary to worsening stage 4 decubitus ulcers secondary to paraplegia  -will ask plastic surgery to see in consideration of bedside or OR debridement  -this is a difficult situation with very poor long term options  -continue empiric vancomycin  -continue empiric ertapenem 1 g IV q 24h given history of ESBL infections  -repeat CBC, CMP, CRP in AM; obtain wound culture from sacrum    2. Multiple drug allergies  -vanco and meropenem listed were not true allergies    3. R  hydrourteter and R hydronephrosis  -probably due to issues w/ suprapubic catheter  -urology has been consulted  -he says tube last changed 30 days ago    I'll see what plastic surgery thinks before involving neurosurgery based on scan results.    Thank you for this consult. ID will follow. I discussed the patients findings and my recommendations with JAMAL Estrada and Dr Arriaga.
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
## 2018-02-15 NOTE — NURSING NOTE
Pt alert to name , place, situation , unable to flush supra pubic cath , no urine out of F/C after 1 uprbc and 1 l NS, bladder scan shows 0 x 3 , air escapes around supra pubic cath and leaks urine in bed , linens changed, colostomy bag that appears old changed, bath given, pt smells strongly of urine and body odor, large amounts of dry skin on cloths, pts skin filthy especially in skin folds where contracted .

## 2018-02-15 NOTE — PROGRESS NOTES
"Pharmacokinetic Evaluation - Vancomycin    Joaquín Sherman is a 37 y.o. male on vancomycin pharmacy to dose.  MRN: [id number]  : 1980    Day of vancomycin therapy: 2 (through 3/28 per ID)  Indication: sepsis/possible osteomyelitis  Consulted by: Dr. Conroy. Dr Miller consulted   Goal trough: 15-20 mcg/ml    Current dose: tbd  Other antimicrobials: ertapenem    Blood pressure 106/81, pulse 107, temperature 97.3 °F (36.3 °C), temperature source Oral, resp. rate 20, height 185 cm (72.84\"), weight 36.7 kg (80 lb 14.4 oz), SpO2 93 %.    Results from last 7 days  Lab Units 02/15/18  0159 18  1550   CREATININE mg/dL 0.84 1.13     Estimated Creatinine Clearance: 62.5 mL/min (by C-G formula based on Cr of 0.84).    Results from last 7 days  Lab Units 02/15/18  1205 02/15/18  0159 18  1641 18  1550   WBC 10*3/mm3  --   --  13.09* 11.34*   HEMOGLOBIN g/dL 9.5* 5.5* 3.3* 3.4*   HEMATOCRIT % 30.3* 18.1* 12.7* 13.0*   PLATELETS 10*3/mm3  --   --  280 253       Procal: 2/15  0.45  Other relevant labs/chart info:  crp 20.8, LA 0.6      Cultures:    bc ng<24h (2 sets)   uc >100k mixed  2/15  Sacral wound-pend    Dosing hx (include troughs if drawn):   750 mg at 1716  2/15  0159 random=9.5 mcg/ml, 500 mg 1035. 500 q12: 2100.    Assessment:   Admitted with fatigue, cough and sweats, positive for sepsis screen and abx were initiated. Has chronic sacral ulcer that is infected. ID is familiar from previous admissions and plans to consult plastic surgery about debridement. Pt has history of esbl infections.  Received 750 mg loading dose last night (20 mg/kg). Random level drawn early at 0200=0.5 mcg/ml and was redosed with 500 mg this am.     Plan:  1) Continue vancomycin 500 mg every 12 hours.  2) Next trough on  at 0830 after 3 total doses.  3) Monitor for decreased UOP or other signs of vancomycin intolerance.    Thanks for this consult, will follow until Lazarus barney Pharm.D, " [facility]  2/15/2018
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
## 2018-02-15 NOTE — PLAN OF CARE
Problem: Nutrition, Imbalanced: Inadequate Oral Intake (Adult)  Intervention: Promote/Optimize Nutrition   02/15/18 1425   Nutrition Interventions   Oral Nutrition Promotion nutritional therapy counseling provided;calorie dense foods provided;calorie dense liquids provided. Calorie count in progress.       Goal: Identify Related Risk Factors and Signs and Symptoms  Outcome: Outcome(s) achieved Date Met: 02/15/18    Goal: Improved Oral Intake  Outcome: Ongoing (interventions implemented as appropriate)    Goal: Prevent Further Weight Loss  Outcome: Ongoing (interventions implemented as appropriate)

## 2018-02-16 LAB
ABO + RH BLD: NORMAL
ABO + RH BLD: NORMAL
ANION GAP SERPL CALCULATED.3IONS-SCNC: 10.5 MMOL/L
BH BB BLOOD EXPIRATION DATE: NORMAL
BH BB BLOOD EXPIRATION DATE: NORMAL
BH BB BLOOD TYPE BARCODE: 6200
BH BB BLOOD TYPE BARCODE: 6200
BH BB DISPENSE STATUS: NORMAL
BH BB DISPENSE STATUS: NORMAL
BH BB PRODUCT CODE: NORMAL
BH BB PRODUCT CODE: NORMAL
BH BB UNIT NUMBER: NORMAL
BH BB UNIT NUMBER: NORMAL
BUN BLD-MCNC: 20 MG/DL (ref 6–20)
BUN/CREAT SERPL: 22.7 (ref 7–25)
CALCIUM SPEC-SCNC: 6.8 MG/DL (ref 8.6–10.5)
CHLORIDE SERPL-SCNC: 106 MMOL/L (ref 98–107)
CO2 SERPL-SCNC: 19.5 MMOL/L (ref 22–29)
CREAT BLD-MCNC: 0.88 MG/DL (ref 0.76–1.27)
CRP SERPL-MCNC: 14.57 MG/DL (ref 0–0.5)
DEPRECATED RDW RBC AUTO: 52.5 FL (ref 37–54)
ERYTHROCYTE [DISTWIDTH] IN BLOOD BY AUTOMATED COUNT: 17.1 % (ref 11.5–14.5)
GFR SERPL CREATININE-BSD FRML MDRD: 118 ML/MIN/1.73
GLUCOSE BLD-MCNC: 119 MG/DL (ref 65–99)
HCT VFR BLD AUTO: 25.4 % (ref 40.4–52.2)
HCT VFR BLD AUTO: 25.4 % (ref 40.4–52.2)
HCT VFR BLD AUTO: 26.6 % (ref 40.4–52.2)
HCT VFR BLD AUTO: 27.4 % (ref 40.4–52.2)
HGB BLD-MCNC: 7.8 G/DL (ref 13.7–17.6)
HGB BLD-MCNC: 7.8 G/DL (ref 13.7–17.6)
HGB BLD-MCNC: 8 G/DL (ref 13.7–17.6)
HGB BLD-MCNC: 8.2 G/DL (ref 13.7–17.6)
MCH RBC QN AUTO: 25.8 PG (ref 27–32.7)
MCHC RBC AUTO-ENTMCNC: 30.7 G/DL (ref 32.6–36.4)
MCV RBC AUTO: 84.1 FL (ref 79.8–96.2)
PLATELET # BLD AUTO: 191 10*3/MM3 (ref 140–500)
PMV BLD AUTO: 8.5 FL (ref 6–12)
POTASSIUM BLD-SCNC: 3.2 MMOL/L (ref 3.5–5.2)
RBC # BLD AUTO: 3.02 10*6/MM3 (ref 4.6–6)
SODIUM BLD-SCNC: 136 MMOL/L (ref 136–145)
UNIT  ABO: NORMAL
UNIT  ABO: NORMAL
UNIT  RH: NORMAL
UNIT  RH: NORMAL
VANCOMYCIN TROUGH SERPL-MCNC: 20.4 MCG/ML (ref 5–20)
WBC NRBC COR # BLD: 12.73 10*3/MM3 (ref 4.5–10.7)

## 2018-02-16 PROCEDURE — 99232 SBSQ HOSP IP/OBS MODERATE 35: CPT | Performed by: INTERNAL MEDICINE

## 2018-02-16 PROCEDURE — 85027 COMPLETE CBC AUTOMATED: CPT | Performed by: INTERNAL MEDICINE

## 2018-02-16 PROCEDURE — 85018 HEMOGLOBIN: CPT | Performed by: INTERNAL MEDICINE

## 2018-02-16 PROCEDURE — 25010000002 HYDROCORTISONE SODIUM SUCCINATE 100 MG RECONSTITUTED SOLUTION: Performed by: INTERNAL MEDICINE

## 2018-02-16 PROCEDURE — 80048 BASIC METABOLIC PNL TOTAL CA: CPT | Performed by: INTERNAL MEDICINE

## 2018-02-16 PROCEDURE — 85014 HEMATOCRIT: CPT | Performed by: INTERNAL MEDICINE

## 2018-02-16 PROCEDURE — 80202 ASSAY OF VANCOMYCIN: CPT | Performed by: INTERNAL MEDICINE

## 2018-02-16 PROCEDURE — 25010000002 VANCOMYCIN PER 500 MG: Performed by: INTERNAL MEDICINE

## 2018-02-16 PROCEDURE — 25010000002 ERTAPENEM PER 500 MG: Performed by: INTERNAL MEDICINE

## 2018-02-16 PROCEDURE — 86140 C-REACTIVE PROTEIN: CPT | Performed by: INTERNAL MEDICINE

## 2018-02-16 RX ORDER — PANTOPRAZOLE SODIUM 40 MG/1
40 TABLET, DELAYED RELEASE ORAL
Status: DISCONTINUED | OUTPATIENT
Start: 2018-02-16 | End: 2018-02-20 | Stop reason: HOSPADM

## 2018-02-16 RX ORDER — POTASSIUM CHLORIDE 750 MG/1
40 CAPSULE, EXTENDED RELEASE ORAL AS NEEDED
Status: DISCONTINUED | OUTPATIENT
Start: 2018-02-16 | End: 2018-02-20 | Stop reason: HOSPADM

## 2018-02-16 RX ADMIN — POTASSIUM CHLORIDE 40 MEQ: 750 CAPSULE, EXTENDED RELEASE ORAL at 06:49

## 2018-02-16 RX ADMIN — VANCOMYCIN HYDROCHLORIDE 500 MG: 5 INJECTION, POWDER, LYOPHILIZED, FOR SOLUTION INTRAVENOUS at 08:44

## 2018-02-16 RX ADMIN — MORPHINE SULFATE 15 MG: 15 TABLET, EXTENDED RELEASE ORAL at 21:37

## 2018-02-16 RX ADMIN — HYDROCORTISONE SODIUM SUCCINATE 100 MG: 100 INJECTION, POWDER, FOR SOLUTION INTRAMUSCULAR; INTRAVENOUS at 16:59

## 2018-02-16 RX ADMIN — PANTOPRAZOLE SODIUM 40 MG: 40 TABLET, DELAYED RELEASE ORAL at 16:59

## 2018-02-16 RX ADMIN — SODIUM CHLORIDE 1 G: 900 INJECTION, SOLUTION INTRAVENOUS at 16:59

## 2018-02-16 RX ADMIN — SODIUM CHLORIDE 8 MG/HR: 900 INJECTION INTRAVENOUS at 02:03

## 2018-02-16 RX ADMIN — HYDROCORTISONE SODIUM SUCCINATE 100 MG: 100 INJECTION, POWDER, FOR SOLUTION INTRAMUSCULAR; INTRAVENOUS at 06:49

## 2018-02-16 RX ADMIN — MORPHINE SULFATE 15 MG: 15 TABLET, EXTENDED RELEASE ORAL at 08:52

## 2018-02-16 RX ADMIN — OXYCODONE HYDROCHLORIDE 15 MG: 5 TABLET ORAL at 13:00

## 2018-02-16 RX ADMIN — DAKIN'S SOLUTION 0.125% (QUARTER STRENGTH) 946 ML: 0.12 SOLUTION at 13:02

## 2018-02-16 RX ADMIN — HYDROCORTISONE SODIUM SUCCINATE 100 MG: 100 INJECTION, POWDER, FOR SOLUTION INTRAMUSCULAR; INTRAVENOUS at 00:06

## 2018-02-16 NOTE — PLAN OF CARE
Problem: Patient Care Overview (Adult)  Goal: Plan of Care Review  Outcome: Ongoing (interventions implemented as appropriate)   02/15/18 2045   Outcome Evaluation   Outcome Summary/Follow up Plan Patient stable throughout the day. Off levo. HGB 9.5. Wounds dressed and examined by wound care. Hopefully overflow in AM.   Coping/Psychosocial Response Interventions   Plan Of Care Reviewed With patient   Patient Care Overview   Progress progress toward functional goals as expected       Problem: Fall Risk (Adult)  Goal: Identify Related Risk Factors and Signs and Symptoms  Outcome: Ongoing (interventions implemented as appropriate)   02/15/18 2045   Fall Risk   Fall Risk: Related Risk Factors other (see comments)  (spinal cord injury)   Fall Risk: Signs and Symptoms presence of risk factors       Problem: Sepsis (Adult)  Goal: Signs and Symptoms of Listed Potential Problems Will be Absent or Manageable (Sepsis)  Outcome: Ongoing (interventions implemented as appropriate)   02/15/18 2045   Sepsis   Problems Assessed (Sepsis) infection progression;situational response   Problems Present (Sepsis) malnutrition (undernutrition);progression of infection;situational response
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
## 2018-02-16 NOTE — PROGRESS NOTES
Discharge Planning Assessment  [facility]     Patient Name: Joaquín Sherman  MRN: [id number]  Today's Date: 2/16/2018    Admit Date: 2/14/2018          Discharge Needs Assessment       02/16/18 1516    Living Environment    Lives With parent(s);sibling(s)    Living Arrangements apartment    Home Accessibility no concerns    Type of Financial/Environmental Concern none    Transportation Available ambulance    Living Environment    Provides Primary Care For no one    Primary Care Provided By parent(s);other (see comments)   siblings    Quality Of Family Relationships unable to assess;supportive;helpful    Able to Return to Prior Living Arrangements yes    Discharge Needs Assessment    Concerns To Be Addressed discharge planning concerns    Equipment Currently Used at Home colostomy/ostomy supplies;hospital bed;bath bench;wheelchair    Discharge Facility/Level Of Care Needs home with home health    Discharge Disposition home healthcare service    Discharge Planning Comments Home Health            Discharge Plan       02/16/18 1518    Case Management/Social Work Plan    Plan Home Health    Patient/Family In Agreement With Plan yes    Additional Comments CCP met with pt to discuss d/c planning. Facesheet verified. CCP role explained. Pt resides with his parents and two sisters who reportedly assist pt in his care needs. Pt reportedly has hospital bed (low air loss mattress), motorized wheelchair, wound and ostomy supplies ([employer]) at home. Pt reports he is current for home health via [facility]. CCP awaiting confirmation from [place]. Pt confirms pharmacy is [facility] on [address]. Pt states plan to return home with continued home health and assistance from his family. Pt requests ambulance transport at d/c. Disclaimer given. CCP to follow to assist should additional d/c needs arise. Kathryn Pollack LCSW        Discharge Placement     Facility/Agency Request Status Selected? Address Phone Number Fax Number     [facility] Pending - Request Sent     [id number] [place], [place] [address] [phone]                Demographic Summary       02/16/18 1514    Referral Information    Admission Type inpatient    Arrived From admitted as an inpatient    Referral Source nursing;physician    Reason For Consult discharge planning    Record Reviewed medical record    Primary Care Physician Information    Name Salvador Jaramillo MD            Functional Status       02/16/18 1515    Functional Status Current    Ambulation 4-->completely dependent    Transferring 4-->completely dependent    Toileting 4-->completely dependent    Bathing 4-->completely dependent    Dressing 4-->completely dependent    Eating 0-->independent    Communication 0-->understands/communicates without difficulty    Swallowing (if score 2 or more for any item, consult Rehab Services) 0-->swallows foods/liquids without difficulty    Functional Status Prior    Ambulation 4-->completely dependent    Transferring 4-->completely dependent    Toileting 4-->completely dependent    Bathing 4-->completely dependent    Dressing 4-->completely dependent    Eating 0-->independent    Communication 0-->understands/communicates without difficulty    Swallowing 0-->swallows foods/liquids without difficulty    Cognitive/Perceptual/Developmental    Current Mental Status/Cognitive Functioning no deficits noted            Psychosocial     None            Abuse/Neglect     None            Legal     None            Substance Abuse     None            Patient Forms     None          Ermelinda Pollack LCSW
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
## 2018-02-16 NOTE — DISCHARGE PLACEMENT REQUEST
"Joaquín Sherman (37 y.o. Male)     Date of Birth Social Security Number Address Home Phone MRN    1980  [facility] [place] [address] [phone] [id number]    [facility] Status          None Single       Admission Date Admission Type Admitting Provider Attending Provider Department, Room/Bed    2/14/18 Emergency Stew Conroy MD [place], David Bangura MD [facility] INTENSIVE CARE, 381/1    Discharge Date Discharge Disposition Discharge Destination                      Attending Provider: David Arriaga MD     Allergies:  Amoxicillin-pot Clavulanate, Ibuprofen, Ketorolac Tromethamine    Isolation:  Contact   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), 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)   Code Status:  FULL    Ht:  185 cm (72.84\")   Wt:  36.7 kg (80 lb 14.4 oz)    Admission Cmt:  None   Principal Problem:  Severe sepsis with septic shock [A41.9,R65.21]                 Active Insurance as of 2/14/2018     Primary Coverage     Payor Plan Insurance Group Employer/Plan Group    PASSPORT PASSPORT MEDICAID     Payor Plan Address Payor Plan Phone Number Effective From Effective To    [address] [phone] 1/1/1998     [place] [address]       Subscriber Name Subscriber Birth Date Member ID       [id number] OMER 1980 [id number]                 Emergency Contacts      (Rel.) Home Phone Work Phone Mobile Phone    Marlen Al (Mother) [phone] -- --    Jenni Khoury (Sister) [phone] -- --              "

## 2018-02-16 NOTE — PROGRESS NOTES
LOS: 2 days     Chief Complaint:  Follow-up wound infection    Interval History:  NO acute events. BP improved to near baseline. He has pain at all of his wound sites. He says his cough is better. Tolerating antibiotics w/o rash or diarrhea.    ROS: no n/v/d    Vital Signs  Temp:  [96.3 °F (35.7 °C)-97.7 °F (36.5 °C)] 97.7 °F (36.5 °C)  Heart Rate:  [] 78  Resp:  [20] 20  BP: ()/(51-81) 94/65    Physical Exam:  General: awake, chronically ill-appearing  Head: Normocephalic  Eyes: PERRL, EOMI, no scleral icterus, + conjunctival pallor  ENT: MM dry, OP clear, no thrush. Gold dentition.   Neck: Supple,  Cardiovascular: NR,  no LE edema  Respiratory: Lungs are clear to ascultation bilaterally, no rales or wheezing; normal work of breathing on ambient air; some cough during exam   GI: Abdomen is thin, soft, non-tender  : condom and suprapubic catheters present  Musculoskeletal: large L hip and sacral ulcer are worse; they are draining; left knee and ankle sores are also noted  Skin: no rashes, see MSK above  Neurological: Alert and oriented x 3,  motor strength 4/5 in upper extremities; 0/5 lower  Psychiatric: Normal mood and affect   Vasc: no cyanosis; R chest port w/o erythema    Antibiotics:  •  ertapenem (INVanz) 1 g/100 mL 0.9% NS VTB (mbp), 1 g, Intravenous, Q24H, Stew Conroy MD, 1 g at 02/15/18 1900  •  vancomycin 500 mg/100 mL 0.9% NS IVPB ([facility]), 500 mg, Intravenous, Q12H, David Arriaga MD, 500 mg at 02/16/18 0844    LABS:  CBC, BMP, CRP, micro reviewed  Lab Results   Component Value Date    WBC 12.73 (H) 02/16/2018    HGB 7.8 (L) 02/16/2018    HGB 7.8 (L) 02/16/2018    HCT 25.4 (L) 02/16/2018    HCT 25.4 (L) 02/16/2018    MCV 84.1 02/16/2018     02/16/2018     Lab Results   Component Value Date    GLUCOSE 119 (H) 02/16/2018    BUN 20 02/16/2018    CREATININE 0.88 02/16/2018    EGFRIFAFRI 118 02/16/2018    BCR 22.7 02/16/2018    CO2 19.5 (L) 02/16/2018    CALCIUM 6.8 (L) 02/16/2018     ALBUMIN 2.00 (L) 02/14/2018    LABIL2 0.4 02/14/2018    AST 6 02/14/2018    ALT <5 02/14/2018    CRP 14.57 (H) 02/16/2018     Procal 0.45     Microbiology:  2/14 BCx: NGTD  2/14 UCx: 100k mixed  2/15 WCx: pending     Radiology (personally reviewed images/report):  No new imaging    Assessment/Plan   1. Septic shock secondary to worsening stage 4 decubitus ulcers secondary to paraplegia  -shock resolved  -thanks to plastics for seeing; wound care instructions given; no need for OR; with cure not possible, will aim to treat as superficial wound infection exacerbation as below  -will give 2 weeks of vancomycin 500 mg IV q12h with goal trough 15-20 and 2 weeks of ertapenem 1 g q24h with stop date 3/1/18  -no specific ID f/u visits needed but CBC, CMP, VTr, CRP faxed to me at  [phone]  -noted plans for wound care and new bed at home  -this is a difficult situation with very poor long term options      2. Multiple drug allergies  -vanco and meropenem listed were not true allergies     3. R hydrourteter and R hydronephrosis  -probably due to issues w/ suprapubic catheter  -tube changed    Thank you for allowing me to be involved in the care of this patient. Infectious diseases will sign off at this time with antibiotics plan in place but please call me at [phone] if any further questions.We will follow-up culture results and adjust antibiotics if needed.

## 2018-02-16 NOTE — PLAN OF CARE
Problem: Patient Care Overview (Adult)  Goal: Plan of Care Review  Outcome: Ongoing (interventions implemented as appropriate)   02/16/18 0813   Outcome Evaluation   Outcome Summary/Follow up Plan Patient stable throughout the night. Denied Dacon's and denied the scheduled dressing change. Pt pulled out his tooth around 0400, he stated that it was loose earlier and he pulled it out and saved it. I made a SAFE report,. Pt was able to stay off the levo . Will continue to monitor.    Coping/Psychosocial Response Interventions   Plan Of Care Reviewed With patient     Goal: Adult Individualization and Mutuality  Outcome: Ongoing (interventions implemented as appropriate)    Goal: Discharge Needs Assessment  Outcome: Ongoing (interventions implemented as appropriate)      Problem: Fall Risk (Adult)  Goal: Identify Related Risk Factors and Signs and Symptoms  Outcome: Ongoing (interventions implemented as appropriate)    Goal: Absence of Falls  Outcome: Ongoing (interventions implemented as appropriate)      Problem: Pain, Chronic (Adult)  Goal: Identify Related Risk Factors and Signs and Symptoms  Outcome: Ongoing (interventions implemented as appropriate)    Goal: Acceptable Pain Control/Comfort Level  Outcome: Ongoing (interventions implemented as appropriate)      Problem: Pressure Ulcer (Adult)  Goal: Signs and Symptoms of Listed Potential Problems Will be Absent or Manageable (Pressure Ulcer)  Outcome: Ongoing (interventions implemented as appropriate)      Problem: Sepsis (Adult)  Goal: Signs and Symptoms of Listed Potential Problems Will be Absent or Manageable (Sepsis)  Outcome: Ongoing (interventions implemented as appropriate)      Problem: Nutrition, Imbalanced: Inadequate Oral Intake (Adult)  Goal: Improved Oral Intake  Outcome: Ongoing (interventions implemented as appropriate)    Goal: Prevent Further Weight Loss  Outcome: Ongoing (interventions implemented as appropriate)

## 2018-02-16 NOTE — PROGRESS NOTES
"Pharmacokinetic Evaluation - Vancomycin    Joaquín Sherman is a 37 y.o. male on vancomycin pharmacy to dose.  MRN: [id number]  : 1980    Day of vancomycin therapy: 3/14 per ID  Indication: sepsis/possible osteomyelitis  Consulted by: Dr. Conroy. Dr Miller consulted   Goal trough: 15-20 mcg/ml    Current dose: 500 mg iv q12  Other antimicrobials: ertapenem 1g q24    Blood pressure 94/65, pulse 78, temperature 97.7 °F (36.5 °C), temperature source Oral, resp. rate 20, height 185 cm (72.84\"), weight 36.7 kg (80 lb 14.4 oz), SpO2 100 %.    Results from last 7 days  Lab Units 18  0329 02/15/18  0159 18  1550   CREATININE mg/dL 0.88 0.84 1.13     Estimated Creatinine Clearance: 59.7 mL/min (by C-G formula based on Cr of 0.88).    Results from last 7 days  Lab Units 18  0329 18  0005 02/15/18  1205  18  1641 18  1550   WBC 10*3/mm3 12.73*  --   --   --  13.09* 11.34*   HEMOGLOBIN g/dL 7.8*  7.8* 8.0* 9.5*  < > 3.3* 3.4*   HEMATOCRIT % 25.4*  25.4* 26.6* 30.3*  < > 12.7* 13.0*   PLATELETS 10*3/mm3 191  --   --   --  280 253   < > = values in this interval not displayed.    Procal: 2/15  0.45  Other relevant labs/chart info:  crp 20.8->2/15 14.57, LA 0.6    Cultures:    bc ng at 24h (2 sets)   uc >100k mixed  2/15  Sacral wound-1+ gpc     Dosing hx (include troughs if drawn):   750 mg at 1716  2/15  0159 random=9.5 mcg/ml, 500 mg 1035. 500 q12: 4  329 trough=20.4 mcg/ml (7 hr),0844    Assessment:     AM trough level=20.4 mcg/ml, drawn very early at 7 hrs and early in regimen (not at steady state). Estimated trough is 13-14 mcg/ml on current dose of 500 mg iv q12. Expect accumulation. Will continue current dose and recheck tomorrow am. Will ask rn to draw closer to admin time rather than with AM labs.     Plan:  1. Continue vanc 500 mg iv q12.   2. Recheck trough level tomorrow am (6th overall) when at a steady state. Will ask RN to draw closer to " administration time rather than with AM labs.    Thanks for this consult, will follow until dc,  Lazarsu Mednoza Pharm.D, BCCCP  2/16/2018

## 2018-02-16 NOTE — PROGRESS NOTES
Adult Nutrition  Assessment/PES    Patient Name:  Joaquín Sherman  YOB: 1980  MRN: [id number]  Admit Date:  2/14/2018    Assessment Date:  2/16/2018    Comments:  Calorie count Day #1 (2/15).  No menus saved.  Attempted to discuss intake with pt but he chose to ignore my presence and would not get off his cell phone to talk to me. Will follow up tomorrow with results from today.          Reason for Assessment       02/16/18 1047    Reason for Assessment    Reason For Assessment/Visit calorie count order;follow up protocol              Labs/Tests/Procedures/Meds       02/16/18 1047    Labs/Tests/Procedures/Meds    Diagnostic Test/Procedure Review reviewed, pertinent    Labs/Tests Review Reviewed;Glucose;C-React PRO;K+    Medication Review Reviewed, pertinent;Antibiotic;Antacid    Significant Vitals reviewed            Physical Findings       02/16/18 1048    Physical Appearance    Overall Physical Appearance underweight;cachetic;loss of subcutaneous fat;loss of muscle mass    Gastrointestinal colostomy    Tubes other (see comments)   suprabpubic catheter    Oral/Mouth Cavity poor dentition    Skin pressure ulcer(s);poor skin integrity/turgor   multiple unstageable wounds              Nutrition Prescription Ordered       02/16/18 1049    Nutrition Prescription PO    Current PO Diet Regular    Supplement Mighty Shake    Supplement Frequency 3 times a day            Evaluation of Received Nutrient/Fluid Intake       02/16/18 1049    PO Evaluation    Number of Days PO Intake Evaluated Insufficient Data   no menus saved      Problem/Interventions:        Intervention Goal       02/16/18 1050    Intervention Goal    General Maintain nutrition;Reduce/improve symptoms;Meet nutritional needs for age/condition;Disease management/therapy    PO Tolerate PO;Increase intake;PO intake (%)    PO Intake % 90 %    Weight Appropriate weight gain            Nutrition Intervention       02/16/18 1050    Nutrition  Intervention    RD/Tech Action Care plan reviewd;Follow Tx progress   attempted to discuss po intake from yesterday with pt and he wouldn't get of the phone              Education/Evaluation       02/16/18 1050    Education    Education Will Instruct as appropriate    Monitor/Evaluation    Monitor Symptoms;Per protocol;I&O;PO intake;Supplement intake;Pertinent labs;Skin status;Weight        Electronically signed by:  Yolanda Duke RD  02/16/18 10:51 AM
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
## 2018-02-17 ENCOUNTER — INPATIENT HOSPITAL (OUTPATIENT)
Dept: URBAN - METROPOLITAN AREA HOSPITAL 113 | Facility: HOSPITAL | Age: 38
End: 2018-02-17
Payer: COMMERCIAL

## 2018-02-17 DIAGNOSIS — D64.9 ANEMIA, UNSPECIFIED: ICD-10-CM

## 2018-02-17 DIAGNOSIS — E46 UNSPECIFIED PROTEIN-CALORIE MALNUTRITION: ICD-10-CM

## 2018-02-17 DIAGNOSIS — A41.9 SEPSIS, UNSPECIFIED ORGANISM: ICD-10-CM

## 2018-02-17 LAB
ANION GAP SERPL CALCULATED.3IONS-SCNC: 13.1 MMOL/L
BASOPHILS # BLD AUTO: 0 10*3/MM3 (ref 0–0.2)
BASOPHILS NFR BLD AUTO: 0 % (ref 0–1.5)
BUN BLD-MCNC: 17 MG/DL (ref 6–20)
BUN/CREAT SERPL: 26.6 (ref 7–25)
CALCIUM SPEC-SCNC: 6.3 MG/DL (ref 8.6–10.5)
CHLORIDE SERPL-SCNC: 110 MMOL/L (ref 98–107)
CO2 SERPL-SCNC: 17.9 MMOL/L (ref 22–29)
CREAT BLD-MCNC: 0.64 MG/DL (ref 0.76–1.27)
DEPRECATED RDW RBC AUTO: 53.6 FL (ref 37–54)
EOSINOPHIL # BLD AUTO: 0 10*3/MM3 (ref 0–0.7)
EOSINOPHIL NFR BLD AUTO: 0 % (ref 0.3–6.2)
ERYTHROCYTE [DISTWIDTH] IN BLOOD BY AUTOMATED COUNT: 17.3 % (ref 11.5–14.5)
GFR SERPL CREATININE-BSD FRML MDRD: >150 ML/MIN/1.73
GLUCOSE BLD-MCNC: 104 MG/DL (ref 65–99)
HCT VFR BLD AUTO: 27.2 % (ref 40.4–52.2)
HCT VFR BLD AUTO: 27.3 % (ref 40.4–52.2)
HCT VFR BLD AUTO: 27.3 % (ref 40.4–52.2)
HGB BLD-MCNC: 8.1 G/DL (ref 13.7–17.6)
HGB BLD-MCNC: 8.3 G/DL (ref 13.7–17.6)
HGB BLD-MCNC: 8.3 G/DL (ref 13.7–17.6)
IMM GRANULOCYTES # BLD: 0.1 10*3/MM3 (ref 0–0.03)
IMM GRANULOCYTES NFR BLD: 1.3 % (ref 0–0.5)
LYMPHOCYTES # BLD AUTO: 0.76 10*3/MM3 (ref 0.9–4.8)
LYMPHOCYTES NFR BLD AUTO: 9.7 % (ref 19.6–45.3)
MCH RBC QN AUTO: 25.9 PG (ref 27–32.7)
MCHC RBC AUTO-ENTMCNC: 30.4 G/DL (ref 32.6–36.4)
MCV RBC AUTO: 85.3 FL (ref 79.8–96.2)
MONOCYTES # BLD AUTO: 0.3 10*3/MM3 (ref 0.2–1.2)
MONOCYTES NFR BLD AUTO: 3.8 % (ref 5–12)
NEUTROPHILS # BLD AUTO: 6.7 10*3/MM3 (ref 1.9–8.1)
NEUTROPHILS NFR BLD AUTO: 85.2 % (ref 42.7–76)
PLATELET # BLD AUTO: 150 10*3/MM3 (ref 140–500)
PMV BLD AUTO: 8.6 FL (ref 6–12)
POTASSIUM BLD-SCNC: 3.5 MMOL/L (ref 3.5–5.2)
POTASSIUM BLD-SCNC: 3.6 MMOL/L (ref 3.5–5.2)
RBC # BLD AUTO: 3.2 10*6/MM3 (ref 4.6–6)
SODIUM BLD-SCNC: 141 MMOL/L (ref 136–145)
VANCOMYCIN TROUGH SERPL-MCNC: 21.7 MCG/ML (ref 5–20)
WBC NRBC COR # BLD: 7.86 10*3/MM3 (ref 4.5–10.7)

## 2018-02-17 PROCEDURE — 25010000002 VANCOMYCIN 10 G RECONSTITUTED SOLUTION: Performed by: INTERNAL MEDICINE

## 2018-02-17 PROCEDURE — 99231 SBSQ HOSP IP/OBS SF/LOW 25: CPT

## 2018-02-17 PROCEDURE — 85018 HEMOGLOBIN: CPT | Performed by: INTERNAL MEDICINE

## 2018-02-17 PROCEDURE — 80202 ASSAY OF VANCOMYCIN: CPT | Performed by: INTERNAL MEDICINE

## 2018-02-17 PROCEDURE — 80048 BASIC METABOLIC PNL TOTAL CA: CPT | Performed by: INTERNAL MEDICINE

## 2018-02-17 PROCEDURE — 25010000002 HYDROCORTISONE SODIUM SUCCINATE 100 MG RECONSTITUTED SOLUTION: Performed by: INTERNAL MEDICINE

## 2018-02-17 PROCEDURE — 25010000002 ERTAPENEM PER 500 MG: Performed by: INTERNAL MEDICINE

## 2018-02-17 PROCEDURE — 25010000002 VANCOMYCIN PER 500 MG: Performed by: INTERNAL MEDICINE

## 2018-02-17 PROCEDURE — 84132 ASSAY OF SERUM POTASSIUM: CPT | Performed by: INTERNAL MEDICINE

## 2018-02-17 PROCEDURE — 85014 HEMATOCRIT: CPT | Performed by: INTERNAL MEDICINE

## 2018-02-17 PROCEDURE — 85025 COMPLETE CBC W/AUTO DIFF WBC: CPT | Performed by: INTERNAL MEDICINE

## 2018-02-17 RX ORDER — GUAIFENESIN 600 MG/1
600 TABLET, EXTENDED RELEASE ORAL EVERY 12 HOURS SCHEDULED
Status: DISCONTINUED | OUTPATIENT
Start: 2018-02-17 | End: 2018-02-20 | Stop reason: HOSPADM

## 2018-02-17 RX ORDER — PREDNISONE 20 MG/1
20 TABLET ORAL
Status: DISCONTINUED | OUTPATIENT
Start: 2018-02-18 | End: 2018-02-20 | Stop reason: HOSPADM

## 2018-02-17 RX ADMIN — GUAIFENESIN 600 MG: 600 TABLET, EXTENDED RELEASE ORAL at 14:12

## 2018-02-17 RX ADMIN — SODIUM CHLORIDE 125 ML/HR: 9 INJECTION, SOLUTION INTRAVENOUS at 23:05

## 2018-02-17 RX ADMIN — HYDROCORTISONE SODIUM SUCCINATE 100 MG: 100 INJECTION, POWDER, FOR SOLUTION INTRAMUSCULAR; INTRAVENOUS at 14:13

## 2018-02-17 RX ADMIN — POTASSIUM CHLORIDE 40 MEQ: 750 CAPSULE, EXTENDED RELEASE ORAL at 21:43

## 2018-02-17 RX ADMIN — PANTOPRAZOLE SODIUM 40 MG: 40 TABLET, DELAYED RELEASE ORAL at 09:13

## 2018-02-17 RX ADMIN — HYDROCORTISONE SODIUM SUCCINATE 100 MG: 100 INJECTION, POWDER, FOR SOLUTION INTRAMUSCULAR; INTRAVENOUS at 00:13

## 2018-02-17 RX ADMIN — HYDROCORTISONE SODIUM SUCCINATE 100 MG: 100 INJECTION, POWDER, FOR SOLUTION INTRAMUSCULAR; INTRAVENOUS at 09:13

## 2018-02-17 RX ADMIN — GUAIFENESIN 600 MG: 600 TABLET, EXTENDED RELEASE ORAL at 21:43

## 2018-02-17 RX ADMIN — SODIUM CHLORIDE 125 ML/HR: 9 INJECTION, SOLUTION INTRAVENOUS at 14:33

## 2018-02-17 RX ADMIN — PANTOPRAZOLE SODIUM 40 MG: 40 TABLET, DELAYED RELEASE ORAL at 16:55

## 2018-02-17 RX ADMIN — MORPHINE SULFATE 15 MG: 15 TABLET, EXTENDED RELEASE ORAL at 09:21

## 2018-02-17 RX ADMIN — OXYCODONE HYDROCHLORIDE 15 MG: 5 TABLET ORAL at 12:54

## 2018-02-17 RX ADMIN — SODIUM CHLORIDE 1 G: 900 INJECTION, SOLUTION INTRAVENOUS at 16:26

## 2018-02-17 RX ADMIN — DAKIN'S SOLUTION 0.125% (QUARTER STRENGTH) 946 ML: 0.12 SOLUTION at 09:14

## 2018-02-17 RX ADMIN — VANCOMYCIN HYDROCHLORIDE 500 MG: 5 INJECTION, POWDER, LYOPHILIZED, FOR SOLUTION INTRAVENOUS at 00:08

## 2018-02-17 RX ADMIN — POTASSIUM CHLORIDE 40 MEQ: 750 CAPSULE, EXTENDED RELEASE ORAL at 12:54

## 2018-02-17 RX ADMIN — MORPHINE SULFATE 15 MG: 15 TABLET, EXTENDED RELEASE ORAL at 21:43

## 2018-02-17 RX ADMIN — SODIUM CHLORIDE 125 ML/HR: 9 INJECTION, SOLUTION INTRAVENOUS at 09:43

## 2018-02-17 RX ADMIN — VANCOMYCIN HYDROCHLORIDE 500 MG: 5 INJECTION, POWDER, LYOPHILIZED, FOR SOLUTION INTRAVENOUS at 10:57

## 2018-02-17 NOTE — PROGRESS NOTES
"  Dr. WAYLON Nolasco    [address]    2/17/2018    Patient ID:  Name:  Joaquín Sherman  MRN:  [id number]  1980  37 y.o.  male            Reason for visit: f/u sepsis with shock    HPI: Patient continues to ask for Xanax and narcotics.  He tells me his pain is severe, however when I walked into the room he was eating a sandwich.  Overall poor insight.  Has a cough.  No other complaints    Vitals:  Vitals:    02/17/18 1100 02/17/18 1217 02/17/18 1253 02/17/18 1305   BP:    115/75   BP Location:    Left arm   Patient Position:    Lying   Pulse: 68  72 76   Resp:    18   Temp:    96.8 °F (36 °C)   TempSrc:    Oral   SpO2: 100%  100% 99%   Weight:  36.7 kg (80 lb 14.5 oz)     Height:  185 cm (72.84\")             Body mass index is 10.72 kg/(m^2).    Intake/Output Summary (Last 24 hours) at 02/17/18 1835  Last data filed at 02/17/18 0931   Gross per 24 hour   Intake              440 ml   Output                0 ml   Net              440 ml       Exam:  GEN:  No distress,Alert, awake, eating  LUNGS: bilat rhonchi, nonlabored breathing  CV:  Normal S1S2, without murmur, no edema  ABD:  Non tender, no enlarged liver or masses  EXT:  No cyanosis or clubbing    Scheduled meds:    ertapenem 1 g Intravenous Q24H   guaiFENesin 600 mg Oral Q12H   hydrocortisone sodium succinate 100 mg Intravenous Q8H   Morphine 15 mg Oral Q12H   pantoprazole 40 mg Oral BID AC   sodium hypochlorite 946 mL Irrigation Q12H   vancomycin 500 mg Intravenous Q12H     IV meds:                        Pharmacy to dose vancomycin     sodium chloride 125 mL/hr Last Rate: 125 mL/hr (02/17/18 1433)       Data Review:   I reviewed the patient's medications and new clinical results.  Lab Results   Component Value Date    CALCIUM 6.3 (L) 02/17/2018    PHOS 4.5 02/14/2018    MG 1.9 02/14/2018    MG 1.6 01/17/2017    MG 1.7 04/18/2015       Results from last 7 days  Lab Units 02/17/18  0805 02/16/18  2148 02/16/18  0329  02/15/18  0159 " 02/14/18  1641 02/14/18  1550   SODIUM mmol/L 141  --  136  --   --   --  124*   POTASSIUM mmol/L 3.6  --  3.2*  --   --   --  3.9   CHLORIDE mmol/L 110*  --  106  --   --   --  90*   CO2 mmol/L 17.9*  --  19.5*  --   --   --  23.1   BUN mg/dL 17  --  20  --   --   --  27*   CREATININE mg/dL 0.64*  --  0.88  --  0.84  --  1.13   CALCIUM mg/dL 6.3*  --  6.8*  --   --   --  8.5*   BILIRUBIN mg/dL  --   --   --   --   --   --  0.2   ALK PHOS U/L  --   --   --   --   --   --  96   ALT (SGPT) U/L  --   --   --   --   --   --  <5   AST (SGOT) U/L  --   --   --   --   --   --  6   GLUCOSE mg/dL 104*  --  119*  --   --   --  94   WBC 10*3/mm3 7.86  --  12.73*  --   --  13.09* 11.34*   HEMOGLOBIN g/dL 8.3*  8.3* 8.2* 7.8*  7.8*  < > 5.5* 3.3* 3.4*   PLATELETS 10*3/mm3 150  --  191  --   --  280 253   INR   --   --   --   --   --   --  1.35*   PROCALCITONIN ng/mL  --   --   --   --  0.45*  --   --    < > = values in this interval not displayed.    Results from last 7 days  Lab Units 02/15/18  1529 02/14/18  1650 02/14/18  1551 02/14/18  1550   BLOODCX   --   --  No growth at 3 days No growth at 3 days   WOUNDCX    Moderate growth (3+) Gram Negative Bacilli*  Moderate growth (3+) Gram Positive Cocci*  --   --   --    GRAM STAIN RESULT  Rare (1+) Gram positive cocci  Occasional Gram positive bacilli  Rare (1+) WBCs per low power field  --   --   --    URINECX   --  >100,000 CFU/mL Mixed Odette Isolated  --   --          ASSESSMENT:   Principal Problem:    Severe sepsis with septic shock  Active Problems:    Chronic anemia    Paraplegia following spinal cord injury    Suprapubic catheter dysfunction    Decubitus ulcer of buttock    Decubitus ulcer of hip    Anemia    Osteomyelitis    Pneumonia with history of MRSA    Chronic adrenal insufficiency      PLAN:  Continue supportive care.  I'm trying to convince the patient that we cannot continue to give him benzodiazepines on a scheduled basis.  He is already on scheduled  narcotics.  He was in no distress when I walked in but he still requesting these medications be given around the clock.  Infectious diseases is managing antibiotics.  He is not a surgical candidate for his decubitus ulcer  Start prednisone 20 mg daily.  Stop IV stress dose Solu-Cortef.      Phoenix Nolasco MD  2/17/2018

## 2018-02-17 NOTE — PLAN OF CARE
Problem: Patient Care Overview (Adult)  Goal: Plan of Care Review  Outcome: Ongoing (interventions implemented as appropriate)   02/17/18 5240   Outcome Evaluation   Outcome Summary/Follow up Plan Pt. continued with IV fluids and antibiotics. He took 40 mEq of potassium, but refused anymore today. Dressing changed on his Port today and his dressing on his legs and bottom completed. Will continue to monitor.   Coping/Psychosocial Response Interventions   Plan Of Care Reviewed With patient   Patient Care Overview   Progress no change     Goal: Adult Individualization and Mutuality  Outcome: Ongoing (interventions implemented as appropriate)    Goal: Discharge Needs Assessment  Outcome: Ongoing (interventions implemented as appropriate)      Problem: Fall Risk (Adult)  Goal: Absence of Falls  Outcome: Ongoing (interventions implemented as appropriate)      Problem: Pain, Chronic (Adult)  Goal: Acceptable Pain Control/Comfort Level  Outcome: Ongoing (interventions implemented as appropriate)      Problem: Nutrition, Imbalanced: Inadequate Oral Intake (Adult)  Goal: Improved Oral Intake  Outcome: Ongoing (interventions implemented as appropriate)    Goal: Prevent Further Weight Loss  Outcome: Ongoing (interventions implemented as appropriate)

## 2018-02-17 NOTE — PROGRESS NOTES
"Adult Nutrition  Assessment/PES    Patient Name:  Joaquín Sherman  YOB: 1980  MRN: [id number]  Admit Date:  2/14/2018    Assessment Date:  2/17/2018    Comments:  Calorie count day #2 results:  PO intake only recorded from breakfast.  759 kcal (46% estimated needs) and 31 grams protein (42% estimated needs).    RD to continue to follow for results.          Reason for Assessment       02/17/18 1217    Reason for Assessment    Reason For Assessment/Visit follow up protocol;calorie count order                Anthropometrics       02/17/18 1217    Anthropometrics    Height 185 cm (72.84\")    Weight 36.7 kg (80 lb 14.5 oz)    RD Documented Current Weight  36.7 kg (80 lb 14.5 oz)    Ideal Body Weight (IBW)    Ideal Body Weight (IBW), Male (kg) 84.4    % Ideal Body Weight 43.57    Body Mass Index (BMI)    BMI (kg/m2) 10.75    BMI Grade less than 16 low grade III                  Nutrition Prescription Ordered       02/17/18 1218    Nutrition Prescription PO    Current PO Diet Regular    Supplement Mighty Shake    Supplement Frequency 3 times a day            Evaluation of Received Nutrient/Fluid Intake       02/17/18 1218    Evaluation of Received Nutrient/Fluid Intake    Number of Days Evaluated 1 day    Nutrition Delivered Calorie Evaluation;Protein Evaluation    Calorie Intake Evaluation    Oral Calories (kcal) 759    Total Calories (kcal) 759    % Kcal Needs 46    Protein Intake Evaluation    Oral Protein (gm) 31    Total Protein (gm) 31    % Protein Needs 42            Problem/Interventions:                  Intervention Goal       02/17/18 1219    Intervention Goal    General Maintain nutrition;Reduce/improve symptoms;Disease management/therapy;Meet nutritional needs for age/condition    PO Tolerate PO;Increase intake;PO intake (%)    PO Intake % 90 %    Weight Appropriate weight gain            Nutrition Intervention       02/17/18 1219    Nutrition Intervention    RD/Tech Action Follow Tx " progress;Care plan reviewd;Supplement provided              Education/Evaluation       02/17/18 1219    Education    Education Will Instruct as appropriate    Monitor/Evaluation    Monitor Per protocol;PO intake;Supplement intake;Weight;Skin status    Education Follow-up Reinforce PRN        Electronically signed by:  Darcy Jacobs RD  02/17/18 12:20 PM

## 2018-02-17 NOTE — PLAN OF CARE
Problem: Patient Care Overview (Adult)  Goal: Plan of Care Review  Outcome: Ongoing (interventions implemented as appropriate)      Problem: Fall Risk (Adult)  Goal: Identify Related Risk Factors and Signs and Symptoms  Outcome: Ongoing (interventions implemented as appropriate)    Goal: Absence of Falls  Outcome: Ongoing (interventions implemented as appropriate)      Problem: Mobility, Physical Impaired (Adult)  Goal: Identify Related Risk Factors and Signs and Symptoms  Outcome: Outcome(s) achieved Date Met: 02/17/18    Goal: Enhanced Mobility Skills  Outcome: Ongoing (interventions implemented as appropriate)      Problem: Pain, Chronic (Adult)  Goal: Identify Related Risk Factors and Signs and Symptoms  Outcome: Ongoing (interventions implemented as appropriate)    Goal: Acceptable Pain Control/Comfort Level  Outcome: Ongoing (interventions implemented as appropriate)      Problem: Pressure Ulcer (Adult)  Goal: Signs and Symptoms of Listed Potential Problems Will be Absent or Manageable (Pressure Ulcer)  Outcome: Ongoing (interventions implemented as appropriate)      Problem: Sepsis (Adult)  Goal: Signs and Symptoms of Listed Potential Problems Will be Absent or Manageable (Sepsis)  Outcome: Ongoing (interventions implemented as appropriate)      Problem: Nutrition, Imbalanced: Inadequate Oral Intake (Adult)  Goal: Improved Oral Intake  Outcome: Ongoing (interventions implemented as appropriate)    Goal: Prevent Further Weight Loss  Outcome: Ongoing (interventions implemented as appropriate)

## 2018-02-17 NOTE — PROGRESS NOTES
GI Daily Progress Note    Assessment/Plan:    Principal Problem:    Severe sepsis with septic shock  Active Problems:    Chronic anemia    Paraplegia following spinal cord injury    Suprapubic catheter dysfunction    Decubitus ulcer of buttock    Decubitus ulcer of hip    Anemia    Osteomyelitis    Pneumonia with history of MRSA    Chronic adrenal insufficiency       LOS: 3 days     Joaquín Sherman is a 37 y.o. male who was admitted with Severe sepsis with septic shock. He reports his symptoms are improving with treatment. Pt out on floor and still coughing but no GI complaints he feels his Pneumonia was what was keeping him from eating. His HGB is stble and ostomy output is Green. His swallow appearsw to be functional and his caloric intake is being counted. Doubt he will be a condidate for Peg but mostlikely needs skilled rehab/chronic placement    Subjective:    Patient expresses No specific GI compalints  Patient denies abdominal pain, vomiting and diarrhea    Objective:    Vital signs in last 24 hours:  Temp:  [96.7 °F (35.9 °C)-97.2 °F (36.2 °C)] 96.7 °F (35.9 °C)  Heart Rate:  [51-72] 62  Resp:  [16] 16  BP: ()/(59-96) 98/66    Intake/Output last 3 shifts:  I/O last 3 completed shifts:  In: 6785 [I.V.:4915; IV Piggyback:1870]  Out: 225 [Urine:75; Stool:150]  Intake/Output this shift:  I/O this shift:  In: 440 [P.O.:440]  Out: -     Results from last 7 days  Lab Units 02/17/18  0805 02/16/18  2148 02/16/18  0329  02/14/18  1641   WBC 10*3/mm3 7.86  --  12.73*  --  13.09*   HEMOGLOBIN g/dL 8.3*  8.3* 8.2* 7.8*  7.8*  < > 3.3*   HEMATOCRIT % 27.3*  27.3* 27.4* 25.4*  25.4*  < > 12.7*   PLATELETS 10*3/mm3 150  --  191  --  280   < > = values in this interval not displayed.     Results from last 7 days  Lab Units 02/17/18  0805 02/16/18  0329 02/15/18  0159 02/14/18  1550   SODIUM mmol/L 141 136  --  124*   POTASSIUM mmol/L 3.6 3.2*  --  3.9   CHLORIDE mmol/L 110* 106  --  90*   CO2 mmol/L 17.9* 19.5*   --  23.1   BUN mg/dL 17 20  --  27*   CREATININE mg/dL 0.64* 0.88 0.84 1.13   GLUCOSE mg/dL 104* 119*  --  94   CALCIUM mg/dL 6.3* 6.8*  --  8.5*       Physical Exam:Abdomen  Sounds Normal Active Bowel Sounds   Distension Soft   Tenderness Nontender     1. Acute on chronic anemia: no overt bleeding.  Green stool in ostomy.  Most suspicious for anemia of chronic disease complicated by poor po intake  2. Severe protein calorie malnutrition: clearly inadequate caloric intake  3. Sepsis: UTI and osteomyelitis  4. Decubitus ulcers: with exposed bone, osteomyelitis  5. Paraplegia  6. Neurogenic bladder    Stable HGB , will have BGA check in on Mon for calorie count results but dont feel any GI  intervention is appropriate at this time.
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
## 2018-02-18 LAB
ANION GAP SERPL CALCULATED.3IONS-SCNC: 10.8 MMOL/L
BUN BLD-MCNC: 18 MG/DL (ref 6–20)
BUN/CREAT SERPL: 34 (ref 7–25)
CALCIUM SPEC-SCNC: 6.2 MG/DL (ref 8.6–10.5)
CHLORIDE SERPL-SCNC: 114 MMOL/L (ref 98–107)
CO2 SERPL-SCNC: 17.2 MMOL/L (ref 22–29)
CREAT BLD-MCNC: 0.53 MG/DL (ref 0.76–1.27)
GFR SERPL CREATININE-BSD FRML MDRD: >150 ML/MIN/1.73
GLUCOSE BLD-MCNC: 116 MG/DL (ref 65–99)
HCT VFR BLD AUTO: 23.2 % (ref 40.4–52.2)
HCT VFR BLD AUTO: 28 % (ref 40.4–52.2)
HCT VFR BLD AUTO: 29.1 % (ref 40.4–52.2)
HGB BLD-MCNC: 6.8 G/DL (ref 13.7–17.6)
HGB BLD-MCNC: 8.3 G/DL (ref 13.7–17.6)
HGB BLD-MCNC: 8.6 G/DL (ref 13.7–17.6)
POTASSIUM BLD-SCNC: 4.4 MMOL/L (ref 3.5–5.2)
SODIUM BLD-SCNC: 142 MMOL/L (ref 136–145)
VANCOMYCIN TROUGH SERPL-MCNC: 21.4 MCG/ML (ref 5–20)

## 2018-02-18 PROCEDURE — 80202 ASSAY OF VANCOMYCIN: CPT | Performed by: INTERNAL MEDICINE

## 2018-02-18 PROCEDURE — 85014 HEMATOCRIT: CPT | Performed by: INTERNAL MEDICINE

## 2018-02-18 PROCEDURE — 85018 HEMOGLOBIN: CPT | Performed by: INTERNAL MEDICINE

## 2018-02-18 PROCEDURE — 25010000002 ERTAPENEM PER 500 MG: Performed by: INTERNAL MEDICINE

## 2018-02-18 PROCEDURE — 80048 BASIC METABOLIC PNL TOTAL CA: CPT | Performed by: INTERNAL MEDICINE

## 2018-02-18 PROCEDURE — 99232 SBSQ HOSP IP/OBS MODERATE 35: CPT | Performed by: INTERNAL MEDICINE

## 2018-02-18 PROCEDURE — 94799 UNLISTED PULMONARY SVC/PX: CPT

## 2018-02-18 PROCEDURE — 63710000001 PREDNISONE PER 1 MG: Performed by: INTERNAL MEDICINE

## 2018-02-18 PROCEDURE — 25010000002 VANCOMYCIN 10 G RECONSTITUTED SOLUTION: Performed by: INTERNAL MEDICINE

## 2018-02-18 RX ORDER — ALBUTEROL SULFATE 2.5 MG/3ML
2.5 SOLUTION RESPIRATORY (INHALATION) EVERY 6 HOURS PRN
Status: DISCONTINUED | OUTPATIENT
Start: 2018-02-18 | End: 2018-02-20 | Stop reason: HOSPADM

## 2018-02-18 RX ADMIN — SODIUM CHLORIDE 1 G: 900 INJECTION, SOLUTION INTRAVENOUS at 21:17

## 2018-02-18 RX ADMIN — VANCOMYCIN HYDROCHLORIDE 500 MG: 5 INJECTION, POWDER, LYOPHILIZED, FOR SOLUTION INTRAVENOUS at 01:01

## 2018-02-18 RX ADMIN — PANTOPRAZOLE SODIUM 40 MG: 40 TABLET, DELAYED RELEASE ORAL at 18:25

## 2018-02-18 RX ADMIN — MORPHINE SULFATE 15 MG: 15 TABLET, EXTENDED RELEASE ORAL at 21:17

## 2018-02-18 RX ADMIN — PREDNISONE 20 MG: 20 TABLET ORAL at 08:05

## 2018-02-18 RX ADMIN — SODIUM CHLORIDE 125 ML/HR: 9 INJECTION, SOLUTION INTRAVENOUS at 16:12

## 2018-02-18 RX ADMIN — SODIUM CHLORIDE 125 ML/HR: 9 INJECTION, SOLUTION INTRAVENOUS at 23:37

## 2018-02-18 RX ADMIN — OXYCODONE HYDROCHLORIDE 15 MG: 5 TABLET ORAL at 12:49

## 2018-02-18 RX ADMIN — MORPHINE SULFATE 15 MG: 15 TABLET, EXTENDED RELEASE ORAL at 08:05

## 2018-02-18 RX ADMIN — DAKIN'S SOLUTION 0.125% (QUARTER STRENGTH) 946 ML: 0.12 SOLUTION at 08:11

## 2018-02-18 RX ADMIN — OXYCODONE HYDROCHLORIDE 15 MG: 5 TABLET ORAL at 08:45

## 2018-02-18 RX ADMIN — GUAIFENESIN 600 MG: 600 TABLET, EXTENDED RELEASE ORAL at 21:17

## 2018-02-18 RX ADMIN — OXYCODONE HYDROCHLORIDE 15 MG: 5 TABLET ORAL at 16:50

## 2018-02-18 RX ADMIN — PANTOPRAZOLE SODIUM 40 MG: 40 TABLET, DELAYED RELEASE ORAL at 06:38

## 2018-02-18 RX ADMIN — GUAIFENESIN 600 MG: 600 TABLET, EXTENDED RELEASE ORAL at 08:05

## 2018-02-18 RX ADMIN — SODIUM CHLORIDE 125 ML/HR: 9 INJECTION, SOLUTION INTRAVENOUS at 08:02

## 2018-02-18 NOTE — PROGRESS NOTES
"  Dr. WAYLON Nolasco    [facility] [address]    2/18/2018    Patient ID:  Name:  Joaquín Sherman  MRN:  [id number]  1980  37 y.o.  male            Reason for visit: f/u sepsis with shock    HPI: He has very poor insight into his disease process.  He continues to ask for narcotics and Xanax.  He refuses to participate with the nurses in his medical care.  Here he refuses to be repositioned and mobilized.  He refuses vancomycin.    Vitals:  Vitals:    02/17/18 1305 02/17/18 2248 02/18/18 0634 02/18/18 1229   BP: 115/75 105/72 103/60    BP Location: Left arm Right arm Right arm    Patient Position: Lying Lying Lying    Pulse: 76 56 53    Resp: 18 18 18    Temp: 96.8 °F (36 °C) 97.5 °F (36.4 °C) 96.7 °F (35.9 °C)    TempSrc: Oral Oral Oral    SpO2: 99% 100% 100%    Weight:    36.7 kg (80 lb 14.5 oz)   Height:    185 cm (72.84\")           Body mass index is 10.72 kg/(m^2).    Intake/Output Summary (Last 24 hours) at 02/18/18 1630  Last data filed at 02/18/18 1613   Gross per 24 hour   Intake          3647.92 ml   Output             1600 ml   Net          2047.92 ml       Exam:  GEN:  No distress, Awake, alert.  Paraplegic in lower extremities  LUNGS: Shallow breathing, bilateral rhonchi, nonlabored breathing  CV:  Normal S1S2, without murmur, no edema  ABD:  Non tender,     Scheduled meds:    ertapenem 1 g Intravenous Q24H   guaiFENesin 600 mg Oral Q12H   Morphine 15 mg Oral Q12H   pantoprazole 40 mg Oral BID AC   predniSONE 20 mg Oral Daily With Breakfast   sodium hypochlorite 946 mL Irrigation Q12H   vancomycin 500 mg Intravenous Q18H     IV meds:                        Pharmacy to dose vancomycin     sodium chloride 125 mL/hr Last Rate: 125 mL/hr (02/18/18 1612)       Data Review:   I reviewed the patient's medications and new clinical results.  Lab Results   Component Value Date    CALCIUM 6.2 (L) 02/18/2018    PHOS 4.5 02/14/2018    MG 1.9 02/14/2018    MG 1.6 01/17/2017    MG 1.7 04/18/2015       Results " from last 7 days  Lab Units 02/18/18  0818 02/18/18  0638 02/17/18  1834 02/17/18  0805  02/16/18  0329  02/15/18  0159 02/14/18  1641 02/14/18  1550   SODIUM mmol/L 142  --   --  141  --  136  --   --   --  124*   POTASSIUM mmol/L 4.4  --  3.5 3.6  --  3.2*  --   --   --  3.9   CHLORIDE mmol/L 114*  --   --  110*  --  106  --   --   --  90*   CO2 mmol/L 17.2*  --   --  17.9*  --  19.5*  --   --   --  23.1   BUN mg/dL 18  --   --  17  --  20  --   --   --  27*   CREATININE mg/dL 0.53*  --   --  0.64*  --  0.88  --  0.84  --  1.13   CALCIUM mg/dL 6.2*  --   --  6.3*  --  6.8*  --   --   --  8.5*   BILIRUBIN mg/dL  --   --   --   --   --   --   --   --   --  0.2   ALK PHOS U/L  --   --   --   --   --   --   --   --   --  96   ALT (SGPT) U/L  --   --   --   --   --   --   --   --   --  <5   AST (SGOT) U/L  --   --   --   --   --   --   --   --   --  6   GLUCOSE mg/dL 116*  --   --  104*  --  119*  --   --   --  94   WBC 10*3/mm3  --   --   --  7.86  --  12.73*  --   --  13.09* 11.34*   HEMOGLOBIN g/dL 8.6* 6.8* 8.1* 8.3*  8.3*  < > 7.8*  7.8*  < > 5.5* 3.3* 3.4*   PLATELETS 10*3/mm3  --   --   --  150  --  191  --   --  280 253   INR   --   --   --   --   --   --   --   --   --  1.35*   PROCALCITONIN ng/mL  --   --   --   --   --   --   --  0.45*  --   --    < > = values in this interval not displayed.    Results from last 7 days  Lab Units 02/15/18  1529 02/14/18  1650 02/14/18  1551 02/14/18  1550   BLOODCX   --   --  No growth at 4 days No growth at 4 days   WOUNDCX    Moderate growth (3+) Proteus mirabilis*  Moderate growth (3+) Staphylococcus aureus, MRSA*  Moderate growth (3+) Gram Negative Bacilli*  --   --   --    GRAM STAIN RESULT  Rare (1+) Gram positive cocci  Occasional Gram positive bacilli  Rare (1+) WBCs per low power field  --   --   --    URINECX   --  >100,000 CFU/mL Mixed Odette Isolated  --   --          ASSESSMENT:   Principal Problem:    Severe sepsis with septic shock  Active Problems:     Chronic anemia    Paraplegia following spinal cord injury    Suprapubic catheter dysfunction    Decubitus ulcer of buttock    Decubitus ulcer of hip    Anemia    Osteomyelitis    Pneumonia with history of MRSA    Chronic adrenal insufficiency      PLAN:  Not much else to offer this patient who is refusing to participate in his care, with very poor insight into his disease processes.  Arrange for disposition to long-term care facility.        Phoenix Nolasco MD  2/18/2018

## 2018-02-18 NOTE — PROGRESS NOTES
"Adult Nutrition  Assessment/PES    Patient Name:  Joaquín Sherman  YOB: 1980  MRN: [id number]  Admit Date:  2/14/2018    Assessment Date:  2/18/2018    Comments:  Calorie count day 3 results: 1112 kcal consumed (67% estimated needs), 32 grams protein consumed (43% estimated needs).    From day 2 to day 3, patient did increase PO intake.  Seems to have eaten well this am at breakfast also.    Calorie count concluded.    RD to continue to follow.            Reason for Assessment       02/18/18 1229    Reason for Assessment    Reason For Assessment/Visit follow up protocol;calorie count order                Anthropometrics       02/18/18 1229    Anthropometrics    Height 185 cm (72.84\")    Weight 36.7 kg (80 lb 14.5 oz)    RD Documented Current Weight  36.7 kg (80 lb 14.5 oz)    Ideal Body Weight (IBW)    Ideal Body Weight (IBW), Male (kg) 84.4    % Ideal Body Weight 43.57    Body Mass Index (BMI)    BMI (kg/m2) 10.75    BMI Grade less than 16 low grade III            Labs/Tests/Procedures/Meds       02/18/18 1229    Labs/Tests/Procedures/Meds    Diagnostic Test/Procedure Review reviewed, pertinent    Labs/Tests Review Reviewed    Medication Review Reviewed, pertinent    Significant Vitals reviewed, pertinent                Nutrition Prescription Ordered       02/18/18 1229    Nutrition Prescription PO    Current PO Diet Regular    Supplement Mighty Shake    Supplement Frequency 3 times a day            Evaluation of Received Nutrient/Fluid Intake       02/18/18 1229    Evaluation of Received Nutrient/Fluid Intake    Number of Days Evaluated 1 day    Nutrition Delivered Calorie Evaluation;Protein Evaluation    Calorie Intake Evaluation    Oral Calories (kcal) 1112    Total Calories (kcal) 1112    % Kcal Needs 67    Protein Intake Evaluation    Oral Protein (gm) 32    Total Protein (gm) 32    % Protein Needs 43            Problem/Interventions:                  Intervention Goal       02/18/18 1230    " Intervention Goal    General Maintain nutrition;Disease management/therapy;Reduce/improve symptoms;Meet nutritional needs for age/condition    PO Tolerate PO;Increase intake;PO intake (%)    PO Intake % 90 %    Weight Appropriate weight gain            Nutrition Intervention       02/18/18 1230    Nutrition Intervention    RD/Tech Action Follow Tx progress;Care plan reviewd;Supplement provided              Education/Evaluation       02/18/18 1230    Education    Education Will Instruct as appropriate    Monitor/Evaluation    Monitor Per protocol;PO intake;Supplement intake;Pertinent labs;Weight;Skin status;Symptoms    Education Follow-up Reinforce PRN        Electronically signed by:  Darcy Jacobs RD  02/18/18 12:30 PM
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
## 2018-02-18 NOTE — PLAN OF CARE
Problem: Patient Care Overview (Adult)  Goal: Plan of Care Review  Outcome: Ongoing (interventions implemented as appropriate)   02/18/18 4551   Outcome Evaluation   Outcome Summary/Follow up Plan Pt refusing IV Vanc, MD aware. IVF continue. Dressings changed. PRN pain meds given x3. Will continue to monitor.   Coping/Psychosocial Response Interventions   Plan Of Care Reviewed With patient   Patient Care Overview   Progress no change       Problem: Fall Risk (Adult)  Goal: Identify Related Risk Factors and Signs and Symptoms  Outcome: Outcome(s) achieved Date Met: 02/18/18    Goal: Absence of Falls  Outcome: Ongoing (interventions implemented as appropriate)      Problem: Mobility, Physical Impaired (Adult)  Goal: Enhanced Mobility Skills  Outcome: Ongoing (interventions implemented as appropriate)    Goal: Enhanced Functionality Ability  Outcome: Ongoing (interventions implemented as appropriate)      Problem: Pain, Chronic (Adult)  Goal: Identify Related Risk Factors and Signs and Symptoms  Outcome: Outcome(s) achieved Date Met: 02/18/18    Goal: Acceptable Pain Control/Comfort Level  Outcome: Ongoing (interventions implemented as appropriate)      Problem: Pressure Ulcer (Adult)  Goal: Signs and Symptoms of Listed Potential Problems Will be Absent or Manageable (Pressure Ulcer)  Outcome: Ongoing (interventions implemented as appropriate)      Problem: Sepsis (Adult)  Goal: Signs and Symptoms of Listed Potential Problems Will be Absent or Manageable (Sepsis)  Outcome: Ongoing (interventions implemented as appropriate)      Problem: Nutrition, Imbalanced: Inadequate Oral Intake (Adult)  Goal: Improved Oral Intake  Outcome: Ongoing (interventions implemented as appropriate)    Goal: Prevent Further Weight Loss  Outcome: Ongoing (interventions implemented as appropriate)

## 2018-02-18 NOTE — PLAN OF CARE
Problem: Patient Care Overview (Adult)  Goal: Plan of Care Review  Outcome: Ongoing (interventions implemented as appropriate)   02/17/18 1530 02/17/18 2115 02/18/18 0605   Outcome Evaluation   Outcome Summary/Follow up Plan --  --  Patient is on IV antibiotics and IV fluids. Patient refused the second dose of Potassium per potassium protocol. No family at bedside. Patient refused dressing change last night. Will continue to monitor vital signs, wounds, labs and dressings.   Coping/Psychosocial Response Interventions   Plan Of Care Reviewed With --  patient --    Patient Care Overview   Progress no change --  --      Goal: Adult Individualization and Mutuality  Outcome: Ongoing (interventions implemented as appropriate)    Goal: Discharge Needs Assessment  Outcome: Ongoing (interventions implemented as appropriate)      Problem: Fall Risk (Adult)  Goal: Identify Related Risk Factors and Signs and Symptoms  Outcome: Ongoing (interventions implemented as appropriate)    Goal: Absence of Falls  Outcome: Ongoing (interventions implemented as appropriate)      Problem: Mobility, Physical Impaired (Adult)  Goal: Enhanced Mobility Skills  Outcome: Ongoing (interventions implemented as appropriate)    Goal: Enhanced Functionality Ability  Outcome: Ongoing (interventions implemented as appropriate)      Problem: Pain, Chronic (Adult)  Goal: Identify Related Risk Factors and Signs and Symptoms  Outcome: Ongoing (interventions implemented as appropriate)    Goal: Acceptable Pain Control/Comfort Level  Outcome: Ongoing (interventions implemented as appropriate)      Problem: Pressure Ulcer (Adult)  Goal: Signs and Symptoms of Listed Potential Problems Will be Absent or Manageable (Pressure Ulcer)  Outcome: Ongoing (interventions implemented as appropriate)      Problem: Sepsis (Adult)  Goal: Signs and Symptoms of Listed Potential Problems Will be Absent or Manageable (Sepsis)  Outcome: Ongoing (interventions implemented as  appropriate)      Problem: Nutrition, Imbalanced: Inadequate Oral Intake (Adult)  Goal: Improved Oral Intake  Outcome: Ongoing (interventions implemented as appropriate)    Goal: Prevent Further Weight Loss  Outcome: Ongoing (interventions implemented as appropriate)
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
## 2018-02-19 LAB
ANION GAP SERPL CALCULATED.3IONS-SCNC: 10 MMOL/L
BACTERIA SPEC AEROBE CULT: ABNORMAL
BACTERIA SPEC AEROBE CULT: NORMAL
BUN BLD-MCNC: 15 MG/DL (ref 6–20)
BUN/CREAT SERPL: 30 (ref 7–25)
CALCIUM SPEC-SCNC: 5.4 MG/DL (ref 8.6–10.5)
CHLORIDE SERPL-SCNC: 116 MMOL/L (ref 98–107)
CO2 SERPL-SCNC: 19 MMOL/L (ref 22–29)
CREAT BLD-MCNC: 0.5 MG/DL (ref 0.76–1.27)
GFR SERPL CREATININE-BSD FRML MDRD: >150 ML/MIN/1.73
GLUCOSE BLD-MCNC: 76 MG/DL (ref 65–99)
GRAM STN SPEC: ABNORMAL
HCT VFR BLD AUTO: 27.1 % (ref 40.4–52.2)
HCT VFR BLD AUTO: 27.3 % (ref 40.4–52.2)
HGB BLD-MCNC: 7.9 G/DL (ref 13.7–17.6)
HGB BLD-MCNC: 8.1 G/DL (ref 13.7–17.6)
POTASSIUM BLD-SCNC: 3.6 MMOL/L (ref 3.5–5.2)
SODIUM BLD-SCNC: 145 MMOL/L (ref 136–145)

## 2018-02-19 PROCEDURE — 85014 HEMATOCRIT: CPT | Performed by: INTERNAL MEDICINE

## 2018-02-19 PROCEDURE — 80048 BASIC METABOLIC PNL TOTAL CA: CPT | Performed by: INTERNAL MEDICINE

## 2018-02-19 PROCEDURE — 99232 SBSQ HOSP IP/OBS MODERATE 35: CPT | Performed by: INTERNAL MEDICINE

## 2018-02-19 PROCEDURE — 25010000002 ERTAPENEM PER 500 MG: Performed by: INTERNAL MEDICINE

## 2018-02-19 PROCEDURE — 94799 UNLISTED PULMONARY SVC/PX: CPT

## 2018-02-19 PROCEDURE — 63710000001 PREDNISONE PER 1 MG: Performed by: INTERNAL MEDICINE

## 2018-02-19 PROCEDURE — 85018 HEMOGLOBIN: CPT | Performed by: INTERNAL MEDICINE

## 2018-02-19 RX ORDER — SULFAMETHOXAZOLE AND TRIMETHOPRIM 800; 160 MG/1; MG/1
1 TABLET ORAL EVERY 12 HOURS SCHEDULED
Status: DISCONTINUED | OUTPATIENT
Start: 2018-02-19 | End: 2018-02-20 | Stop reason: HOSPADM

## 2018-02-19 RX ORDER — ONDANSETRON 4 MG/1
4 TABLET, ORALLY DISINTEGRATING ORAL EVERY 6 HOURS PRN
Status: DISCONTINUED | OUTPATIENT
Start: 2018-02-19 | End: 2018-02-20 | Stop reason: HOSPADM

## 2018-02-19 RX ADMIN — GUAIFENESIN 600 MG: 600 TABLET, EXTENDED RELEASE ORAL at 09:34

## 2018-02-19 RX ADMIN — OXYCODONE HYDROCHLORIDE 15 MG: 5 TABLET ORAL at 18:50

## 2018-02-19 RX ADMIN — GUAIFENESIN 600 MG: 600 TABLET, EXTENDED RELEASE ORAL at 22:34

## 2018-02-19 RX ADMIN — PREDNISONE 20 MG: 20 TABLET ORAL at 09:34

## 2018-02-19 RX ADMIN — MORPHINE SULFATE 15 MG: 15 TABLET, EXTENDED RELEASE ORAL at 22:35

## 2018-02-19 RX ADMIN — SULFAMETHOXAZOLE AND TRIMETHOPRIM 160 MG: 800; 160 TABLET ORAL at 22:34

## 2018-02-19 RX ADMIN — SULFAMETHOXAZOLE AND TRIMETHOPRIM 160 MG: 800; 160 TABLET ORAL at 14:10

## 2018-02-19 RX ADMIN — DAKIN'S SOLUTION 0.125% (QUARTER STRENGTH) 946 ML: 0.12 SOLUTION at 09:36

## 2018-02-19 RX ADMIN — PANTOPRAZOLE SODIUM 40 MG: 40 TABLET, DELAYED RELEASE ORAL at 07:30

## 2018-02-19 RX ADMIN — PANTOPRAZOLE SODIUM 40 MG: 40 TABLET, DELAYED RELEASE ORAL at 17:40

## 2018-02-19 RX ADMIN — SODIUM CHLORIDE 1 G: 900 INJECTION, SOLUTION INTRAVENOUS at 17:40

## 2018-02-19 RX ADMIN — MORPHINE SULFATE 15 MG: 15 TABLET, EXTENDED RELEASE ORAL at 09:35

## 2018-02-19 RX ADMIN — OXYCODONE HYDROCHLORIDE 15 MG: 5 TABLET ORAL at 10:23

## 2018-02-19 NOTE — PROGRESS NOTES
ID note  Cc: infected ulcer  S: feels better since admission.  Claims  vanc causes swelling.  Tolerated bactrim before.  No f/c/ns  O:  AF, VSS  NAD  pulm effort normal  Limited insight  Skin warm and dry in exposed areas    Cr 0.50  K 3.6    A/P  1. Septic shock secondary to worsening stage 4 decubitus ulcers secondary to paraplegia  -shock resolved  -thanks to plastics for seeing; wound care instructions given; no need for OR; with cure not possible, will aim to treat as superficial wound infection exacerbation as below   -cx with MRSA, e coli and proteus  -refuses additional vanc so will change to bactrim ds po bid.  -will give 2 weeks of bactrim and ertapenem 1 g iv q24h with stop date 3/1/18  -no specific ID f/u visits needed but CBC, CMP, CRP faxed to Dr Miller at  [phone]  -noted plans for wound care and new bed at home  -this is a difficult situation with very poor long term options        2. Multiple drug allergies  -vanco and meropenem listed were not true allergies but pt refuses to take more vanc.  As above      3. R hydrourteter and R hydronephrosis  -probably due to issues w/ suprapubic catheter  -tube changed    Will not plan to see daily but will be happy to see anytime you need.

## 2018-02-19 NOTE — PLAN OF CARE
Problem: Patient Care Overview (Adult)  Goal: Plan of Care Review  Outcome: Ongoing (interventions implemented as appropriate)   02/19/18 7966   Outcome Evaluation   Outcome Summary/Follow up Plan Pt medicated x1 for pain. Dressing changes completed this shift. Pt refusing potassium per potassium protocol. VSS. Will continue to monitor per protocol.    Coping/Psychosocial Response Interventions   Plan Of Care Reviewed With patient   Patient Care Overview   Progress no change     Goal: Adult Individualization and Mutuality  Outcome: Ongoing (interventions implemented as appropriate)    Goal: Discharge Needs Assessment  Outcome: Ongoing (interventions implemented as appropriate)      Problem: Fall Risk (Adult)  Goal: Absence of Falls  Outcome: Ongoing (interventions implemented as appropriate)      Problem: Mobility, Physical Impaired (Adult)  Goal: Enhanced Mobility Skills  Outcome: Ongoing (interventions implemented as appropriate)    Goal: Enhanced Functionality Ability  Outcome: Ongoing (interventions implemented as appropriate)      Problem: Pain, Chronic (Adult)  Goal: Acceptable Pain Control/Comfort Level  Outcome: Ongoing (interventions implemented as appropriate)      Problem: Pressure Ulcer (Adult)  Goal: Signs and Symptoms of Listed Potential Problems Will be Absent or Manageable (Pressure Ulcer)  Outcome: Ongoing (interventions implemented as appropriate)      Problem: Sepsis (Adult)  Goal: Signs and Symptoms of Listed Potential Problems Will be Absent or Manageable (Sepsis)  Outcome: Ongoing (interventions implemented as appropriate)      Problem: Nutrition, Imbalanced: Inadequate Oral Intake (Adult)  Goal: Improved Oral Intake  Outcome: Ongoing (interventions implemented as appropriate)    Goal: Prevent Further Weight Loss  Outcome: Ongoing (interventions implemented as appropriate)

## 2018-02-19 NOTE — PROGRESS NOTES
Discharge Planning Assessment  [facility]     Patient Name: Joaquín Sherman  MRN: [id number]  Today's Date: 2/19/2018    Admit Date: 2/14/2018          Discharge Needs Assessment     None            Discharge Plan       02/19/18 1147    Case Management/Social Work Plan    Additional Comments The patient transferred to [facility] from ICU on 2/16/18 @ 16:40. CCP will continue to follow for any needs that may arise. ISHAN Swan RN        Discharge Placement     Facility/Agency Request Status Selected? Address Phone Number Fax Number    [facility] Pending - Request Sent     [id number] [facility]  [place] [id number] [phone]                Demographic Summary     None            Functional Status     None            Psychosocial     None            Abuse/Neglect     None            Legal     None            Substance Abuse     None            Patient Forms     None          Bree Swan RN
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
## 2018-02-19 NOTE — PROGRESS NOTES
"  PROGRESS NOTE  Patient Name: Joaquín Sherman  Age/Sex: 37 y.o. male  : 1980  MRN: [id number]    Date of Admission: 2018  Date of Encounter Visit: 18   LOS: 5 days   Patient Care Team:  Salvador Jaramillo MD as PCP - General (Family Medicine)    Chief Complaint: Paraplegic with chronic wound infection    Interval History: Patient presented on 18 from home with purulent drainage from his decubitus.  He is known to have poor care for his chronic decubiti and he refused to go to nursing home for proper care end up home with worsening symptoms.  Patient is refusing frequent repositioning in the bed and has poor insight and the gravity of his situation and is noncooperative with the recommendation which is complicating his care and his risk of having recurrent infection is pretty high and he is currently on ertapenem and on Bactrim with recommendation by infectious disease to continue until the last day on 3/1/18    REVIEW OF SYSTEMS:       Ventilator/Non-Invasive Ventilation Settings     None            Vital Signs  Temp:  [96.9 °F (36.1 °C)-97.6 °F (36.4 °C)] 97 °F (36.1 °C)  Heart Rate:  [65-84] 75  Resp:  [16-18] 18  BP: (102-121)/(67-75) 102/72  SpO2:  [99 %-100 %] 99 %  on    O2 Device: room air    Intake/Output Summary (Last 24 hours) at 18 1722  Last data filed at 18 1246   Gross per 24 hour   Intake             1382 ml   Output             1100 ml   Net              282 ml     Flowsheet Rows         First Filed Value    Admission Height  185 cm (72.84\") Documented at 2018 1502    Admission Weight  40.8 kg (90 lb) Documented at 2018 1502        Body mass index is 10.72 kg/(m^2).  Last 3 weights    18  1931 18  1217 18  1229   Weight: 36.7 kg (80 lb 14.4 oz) 36.7 kg (80 lb 14.5 oz) 36.7 kg (80 lb 14.5 oz)       Physical Exam:  GEN:  No acute distress, alert, cooperative, well developed , talking on the phone  EYES:   Sclera clear. No icterus. " PERRL. Normal EOM  ENT:   External ears/nose normal, no oral lesions, no thrush, mucous membranes moist  NECK:  Supple, midline trachea, no JVD  LUNGS: Normal chest on inspection, patient has cough with wet sounding rhonchi right more than left.  No wheezes with normal white breathing Genevieve no crackles  CV:  Regular rhythm and rate. Normal S1/S2. No murmurs, gallops, or rubs noted.  ABD:  Soft, non-tender and non-distended. Normal bowel sounds. No guarding  EXT:  Deformities because of his paraplegia, he has some lower extremity edema, the hip wound were surgically addressed with minimal foul smell  Skin: dry, intact, no bleeding  Cognitive: Limited insight of the complexity of his duration.yet   Patient is cognitively intact    Results Review:        Results from last 7 days  Lab Units 02/19/18  0606 02/18/18  0818 02/17/18  1834 02/17/18  0805 02/16/18  0329 02/15/18  0159 02/14/18  1550   SODIUM mmol/L 145 142  --  141 136  --  124*   POTASSIUM mmol/L 3.6 4.4 3.5 3.6 3.2*  --  3.9   CHLORIDE mmol/L 116* 114*  --  110* 106  --  90*   CO2 mmol/L 19.0* 17.2*  --  17.9* 19.5*  --  23.1   BUN mg/dL 15 18  --  17 20  --  27*   CREATININE mg/dL 0.50* 0.53*  --  0.64* 0.88 0.84 1.13   CALCIUM mg/dL 5.4* 6.2*  --  6.3* 6.8*  --  8.5*   AST (SGOT) U/L  --   --   --   --   --   --  6   ALT (SGPT) U/L  --   --   --   --   --   --  <5   ANION GAP mmol/L 10.0 10.8  --  13.1 10.5  --  10.9   ALBUMIN g/dL  --   --   --   --   --   --  2.00*                   Results from last 7 days  Lab Units 02/19/18  0606 02/18/18 2125 02/18/18  0818 02/18/18  0638 02/17/18  1834 02/17/18  0805 02/16/18  2148 02/16/18  0329  02/14/18  1641 02/14/18  1550   WBC 10*3/mm3  --   --   --   --   --  7.86  --  12.73*  --  13.09* 11.34*   HEMOGLOBIN g/dL 7.9* 8.3* 8.6* 6.8* 8.1* 8.3*  8.3* 8.2* 7.8*  7.8*  < > 3.3* 3.4*   HEMATOCRIT % 27.3* 28.0* 29.1* 23.2* 27.2* 27.3*  27.3* 27.4* 25.4*  25.4*  < > 12.7* 13.0*   PLATELETS 10*3/mm3  --   --   --    --   --  150  --  191  --  280 253   MCV fL  --   --   --   --   --  85.3  --  84.1  --  81.9 81.8   NEUTROPHIL % %  --   --   --   --   --  85.2*  --   --   --  72.2 75.4   LYMPHOCYTE % %  --   --   --   --   --  9.7*  --   --   --  19.1* 14.8*   MONOCYTES % %  --   --   --   --   --  3.8*  --   --   --  7.1 8.5   EOSINOPHIL % %  --   --   --   --   --  0.0*  --   --   --  0.2* 0.4   BASOPHIL % %  --   --   --   --   --  0.0  --   --   --  0.2 0.1   IMM GRAN % %  --   --   --   --   --  1.3*  --   --   --  1.2* 0.8*   < > = values in this interval not displayed.    Results from last 7 days  Lab Units 02/14/18  1550   INR  1.35*   APTT seconds 44.0*       Results from last 7 days  Lab Units 02/14/18  1550   MAGNESIUM mg/dL 1.9           Invalid input(s): LDLCALC          No results found for: POCGLU    Results from last 7 days  Lab Units 02/15/18  0159 02/14/18  1550   PROCALCITONIN ng/mL 0.45*  --    LACTATE mmol/L  --  0.6       Results from last 7 days  Lab Units 02/15/18  1529 02/14/18  1650 02/14/18  1551 02/14/18  1550   BLOODCX   --   --  No growth at 5 days No growth at 5 days   WOUNDCX    Moderate growth (3+) Proteus mirabilis*  Moderate growth (3+) Staphylococcus aureus, MRSA*  Moderate growth (3+) Escherichia coli*  --   --   --    GRAM STAIN RESULT  Rare (1+) Gram positive cocci  Occasional Gram positive bacilli  Rare (1+) WBCs per low power field  --   --   --    URINECX   --  >100,000 CFU/mL Mixed Odette Isolated  --   --        Results from last 7 days  Lab Units 02/14/18  1650   NITRITE UA  Negative   WBC UA /HPF Too Numerous to Count*   BACTERIA UA /HPF 4+*   SQUAM EPITHEL UA /HPF None Seen   URINECX  >100,000 CFU/mL Mixed Odette Isolated               Imaging:   Imaging Results (all)     Procedure Component Value Units Date/Time    CT Abdomen Pelvis Without Contrast [[id number]] Collected:  02/14/18 1750     Updated:  02/14/18 1806    Narrative:       CT ABDOMEN PELVIS WO CONTRAST-      INDICATIONS: Abdominal pain     TECHNIQUE: Radiation dose reduction techniques were utilized, including  automated exposure control and exposure modulation based on body size.   Unenhanced ABDOMEN AND PELVIS CT     COMPARISON: 11/10/2017     FINDINGS:      Suprapubic catheter is in place. Small gas in the urinary bladder could  be related to catheterization or could be evidence of infection;  likewise, appearance of mild bladder wall thickening could be from lack  of distention or evidence of inflammation/infection.     Mild right hydronephrosis and right hydroureter, without a specific  cause identified (a previously demonstrated stone of the right kidney is  not appreciated on this exam, may have passed), follow-up/further  evaluation can be obtained as indicated.     Otherwise unremarkable appearance of the liver, spleen, adrenal glands,  pancreas, kidneys, bladder.     No bowel obstruction or abnormal bowel thickening is identified, but  assessment for inflammatory changes of bowel is limited by paucity of  mesenteric fat.     No free intraperitoneal gas or free fluid.     Scattered small mesenteric and para-aortic lymph nodes are seen that are  not significant by size criteria.     Abdominal aorta is not aneurysmal. Aortic and other arterial  calcifications are present.     The lung bases show density suggesting rounded atelectasis versus  consolidative pneumonia in the left lower lobe, follow-up recommended.     Medially adjacent to the left iliacus, a 2 cm gas focus, image 107 of  series 1 may represent abscess. Spina bifida is evident. Of gas foci  apparent along the right margin of the spinal canal at the level of the  sacrum, for example image 109 of series 1 could be result of direct  continuity with the external air, or could be evidence of infection.  Chronic changes/deformities of the hips and pelvis are redemonstrated,  suggesting sequela of osteomyelitis.             Impression:             1.  Possible abscess in the left aspect of the pelvis. Gas foci along the  right aspect of the spinal canal at the level of the sacrum, may be  evidence of spinal infection. Chronic deformities of pelvis and hips,  suggesting sequela of osteomyelitis.  2. Mild right hydronephrosis and hydroureter without a specific cause  identified (a previously demonstrated stone of the right kidney is not  appreciated on this exam, may have passed), follow-up/further evaluation  can be obtained as indicated. Changes of the urinary bladder, correlate  clinically to exclude possibility of urinary infection.  3. No acute inflammatory process of bowel is identified, limited as  described, follow up as indications persist  4. Density in the left lower lobe of the lung, could represent  pneumonia, follow-up recommended.     Discussed by telephone with Dr. Austin at time of interpretation, 1803,  02/14/2018     This report was finalized on 2/14/2018 6:03 PM by Dr. Medardo Washington MD.       XR Chest 1 View [[id number]] Collected:  02/14/18 1751     Updated:  02/14/18 1920    Narrative:       PORTABLE RADIOGRAPHIC VIEW OF THE CHEST      CLINICAL HISTORY: Severe sepsis.     FINDINGS: A right IJ approach central venous catheter terminates at the  level of the venoatrial junction. There are some minimal prominent  densities within the left retrocardiac region. However, there is no  convincing evidence for an infiltrate on this examination. The  cardiomediastinal silhouette is unremarkable. Osseous structures are  within limits.     Minimal prominence of the lung markings within the left retrocardiac  region without convincing evidence for an infiltrate. Follow-up PA and  lateral chest radiograph is suggested if the patient is able to  cooperate for such an exam. If the patient cannot cooperate for a PA and  lateral, then a follow-up portable radiographic view of the chest would  be advised.     This report was finalized on 2/14/2018 7:17 PM by  Dr. Ousmane Zamorano MD.             I reviewed the patient's new clinical results.  I personally viewed and interpreted the patient's imaging results:        Medication Review:     ertapenem 1 g Intravenous Q24H   guaiFENesin 600 mg Oral Q12H   Morphine 15 mg Oral Q12H   pantoprazole 40 mg Oral BID AC   predniSONE 20 mg Oral Daily With Breakfast   sodium hypochlorite 946 mL Irrigation Q12H   sulfamethoxazole-trimethoprim 1 tablet Oral Q12H         sodium chloride 125 mL/hr Last Rate: 125 mL/hr (02/18/18 0745)       ASSESSMENT:   Severe sepsis with septic shock  Paraplegia following spinal cord injury  Suprapubic catheter dysfunction  Decubitus ulcer of the hip  Osteomyelitis with multiple bruising growth from the decubitus ulcer  Pneumonia with history of MRSA  Chronic adrenal insufficiency    PLAN:  Patient has been followed by infectious disease, he was seen by plastic surgery and he is not a surgical candidate for several reasons described in the notes.  The plan is to treat with IV ertapenem with by mouth Bactrim until 3/1/18.  Patient wants to go home, has been notified and this is something we went through on the last admission that home is not adequate enough given the complexity of the wound care that he needs but he is refusing the other recommendation, he has been noncompliant with many other recommendation and requiring to go home instead of her nursing facility against doctor's recommendations.  Discussed with discharge planner with the nursing staff and with the patient    Disposition: Home with home health, likely in a.m.    Andre Conroy MD  02/19/18  5:22 PM        EMR Dragon/Transcription:   Much of this encounter note is an electronic transcription/translation of spoken language to printed text. The electronic translation of spoken language may permit erroneous, or at times, nonsensical words or phrases to be inadvertently transcribed; Although I have reviewed the note for such errors, some may  still exist.
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
## 2018-02-19 NOTE — PLAN OF CARE
Problem: Patient Care Overview (Adult)  Goal: Plan of Care Review  Outcome: Ongoing (interventions implemented as appropriate)   02/18/18 1751 02/18/18 2117 02/19/18 0451   Outcome Evaluation   Outcome Summary/Follow up Plan --  --  Patient slep most of the night. Patient received IV Invanz but refused IV Vancomicyn. Pateint refused dressing changed. Place new condom cath . Will continue to monitor vital signs, labs and comfort.   Coping/Psychosocial Response Interventions   Plan Of Care Reviewed With --  patient --    Patient Care Overview   Progress no change --  --      Goal: Discharge Needs Assessment  Outcome: Ongoing (interventions implemented as appropriate)
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
## 2018-02-19 NOTE — PLAN OF CARE
Problem: Patient Care Overview (Adult)  Goal: Adult Individualization and Mutuality  Outcome: Ongoing (interventions implemented as appropriate)    Goal: Discharge Needs Assessment  Outcome: Ongoing (interventions implemented as appropriate)      Problem: Fall Risk (Adult)  Goal: Absence of Falls  Outcome: Ongoing (interventions implemented as appropriate)      Problem: Mobility, Physical Impaired (Adult)  Goal: Enhanced Mobility Skills  Outcome: Ongoing (interventions implemented as appropriate)    Goal: Enhanced Functionality Ability  Outcome: Ongoing (interventions implemented as appropriate)      Problem: Pain, Chronic (Adult)  Goal: Acceptable Pain Control/Comfort Level  Outcome: Ongoing (interventions implemented as appropriate)      Problem: Pressure Ulcer (Adult)  Goal: Signs and Symptoms of Listed Potential Problems Will be Absent or Manageable (Pressure Ulcer)  Outcome: Ongoing (interventions implemented as appropriate)      Problem: Sepsis (Adult)  Goal: Signs and Symptoms of Listed Potential Problems Will be Absent or Manageable (Sepsis)  Outcome: Ongoing (interventions implemented as appropriate)      Problem: Nutrition, Imbalanced: Inadequate Oral Intake (Adult)  Goal: Improved Oral Intake  Outcome: Ongoing (interventions implemented as appropriate)    Goal: Prevent Further Weight Loss  Outcome: Ongoing (interventions implemented as appropriate)

## 2018-02-20 VITALS
TEMPERATURE: 97.4 F | DIASTOLIC BLOOD PRESSURE: 69 MMHG | HEART RATE: 94 BPM | RESPIRATION RATE: 20 BRPM | OXYGEN SATURATION: 97 % | BODY MASS INDEX: 10.72 KG/M2 | SYSTOLIC BLOOD PRESSURE: 105 MMHG | WEIGHT: 80.91 LBS | HEIGHT: 73 IN

## 2018-02-20 LAB
ANION GAP SERPL CALCULATED.3IONS-SCNC: 12.2 MMOL/L
BUN BLD-MCNC: 9 MG/DL (ref 6–20)
BUN/CREAT SERPL: 18.8 (ref 7–25)
CALCIUM SPEC-SCNC: 5.3 MG/DL (ref 8.6–10.5)
CHLORIDE SERPL-SCNC: 111 MMOL/L (ref 98–107)
CO2 SERPL-SCNC: 17.8 MMOL/L (ref 22–29)
CREAT BLD-MCNC: 0.48 MG/DL (ref 0.76–1.27)
GFR SERPL CREATININE-BSD FRML MDRD: >150 ML/MIN/1.73
GLUCOSE BLD-MCNC: 70 MG/DL (ref 65–99)
HCT VFR BLD AUTO: 27.9 % (ref 40.4–52.2)
HGB BLD-MCNC: 8.4 G/DL (ref 13.7–17.6)
POTASSIUM BLD-SCNC: 3.1 MMOL/L (ref 3.5–5.2)
SODIUM BLD-SCNC: 141 MMOL/L (ref 136–145)

## 2018-02-20 PROCEDURE — 80048 BASIC METABOLIC PNL TOTAL CA: CPT | Performed by: INTERNAL MEDICINE

## 2018-02-20 PROCEDURE — 94799 UNLISTED PULMONARY SVC/PX: CPT

## 2018-02-20 PROCEDURE — 94640 AIRWAY INHALATION TREATMENT: CPT

## 2018-02-20 PROCEDURE — 99231 SBSQ HOSP IP/OBS SF/LOW 25: CPT | Performed by: INTERNAL MEDICINE

## 2018-02-20 PROCEDURE — 25010000002 ERTAPENEM PER 500 MG: Performed by: INTERNAL MEDICINE

## 2018-02-20 PROCEDURE — 25010000002 HEPARIN FLUSH (PORCINE) 100 UNIT/ML SOLUTION

## 2018-02-20 PROCEDURE — 85018 HEMOGLOBIN: CPT | Performed by: INTERNAL MEDICINE

## 2018-02-20 PROCEDURE — 85014 HEMATOCRIT: CPT | Performed by: INTERNAL MEDICINE

## 2018-02-20 PROCEDURE — 63710000001 PREDNISONE PER 1 MG: Performed by: INTERNAL MEDICINE

## 2018-02-20 RX ORDER — HYDROCORTISONE 10 MG/1
TABLET ORAL
Refills: 5 | Status: ON HOLD | COMMUNITY
Start: 2017-12-26 | End: 2018-10-06

## 2018-02-20 RX ORDER — SULFAMETHOXAZOLE AND TRIMETHOPRIM 800; 160 MG/1; MG/1
1 TABLET ORAL EVERY 12 HOURS SCHEDULED
Qty: 22 TABLET | Refills: 0 | Status: SHIPPED | OUTPATIENT
Start: 2018-02-20 | End: 2018-03-03

## 2018-02-20 RX ORDER — MULTIPLE VITAMINS W/ MINERALS TAB 9MG-400MCG
1 TAB ORAL DAILY
Status: DISCONTINUED | OUTPATIENT
Start: 2018-02-20 | End: 2018-02-20 | Stop reason: HOSPADM

## 2018-02-20 RX ORDER — MIDODRINE HYDROCHLORIDE 10 MG/1
TABLET ORAL
Refills: 5 | Status: ON HOLD | COMMUNITY
Start: 2017-12-26 | End: 2018-10-06

## 2018-02-20 RX ORDER — GUAIFENESIN 600 MG/1
600 TABLET, EXTENDED RELEASE ORAL EVERY 12 HOURS SCHEDULED
Qty: 30 TABLET | Refills: 1 | Status: ON HOLD | OUTPATIENT
Start: 2018-02-20 | End: 2018-10-06

## 2018-02-20 RX ORDER — ACETAMINOPHEN 325 MG/1
650 TABLET ORAL EVERY 6 HOURS PRN
Status: ON HOLD
Start: 2018-02-20 | End: 2018-10-06

## 2018-02-20 RX ORDER — ALBUTEROL SULFATE 2.5 MG/3ML
2.5 SOLUTION RESPIRATORY (INHALATION) EVERY 6 HOURS PRN
Qty: 120 ML | Refills: 2 | Status: ON HOLD | OUTPATIENT
Start: 2018-02-20 | End: 2018-10-06

## 2018-02-20 RX ORDER — POTASSIUM CHLORIDE 1500 MG/1
TABLET, EXTENDED RELEASE ORAL
Refills: 0 | Status: ON HOLD | COMMUNITY
Start: 2017-12-26 | End: 2018-10-06

## 2018-02-20 RX ORDER — MIDODRINE HYDROCHLORIDE 5 MG/1
10 TABLET ORAL
Status: DISCONTINUED | OUTPATIENT
Start: 2018-02-20 | End: 2018-02-20 | Stop reason: HOSPADM

## 2018-02-20 RX ORDER — LORATADINE 10 MG
TABLET ORAL
Refills: 5 | Status: ON HOLD | COMMUNITY
Start: 2017-12-26 | End: 2018-10-06

## 2018-02-20 RX ADMIN — DAKIN'S SOLUTION 0.125% (QUARTER STRENGTH) 946 ML: 0.12 SOLUTION at 01:07

## 2018-02-20 RX ADMIN — OXYCODONE HYDROCHLORIDE 15 MG: 5 TABLET ORAL at 16:39

## 2018-02-20 RX ADMIN — MORPHINE SULFATE 15 MG: 15 TABLET, EXTENDED RELEASE ORAL at 09:27

## 2018-02-20 RX ADMIN — PREDNISONE 20 MG: 20 TABLET ORAL at 09:27

## 2018-02-20 RX ADMIN — SODIUM CHLORIDE 1 G: 900 INJECTION, SOLUTION INTRAVENOUS at 16:41

## 2018-02-20 RX ADMIN — DAKIN'S SOLUTION 0.125% (QUARTER STRENGTH) 946 ML: 0.12 SOLUTION at 11:53

## 2018-02-20 RX ADMIN — Medication 500 UNITS: at 17:37

## 2018-02-20 RX ADMIN — MIDODRINE HYDROCHLORIDE 10 MG: 5 TABLET ORAL at 16:39

## 2018-02-20 RX ADMIN — OXYCODONE HYDROCHLORIDE 15 MG: 5 TABLET ORAL at 11:53

## 2018-02-20 RX ADMIN — PANTOPRAZOLE SODIUM 40 MG: 40 TABLET, DELAYED RELEASE ORAL at 09:26

## 2018-02-20 RX ADMIN — GUAIFENESIN 600 MG: 600 TABLET, EXTENDED RELEASE ORAL at 09:26

## 2018-02-20 RX ADMIN — SULFAMETHOXAZOLE AND TRIMETHOPRIM 160 MG: 800; 160 TABLET ORAL at 09:26

## 2018-02-20 RX ADMIN — ALBUTEROL SULFATE 2.5 MG: 2.5 SOLUTION RESPIRATORY (INHALATION) at 10:51

## 2018-02-20 RX ADMIN — MULTIPLE VITAMINS W/ MINERALS TAB 1 TABLET: TAB at 16:39

## 2018-02-20 RX ADMIN — PANTOPRAZOLE SODIUM 40 MG: 40 TABLET, DELAYED RELEASE ORAL at 17:37

## 2018-02-20 RX ADMIN — SODIUM CHLORIDE 125 ML/HR: 9 INJECTION, SOLUTION INTRAVENOUS at 03:21

## 2018-02-20 NOTE — DISCHARGE INSTRUCTIONS
For [facility]-per Dr. Oliver, please re-access the pt.'s port at home for IV antibiotic use.  The port will be re-accessed weekly for dressing changes and new access needle.

## 2018-02-20 NOTE — PROGRESS NOTES
Discharge Planning Assessment  [facility]     Patient Name: Joaquín Sherman  MRN: [id number]  Today's Date: 2/20/2018    Admit Date: 2/14/2018          Discharge Needs Assessment     None            Discharge Plan       02/20/18 1038    Case Management/Social Work Plan    Plan Home with [facility] and [facility]      02/20/18 1037    Case Management/Social Work Plan    Additional Comments Called Azalia with [facility] and she is calling [facility] for the IVAB's and she states she will follow. ISHAN Swan, RN, CCP.        Discharge Placement     Facility/Agency Request Status Selected? Address Phone Number Fax Number    [facility] Accepted    Yes [id number] [place] [id number] [phone]        Expected Discharge Date and Time     Expected Discharge Date Expected Discharge Time    Feb 20, 2018               Demographic Summary     None            Functional Status     None            Psychosocial     None            Abuse/Neglect     None            Legal     None            Substance Abuse     None            Patient Forms     None          Bree Swan, JAMAL
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
## 2018-02-20 NOTE — DISCHARGE INSTR - APPOINTMENTS
Appointment set up for Dr. Jaramillo's office on Friday February 23rd at 4:30pm.    Appointment set up for Dr. Blanca to replace catheter on Wednesday March 14th at 10:00am. Location: [address] [place] [address].    [facility]. Spoke to Tammy, patient is already on the que so they will follow up, no need to make appointment.

## 2018-02-20 NOTE — PROGRESS NOTES
Discharge Planning Assessment  [facility]     Patient Name: Joaquín Sherman  MRN: [id number]  Today's Date: 2/20/2018    Admit Date: 2/14/2018          Discharge Needs Assessment     None            Discharge Plan       02/20/18 1317    Case Management/Social Work Plan    Plan Home with Home Health services, wound care and IV antbiotics     Patient/Family In Agreement With Plan yes    Additional Comments Received call from Mckayla from [facility] [phone], she states that they are not going to be able to readmit this patient due to his non-compliance.   Will evaluate additional discharge needs.       02/20/18 1038    Case Management/Social Work Plan    Plan Home with [facility] and [facility]      02/20/18 1037    Case Management/Social Work Plan    Additional Comments Called Azalia with [facility] and she is calling [facility] for the IVAB's and she states she will follow. ISHAN Swan RN, CCP.        Discharge Placement     Facility/Agency Request Status Selected? Address Phone Number Fax Number    [facility] Accepted    Yes [address] [facility] [address] [id number] [phone]    [facility] Declined     [address] [facility] [address] [address] [phone]        Expected Discharge Date and Time     Expected Discharge Date Expected Discharge Time    Feb 20, 2018               Demographic Summary     None            Functional Status     None            Psychosocial     None            Abuse/Neglect     None            Legal     None            Substance Abuse     None            Patient Forms     None          Dacia Louis RN

## 2018-02-20 NOTE — PROGRESS NOTES
Case Management Discharge Note    Final Note: [facility]. ISHAN Swan RN, CCP    Discharge Placement     Facility/Agency Request Status Selected? Address Phone Number Fax Number    [facility] [place] Accepted    Yes [address] [facility] [address] [id number] [phone]    [facility] Decl[facility]   not enough staffing to provide the coverage for the      [id number] [facility]  [facility] [address] [facility] [id number] [phone]    [facility] Decl[facility]     [address] [facility] [address], [facility] [id number] [phone]             Discharge Codes: 06  Discharged/transferred to home under care of organized home health service organization in anticipation of skilled care

## 2018-02-20 NOTE — PLAN OF CARE
Problem: Patient Care Overview (Adult)  Goal: Plan of Care Review  Outcome: Ongoing (interventions implemented as appropriate)   02/20/18 0157   Outcome Evaluation   Outcome Summary/Follow up Plan vital signs stable; refusing turns; refused dressing changed until later in shift; medications per MAR; will continue to monitor/encourage to turn; possible d/c in AM   Coping/Psychosocial Response Interventions   Plan Of Care Reviewed With patient   Patient Care Overview   Progress no change       Problem: Fall Risk (Adult)  Goal: Absence of Falls  Outcome: Ongoing (interventions implemented as appropriate)      Problem: Pain, Chronic (Adult)  Goal: Acceptable Pain Control/Comfort Level  Outcome: Ongoing (interventions implemented as appropriate)      Problem: Pressure Ulcer (Adult)  Goal: Signs and Symptoms of Listed Potential Problems Will be Absent or Manageable (Pressure Ulcer)  Outcome: Ongoing (interventions implemented as appropriate)
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
## 2018-02-22 ENCOUNTER — DOCUMENTATION (OUTPATIENT)
Dept: INFECTIOUS DISEASES | Facility: CLINIC | Age: 38
End: 2018-02-22

## 2018-02-22 LAB
BACTERIA SPEC AEROBE CULT: ABNORMAL
BACTERIA SPEC AEROBE CULT: ABNORMAL
ISOLATED FROM: ABNORMAL

## 2018-02-22 NOTE — PROGRESS NOTES
We were notified by labs the blood cultures in 1 out of 2 sets after 5-1/2 days incubation are now growing MRSA.  Attempted to start the patient on outpatient parenteral antibiotics, but is been admitted to [facility].  Was able to reach the nurse there, Shahida, notified her of the situation.  She said infectious diseases had been consulted already and I offered our office number if they needed additional information.

## 2018-05-25 ENCOUNTER — HOSPITAL ENCOUNTER (EMERGENCY)
Facility: HOSPITAL | Age: 38
Discharge: HOME | End: 2018-05-26
Attending: EMERGENCY MEDICINE | Admitting: EMERGENCY MEDICINE

## 2018-05-25 VITALS
OXYGEN SATURATION: 94 % | BODY MASS INDEX: 12.86 KG/M2 | HEART RATE: 93 BPM | WEIGHT: 80 LBS | RESPIRATION RATE: 16 BRPM | DIASTOLIC BLOOD PRESSURE: 54 MMHG | TEMPERATURE: 98.3 F | HEIGHT: 66 IN | SYSTOLIC BLOOD PRESSURE: 83 MMHG

## 2018-05-25 DIAGNOSIS — Z46.6 ENCOUNTER FOR REPLACEMENT OF URINARY CATHETER: Primary | ICD-10-CM

## 2018-05-25 DIAGNOSIS — N39.0 ACUTE UTI: ICD-10-CM

## 2018-05-25 LAB
BACTERIA UR QL AUTO: ABNORMAL /HPF
BILIRUB UR QL STRIP: NEGATIVE
CLARITY UR: ABNORMAL
COLOR UR: YELLOW
GLUCOSE UR STRIP-MCNC: NEGATIVE MG/DL
HGB UR QL STRIP.AUTO: ABNORMAL
HYALINE CASTS UR QL AUTO: ABNORMAL /LPF
KETONES UR QL STRIP: NEGATIVE
LEUKOCYTE ESTERASE UR QL STRIP.AUTO: ABNORMAL
NITRITE UR QL STRIP: POSITIVE
PH UR STRIP.AUTO: >=9 [PH] (ref 5–8)
PROT UR QL STRIP: ABNORMAL
RBC # UR: ABNORMAL /HPF
REF LAB TEST METHOD: ABNORMAL
SP GR UR STRIP: 1.01 (ref 1–1.03)
SQUAMOUS #/AREA URNS HPF: ABNORMAL /HPF
UROBILINOGEN UR QL STRIP: ABNORMAL
WBC UR QL AUTO: ABNORMAL /HPF

## 2018-05-25 PROCEDURE — 99284 EMERGENCY DEPT VISIT MOD MDM: CPT

## 2018-05-25 PROCEDURE — 87186 SC STD MICRODIL/AGAR DIL: CPT | Performed by: PHYSICIAN ASSISTANT

## 2018-05-25 PROCEDURE — 87086 URINE CULTURE/COLONY COUNT: CPT | Performed by: PHYSICIAN ASSISTANT

## 2018-05-25 PROCEDURE — 81001 URINALYSIS AUTO W/SCOPE: CPT | Performed by: PHYSICIAN ASSISTANT

## 2018-05-25 RX ORDER — SODIUM CHLORIDE 0.9 % (FLUSH) 0.9 %
10 SYRINGE (ML) INJECTION AS NEEDED
Status: DISCONTINUED | OUTPATIENT
Start: 2018-05-25 | End: 2018-05-26 | Stop reason: HOSPADM

## 2018-05-25 RX ORDER — FENTANYL 50 UG/H
1 PATCH TRANSDERMAL
Status: ON HOLD | COMMUNITY
End: 2018-10-06

## 2018-05-25 RX ORDER — CEPHALEXIN 500 MG/1
500 CAPSULE ORAL 2 TIMES DAILY
Qty: 14 CAPSULE | Refills: 0 | Status: SHIPPED | OUTPATIENT
Start: 2018-05-25 | End: 2018-10-06 | Stop reason: HOSPADM

## 2018-05-29 LAB — BACTERIA SPEC AEROBE CULT: ABNORMAL

## 2018-06-05 ENCOUNTER — HOSPITAL ENCOUNTER (EMERGENCY)
Facility: HOSPITAL | Age: 38
Discharge: HOME | End: 2018-06-06
Attending: EMERGENCY MEDICINE | Admitting: EMERGENCY MEDICINE

## 2018-06-05 DIAGNOSIS — T83.010A SUPRAPUBIC CATHETER DYSFUNCTION, INITIAL ENCOUNTER (HCC): Primary | ICD-10-CM

## 2018-06-05 DIAGNOSIS — Z46.6 ENCOUNTER FOR FOLEY CATHETER REPLACEMENT: ICD-10-CM

## 2018-06-05 PROCEDURE — 51702 INSERT TEMP BLADDER CATH: CPT

## 2018-06-05 PROCEDURE — 99284 EMERGENCY DEPT VISIT MOD MDM: CPT

## 2018-06-06 VITALS
WEIGHT: 110 LBS | BODY MASS INDEX: 14.58 KG/M2 | DIASTOLIC BLOOD PRESSURE: 41 MMHG | HEART RATE: 100 BPM | RESPIRATION RATE: 16 BRPM | SYSTOLIC BLOOD PRESSURE: 92 MMHG | TEMPERATURE: 98.7 F | HEIGHT: 73 IN | OXYGEN SATURATION: 100 %

## 2018-06-06 RX ORDER — OXYCODONE HYDROCHLORIDE AND ACETAMINOPHEN 5; 325 MG/1; MG/1
1 TABLET ORAL ONCE
Status: COMPLETED | OUTPATIENT
Start: 2018-06-06 | End: 2018-06-06

## 2018-06-06 RX ADMIN — OXYCODONE HYDROCHLORIDE AND ACETAMINOPHEN 1 TABLET: 5; 325 TABLET ORAL at 00:56

## 2018-06-06 NOTE — ED NOTES
[facility] contacted for transport to [facility].  ETA [id number].     Peggy Fabian RN  06/06/18 0124
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
## 2018-09-06 ENCOUNTER — LAB REQUISITION (OUTPATIENT)
Dept: LAB | Facility: HOSPITAL | Age: 38
End: 2018-09-06

## 2018-09-06 DIAGNOSIS — Z00.00 ROUTINE GENERAL MEDICAL EXAMINATION AT A HEALTH CARE FACILITY: ICD-10-CM

## 2018-09-06 LAB
ALBUMIN SERPL-MCNC: 2.8 G/DL (ref 3.5–5.2)
ALBUMIN/GLOB SERPL: 0.5 G/DL
ALP SERPL-CCNC: 129 U/L (ref 39–117)
ALT SERPL W P-5'-P-CCNC: 6 U/L (ref 1–41)
ANION GAP SERPL CALCULATED.3IONS-SCNC: 13.6 MMOL/L
AST SERPL-CCNC: 8 U/L (ref 1–40)
BACTERIA UR QL AUTO: ABNORMAL /HPF
BASOPHILS # BLD AUTO: 0.02 10*3/MM3 (ref 0–0.2)
BASOPHILS NFR BLD AUTO: 0.3 % (ref 0–1.5)
BILIRUB SERPL-MCNC: 0.2 MG/DL (ref 0.1–1.2)
BILIRUB UR QL STRIP: NEGATIVE
BUN BLD-MCNC: 11 MG/DL (ref 6–20)
BUN/CREAT SERPL: 11.1 (ref 7–25)
CALCIUM SPEC-SCNC: 8.3 MG/DL (ref 8.6–10.5)
CHLORIDE SERPL-SCNC: 102 MMOL/L (ref 98–107)
CLARITY UR: ABNORMAL
CO2 SERPL-SCNC: 24.4 MMOL/L (ref 22–29)
COLOR UR: ABNORMAL
CREAT BLD-MCNC: 0.99 MG/DL (ref 0.76–1.27)
DEPRECATED RDW RBC AUTO: 54 FL (ref 37–54)
EOSINOPHIL # BLD AUTO: 0.16 10*3/MM3 (ref 0–0.7)
EOSINOPHIL NFR BLD AUTO: 2.3 % (ref 0.3–6.2)
ERYTHROCYTE [DISTWIDTH] IN BLOOD BY AUTOMATED COUNT: 17.1 % (ref 11.5–14.5)
GFR SERPL CREATININE-BSD FRML MDRD: 103 ML/MIN/1.73
GLOBULIN UR ELPH-MCNC: 5.2 GM/DL
GLUCOSE BLD-MCNC: 90 MG/DL (ref 65–99)
GLUCOSE UR STRIP-MCNC: NEGATIVE MG/DL
HCT VFR BLD AUTO: 24.5 % (ref 40.4–52.2)
HGB BLD-MCNC: 6.6 G/DL (ref 13.7–17.6)
HGB UR QL STRIP.AUTO: ABNORMAL
HYALINE CASTS UR QL AUTO: ABNORMAL /LPF
IMM GRANULOCYTES # BLD: 0.02 10*3/MM3 (ref 0–0.03)
IMM GRANULOCYTES NFR BLD: 0.3 % (ref 0–0.5)
KETONES UR QL STRIP: NEGATIVE
LEUKOCYTE ESTERASE UR QL STRIP.AUTO: ABNORMAL
LYMPHOCYTES # BLD AUTO: 1.33 10*3/MM3 (ref 0.9–4.8)
LYMPHOCYTES NFR BLD AUTO: 18.7 % (ref 19.6–45.3)
MCH RBC QN AUTO: 23.2 PG (ref 27–32.7)
MCHC RBC AUTO-ENTMCNC: 26.9 G/DL (ref 32.6–36.4)
MCV RBC AUTO: 86 FL (ref 79.8–96.2)
MONOCYTES # BLD AUTO: 0.48 10*3/MM3 (ref 0.2–1.2)
MONOCYTES NFR BLD AUTO: 6.8 % (ref 5–12)
NEUTROPHILS # BLD AUTO: 5.11 10*3/MM3 (ref 1.9–8.1)
NEUTROPHILS NFR BLD AUTO: 71.9 % (ref 42.7–76)
NITRITE UR QL STRIP: POSITIVE
PH UR STRIP.AUTO: 5.5 [PH] (ref 5–8)
PLATELET # BLD AUTO: 320 10*3/MM3 (ref 140–500)
PMV BLD AUTO: 8.7 FL (ref 6–12)
POTASSIUM BLD-SCNC: 4.4 MMOL/L (ref 3.5–5.2)
PROT SERPL-MCNC: 8 G/DL (ref 6–8.5)
PROT UR QL STRIP: ABNORMAL
RBC # BLD AUTO: 2.85 10*6/MM3 (ref 4.6–6)
RBC # UR: ABNORMAL /HPF
REF LAB TEST METHOD: ABNORMAL
SODIUM BLD-SCNC: 140 MMOL/L (ref 136–145)
SP GR UR STRIP: 1.02 (ref 1–1.03)
SQUAMOUS #/AREA URNS HPF: ABNORMAL /HPF
UROBILINOGEN UR QL STRIP: ABNORMAL
WBC NRBC COR # BLD: 7.1 10*3/MM3 (ref 4.5–10.7)
WBC UR QL AUTO: ABNORMAL /HPF

## 2018-09-06 PROCEDURE — 81001 URINALYSIS AUTO W/SCOPE: CPT

## 2018-09-06 PROCEDURE — 85025 COMPLETE CBC W/AUTO DIFF WBC: CPT

## 2018-09-06 PROCEDURE — 80053 COMPREHEN METABOLIC PANEL: CPT

## 2018-09-29 ENCOUNTER — APPOINTMENT (OUTPATIENT)
Dept: GENERAL RADIOLOGY | Facility: HOSPITAL | Age: 38
End: 2018-09-29

## 2018-09-29 ENCOUNTER — HOSPITAL ENCOUNTER (INPATIENT)
Facility: HOSPITAL | Age: 38
LOS: 7 days | Discharge: HOME HEALTH | End: 2018-10-06
Attending: EMERGENCY MEDICINE | Admitting: INTERNAL MEDICINE

## 2018-09-29 DIAGNOSIS — A41.9 SEPSIS, DUE TO UNSPECIFIED ORGANISM: ICD-10-CM

## 2018-09-29 DIAGNOSIS — I95.9 HYPOTENSION, UNSPECIFIED HYPOTENSION TYPE: ICD-10-CM

## 2018-09-29 DIAGNOSIS — T83.511A URINARY TRACT INFECTION ASSOCIATED WITH CATHETERIZATION OF URINARY TRACT, UNSPECIFIED INDWELLING URINARY CATHETER TYPE, INITIAL ENCOUNTER (HCC): ICD-10-CM

## 2018-09-29 DIAGNOSIS — T83.010D SUPRAPUBIC CATHETER DYSFUNCTION, SUBSEQUENT ENCOUNTER: ICD-10-CM

## 2018-09-29 DIAGNOSIS — L89.304 PRESSURE INJURY OF BUTTOCK, STAGE 4, UNSPECIFIED LATERALITY (HCC): ICD-10-CM

## 2018-09-29 DIAGNOSIS — Z78.9 IMPAIRED MOBILITY AND ADLS: ICD-10-CM

## 2018-09-29 DIAGNOSIS — Z74.09 IMPAIRED MOBILITY AND ADLS: ICD-10-CM

## 2018-09-29 DIAGNOSIS — L89.154 DECUBITUS ULCER OF SACRAL REGION, STAGE 4 (HCC): ICD-10-CM

## 2018-09-29 DIAGNOSIS — L89.224 PRESSURE INJURY OF LEFT HIP, STAGE 4 (HCC): ICD-10-CM

## 2018-09-29 DIAGNOSIS — L89.90 PRESSURE ULCER, UNSPECIFIED LOCATION, UNSPECIFIED ULCER STAGE: Primary | ICD-10-CM

## 2018-09-29 DIAGNOSIS — G82.20 PARAPLEGIA FOLLOWING SPINAL CORD INJURY (HCC): Chronic | ICD-10-CM

## 2018-09-29 DIAGNOSIS — N39.0 URINARY TRACT INFECTION ASSOCIATED WITH CATHETERIZATION OF URINARY TRACT, UNSPECIFIED INDWELLING URINARY CATHETER TYPE, INITIAL ENCOUNTER (HCC): ICD-10-CM

## 2018-09-29 DIAGNOSIS — R26.89 DECREASED MOBILITY: ICD-10-CM

## 2018-09-29 LAB
ABO GROUP BLD: NORMAL
ALBUMIN SERPL-MCNC: 2.4 G/DL (ref 3.5–5.2)
ALBUMIN/GLOB SERPL: 0.4 G/DL
ALP SERPL-CCNC: 131 U/L (ref 39–117)
ALT SERPL W P-5'-P-CCNC: 8 U/L (ref 1–41)
ANION GAP SERPL CALCULATED.3IONS-SCNC: 11.6 MMOL/L
AST SERPL-CCNC: 8 U/L (ref 1–40)
BACTERIA UR QL AUTO: ABNORMAL /HPF
BASOPHILS # BLD AUTO: 0.03 10*3/MM3 (ref 0–0.2)
BASOPHILS NFR BLD AUTO: 0.3 % (ref 0–1.5)
BILIRUB SERPL-MCNC: 0.4 MG/DL (ref 0.1–1.2)
BILIRUB UR QL STRIP: NEGATIVE
BLD GP AB SCN SERPL QL: NEGATIVE
BUN BLD-MCNC: 16 MG/DL (ref 6–20)
BUN/CREAT SERPL: 17.4 (ref 7–25)
CALCIUM SPEC-SCNC: 8.3 MG/DL (ref 8.6–10.5)
CHLORIDE SERPL-SCNC: 103 MMOL/L (ref 98–107)
CK SERPL-CCNC: 49 U/L (ref 20–200)
CLARITY UR: ABNORMAL
CO2 SERPL-SCNC: 23.4 MMOL/L (ref 22–29)
COLOR UR: ABNORMAL
CREAT BLD-MCNC: 0.92 MG/DL (ref 0.76–1.27)
D-LACTATE SERPL-SCNC: 0.4 MMOL/L (ref 0.5–2)
DEPRECATED RDW RBC AUTO: 50.8 FL (ref 37–54)
EOSINOPHIL # BLD AUTO: 0.14 10*3/MM3 (ref 0–0.7)
EOSINOPHIL NFR BLD AUTO: 1.5 % (ref 0.3–6.2)
ERYTHROCYTE [DISTWIDTH] IN BLOOD BY AUTOMATED COUNT: 16.6 % (ref 11.5–14.5)
GFR SERPL CREATININE-BSD FRML MDRD: 112 ML/MIN/1.73
GLOBULIN UR ELPH-MCNC: 5.6 GM/DL
GLUCOSE BLD-MCNC: 95 MG/DL (ref 65–99)
GLUCOSE BLDC GLUCOMTR-MCNC: 89 MG/DL (ref 70–130)
GLUCOSE UR STRIP-MCNC: NEGATIVE MG/DL
HCT VFR BLD AUTO: 25.7 % (ref 40.4–52.2)
HGB BLD-MCNC: 7.3 G/DL (ref 13.7–17.6)
HGB UR QL STRIP.AUTO: ABNORMAL
HYALINE CASTS UR QL AUTO: ABNORMAL /LPF
HYPOCHROMIA BLD QL: NORMAL
IMM GRANULOCYTES # BLD: 0.02 10*3/MM3 (ref 0–0.03)
IMM GRANULOCYTES NFR BLD: 0.2 % (ref 0–0.5)
KETONES UR QL STRIP: NEGATIVE
LEUKOCYTE ESTERASE UR QL STRIP.AUTO: ABNORMAL
LIPASE SERPL-CCNC: 20 U/L (ref 13–60)
LYMPHOCYTES # BLD AUTO: 1.49 10*3/MM3 (ref 0.9–4.8)
LYMPHOCYTES NFR BLD AUTO: 16.2 % (ref 19.6–45.3)
MCH RBC QN AUTO: 23.9 PG (ref 27–32.7)
MCHC RBC AUTO-ENTMCNC: 28.4 G/DL (ref 32.6–36.4)
MCV RBC AUTO: 84 FL (ref 79.8–96.2)
MONOCYTES # BLD AUTO: 0.8 10*3/MM3 (ref 0.2–1.2)
MONOCYTES NFR BLD AUTO: 8.7 % (ref 5–12)
NEUTROPHILS # BLD AUTO: 6.76 10*3/MM3 (ref 1.9–8.1)
NEUTROPHILS NFR BLD AUTO: 73.3 % (ref 42.7–76)
NITRITE UR QL STRIP: POSITIVE
NRBC BLD MANUAL-RTO: 0 /100 WBC (ref 0–0)
NT-PROBNP SERPL-MCNC: 527.7 PG/ML (ref 0–450)
PH UR STRIP.AUTO: 5.5 [PH] (ref 5–8)
PLAT MORPH BLD: NORMAL
PLATELET # BLD AUTO: 310 10*3/MM3 (ref 140–500)
PMV BLD AUTO: 8.6 FL (ref 6–12)
POTASSIUM BLD-SCNC: 4 MMOL/L (ref 3.5–5.2)
PROCALCITONIN SERPL-MCNC: 0.32 NG/ML (ref 0.1–0.25)
PROT SERPL-MCNC: 8 G/DL (ref 6–8.5)
PROT UR QL STRIP: ABNORMAL
RBC # BLD AUTO: 3.06 10*6/MM3 (ref 4.6–6)
RBC # UR: ABNORMAL /HPF
REF LAB TEST METHOD: ABNORMAL
RH BLD: POSITIVE
SODIUM BLD-SCNC: 138 MMOL/L (ref 136–145)
SP GR UR STRIP: 1.01 (ref 1–1.03)
SQUAMOUS #/AREA URNS HPF: ABNORMAL /HPF
T&S EXPIRATION DATE: NORMAL
TROPONIN T SERPL-MCNC: <0.01 NG/ML (ref 0–0.03)
UROBILINOGEN UR QL STRIP: ABNORMAL
WBC MORPH BLD: NORMAL
WBC NRBC COR # BLD: 9.22 10*3/MM3 (ref 4.5–10.7)
WBC UR QL AUTO: ABNORMAL /HPF

## 2018-09-29 PROCEDURE — 85007 BL SMEAR W/DIFF WBC COUNT: CPT | Performed by: EMERGENCY MEDICINE

## 2018-09-29 PROCEDURE — 83605 ASSAY OF LACTIC ACID: CPT | Performed by: EMERGENCY MEDICINE

## 2018-09-29 PROCEDURE — 25010000002 MEROPENEM PER 100 MG: Performed by: EMERGENCY MEDICINE

## 2018-09-29 PROCEDURE — 87186 SC STD MICRODIL/AGAR DIL: CPT | Performed by: EMERGENCY MEDICINE

## 2018-09-29 PROCEDURE — 84484 ASSAY OF TROPONIN QUANT: CPT | Performed by: EMERGENCY MEDICINE

## 2018-09-29 PROCEDURE — 83880 ASSAY OF NATRIURETIC PEPTIDE: CPT | Performed by: EMERGENCY MEDICINE

## 2018-09-29 PROCEDURE — 82962 GLUCOSE BLOOD TEST: CPT

## 2018-09-29 PROCEDURE — 87040 BLOOD CULTURE FOR BACTERIA: CPT | Performed by: EMERGENCY MEDICINE

## 2018-09-29 PROCEDURE — 25010000002 VANCOMYCIN PER 500 MG: Performed by: EMERGENCY MEDICINE

## 2018-09-29 PROCEDURE — 86850 RBC ANTIBODY SCREEN: CPT | Performed by: EMERGENCY MEDICINE

## 2018-09-29 PROCEDURE — 83690 ASSAY OF LIPASE: CPT | Performed by: EMERGENCY MEDICINE

## 2018-09-29 PROCEDURE — 86900 BLOOD TYPING SEROLOGIC ABO: CPT | Performed by: EMERGENCY MEDICINE

## 2018-09-29 PROCEDURE — 93010 ELECTROCARDIOGRAM REPORT: CPT | Performed by: INTERNAL MEDICINE

## 2018-09-29 PROCEDURE — 86901 BLOOD TYPING SEROLOGIC RH(D): CPT | Performed by: EMERGENCY MEDICINE

## 2018-09-29 PROCEDURE — 80053 COMPREHEN METABOLIC PANEL: CPT | Performed by: EMERGENCY MEDICINE

## 2018-09-29 PROCEDURE — 99291 CRITICAL CARE FIRST HOUR: CPT

## 2018-09-29 PROCEDURE — 87086 URINE CULTURE/COLONY COUNT: CPT | Performed by: EMERGENCY MEDICINE

## 2018-09-29 PROCEDURE — 87077 CULTURE AEROBIC IDENTIFY: CPT | Performed by: EMERGENCY MEDICINE

## 2018-09-29 PROCEDURE — 84145 PROCALCITONIN (PCT): CPT | Performed by: EMERGENCY MEDICINE

## 2018-09-29 PROCEDURE — 82550 ASSAY OF CK (CPK): CPT | Performed by: EMERGENCY MEDICINE

## 2018-09-29 PROCEDURE — 93005 ELECTROCARDIOGRAM TRACING: CPT | Performed by: EMERGENCY MEDICINE

## 2018-09-29 PROCEDURE — 86923 COMPATIBILITY TEST ELECTRIC: CPT

## 2018-09-29 PROCEDURE — 71045 X-RAY EXAM CHEST 1 VIEW: CPT

## 2018-09-29 PROCEDURE — 81001 URINALYSIS AUTO W/SCOPE: CPT | Performed by: EMERGENCY MEDICINE

## 2018-09-29 PROCEDURE — 85025 COMPLETE CBC W/AUTO DIFF WBC: CPT | Performed by: EMERGENCY MEDICINE

## 2018-09-29 RX ORDER — SODIUM CHLORIDE 0.9 % (FLUSH) 0.9 %
1-10 SYRINGE (ML) INJECTION AS NEEDED
Status: DISCONTINUED | OUTPATIENT
Start: 2018-09-29 | End: 2018-10-06 | Stop reason: HOSPADM

## 2018-09-29 RX ORDER — ONDANSETRON 4 MG/1
4 TABLET, FILM COATED ORAL EVERY 6 HOURS PRN
Status: DISCONTINUED | OUTPATIENT
Start: 2018-09-29 | End: 2018-10-06 | Stop reason: HOSPADM

## 2018-09-29 RX ORDER — SODIUM CHLORIDE 0.9 % (FLUSH) 0.9 %
10 SYRINGE (ML) INJECTION AS NEEDED
Status: DISCONTINUED | OUTPATIENT
Start: 2018-09-29 | End: 2018-10-06 | Stop reason: HOSPADM

## 2018-09-29 RX ORDER — ONDANSETRON 4 MG/1
4 TABLET, ORALLY DISINTEGRATING ORAL EVERY 6 HOURS PRN
Status: DISCONTINUED | OUTPATIENT
Start: 2018-09-29 | End: 2018-10-06 | Stop reason: HOSPADM

## 2018-09-29 RX ORDER — VANCOMYCIN HYDROCHLORIDE 1 G/200ML
20 INJECTION, SOLUTION INTRAVENOUS ONCE
Status: COMPLETED | OUTPATIENT
Start: 2018-09-29 | End: 2018-09-29

## 2018-09-29 RX ORDER — SODIUM CHLORIDE 9 MG/ML
125 INJECTION, SOLUTION INTRAVENOUS CONTINUOUS
Status: DISCONTINUED | OUTPATIENT
Start: 2018-09-29 | End: 2018-09-30

## 2018-09-29 RX ORDER — ONDANSETRON 2 MG/ML
4 INJECTION INTRAMUSCULAR; INTRAVENOUS EVERY 6 HOURS PRN
Status: DISCONTINUED | OUTPATIENT
Start: 2018-09-29 | End: 2018-10-06 | Stop reason: HOSPADM

## 2018-09-29 RX ORDER — GABAPENTIN 300 MG/1
300 CAPSULE ORAL 3 TIMES DAILY
Status: ON HOLD | COMMUNITY
End: 2018-10-06

## 2018-09-29 RX ADMIN — MEROPENEM 1 G: 1 INJECTION, POWDER, FOR SOLUTION INTRAVENOUS at 20:34

## 2018-09-29 RX ADMIN — VANCOMYCIN HYDROCHLORIDE 1000 MG: 1 INJECTION, SOLUTION INTRAVENOUS at 22:32

## 2018-09-29 RX ADMIN — SODIUM CHLORIDE 1371 ML: 9 INJECTION, SOLUTION INTRAVENOUS at 19:35

## 2018-09-29 RX ADMIN — SODIUM CHLORIDE 125 ML/HR: 9 INJECTION, SOLUTION INTRAVENOUS at 20:45

## 2018-09-30 ENCOUNTER — APPOINTMENT (OUTPATIENT)
Dept: CT IMAGING | Facility: HOSPITAL | Age: 38
End: 2018-09-30

## 2018-09-30 LAB
ABO + RH BLD: NORMAL
ALBUMIN SERPL-MCNC: 2.3 G/DL (ref 3.5–5.2)
ALBUMIN/GLOB SERPL: 0.5 G/DL
ALP SERPL-CCNC: 114 U/L (ref 39–117)
ALT SERPL W P-5'-P-CCNC: 6 U/L (ref 1–41)
ANION GAP SERPL CALCULATED.3IONS-SCNC: 9.9 MMOL/L
AST SERPL-CCNC: 8 U/L (ref 1–40)
BASOPHILS # BLD AUTO: 0.02 10*3/MM3 (ref 0–0.2)
BASOPHILS NFR BLD AUTO: 0.2 % (ref 0–1.5)
BH BB BLOOD EXPIRATION DATE: NORMAL
BH BB BLOOD TYPE BARCODE: 8400
BH BB DISPENSE STATUS: NORMAL
BH BB PRODUCT CODE: NORMAL
BH BB UNIT NUMBER: NORMAL
BILIRUB SERPL-MCNC: 0.6 MG/DL (ref 0.1–1.2)
BUN BLD-MCNC: 14 MG/DL (ref 6–20)
BUN/CREAT SERPL: 19.4 (ref 7–25)
CA-I BLD-MCNC: 4.9 MG/DL (ref 4.6–5.4)
CA-I SERPL ISE-MCNC: 1.22 MMOL/L (ref 1.15–1.35)
CALCIUM SPEC-SCNC: 7.9 MG/DL (ref 8.6–10.5)
CHLORIDE SERPL-SCNC: 108 MMOL/L (ref 98–107)
CK SERPL-CCNC: 42 U/L (ref 20–200)
CO2 SERPL-SCNC: 22.1 MMOL/L (ref 22–29)
CREAT BLD-MCNC: 0.72 MG/DL (ref 0.76–1.27)
D-LACTATE SERPL-SCNC: 0.5 MMOL/L (ref 0.5–2)
DEPRECATED RDW RBC AUTO: 50.6 FL (ref 37–54)
EOSINOPHIL # BLD AUTO: 0.01 10*3/MM3 (ref 0–0.7)
EOSINOPHIL NFR BLD AUTO: 0.1 % (ref 0.3–6.2)
ERYTHROCYTE [DISTWIDTH] IN BLOOD BY AUTOMATED COUNT: 16.3 % (ref 11.5–14.5)
GFR SERPL CREATININE-BSD FRML MDRD: 148 ML/MIN/1.73
GLOBULIN UR ELPH-MCNC: 4.9 GM/DL
GLUCOSE BLD-MCNC: 146 MG/DL (ref 65–99)
HCT VFR BLD AUTO: 25.1 % (ref 40.4–52.2)
HGB BLD-MCNC: 7.4 G/DL (ref 13.7–17.6)
IMM GRANULOCYTES # BLD: 0.03 10*3/MM3 (ref 0–0.03)
IMM GRANULOCYTES NFR BLD: 0.3 % (ref 0–0.5)
INR PPP: 1.37 (ref 0.9–1.1)
LYMPHOCYTES # BLD AUTO: 0.52 10*3/MM3 (ref 0.9–4.8)
LYMPHOCYTES NFR BLD AUTO: 5.5 % (ref 19.6–45.3)
MAGNESIUM SERPL-MCNC: 1.7 MG/DL (ref 1.6–2.6)
MCH RBC QN AUTO: 25 PG (ref 27–32.7)
MCHC RBC AUTO-ENTMCNC: 29.5 G/DL (ref 32.6–36.4)
MCV RBC AUTO: 84.8 FL (ref 79.8–96.2)
MONOCYTES # BLD AUTO: 0.29 10*3/MM3 (ref 0.2–1.2)
MONOCYTES NFR BLD AUTO: 3.1 % (ref 5–12)
NEUTROPHILS # BLD AUTO: 8.58 10*3/MM3 (ref 1.9–8.1)
NEUTROPHILS NFR BLD AUTO: 91.1 % (ref 42.7–76)
NT-PROBNP SERPL-MCNC: 555.8 PG/ML (ref 5–450)
PHOSPHATE SERPL-MCNC: 3.2 MG/DL (ref 2.5–4.5)
PLATELET # BLD AUTO: 259 10*3/MM3 (ref 140–500)
PMV BLD AUTO: 8.7 FL (ref 6–12)
POTASSIUM BLD-SCNC: 3.9 MMOL/L (ref 3.5–5.2)
PROCALCITONIN SERPL-MCNC: 0.23 NG/ML (ref 0.1–0.25)
PROT SERPL-MCNC: 7.2 G/DL (ref 6–8.5)
PROTHROMBIN TIME: 16.7 SECONDS (ref 11.7–14.2)
RBC # BLD AUTO: 2.96 10*6/MM3 (ref 4.6–6)
SODIUM BLD-SCNC: 140 MMOL/L (ref 136–145)
UNIT  ABO: NORMAL
UNIT  RH: NORMAL
WBC NRBC COR # BLD: 9.42 10*3/MM3 (ref 4.5–10.7)

## 2018-09-30 PROCEDURE — 82550 ASSAY OF CK (CPK): CPT | Performed by: INTERNAL MEDICINE

## 2018-09-30 PROCEDURE — 25010000002 HYDROCORTISONE SODIUM SUCCINATE 100 MG RECONSTITUTED SOLUTION: Performed by: EMERGENCY MEDICINE

## 2018-09-30 PROCEDURE — 84145 PROCALCITONIN (PCT): CPT | Performed by: INTERNAL MEDICINE

## 2018-09-30 PROCEDURE — 97110 THERAPEUTIC EXERCISES: CPT

## 2018-09-30 PROCEDURE — 82330 ASSAY OF CALCIUM: CPT | Performed by: INTERNAL MEDICINE

## 2018-09-30 PROCEDURE — 83880 ASSAY OF NATRIURETIC PEPTIDE: CPT | Performed by: INTERNAL MEDICINE

## 2018-09-30 PROCEDURE — 25010000002 MEROPENEM PER 100 MG: Performed by: INTERNAL MEDICINE

## 2018-09-30 PROCEDURE — 80053 COMPREHEN METABOLIC PANEL: CPT | Performed by: INTERNAL MEDICINE

## 2018-09-30 PROCEDURE — P9016 RBC LEUKOCYTES REDUCED: HCPCS

## 2018-09-30 PROCEDURE — 85610 PROTHROMBIN TIME: CPT | Performed by: INTERNAL MEDICINE

## 2018-09-30 PROCEDURE — 99223 1ST HOSP IP/OBS HIGH 75: CPT | Performed by: INTERNAL MEDICINE

## 2018-09-30 PROCEDURE — 25010000002 VANCOMYCIN 750 MG RECONSTITUTED SOLUTION: Performed by: INTERNAL MEDICINE

## 2018-09-30 PROCEDURE — 36430 TRANSFUSION BLD/BLD COMPNT: CPT

## 2018-09-30 PROCEDURE — 85025 COMPLETE CBC W/AUTO DIFF WBC: CPT | Performed by: INTERNAL MEDICINE

## 2018-09-30 PROCEDURE — 83605 ASSAY OF LACTIC ACID: CPT | Performed by: INTERNAL MEDICINE

## 2018-09-30 PROCEDURE — 86900 BLOOD TYPING SEROLOGIC ABO: CPT

## 2018-09-30 PROCEDURE — 84100 ASSAY OF PHOSPHORUS: CPT | Performed by: INTERNAL MEDICINE

## 2018-09-30 PROCEDURE — 97162 PT EVAL MOD COMPLEX 30 MIN: CPT

## 2018-09-30 PROCEDURE — 74176 CT ABD & PELVIS W/O CONTRAST: CPT

## 2018-09-30 PROCEDURE — 83735 ASSAY OF MAGNESIUM: CPT | Performed by: INTERNAL MEDICINE

## 2018-09-30 RX ORDER — OXYCODONE HYDROCHLORIDE 15 MG/1
15 TABLET ORAL EVERY 4 HOURS PRN
Status: DISCONTINUED | OUTPATIENT
Start: 2018-09-30 | End: 2018-10-06 | Stop reason: HOSPADM

## 2018-09-30 RX ORDER — HYDROCORTISONE 10 MG/1
10 TABLET ORAL
Status: DISCONTINUED | OUTPATIENT
Start: 2018-09-30 | End: 2018-10-02

## 2018-09-30 RX ORDER — HYDROCORTISONE 10 MG/1
10 TABLET ORAL 2 TIMES DAILY WITH MEALS
Status: DISCONTINUED | OUTPATIENT
Start: 2018-09-30 | End: 2018-09-30

## 2018-09-30 RX ORDER — PANTOPRAZOLE SODIUM 40 MG/1
40 TABLET, DELAYED RELEASE ORAL
Status: DISCONTINUED | OUTPATIENT
Start: 2018-09-30 | End: 2018-10-06 | Stop reason: HOSPADM

## 2018-09-30 RX ORDER — HYDROMORPHONE HYDROCHLORIDE 4 MG/1
4 TABLET ORAL EVERY 8 HOURS PRN
Status: DISCONTINUED | OUTPATIENT
Start: 2018-09-30 | End: 2018-10-06 | Stop reason: HOSPADM

## 2018-09-30 RX ORDER — FERROUS SULFATE 325(65) MG
325 TABLET ORAL
Status: DISCONTINUED | OUTPATIENT
Start: 2018-09-30 | End: 2018-10-06 | Stop reason: HOSPADM

## 2018-09-30 RX ORDER — HYDROCORTISONE 10 MG/1
20 TABLET ORAL
Status: DISCONTINUED | OUTPATIENT
Start: 2018-09-30 | End: 2018-10-02

## 2018-09-30 RX ORDER — OXYBUTYNIN CHLORIDE 5 MG/1
5 TABLET ORAL DAILY
Status: DISCONTINUED | OUTPATIENT
Start: 2018-09-30 | End: 2018-10-06 | Stop reason: HOSPADM

## 2018-09-30 RX ORDER — GABAPENTIN 300 MG/1
300 CAPSULE ORAL 3 TIMES DAILY
Status: DISCONTINUED | OUTPATIENT
Start: 2018-09-30 | End: 2018-10-06 | Stop reason: HOSPADM

## 2018-09-30 RX ORDER — MIDODRINE HYDROCHLORIDE 5 MG/1
10 TABLET ORAL
Status: DISCONTINUED | OUTPATIENT
Start: 2018-09-30 | End: 2018-10-02

## 2018-09-30 RX ADMIN — PANTOPRAZOLE SODIUM 40 MG: 40 TABLET, DELAYED RELEASE ORAL at 11:31

## 2018-09-30 RX ADMIN — DAKIN'S SOLUTION 0.125% (QUARTER STRENGTH) 946 ML: 0.12 SOLUTION at 14:00

## 2018-09-30 RX ADMIN — COLLAGENASE SANTYL 1 APPLICATION: 250 OINTMENT TOPICAL at 14:00

## 2018-09-30 RX ADMIN — HYDROCORTISONE SODIUM SUCCINATE 50 MG: 100 INJECTION, POWDER, FOR SOLUTION INTRAMUSCULAR; INTRAVENOUS at 00:16

## 2018-09-30 RX ADMIN — OXYCODONE HYDROCHLORIDE 15 MG: 15 TABLET ORAL at 08:25

## 2018-09-30 RX ADMIN — OXYCODONE HYDROCHLORIDE 15 MG: 15 TABLET ORAL at 15:59

## 2018-09-30 RX ADMIN — HYDROMORPHONE HYDROCHLORIDE 4 MG: 4 TABLET ORAL at 19:28

## 2018-09-30 RX ADMIN — OXYCODONE HYDROCHLORIDE 15 MG: 15 TABLET ORAL at 02:20

## 2018-09-30 RX ADMIN — SODIUM CHLORIDE 750 MG: 900 INJECTION, SOLUTION INTRAVENOUS at 11:31

## 2018-09-30 RX ADMIN — OXYBUTYNIN CHLORIDE 5 MG: 5 TABLET ORAL at 11:32

## 2018-09-30 RX ADMIN — HYDROMORPHONE HYDROCHLORIDE 4 MG: 4 TABLET ORAL at 11:31

## 2018-09-30 RX ADMIN — GABAPENTIN 300 MG: 300 CAPSULE ORAL at 21:24

## 2018-09-30 RX ADMIN — MEROPENEM 1 G: 1 INJECTION, POWDER, FOR SOLUTION INTRAVENOUS at 08:17

## 2018-09-30 RX ADMIN — GABAPENTIN 300 MG: 300 CAPSULE ORAL at 15:41

## 2018-09-30 RX ADMIN — MEROPENEM 1 G: 1 INJECTION, POWDER, FOR SOLUTION INTRAVENOUS at 15:40

## 2018-09-30 RX ADMIN — GABAPENTIN 300 MG: 300 CAPSULE ORAL at 08:18

## 2018-10-01 LAB
ANISOCYTOSIS BLD QL: NORMAL
BASOPHILS # BLD AUTO: 0.05 10*3/MM3 (ref 0–0.2)
BASOPHILS NFR BLD AUTO: 0.5 % (ref 0–1.5)
DACRYOCYTES BLD QL SMEAR: NORMAL
DEPRECATED RDW RBC AUTO: 52.3 FL (ref 37–54)
ELLIPTOCYTES BLD QL SMEAR: NORMAL
EOSINOPHIL # BLD AUTO: 0.2 10*3/MM3 (ref 0–0.7)
EOSINOPHIL NFR BLD AUTO: 2.1 % (ref 0.3–6.2)
ERYTHROCYTE [DISTWIDTH] IN BLOOD BY AUTOMATED COUNT: 16.5 % (ref 11.5–14.5)
HCT VFR BLD AUTO: 27.7 % (ref 40.4–52.2)
HGB BLD-MCNC: 7.9 G/DL (ref 13.7–17.6)
HYPOCHROMIA BLD QL: NORMAL
IMM GRANULOCYTES # BLD: 0.04 10*3/MM3 (ref 0–0.03)
IMM GRANULOCYTES NFR BLD: 0.4 % (ref 0–0.5)
LYMPHOCYTES # BLD AUTO: 2.22 10*3/MM3 (ref 0.9–4.8)
LYMPHOCYTES NFR BLD AUTO: 23.6 % (ref 19.6–45.3)
MCH RBC QN AUTO: 24.6 PG (ref 27–32.7)
MCHC RBC AUTO-ENTMCNC: 28.5 G/DL (ref 32.6–36.4)
MCV RBC AUTO: 86.3 FL (ref 79.8–96.2)
MONOCYTES # BLD AUTO: 0.82 10*3/MM3 (ref 0.2–1.2)
MONOCYTES NFR BLD AUTO: 8.7 % (ref 5–12)
NEUTROPHILS # BLD AUTO: 6.13 10*3/MM3 (ref 1.9–8.1)
NEUTROPHILS NFR BLD AUTO: 65.1 % (ref 42.7–76)
NRBC BLD MANUAL-RTO: 0 /100 WBC (ref 0–0)
PLAT MORPH BLD: NORMAL
PLATELET # BLD AUTO: 325 10*3/MM3 (ref 140–500)
PMV BLD AUTO: 8.8 FL (ref 6–12)
RBC # BLD AUTO: 3.21 10*6/MM3 (ref 4.6–6)
WBC MORPH BLD: NORMAL
WBC NRBC COR # BLD: 9.42 10*3/MM3 (ref 4.5–10.7)

## 2018-10-01 PROCEDURE — 85007 BL SMEAR W/DIFF WBC COUNT: CPT | Performed by: INTERNAL MEDICINE

## 2018-10-01 PROCEDURE — 99232 SBSQ HOSP IP/OBS MODERATE 35: CPT | Performed by: INTERNAL MEDICINE

## 2018-10-01 PROCEDURE — 85025 COMPLETE CBC W/AUTO DIFF WBC: CPT | Performed by: INTERNAL MEDICINE

## 2018-10-01 PROCEDURE — 25010000002 MEROPENEM PER 100 MG: Performed by: INTERNAL MEDICINE

## 2018-10-01 RX ADMIN — OXYCODONE HYDROCHLORIDE 15 MG: 15 TABLET ORAL at 04:43

## 2018-10-01 RX ADMIN — HYDROMORPHONE HYDROCHLORIDE 4 MG: 4 TABLET ORAL at 16:28

## 2018-10-01 RX ADMIN — OXYCODONE HYDROCHLORIDE 15 MG: 15 TABLET ORAL at 20:08

## 2018-10-01 RX ADMIN — GABAPENTIN 300 MG: 300 CAPSULE ORAL at 20:04

## 2018-10-01 RX ADMIN — GABAPENTIN 300 MG: 300 CAPSULE ORAL at 09:46

## 2018-10-01 RX ADMIN — MIDODRINE HYDROCHLORIDE 10 MG: 5 TABLET ORAL at 09:43

## 2018-10-01 RX ADMIN — MIDODRINE HYDROCHLORIDE 10 MG: 5 TABLET ORAL at 18:50

## 2018-10-01 RX ADMIN — DAKIN'S SOLUTION 0.125% (QUARTER STRENGTH) 946 ML: 0.12 SOLUTION at 15:30

## 2018-10-01 RX ADMIN — PANTOPRAZOLE SODIUM 40 MG: 40 TABLET, DELAYED RELEASE ORAL at 09:46

## 2018-10-01 RX ADMIN — OXYCODONE HYDROCHLORIDE 15 MG: 15 TABLET ORAL at 09:47

## 2018-10-01 RX ADMIN — MEROPENEM 1 G: 1 INJECTION, POWDER, FOR SOLUTION INTRAVENOUS at 00:46

## 2018-10-01 RX ADMIN — OXYBUTYNIN CHLORIDE 5 MG: 5 TABLET ORAL at 09:43

## 2018-10-01 RX ADMIN — MIDODRINE HYDROCHLORIDE 10 MG: 5 TABLET ORAL at 13:21

## 2018-10-01 RX ADMIN — OXYCODONE HYDROCHLORIDE 15 MG: 15 TABLET ORAL at 15:03

## 2018-10-01 RX ADMIN — OXYCODONE HYDROCHLORIDE 15 MG: 15 TABLET ORAL at 00:13

## 2018-10-01 RX ADMIN — GABAPENTIN 300 MG: 300 CAPSULE ORAL at 15:04

## 2018-10-01 RX ADMIN — FERROUS SULFATE TAB 325 MG (65 MG ELEMENTAL FE) 325 MG: 325 (65 FE) TAB at 09:43

## 2018-10-01 NOTE — ACP (ADVANCE CARE PLANNING)
I was requested to see patient regarding Advance Directive.  Patient declined and wants everything done.

## 2018-10-01 NOTE — PROGRESS NOTES
Malnutrition Severity Assessment    Patient Name:  Joaquín Sherman  YOB: 1980  MRN: [id number]  Admit Date:  9/29/2018    Patient meets criteria for : Severe malnutrition    Severe malnutrition. BMI-12.4, 54% IBW.  From nutrition focused physical exam, severe muscle and fat loss.    Malnutrition Type: Social/Environmental Circumstance Malnutrition     Malnutrition Type (last 8 hours)      Malnutrition Severity Assessment     Row Name 10/01/18 1104       Malnutrition Severity Assessment    Malnutrition Type Social/Environmental Circumstance Malnutrition    Row Name 10/01/18 1104       Physical Signs of Malnutrition (Social/Environmental)    Muscle Wasting Severe    Fat Loss Severe    Row Name 10/01/18 1104       Weight Status (Social/Environmental)    BMI Severe (<16)   BMI-12.4    %IBW Severe (<70%)   54%    Row Name 10/01/18 1104       Criteria Met (Must meet criteria for severity in at least 2 of these categories: M Wasting, Fat Loss, Fluid, Secondary Signs, Wt. Status, Intake)    Patient meets criteria for  Severe malnutrition          Electronically signed by:  Katiana Colby RD  10/01/18 11:08 AM

## 2018-10-01 NOTE — NURSING NOTE
CWOCN- ordered a Clinitron bed with trapeze from [facility]. Confirmation number is [id number].  We will see patient today.

## 2018-10-01 NOTE — PLAN OF CARE
"Problem: Fall Risk (Adult)  Goal: Absence of Fall  Outcome: Ongoing (interventions implemented as appropriate)      Problem: Pain, Chronic (Adult)  Goal: Acceptable Pain/Comfort Level and Functional Ability  Outcome: Ongoing (interventions implemented as appropriate)      Problem: Pain, Acute (Adult)  Goal: Acceptable Pain Control/Comfort Level  Outcome: Ongoing (interventions implemented as appropriate)      Problem: Sepsis/Septic Shock (Adult)  Goal: Signs and Symptoms of Listed Potential Problems Will be Absent, Minimized or Managed (Sepsis/Septic Shock)  Outcome: Ongoing (interventions implemented as appropriate)      Problem: Skin Injury Risk (Adult)  Goal: Skin Health and Integrity  Outcome: Ongoing (interventions implemented as appropriate)      Problem: Wound (Includes Pressure Injury) (Adult)  Goal: Signs and Symptoms of Listed Potential Problems Will be Absent, Minimized or Managed (Wound)  Outcome: Ongoing (interventions implemented as appropriate)      Problem: Patient Care Overview  Goal: Plan of Care Review   10/01/18 0421   Coping/Psychosocial   Plan of Care Reviewed With patient   Plan of Care Review   Progress no change   OTHER   Outcome Summary patient is alert/oriented and understands needs of care; refused wound care changes r/t last change was \"just before dinner\"; suprapubic cath last changed at first urology office on 9/12/18 (per MD note); colostomy intact with recently changed appliance; patient refuses to turn - states \"need clintron mattress\"; awaiting WOCN assessment/recommendation for wound care; CCP for placement vs home with family         "
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
## 2018-10-01 NOTE — NURSING NOTE
CWOCN consult for multiple skin issues as noted from flow sheet. Where depth is not charted, wounds are flush with skin- moist tissue noted as tissue has filled in and granulated from deeper wounds- sacrum, trochanters, ischium and knee bones have been exposed in the past- these are stage 4 pressure injury.  Prior, patient's heels have also had wounds with bone exposed. These have epithelialized. Patient well known to me. He remains thin with dry skin. There are areas on the skin where it is flaky and dry or blood blisters have formed and dried as noted. Patient has had multiple prior surgeries from pressure injury in the past. Patient has multiple wounds also that do not present as pressure. Patient will be on a Dolphin bed with trapeze, which has arrived and staff to place after wound assessment complete.   Patient reports that wounds have improved with the current treatment plan:  Lower extremity wounds- knees: Dakins moistened nonadherent dressing with abd and kerlix.  RLE lateral wound (which has epithelialized)- santyl, + dry abd and kerlix  Sacral/trochanter/ischial wounds- dakins moistened kerlix, abd pads  Back/lumbar, left penis/groin wounds- santyl and cover dressing (recommend silicone border for back/lumbar and dry abd to left penis/groin)  Silicone border to left heel. Patient states he keeps his feet and legs wrapped with kerlix as well.  Discussed care with patient who verbalized understanding and agreement. Discussed with RN.    10/01/18 1612   Wound 09/29/18 Right lateral knee pressure injury   Date first assessed: 09/29/18   Side: Right  Orientation: lateral  Location: knee  Type: pressure injury  Stage, Pressure Injury: Stage 2   Dressing Appearance moist drainage   Base moist;pink  (red)   Periwound pink;other (see comments)  (scar tissue)   Periwound Temperature warm   Edges irregular   Wound Length (cm) 10 cm   Wound Width (cm) 7 cm   Drainage Characteristics/Odor serosanguineous   Drainage  Amount small   Care, Wound cleansed with;sterile normal saline   Dressing Care, Wound dressing changed;other (see comments)  (dakins moistened ABD, dry abd, kerlix, tape)   Periwound Care, Wound dry periwound area maintained   Wound 09/29/18 Left medial knee pressure injury   Date first assessed: 09/29/18   Side: Left  Orientation: medial  Location: knee  Type: pressure injury  Stage, Pressure Injury: Stage 2;Stage 3   Dressing Appearance moist drainage   Base moist;pink;other (see comments)  (red)   Periwound pink;other (see comments)  (small circular dark/ecchymotic area at skin edge)   Periwound Temperature warm   Periwound Skin Turgor (skin not epithelialized around the knee joint)   Edges irregular   Wound Length (cm) 12 cm   Wound Width (cm) 5 cm   Drainage Characteristics/Odor serosanguineous   Drainage Amount small   Care, Wound cleansed with;sterile normal saline   Dressing Care, Wound dressing changed;gauze;other (see comments)  (dakins moistened abd pad, dry abd pad, kerlix)   Periwound Care, Wound dry periwound area maintained   Wound 09/29/18 Left lateral hip pressure injury   Date first assessed: 09/29/18   Side: Left  Orientation: lateral  Location: hip  Type: pressure injury  Stage, Pressure Injury: Stage 3   Dressing Appearance moist drainage   Base moist;pink  (pink tissue surrounds bone- able to palpate shape of bone)   Periwound pink;other (see comments)  (flaky, scar tissue)   Periwound Temperature warm   Edges irregular   Wound Length (cm) 4.5 cm   Wound Width (cm) 8 cm   Tunneling [Depth (cm)/Location] 1.4 @3-5oclock   Undermining [Depth (cm)/Location] 1.3 @12oclock   Drainage Characteristics/Odor serosanguineous   Drainage Amount small   Care, Wound cleansed with;sterile normal saline   Dressing Care, Wound dressing changed;gauze;other (see comments)  (dakins moistened kerlix, abd pad)   Periwound Care, Wound dry periwound area maintained   Wound 09/29/18 Right lateral hip pressure injury    Date first assessed: 09/29/18   Side: Right  Orientation: lateral  Location: hip  Type: pressure injury   Dressing Appearance moist drainage   Base moist;pink;other (see comments)  (red, bone)   Periwound pink  (scar tissue)   Periwound Temperature warm   Edges irregular   Wound Length (cm) 4 cm   Wound Width (cm) 1 cm   Wound Depth (cm) 3 cm   Tunneling [Depth (cm)/Location] 4 medially   Drainage Characteristics/Odor serosanguineous   Drainage Amount small   Care, Wound cleansed with;sterile normal saline   Dressing Care, Wound dressing changed;gauze  (dakins moistened kerlix, abd)   Periwound Care, Wound dry periwound area maintained   Wound 09/29/18 Left lateral groin pressure injury   Date first assessed: 09/29/18   Side: Left  Orientation: lateral  Location: groin  Type: pressure injury  Stage, Pressure Injury: Stage 2   Dressing Appearance moist drainage   Base moist;pink;yellow   Periwound moist   Periwound Temperature warm   Periwound Skin Turgor soft   Edges open   Wound Length (cm) 2 cm   Wound Width (cm) 2 cm   Drainage Characteristics/Odor serosanguineous   Drainage Amount scant   Care, Wound cleansed with;sterile normal saline;collagenase agent applied   Dressing Care, Wound (abd pad tucked in groin)   Periwound Care, Wound dry periwound area maintained   Wound 10/01/18 transverse;other (see comments) sacral spine pressure injury   Date first assessed: 10/01/18   Present On Admission : yes  Orientation: transverse;other (see comments)  Location: sacral spine  Type: pressure injury  Stage, Pressure Injury: Stage 4  Additional Comments: wound from left to right trochanter at sacru...   Dressing Appearance moist drainage   Base moist;pink;red/granulating;other (see comments)  (beefy red)   Periwound pink;other (see comments)  (scar tissue, epithelialization)   Periwound Temperature warm   Edges irregular   Wound Length (cm) 27 cm   Wound Width (cm) 23 cm   Drainage Characteristics/Odor serosanguineous    Drainage Amount moderate   Care, Wound cleansed with;sterile normal saline   Dressing Care, Wound dressing changed;gauze;other (see comments)  (dakins moistened kerlix, abd pads)   Periwound Care, Wound dry periwound area maintained   Wound 10/01/18 Left heel other (see comments)   Date first assessed: 10/01/18   Present On Admission : yes  Side: Left  Location: heel  Type: other (see comments)  Additional Comments: within prior scar on heel with dry, flaky skin   Dressing Appearance dry   Base clean;moist;pink   Periwound pink;other (see comments)  (dry, flaky)   Periwound Temperature warm   Edges irregular   Wound Length (cm) 1 cm   Wound Width (cm) 0.3 cm   Drainage Characteristics/Odor serous   Drainage Amount scant   Care, Wound cleansed with;sterile normal saline   Dressing Care, Wound dressing changed;border dressing;silicone  (kerlix)   Periwound Care, Wound dry periwound area maintained   Wound 10/01/18 Left penis MASD (moisture associated skin damage)   Date first assessed: 10/01/18   Side: Left  Location: penis  Type: MASD (moisture associated skin damage)  Additional Comments: open at left side/base of penis   Dressing Appearance moist drainage   Base moist;purple;other (see comments)  (red)   Periwound moist   Periwound Temperature warm   Periwound Skin Turgor soft   Edges open   Wound Length (cm) 2 cm   Wound Width (cm) 2 cm   Drainage Characteristics/Odor serosanguineous   Drainage Amount scant   Care, Wound cleansed with;sterile normal saline;collagenase agent applied   Dressing Care, Wound dressing changed;other (see comments)  (abd pad)   Periwound Care, Wound dry periwound area maintained   Wound 10/01/18 Right lateral leg other (see comments)   Date first assessed: 10/01/18   Present On Admission : yes  Side: Right  Orientation: lateral  Location: leg  Type: (c) other (see comments)  Additional Comments: moist crusts along pink scar tissue   Dressing Appearance dry   Base  epithelialization;pink;other (see comments)  (moist skin flakes and crusts along pink scar tissue)   Periwound intact;dry   Edges (closed)   Drainage Amount none   Care, Wound cleansed with;sterile normal saline;collagenase agent applied  (patient applies to crusts)   Dressing Care, Wound dressing changed;gauze   Periwound Care, Wound dry periwound area maintained   Wound 10/01/18 Right posterior knee   Date first assessed: 10/01/18   Present On Admission : yes  Side: Right  Orientation: posterior  Location: knee   Dressing Appearance moist drainage   Base moist;pink;other (see comments)  (red)   Periwound intact   Periwound Temperature warm   Edges irregular   Wound Length (cm) 1 cm   Wound Width (cm) 4 cm   Drainage Characteristics/Odor serosanguineous   Drainage Amount small   Care, Wound cleansed with;sterile normal saline   Dressing Care, Wound dressing changed;gauze  (dakins moistened abd, abd, kerlix)   Periwound Care, Wound dry periwound area maintained   Wound 10/01/18 Left lateral knee other (see comments)   Date first assessed: 10/01/18   Present On Admission : yes  Side: Left  Orientation: lateral  Location: knee  Type: other (see comments)  Additional Comments: additional area of left knee wound   Dressing Appearance moist drainage   Base moist;other (see comments)  (red)   Periwound pink;other (see comments)  (flakey; epithelial tissue between knee wounds)   Periwound Temperature warm   Edges irregular   Wound Length (cm) 1 cm   Wound Width (cm) 2 cm   Drainage Characteristics/Odor serosanguineous   Drainage Amount scant   Care, Wound cleansed with;sterile normal saline   Dressing Care, Wound dressing changed;gauze  (dakins moistened abd, dry abd, kerlix)   Periwound Care, Wound dry periwound area maintained   Wound 10/01/18 Right lateral;lower leg blisters   Date first assessed: 10/01/18   Side: Right  Orientation: lateral;lower  Location: leg  Type: blisters   Dressing Appearance dry   Base  maroon/purple;other (see comments)  (dry blister; not a bony prominence)   Periwound dry  (flaky)   Periwound Temperature warm   Edges irregular   Wound Length (cm) 3 cm   Wound Width (cm) 2.5 cm   Drainage Amount none   Dressing Care, Wound dressing changed;gauze  (dry kerlix)   Periwound Care, Wound dry periwound area maintained   Wound 10/01/18 back other (see comments)   Date first assessed: 10/01/18   Present On Admission : (c) yes  Location: back  Type: (c) other (see comments)   Dressing Appearance open to air   Base moist;pink   Periwound intact   Periwound Temperature warm   Edges irregular  (not circular)   Wound Length (cm) 2 cm   Wound Width (cm) 2 cm   Drainage Characteristics/Odor serosanguineous   Drainage Amount scant   Care, Wound cleansed with;sterile normal saline;collagenase agent applied  (per patient request)   Dressing Care, Wound border dressing;silicone   Periwound Care, Wound dry periwound area maintained   Wound 10/01/18 other (see notes) blisters   Date first assessed: 10/01/18   Location: (c) other (see notes)  Type: blisters   Dressing Appearance open to air   Base other (see comments)  (dry blood blister)   Periwound intact   Periwound Temperature warm   Edges irregular   Wound Length (cm) 2 cm   Wound Width (cm) 1 cm   Drainage Amount none   Care, Wound cleansed with;sterile normal saline;collagenase agent applied  (per patient request)   Dressing Care, Wound dressing changed;border dressing;silicone   Periwound Care, Wound dry periwound area maintained

## 2018-10-01 NOTE — PROGRESS NOTES
LOS: 2 days     Chief Complaint:  Follow-up hypotension    Interval History:  BP at baseline. No new symptoms. No fevers. CT A/P reassuring as it ruled out any acute process.    ROS: no n/v/d    Vital Signs  Temp:  [96.9 °F (36.1 °C)-97.5 °F (36.4 °C)] 96.9 °F (36.1 °C)  Heart Rate:  [] 71  Resp:  [18] 18  BP: ()/(50-78) 82/50    Physical Exam:  GENERAL: resting comfortably  Eyes: no scleral icterus  CV: NR, RR  VASC: R chest port w/o erythema  LUNGS: normal respiratory effort.   GI: Soft, mild LUQ TTP   Skin/MSK: Pressure ulcers of the bilateral knees show superficial with pink granulation tissue and scant serosanguineous drainage, bilateral hip and left groin pressure injuries also without active infection    Meds:    Current Facility-Administered Medications:   •  collagenase ointment 1 application, 1 application, Topical, BID, Angel Gannon MD, 1 application at 09/30/18 1400  •  ferrous sulfate tablet 325 mg, 325 mg, Oral, Daily With Breakfast, Angel Gannon MD  •  gabapentin (NEURONTIN) capsule 300 mg, 300 mg, Oral, TID, Phoenix Nolasco MD, 300 mg at 09/30/18 2124  •  hydrocortisone (CORTEF) tablet 10 mg, 10 mg, Oral, Daily With Dinner, Angel Gannon MD  •  hydrocortisone (CORTEF) tablet 20 mg, 20 mg, Oral, Daily With Breakfast, Angel Gannon MD  •  HYDROmorphone (DILAUDID) tablet 4 mg, 4 mg, Oral, Q8H PRN, Phoenix Nolasco MD, 4 mg at 09/30/18 1928  •  midodrine (PROAMATINE) tablet 10 mg, 10 mg, Oral, TID AC, Angel Gannon MD  •  ondansetron (ZOFRAN) tablet 4 mg, 4 mg, Oral, Q6H PRN **OR** ondansetron ODT (ZOFRAN-ODT) disintegrating tablet 4 mg, 4 mg, Oral, Q6H PRN **OR** ondansetron (ZOFRAN) injection 4 mg, 4 mg, Intravenous, Q6H PRN, Phoenix Nolasco MD  •  oxybutynin (DITROPAN) tablet 5 mg, 5 mg, Oral, Daily, Angel Gannon MD, 5 mg at 09/30/18 1132  •  oxyCODONE (ROXICODONE) immediate release tablet 15 mg, 15 mg, Oral, Q4H PRN,  Phoenix Nolasco MD, 15 mg at 10/01/18 0443  •  oxyCODONE (ROXICODONE) immediate release tablet 15 mg, 15 mg, Oral, Q4H PRN, Angel Gannon MD  •  pantoprazole (PROTONIX) EC tablet 40 mg, 40 mg, Oral, QAM AC, Angel Gannon MD, 40 mg at 09/30/18 1131  •  sodium chloride 0.9 % bolus 500 mL, 500 mL, Intravenous, Once, Azalia Austin MD, Last Rate: 500 mL/hr at 09/29/18 2239, 500 mL at 09/29/18 2239  •  sodium chloride 0.9 % flush 1-10 mL, 1-10 mL, Intravenous, PRN, Phoenix Nolasco MD  •  Insert peripheral IV, , , Once **AND** sodium chloride 0.9 % flush 10 mL, 10 mL, Intravenous, PRN, Azalia Austin MD  •  Sodium Hypochlorite 0.0625 % (Dakin's 1/8th Strength) topical solution, 946 mL, Irrigation, Q12H, Angel Gannon MD, 946 mL at 09/30/18 1400    LABS:  CBC, BMP, micro, procal reviewed today  Lab Results   Component Value Date    WBC 9.42 09/30/2018    HGB 7.4 (L) 09/30/2018    HCT 25.1 (L) 09/30/2018    MCV 84.8 09/30/2018     09/30/2018     Lab Results   Component Value Date    GLUCOSE 146 (H) 09/30/2018    CALCIUM 7.9 (L) 09/30/2018     09/30/2018    K 3.9 09/30/2018    CO2 22.1 09/30/2018     (H) 09/30/2018    BUN 14 09/30/2018    CREATININE 0.72 (L) 09/30/2018    EGFRIFAFRI 148 09/30/2018    BCR 19.4 09/30/2018    ANIONGAP 9.9 09/30/2018       procal 0.23    Microbiology:  9/29 BCx: NGTD  9/29 UCx: 100k GNR x 2    Radiology (personally reviewed):   CT A/P negative for acute abnormality. It does show chronic osteomyelitis of hip as expected; no abscess    Assessment/Plan   1.  Hypotension  2.  Stage 3-4 sacral decubitus ulcers  3.  Chronic suprapubic catheter  4. Chronic LUQ pain    CT A/P negative for new acute process. No clear etiology of chronic LUQ pain. I agree with Dr Moran's assessment yesterday that the patient does not have sepsis. No fevers, normal WBC, normal HR. He has baseline hypotension in the 80s systolic and is stable at this time. I have  stopped meropenem. UCx is of no significance this admission with chronic suprapubic catheter and no symptoms of  infection.    Thank you for allowing me to be involved in the care of this patient. Infectious diseases will sign off at this time  but please call me at [phone] if any further questions.

## 2018-10-01 NOTE — PROGRESS NOTES
Angel Gannon MD                          [phone]      Patient ID:    Name:  Joaquín Sherman    MRN:  [id number]    1980   38 y.o.  male            Patient Care Team:  Salvador Jaramillo MD as PCP - General (Family Medicine)    CC/Reason for visit:     Subjective: Pt seen and examined this AM. No acute overnight events noted. No more N/V. CT abd showed no acute issues. Still has belly pain if u ask him.     Objective     Vital Signs past 24hrs    BP range: BP: (80-90)/(45-62) 90/62  Pulse range: Heart Rate:  [71-91] 71  Resp rate range: Resp:  [18] 18  Temp range: Temp (24hrs), Av °F (36.1 °C), Min:96.9 °F (36.1 °C), Max:97 °F (36.1 °C)      Ventilator/Non-Invasive Ventilation Settings     None          Device (Oxygen Therapy): room air       41.7 kg (91 lb 14.9 oz); Body mass index is 12.47 kg/m².      Intake/Output Summary (Last 24 hours) at 10/01/18 1845  Last data filed at 10/01/18 1655   Gross per 24 hour   Intake             1520 ml   Output             1350 ml   Net              170 ml       Exam:    GEN:  Chronically ill appearing, No respiratory distress, Cachectic  EYES:   EOMI, anicteric sclera bilat  ENT:    External ears/nose normal, OP clear  NECK:  Supple, midline trachea, No JVD or cervical LApathy  LUNGS: Bilateral breath sounds heard, No wheezes/ crackles  CV:  Regular rate and rhythm, No murmur  ABD:  + guarding, no distended, no palpable liver or masses, SPC+   EXT:  No cyanosis or clubbing, trace peripheral/sacral edema, Chronic deformities+     Scheduled meds:      collagenase 1 application Topical BID   ferrous sulfate 325 mg Oral Daily With Breakfast   gabapentin 300 mg Oral TID   hydrocortisone 10 mg Oral Daily With Dinner   hydrocortisone 20 mg Oral Daily With Breakfast   midodrine 10 mg Oral TID AC   oxybutynin 5 mg Oral Daily   pantoprazole 40 mg Oral QAM AC   sodium chloride 500 mL Intravenous Once   Sodium Hypochlorite 0.0625 %  (Dakin's 1/8th Strength) topical solution 946 mL Irrigation Q12H       IV meds:                           Data Review:        Results from last 7 days  Lab Units 10/01/18  1504 09/30/18  0601 09/29/18 1929   SODIUM mmol/L  --  140 138   POTASSIUM mmol/L  --  3.9 4.0   CHLORIDE mmol/L  --  108* 103   CO2 mmol/L  --  22.1 23.4   BUN mg/dL  --  14 16   CREATININE mg/dL  --  0.72* 0.92   CALCIUM mg/dL  --  7.9* 8.3*   BILIRUBIN mg/dL  --  0.6 0.4   ALK PHOS U/L  --  114 131*   ALT (SGPT) U/L  --  6 8   AST (SGOT) U/L  --  8 8   GLUCOSE mg/dL  --  146* 95   WBC 10*3/mm3 9.42 9.42 9.22   HEMOGLOBIN g/dL 7.9* 7.4* 7.3*   PLATELETS 10*3/mm3 325 259 310   INR   --  1.37*  --    PROBNP pg/mL  --  555.8* 527.7*   PROCALCITONIN ng/mL  --  0.23 0.32*       Lab Results   Component Value Date    CALCIUM 7.9 (L) 09/30/2018    PHOS 3.2 09/30/2018         Results from last 7 days  Lab Units 09/29/18 2025 09/29/18 1959 09/29/18 1929   BLOODCX  No growth at 24 hours  --  No growth at 24 hours   URINECX   --  >100,000 CFU/mL Gram Negative Bacilli*  >100,000 CFU/mL Gram Negative Bacilli*  --               Results Review:    I have reviewed the available laboratory results and reviewed the chest imaging personally and summarized it below    Imaging Results (all)     Procedure Component Value Units Date/Time    XR Chest 1 View [[id number]] Collected:  09/29/18 1946     Updated:  09/29/18 1957    Narrative:       PORTABLE CHEST     HISTORY:  Evaluate for pneumonia.     COMPARISON:  Chest x-ray 02/14/2018.     A Mediport is noted with the tip in the right atrium.  The heart is  within normal limits in size. There is elevation of the right  hemidiaphragm, which was the case previously. The pulmonary interstitium  is mildly prominent diffusely. There is mild atelectasis and a small  amount of pleural fluid present at the right lung base, which is  increased slightly as compared to the prior examination. There is no  evidence of  "consolidation or of congestive failure.     This report was finalized on 9/29/2018 7:54 PM by Dr. Mariano Sawyer M.D.           ASSESSMENT:   Hypotension   Suspicion for sepsis   Chronic Malnutrition - refused PEG   Anemia ? Chronic   Chronic pain syndrome   Chronic immobility Syndrome   Indwelling suprapubic brambila with h/o Rt hydroureter/nephrosis     Paraplegia from [facility]   Chronic Stage 4 decubitus Ulcers with h/o osteomyelitis 2/18   H/o MRSA/ ESBL infections   Chronic Adrenal insufficiency     PLAN:  BP improved on restarting midodrine/ florinef but pt says he \" scared to have BP >100\". No apparent reason.    CT abdomen pelvis WNL. .  Patient has chronic anemia.  I would hold off on transfusions unless his hemoglobin drops less than 7.  We will restart home iron.   We will c/w home pain medications except for the fentanyl patch for now.  Appreciate urology/ID  Input. Agree with dcing abx.  Patient would like to go home with a hospital bed, home health and wants to restart his prior primary care physician.  He attributes the current issues to eating discharged from his nursing home without bed, home health.  CCP/SW to help.    I have discussed my findings and recommendations with patient, nursing staff and consulting provider.     Angel Gannon MD  10/1/2018  "

## 2018-10-02 LAB
BACTERIA SPEC AEROBE CULT: ABNORMAL
BACTERIA SPEC AEROBE CULT: ABNORMAL

## 2018-10-02 RX ORDER — FENTANYL 50 UG/H
1 PATCH TRANSDERMAL
Status: DISCONTINUED | OUTPATIENT
Start: 2018-10-02 | End: 2018-10-06 | Stop reason: HOSPADM

## 2018-10-02 RX ADMIN — OXYCODONE HYDROCHLORIDE 15 MG: 15 TABLET ORAL at 18:48

## 2018-10-02 RX ADMIN — PANTOPRAZOLE SODIUM 40 MG: 40 TABLET, DELAYED RELEASE ORAL at 07:58

## 2018-10-02 RX ADMIN — HYDROMORPHONE HYDROCHLORIDE 4 MG: 4 TABLET ORAL at 12:02

## 2018-10-02 RX ADMIN — OXYCODONE HYDROCHLORIDE 15 MG: 15 TABLET ORAL at 07:58

## 2018-10-02 RX ADMIN — COLLAGENASE SANTYL 1 APPLICATION: 250 OINTMENT TOPICAL at 23:58

## 2018-10-02 RX ADMIN — HYDROMORPHONE HYDROCHLORIDE 4 MG: 4 TABLET ORAL at 20:55

## 2018-10-02 RX ADMIN — OXYCODONE HYDROCHLORIDE 15 MG: 15 TABLET ORAL at 23:08

## 2018-10-02 RX ADMIN — OXYBUTYNIN CHLORIDE 5 MG: 5 TABLET ORAL at 08:03

## 2018-10-02 RX ADMIN — COLLAGENASE SANTYL 1 APPLICATION: 250 OINTMENT TOPICAL at 08:03

## 2018-10-02 RX ADMIN — GABAPENTIN 300 MG: 300 CAPSULE ORAL at 15:27

## 2018-10-02 RX ADMIN — DAKIN'S SOLUTION 0.125% (QUARTER STRENGTH) 946 ML: 0.12 SOLUTION at 23:59

## 2018-10-02 RX ADMIN — GABAPENTIN 300 MG: 300 CAPSULE ORAL at 20:55

## 2018-10-02 RX ADMIN — FERROUS SULFATE TAB 325 MG (65 MG ELEMENTAL FE) 325 MG: 325 (65 FE) TAB at 07:59

## 2018-10-02 RX ADMIN — GABAPENTIN 300 MG: 300 CAPSULE ORAL at 08:04

## 2018-10-02 RX ADMIN — OXYCODONE HYDROCHLORIDE 15 MG: 15 TABLET ORAL at 00:05

## 2018-10-02 RX ADMIN — FENTANYL 1 PATCH: 50 PATCH, EXTENDED RELEASE TRANSDERMAL at 20:55

## 2018-10-02 RX ADMIN — DAKIN'S SOLUTION 0.125% (QUARTER STRENGTH) 946 ML: 0.12 SOLUTION at 08:04
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
## 2018-10-02 NOTE — NURSING NOTE
Call was placed to attending MD, Dr. Gannon, to inform of urine culture results. No new orders, per Dr. Gannon.

## 2018-10-02 NOTE — PLAN OF CARE
Problem: Fall Risk (Adult)  Goal: Absence of Fall  Outcome: Ongoing (interventions implemented as appropriate)      Problem: Pain, Chronic (Adult)  Goal: Acceptable Pain/Comfort Level and Functional Ability  Outcome: Ongoing (interventions implemented as appropriate)      Problem: Pain, Acute (Adult)  Goal: Acceptable Pain Control/Comfort Level  Outcome: Ongoing (interventions implemented as appropriate)      Problem: Skin Injury Risk (Adult)  Goal: Skin Health and Integrity  Outcome: Ongoing (interventions implemented as appropriate)      Problem: Patient Care Overview  Goal: Plan of Care Review  Outcome: Ongoing (interventions implemented as appropriate)   10/02/18 0440   Coping/Psychosocial   Plan of Care Reviewed With patient   Plan of Care Review   Progress no change   OTHER   Outcome Summary WOCN completed all wound care about dinner time, patient refused any changes tonight - all dressing CDI; ostomy intact with flatus/no stool; oxy for pain control; awaiting CCP for placement

## 2018-10-02 NOTE — PLAN OF CARE
Problem: Fall Risk (Adult)  Goal: Absence of Fall  Outcome: Ongoing (interventions implemented as appropriate)      Problem: Pain, Chronic (Adult)  Goal: Acceptable Pain/Comfort Level and Functional Ability  Outcome: Ongoing (interventions implemented as appropriate)      Problem: Pain, Acute (Adult)  Goal: Acceptable Pain Control/Comfort Level  Outcome: Ongoing (interventions implemented as appropriate)      Problem: Sepsis/Septic Shock (Adult)  Goal: Signs and Symptoms of Listed Potential Problems Will be Absent, Minimized or Managed (Sepsis/Septic Shock)  Outcome: Ongoing (interventions implemented as appropriate)      Problem: Skin Injury Risk (Adult)  Goal: Skin Health and Integrity  Outcome: Ongoing (interventions implemented as appropriate)      Problem: Wound (Includes Pressure Injury) (Adult)  Goal: Signs and Symptoms of Listed Potential Problems Will be Absent, Minimized or Managed (Wound)  Outcome: Ongoing (interventions implemented as appropriate)      Problem: Patient Care Overview  Goal: Plan of Care Review  Outcome: Ongoing (interventions implemented as appropriate)   10/02/18 0485   Coping/Psychosocial   Plan of Care Reviewed With patient   Plan of Care Review   Progress no change   OTHER   Outcome Summary Pt is A&O. PRN pain medications given at request. R port saline locked. Ostomy, no stool, just flatus. All wound dressings were changed . Awaiting for discharge plans/placement for patient.      Goal: Individualization and Mutuality  Outcome: Ongoing (interventions implemented as appropriate)    Goal: Discharge Needs Assessment  Outcome: Ongoing (interventions implemented as appropriate)    Goal: Interprofessional Rounds/Family Conf  Outcome: Ongoing (interventions implemented as appropriate)
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
## 2018-10-02 NOTE — NURSING NOTE
"Patient refused wound care, stating \"just changed at dinner time\"; allowed to assess all sites/dressings.  Patient insists did not have suprapubic catheter changed in urology office on 9/12 and that it needs to be changed;  Site is clean, dry, intact - refuses to allow nurse or aide to clean catheter.  "

## 2018-10-02 NOTE — PROGRESS NOTES
Continued Stay Note  [facility]     Patient Name: Joaquín Sherman  MRN: [id number]  Today's Date: 10/2/2018    Admit Date: 9/29/2018          Discharge Plan     Row Name 10/02/18 1629       Plan    Plan Comments Pt last discharged (2/2018) with [facility] services. CCP awaiting return contact from [facility] ([place]) to find out if pt is current to resume services. Pt/family express difficultly managing home care needs. CCP made referral to [facility] (Tab, [phone]) for pt to be evaluated for in-home services, a process which is first-come, first-serve and can take a couple of weeks. Pt continues to report that he no longer possesses his [facility] clinitron bed which was reportedly also in need of repair. [facility] contacted [facility] customer service (Jeffery, [phone], Account # [id number]) who reports pt's bed, which was well over five years old, has been discontinued and is no longer serviceable. Per Jeffery, clinitron beds are no longer approved for in-home use, and typically a low air loss mattress is the alternative available product arranged to replace obsolete clinitron beds. Per Jeffery, [facility] no longer accepts Medicaid and cannot assist in arranging a new bed for pt. [facility] contacted [facility] (Toño, unable to leave [facility]) and [employer] (Tasha) to obtain information about available products and pt's insurance coverage eligibility, and will f/u to assist with d/c planning as able. Kathryn Pollack LCSW              Discharge Codes    No documentation.           Eremlinda Pollack LCSW

## 2018-10-02 NOTE — SIGNIFICANT NOTE
10/02/18 1547   Rehab Treatment   Discipline physical therapist   Reason Treatment Not Performed patient/family declined treatment  (Pt. adamantly refused to work with P.T. this day.  Education and encouragement given but pt. continued to decline due to fatigue, pain.  Will follow up tomorrow.)   Recommendation   PT - Next Appointment 10/03/18

## 2018-10-02 NOTE — PROGRESS NOTES
Angel Gannon MD                          [phone]      Patient ID:    Name:  Joaquín Sherman    MRN:  [id number]    1980   38 y.o.  male            Patient Care Team:  Salvador Jaramillo MD as PCP - General (Family Medicine)    CC/Reason for visit:     Subjective: Pt seen and examined this AM. No acute overnight events noted. No more N/V. CT abd showed no acute issues. Still has belly pain if u ask him.     Objective     Vital Signs past 24hrs    BP range: BP: ()/(60-74) 110/64  Pulse range: Heart Rate:  [54-98] 98  Resp rate range: Resp:  [16-18] 18  Temp range: Temp (24hrs), Av.5 °F (36.4 °C), Min:96.9 °F (36.1 °C), Max:98.1 °F (36.7 °C)      Ventilator/Non-Invasive Ventilation Settings     None          Device (Oxygen Therapy): room air       41.7 kg (91 lb 14.9 oz); Body mass index is 12.47 kg/m².      Intake/Output Summary (Last 24 hours) at 10/02/18 1732  Last data filed at 10/02/18 0403   Gross per 24 hour   Intake                0 ml   Output              950 ml   Net             -950 ml       Exam:    GEN:  Chronically ill appearing, No respiratory distress, Cachectic  EYES:   EOMI, anicteric sclera bilat  ENT:    External ears/nose normal, OP clear  NECK:  Supple, midline trachea, No JVD or cervical LApathy  LUNGS: Bilateral breath sounds heard, No wheezes/ crackles  CV:  Regular rate and rhythm, No murmur  ABD:  + guarding, no distended, no palpable liver or masses, SPC+   EXT:  No cyanosis or clubbing, trace peripheral/sacral edema, Chronic deformities+     Scheduled meds:      collagenase 1 application Topical BID   ferrous sulfate 325 mg Oral Daily With Breakfast   gabapentin 300 mg Oral TID   hydrocortisone 10 mg Oral Daily With Dinner   hydrocortisone 20 mg Oral Daily With Breakfast   midodrine 10 mg Oral TID AC   oxybutynin 5 mg Oral Daily   pantoprazole 40 mg Oral QAM AC   sodium chloride 500 mL Intravenous Once   Sodium Hypochlorite  0.0625 % (Dakin's 1/8th Strength) topical solution 946 mL Irrigation Q12H       IV meds:                           Data Review:        Results from last 7 days  Lab Units 10/01/18  1504 09/30/18  0601 09/29/18 1929   SODIUM mmol/L  --  140 138   POTASSIUM mmol/L  --  3.9 4.0   CHLORIDE mmol/L  --  108* 103   CO2 mmol/L  --  22.1 23.4   BUN mg/dL  --  14 16   CREATININE mg/dL  --  0.72* 0.92   CALCIUM mg/dL  --  7.9* 8.3*   BILIRUBIN mg/dL  --  0.6 0.4   ALK PHOS U/L  --  114 131*   ALT (SGPT) U/L  --  6 8   AST (SGOT) U/L  --  8 8   GLUCOSE mg/dL  --  146* 95   WBC 10*3/mm3 9.42 9.42 9.22   HEMOGLOBIN g/dL 7.9* 7.4* 7.3*   PLATELETS 10*3/mm3 325 259 310   INR   --  1.37*  --    PROBNP pg/mL  --  555.8* 527.7*   PROCALCITONIN ng/mL  --  0.23 0.32*       Lab Results   Component Value Date    CALCIUM 7.9 (L) 09/30/2018    PHOS 3.2 09/30/2018         Results from last 7 days  Lab Units 09/29/18 2025 09/29/18 1959 09/29/18 1929   BLOODCX  No growth at 2 days  --  No growth at 2 days   URINECX   --  >100,000 CFU/mL Escherichia coli ESBL*  >100,000 CFU/mL Klebsiella pneumoniae ESBL*  --               Results Review:    I have reviewed the available laboratory results and reviewed the chest imaging personally and summarized it below    Imaging Results (all)     Procedure Component Value Units Date/Time    XR Chest 1 View [[id number]] Collected:  09/29/18 1946     Updated:  09/29/18 1957    Narrative:       PORTABLE CHEST     HISTORY:  Evaluate for pneumonia.     COMPARISON:  Chest x-ray 02/14/2018.     A Mediport is noted with the tip in the right atrium.  The heart is  within normal limits in size. There is elevation of the right  hemidiaphragm, which was the case previously. The pulmonary interstitium  is mildly prominent diffusely. There is mild atelectasis and a small  amount of pleural fluid present at the right lung base, which is  increased slightly as compared to the prior examination. There is no  evidence of  "consolidation or of congestive failure.     This report was finalized on 9/29/2018 7:54 PM by Dr. Mariano Sawyer M.D.           ASSESSMENT:   Hypotension   Suspicion for sepsis   Chronic Malnutrition - refused PEG   Anemia ? Chronic   Chronic pain syndrome   Chronic immobility Syndrome   Indwelling suprapubic brambila with h/o Rt hydroureter/nephrosis     Paraplegia from [facility]   Chronic Stage 4 decubitus Ulcers with h/o osteomyelitis 2/18   H/o MRSA/ ESBL infections   Chronic Adrenal insufficiency     PLAN:  Refused his midodrine and solu cortef as he is scared of BP>100 ?UNCLEAR REASON. Will dc as he is refusing anyways.    CT abdomen pelvis WNL but still has \"lot of pain\".   Patient has chronic anemia.  I would hold off on transfusions unless his hemoglobin drops less than 7. On home iron. Asking for a transfusion - Told him we dont transfuse for this level.   Will restart home fentanyl patch as he says he needs it.   Appreciate urology/ID  Input. Agree with dcing abx.    Long d/w CCP about dc plan. Awaiting special bed for severe decub ulcer.     >30 mn spt care - more than half the time in co-ordinating care and counselling as above    I have discussed my findings and recommendations with patient, nursing staff and consulting provider.     Angel Gannon MD  10/2/2018  "

## 2018-10-02 NOTE — DISCHARGE PLACEMENT REQUEST
"Joaquín Youssef (38 y.o. Male)     Date of Birth Social Security Number Address Home Phone MRN    1980  [address] [place], [place] [id number] [phone] [id number]    Mu-ism Marital Status          None Single       Admission Date Admission Type Admitting Provider Attending Provider Department, Room/Bed    9/29/18 Emergency Ayo Franks MD Nidadavolu, Vinay Gopal, MD [address], P395/1    Discharge Date Discharge Disposition Discharge Destination                       Attending Provider:  Angel Gannon MD    Allergies:  Amoxicillin-pot Clavulanate, Ibuprofen, Ketorolac Tromethamine    Isolation:  Contact   Infection:  ESBL Klebsiella (10/02/18), ESBL E coli (10/02/18), MRSA (10/01/17), 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)   Code Status:  CPR    Ht:  182.9 cm (72\")   Wt:  41.7 kg (91 lb 14.9 oz)    Admission Cmt:  None   Principal Problem:  None                Active Insurance as of 9/29/2018     Primary Coverage     Payor Plan Insurance Group Employer/Plan Group    [place]      Payor Plan Address Payor Plan Phone Number Effective From Effective To    [address] [phone] 9/29/2018     [facility] [id number]       Subscriber Name Subscriber Birth Date Member ID       JOAQUÍN YOUSSEF NEGRA 1980 [id number]                 Emergency Contacts      (Rel.) Home Phone Work Phone Mobile Phone    Shiloh Guzman (Mother) [phone] -- --    Jenni Khoury (Sister) [phone] -- --              "

## 2018-10-03 RX ORDER — ALPRAZOLAM 0.5 MG/1
1 TABLET ORAL 2 TIMES DAILY PRN
Status: DISCONTINUED | OUTPATIENT
Start: 2018-10-03 | End: 2018-10-06 | Stop reason: HOSPADM

## 2018-10-03 RX ADMIN — OXYCODONE HYDROCHLORIDE 15 MG: 15 TABLET ORAL at 02:43

## 2018-10-03 RX ADMIN — OXYBUTYNIN CHLORIDE 5 MG: 5 TABLET ORAL at 08:27

## 2018-10-03 RX ADMIN — ALPRAZOLAM 1 MG: 0.5 TABLET ORAL at 18:29

## 2018-10-03 RX ADMIN — GABAPENTIN 300 MG: 300 CAPSULE ORAL at 21:09

## 2018-10-03 RX ADMIN — HYDROMORPHONE HYDROCHLORIDE 4 MG: 4 TABLET ORAL at 20:15

## 2018-10-03 RX ADMIN — OXYCODONE HYDROCHLORIDE 15 MG: 15 TABLET ORAL at 15:54

## 2018-10-03 RX ADMIN — HYDROMORPHONE HYDROCHLORIDE 4 MG: 4 TABLET ORAL at 04:12

## 2018-10-03 RX ADMIN — FERROUS SULFATE TAB 325 MG (65 MG ELEMENTAL FE) 325 MG: 325 (65 FE) TAB at 08:27

## 2018-10-03 RX ADMIN — GABAPENTIN 300 MG: 300 CAPSULE ORAL at 08:27

## 2018-10-03 RX ADMIN — DAKIN'S SOLUTION 0.125% (QUARTER STRENGTH) 946 ML: 0.12 SOLUTION at 08:27

## 2018-10-03 RX ADMIN — OXYCODONE HYDROCHLORIDE 15 MG: 15 TABLET ORAL at 06:55

## 2018-10-03 RX ADMIN — PANTOPRAZOLE SODIUM 40 MG: 40 TABLET, DELAYED RELEASE ORAL at 06:55

## 2018-10-03 RX ADMIN — HYDROMORPHONE HYDROCHLORIDE 4 MG: 4 TABLET ORAL at 12:20

## 2018-10-03 RX ADMIN — OXYCODONE HYDROCHLORIDE 15 MG: 15 TABLET ORAL at 21:09

## 2018-10-03 RX ADMIN — OXYCODONE HYDROCHLORIDE 15 MG: 15 TABLET ORAL at 11:04

## 2018-10-03 RX ADMIN — COLLAGENASE SANTYL 1 APPLICATION: 250 OINTMENT TOPICAL at 08:27

## 2018-10-03 RX ADMIN — GABAPENTIN 300 MG: 300 CAPSULE ORAL at 15:54

## 2018-10-03 NOTE — PLAN OF CARE
Problem: Fall Risk (Adult)  Goal: Absence of Fall  Outcome: Ongoing (interventions implemented as appropriate)      Problem: Pain, Chronic (Adult)  Goal: Acceptable Pain/Comfort Level and Functional Ability  Outcome: Ongoing (interventions implemented as appropriate)      Problem: Pain, Acute (Adult)  Goal: Acceptable Pain Control/Comfort Level  Outcome: Ongoing (interventions implemented as appropriate)      Problem: Sepsis/Septic Shock (Adult)  Goal: Signs and Symptoms of Listed Potential Problems Will be Absent, Minimized or Managed (Sepsis/Septic Shock)  Outcome: Ongoing (interventions implemented as appropriate)      Problem: Skin Injury Risk (Adult)  Goal: Skin Health and Integrity  Outcome: Ongoing (interventions implemented as appropriate)      Problem: Wound (Includes Pressure Injury) (Adult)  Goal: Signs and Symptoms of Listed Potential Problems Will be Absent, Minimized or Managed (Wound)  Outcome: Ongoing (interventions implemented as appropriate)      Problem: Patient Care Overview  Goal: Plan of Care Review  Outcome: Ongoing (interventions implemented as appropriate)   10/03/18 0510   Coping/Psychosocial   Plan of Care Reviewed With patient   Plan of Care Review   Progress no change   OTHER   Outcome Summary meds per order, pain meds per request, skin care per order and protocol, no falls, see vs and labs     Goal: Individualization and Mutuality  Outcome: Ongoing (interventions implemented as appropriate)    Goal: Discharge Needs Assessment  Outcome: Ongoing (interventions implemented as appropriate)    Goal: Interprofessional Rounds/Family Conf  Outcome: Ongoing (interventions implemented as appropriate)

## 2018-10-03 NOTE — PLAN OF CARE
Problem: Patient Care Overview  Goal: Plan of Care Review  Outcome: Ongoing (interventions implemented as appropriate)   10/03/18 9879   Coping/Psychosocial   Plan of Care Reviewed With patient   Plan of Care Review   Progress no change   OTHER   Outcome Summary Pt A&Ox4, VSS, pain 10/10 constantly despite pain meds. Dressing changes complete - done more so how the pt requests or he will refuse. Awaiting plan for D/C. Will continue to monitor.
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
## 2018-10-03 NOTE — SIGNIFICANT NOTE
10/03/18 1547   Rehab Treatment   Discipline physical therapist   Reason Treatment Not Performed patient/family declined treatment  (Pt declines PT treatment today, said he had enough off a workout when getting his bandages changed earlier today. Cont to refuse despite encouragement and education. Will follow up tomorrow.)   Recommendation   PT - Next Appointment 10/04/18

## 2018-10-03 NOTE — PROGRESS NOTES
Continued Stay Note  [facility]     Patient Name: Joaquín Sherman  MRN: [id number]  Today's Date: 10/3/2018    Admit Date: 9/29/2018          Discharge Plan     Row Name 10/03/18 1211       Plan    Plan Comments [facility] unable to accept  t for HH due to not being able to accept [place] medicaid.   Referral called to Janine with [facility] to follow for HH upon D/C.  [facility] continues to work on getting speciality  bed for pt upon D/C.              Discharge Codes    No documentation.           MISHA Carrillo

## 2018-10-04 LAB
BACTERIA SPEC AEROBE CULT: NORMAL
BACTERIA SPEC AEROBE CULT: NORMAL

## 2018-10-04 RX ORDER — ACETAMINOPHEN 325 MG/1
650 TABLET ORAL EVERY 6 HOURS PRN
Status: DISCONTINUED | OUTPATIENT
Start: 2018-10-04 | End: 2018-10-06 | Stop reason: HOSPADM

## 2018-10-04 RX ADMIN — HYDROMORPHONE HYDROCHLORIDE 4 MG: 4 TABLET ORAL at 04:21

## 2018-10-04 RX ADMIN — FERROUS SULFATE TAB 325 MG (65 MG ELEMENTAL FE) 325 MG: 325 (65 FE) TAB at 11:11

## 2018-10-04 RX ADMIN — OXYCODONE HYDROCHLORIDE 15 MG: 15 TABLET ORAL at 11:12

## 2018-10-04 RX ADMIN — OXYCODONE HYDROCHLORIDE 15 MG: 15 TABLET ORAL at 00:50

## 2018-10-04 RX ADMIN — HYDROMORPHONE HYDROCHLORIDE 4 MG: 4 TABLET ORAL at 13:23

## 2018-10-04 RX ADMIN — GABAPENTIN 300 MG: 300 CAPSULE ORAL at 08:47

## 2018-10-04 RX ADMIN — OXYCODONE HYDROCHLORIDE 15 MG: 15 TABLET ORAL at 15:26

## 2018-10-04 RX ADMIN — ACETAMINOPHEN 650 MG: 325 TABLET, FILM COATED ORAL at 22:21

## 2018-10-04 RX ADMIN — DAKIN'S SOLUTION 0.125% (QUARTER STRENGTH) 946 ML: 0.12 SOLUTION at 00:20

## 2018-10-04 RX ADMIN — COLLAGENASE SANTYL 1 APPLICATION: 250 OINTMENT TOPICAL at 00:20

## 2018-10-04 RX ADMIN — PANTOPRAZOLE SODIUM 40 MG: 40 TABLET, DELAYED RELEASE ORAL at 06:29

## 2018-10-04 RX ADMIN — GABAPENTIN 300 MG: 300 CAPSULE ORAL at 15:26

## 2018-10-04 RX ADMIN — OXYBUTYNIN CHLORIDE 5 MG: 5 TABLET ORAL at 11:12

## 2018-10-04 RX ADMIN — HYDROMORPHONE HYDROCHLORIDE 4 MG: 4 TABLET ORAL at 20:52

## 2018-10-04 RX ADMIN — OXYCODONE HYDROCHLORIDE 15 MG: 15 TABLET ORAL at 06:29

## 2018-10-04 RX ADMIN — ALPRAZOLAM 1 MG: 0.5 TABLET ORAL at 13:23

## 2018-10-04 RX ADMIN — ALPRAZOLAM 1 MG: 0.5 TABLET ORAL at 00:19

## 2018-10-04 RX ADMIN — GABAPENTIN 300 MG: 300 CAPSULE ORAL at 20:49
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
## 2018-10-04 NOTE — PROGRESS NOTES
Continued Stay Note  [facility]     Patient Name: Joaquín Sherman  MRN: [id number]  Today's Date: 10/4/2018    Admit Date: 9/29/2018          Discharge Plan     Row Name 10/04/18 1409       Plan    Plan Comments [place]/[facility] and Rod on unit to obtain orders for equipment. Tasha/[facility] submitted paperwork for wheelchair and informed patient at bedside that a prior authorization is required and will take 2-3 weeks. Eugene to contact patient at home after discharge regarding status of w/c. [place]/[facility] contacted [facility]CP. [facility] bed; low air loss mattress; overhead trapeze and w/c cushion has been approved and is covered at 100%. Says company is attempting to contact mother to arrange time for delivery and set-up. Continue to follow.    Row Name 10/04/18 6339       Plan    Plan Home with family and [facility] pending a reliable friend/family member is agreeable to learn dressing changes.    Patient/Family in Agreement with Plan yes    Plan Comments Discussed case with Janine/[facility]. States patient's mother says he is unable to return home unless he has a bed and a nurse daily for dressing changes. Janine/[facility] says she informed family [facility] is unable to provide visits daily and a reliable friend/family must be present and agreeable to learn. CCP spoke with patient at bedside to inform of mother's requests. States he does not want to return to SNF and he has friends available to assist. Instructed him to provide CCP with names and phone numbers to ensure he will have help at home. Will f/u in am. Call placed to patient's mother. Left voice message x2. Await call back.              Discharge Codes    No documentation.           Caron Gamino RN

## 2018-10-04 NOTE — SIGNIFICANT NOTE
"   10/04/18 1358   Rehab Treatment   Reason Treatment Not Performed patient/family declined treatment  (Pt refused therapy again stating \"he does not need PT\" explained to pt on the benefits and why he needs PT, but pt still refuses stating \"I don't need it.\")     "
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
## 2018-10-04 NOTE — PLAN OF CARE
Problem: Fall Risk (Adult)  Goal: Absence of Fall  Outcome: Ongoing (interventions implemented as appropriate)      Problem: Pain, Chronic (Adult)  Goal: Acceptable Pain/Comfort Level and Functional Ability  Outcome: Ongoing (interventions implemented as appropriate)      Problem: Pain, Acute (Adult)  Goal: Acceptable Pain Control/Comfort Level  Outcome: Ongoing (interventions implemented as appropriate)      Problem: Sepsis/Septic Shock (Adult)  Goal: Signs and Symptoms of Listed Potential Problems Will be Absent, Minimized or Managed (Sepsis/Septic Shock)  Outcome: Ongoing (interventions implemented as appropriate)      Problem: Skin Injury Risk (Adult)  Goal: Skin Health and Integrity  Outcome: Ongoing (interventions implemented as appropriate)      Problem: Wound (Includes Pressure Injury) (Adult)  Goal: Signs and Symptoms of Listed Potential Problems Will be Absent, Minimized or Managed (Wound)  Outcome: Ongoing (interventions implemented as appropriate)      Problem: Patient Care Overview  Goal: Plan of Care Review  Outcome: Ongoing (interventions implemented as appropriate)   10/04/18 1709   Coping/Psychosocial   Plan of Care Reviewed With patient   Plan of Care Review   Progress improving   OTHER   Outcome Summary BP Runs low - 80s/50s. Dilaudid PO x1; Roxicodone x2 for pain - Patient continues to rate generalized pain at a 10 out of 10. Dressing Changes Performed to BLE, Hips, Groin, Coccyx & Spine. Patient refuses Ensure Drinks. Patient refuses Q2 Turns. Colostomy. Suprapubic Catheter cleaned and skin care performed. R Port Care Performed. Plan to discharge home hopefully tomorrow.      Goal: Individualization and Mutuality  Outcome: Ongoing (interventions implemented as appropriate)    Goal: Discharge Needs Assessment  Outcome: Ongoing (interventions implemented as appropriate)    Goal: Interprofessional Rounds/Family Conf  Outcome: Ongoing (interventions implemented as appropriate)

## 2018-10-04 NOTE — PLAN OF CARE
Problem: Fall Risk (Adult)  Goal: Absence of Fall  Outcome: Ongoing (interventions implemented as appropriate)      Problem: Pain, Chronic (Adult)  Goal: Acceptable Pain/Comfort Level and Functional Ability  Outcome: Ongoing (interventions implemented as appropriate)      Problem: Pain, Acute (Adult)  Goal: Acceptable Pain Control/Comfort Level  Outcome: Ongoing (interventions implemented as appropriate)      Problem: Sepsis/Septic Shock (Adult)  Goal: Signs and Symptoms of Listed Potential Problems Will be Absent, Minimized or Managed (Sepsis/Septic Shock)  Outcome: Ongoing (interventions implemented as appropriate)      Problem: Skin Injury Risk (Adult)  Goal: Skin Health and Integrity  Outcome: Ongoing (interventions implemented as appropriate)      Problem: Wound (Includes Pressure Injury) (Adult)  Goal: Signs and Symptoms of Listed Potential Problems Will be Absent, Minimized or Managed (Wound)  Outcome: Ongoing (interventions implemented as appropriate)      Problem: Patient Care Overview  Goal: Plan of Care Review  Outcome: Ongoing (interventions implemented as appropriate)   10/04/18 0541   Coping/Psychosocial   Plan of Care Reviewed With patient   Plan of Care Review   Progress no change   OTHER   Outcome Summary meds per order, pain meds per request, drsg changes per order, pt refuses LE drsg changes, pt states they only need to be done daily, encourage po and ate well for hs snack, no falls, see vs and labs     Goal: Individualization and Mutuality  Outcome: Ongoing (interventions implemented as appropriate)    Goal: Discharge Needs Assessment  Outcome: Ongoing (interventions implemented as appropriate)    Goal: Interprofessional Rounds/Family Conf  Outcome: Ongoing (interventions implemented as appropriate)

## 2018-10-04 NOTE — NURSING NOTE
MANISHN received consult- noted that patient is only allowing daily dressing care, not BID. This is likely what he will continue at home (daily). Please make sure MD knows as well. If patient would not allow even daily dressing care, then a new careplan may need to be considered. Thanks, JAMAL Grubbs

## 2018-10-05 PROBLEM — A41.9 SEPSIS (HCC): Status: RESOLVED | Noted: 2018-09-29 | Resolved: 2018-10-05

## 2018-10-05 RX ADMIN — FERROUS SULFATE TAB 325 MG (65 MG ELEMENTAL FE) 325 MG: 325 (65 FE) TAB at 10:11

## 2018-10-05 RX ADMIN — HYDROMORPHONE HYDROCHLORIDE 4 MG: 4 TABLET ORAL at 14:28

## 2018-10-05 RX ADMIN — OXYCODONE HYDROCHLORIDE 15 MG: 15 TABLET ORAL at 18:16

## 2018-10-05 RX ADMIN — PANTOPRAZOLE SODIUM 40 MG: 40 TABLET, DELAYED RELEASE ORAL at 10:11

## 2018-10-05 RX ADMIN — ALPRAZOLAM 1 MG: 0.5 TABLET ORAL at 19:50

## 2018-10-05 RX ADMIN — ALPRAZOLAM 1 MG: 0.5 TABLET ORAL at 06:11

## 2018-10-05 RX ADMIN — OXYCODONE HYDROCHLORIDE 15 MG: 15 TABLET ORAL at 10:15

## 2018-10-05 RX ADMIN — HYDROMORPHONE HYDROCHLORIDE 4 MG: 4 TABLET ORAL at 23:41

## 2018-10-05 RX ADMIN — HYDROMORPHONE HYDROCHLORIDE 4 MG: 4 TABLET ORAL at 05:43

## 2018-10-05 RX ADMIN — OXYCODONE HYDROCHLORIDE 15 MG: 15 TABLET ORAL at 22:15

## 2018-10-05 RX ADMIN — OXYCODONE HYDROCHLORIDE 15 MG: 15 TABLET ORAL at 04:38

## 2018-10-05 RX ADMIN — COLLAGENASE SANTYL 1 APPLICATION: 250 OINTMENT TOPICAL at 11:30

## 2018-10-05 RX ADMIN — FENTANYL 1 PATCH: 50 PATCH, EXTENDED RELEASE TRANSDERMAL at 18:16

## 2018-10-05 RX ADMIN — DAKIN'S SOLUTION 0.125% (QUARTER STRENGTH) 946 ML: 0.12 SOLUTION at 11:30

## 2018-10-05 RX ADMIN — GABAPENTIN 300 MG: 300 CAPSULE ORAL at 21:09

## 2018-10-05 RX ADMIN — ACETAMINOPHEN 650 MG: 325 TABLET, FILM COATED ORAL at 21:09

## 2018-10-05 RX ADMIN — GABAPENTIN 300 MG: 300 CAPSULE ORAL at 10:11

## 2018-10-05 RX ADMIN — OXYBUTYNIN CHLORIDE 5 MG: 5 TABLET ORAL at 10:11
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
## 2018-10-05 NOTE — PLAN OF CARE
Problem: Nutrition, Imbalanced: Inadequate Oral Intake (Adult)  Intervention: Promote/Optimize Nutrition   10/05/18 0956   Nutrition Interventions   Oral Nutrition Promotion calorie dense foods provided;calorie dense liquids provided;nutritional therapy counseling provided   Follow up-patient severely malnourished. Encouraged adequate po intake. Ensure ordered TID
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
## 2018-10-05 NOTE — PROGRESS NOTES
"Continued Stay Note  [facility]     Patient Name: Joaquín Sherman  MRN: [id number]  Today's Date: 10/5/2018    Admit Date: 9/29/2018          Discharge Plan     Row Name 10/05/18 1906       Plan    Plan Comments Spoke with Dr. Gannon regarding dc plans. He placed dc orders with plans for patient to discharge tomorrow. [facility] arranged for pick-up at 1300. Ambulance necessity form on chart. No additional needs noted. Continue to follow.    Row Name 10/05/18 1901       Plan    Plan Comments Margarita/CCP manager called stating [facility] will follow patient at home. Nicki/[facility] informed CCP they were unable to obtain orders from PCP at this time. [facility] left voice messages x2 with Shannan at Dr. Jaramillo's office ([phone] ext [phone]). Await call back. Continue to follow.    Row Name 10/05/18 1854       Plan    Plan Comments Spoke with Nicki/BRENDA  in her office to update regarding possible dc in am. [facility] were not going to accept patient due to prior non-compliance. Informed her of conversation [facility] had with Janine/[facility] saying will follow if reliable friends/family identified to be able to teach dressings. [facility] spoke with family members by phone in patient's room and are agreeable. Nicki says she needs to contact her manager Joseph. [facility] contacted Bárbara/[facility] manager. Await call back.    Row Name 10/05/18 1850       Plan    Plan Comments Tasha/Eugene at patient's bedside to inform him order has been placed for power chair. Eugene will provide a \"loaner\" chair in the meantime. Tasha told him an evaluation will need to be completed. Eugene will call him to give him date/time/location for evaluation. Verbalized understanding.     Row Name 10/05/18 1838       Plan    Plan Home (cousin's house) with [facility].    Patient/Family in Agreement with Plan yes    Plan Comments Spoke with patient at bedside. States he is not returning to his mother's house but will be staying with his cousin Marilynn Baer " ([phone]). Spoke with her on the phone and she confirmed patient can stay with her and she will assist with learning dressing changes. [facility] to update patient's address. Hospital bed and other equipment is being delivered today between 4-5 pm. Spoke with patient's sister Jenni ([phone]) who also says she will learn how to change dressings.               Discharge Codes    No documentation.       Expected Discharge Date and Time     Expected Discharge Date Expected Discharge Time    Oct 6, 2018             Caron Gamino, RN

## 2018-10-05 NOTE — PROGRESS NOTES
"Continued Stay Note  [facility]     Patient Name: Joaquín Sherman  MRN: [id number]  Today's Date: 10/5/2018    Admit Date: 9/29/2018          Discharge Plan     Row Name 10/05/18 4338       Plan    Plan Comments Tasha/Eugene at patient's bedside to inform him order has been placed for power chair. Eugene will provide a \"loaner\" chair in the meantime. Tasha told him an evaluation will need to be completed. Eugene will call him to give him date/time/location for evaluation. Verbalized understanding.     Row Name 10/05/18 1822       Plan    Plan Home (cousin's house) with [facility].    Patient/Family in Agreement with Plan yes    Plan Comments Spoke with patient at bedside. States he is not returning to his mother's house but will be staying with his cousin Marilynn Baer ([phone]). Spoke with her on the phone and she confirmed patient can stay with her and she will assist with learning dressing changes. [facility] to [facility] ([phone]) to update patient's address. Hospital bed and other equipment is being delivered today between 4-5 pm. Spoke with patient's sister Jenni ([phone]) who also says she will learn how to change dressings.               Discharge Codes    No documentation.       Expected Discharge Date and Time     Expected Discharge Date Expected Discharge Time    Oct 6, 2018             Caron Gamino RN    "

## 2018-10-05 NOTE — PLAN OF CARE
Problem: Fall Risk (Adult)  Goal: Absence of Fall  Outcome: Ongoing (interventions implemented as appropriate)      Problem: Pain, Acute (Adult)  Goal: Acceptable Pain Control/Comfort Level  Outcome: Ongoing (interventions implemented as appropriate)      Problem: Sepsis/Septic Shock (Adult)  Goal: Signs and Symptoms of Listed Potential Problems Will be Absent, Minimized or Managed (Sepsis/Septic Shock)  Outcome: Ongoing (interventions implemented as appropriate)      Problem: Skin Injury Risk (Adult)  Goal: Skin Health and Integrity  Outcome: Ongoing (interventions implemented as appropriate)      Problem: Patient Care Overview  Goal: Plan of Care Review  Outcome: Ongoing (interventions implemented as appropriate)   10/05/18 0321   Coping/Psychosocial   Plan of Care Reviewed With patient   Plan of Care Review   Progress no change   OTHER   Outcome Summary B/P running in the 80 SBP; pt spiked a temp of 101; MD called and tylenol ordered; Pt refused PM dressing change; Pain med given x1; Pt refusing Q2 turns; R port flushed; WIll continue to monitor

## 2018-10-06 VITALS
DIASTOLIC BLOOD PRESSURE: 55 MMHG | OXYGEN SATURATION: 100 % | WEIGHT: 90 LBS | BODY MASS INDEX: 12.19 KG/M2 | HEIGHT: 72 IN | HEART RATE: 115 BPM | RESPIRATION RATE: 18 BRPM | SYSTOLIC BLOOD PRESSURE: 85 MMHG | TEMPERATURE: 97.3 F

## 2018-10-06 LAB
ANION GAP SERPL CALCULATED.3IONS-SCNC: 10.1 MMOL/L
BASOPHILS # BLD AUTO: 0.04 10*3/MM3 (ref 0–0.2)
BASOPHILS NFR BLD AUTO: 0.5 % (ref 0–1.5)
BUN BLD-MCNC: 13 MG/DL (ref 6–20)
BUN/CREAT SERPL: 14.8 (ref 7–25)
CALCIUM SPEC-SCNC: 8.5 MG/DL (ref 8.6–10.5)
CHLORIDE SERPL-SCNC: 101 MMOL/L (ref 98–107)
CO2 SERPL-SCNC: 28.9 MMOL/L (ref 22–29)
CREAT BLD-MCNC: 0.88 MG/DL (ref 0.76–1.27)
DEPRECATED RDW RBC AUTO: 53.7 FL (ref 37–54)
EOSINOPHIL # BLD AUTO: 0.2 10*3/MM3 (ref 0–0.7)
EOSINOPHIL NFR BLD AUTO: 2.4 % (ref 0.3–6.2)
ERYTHROCYTE [DISTWIDTH] IN BLOOD BY AUTOMATED COUNT: 16.8 % (ref 11.5–14.5)
GFR SERPL CREATININE-BSD FRML MDRD: 117 ML/MIN/1.73
GLUCOSE BLD-MCNC: 111 MG/DL (ref 65–99)
HCT VFR BLD AUTO: 26.1 % (ref 40.4–52.2)
HGB BLD-MCNC: 7.5 G/DL (ref 13.7–17.6)
IMM GRANULOCYTES # BLD: 0.03 10*3/MM3 (ref 0–0.03)
IMM GRANULOCYTES NFR BLD: 0.4 % (ref 0–0.5)
LYMPHOCYTES # BLD AUTO: 2.09 10*3/MM3 (ref 0.9–4.8)
LYMPHOCYTES NFR BLD AUTO: 25 % (ref 19.6–45.3)
MCH RBC QN AUTO: 24.8 PG (ref 27–32.7)
MCHC RBC AUTO-ENTMCNC: 28.7 G/DL (ref 32.6–36.4)
MCV RBC AUTO: 86.1 FL (ref 79.8–96.2)
MONOCYTES # BLD AUTO: 0.78 10*3/MM3 (ref 0.2–1.2)
MONOCYTES NFR BLD AUTO: 9.3 % (ref 5–12)
NEUTROPHILS # BLD AUTO: 5.24 10*3/MM3 (ref 1.9–8.1)
NEUTROPHILS NFR BLD AUTO: 62.8 % (ref 42.7–76)
PLATELET # BLD AUTO: 220 10*3/MM3 (ref 140–500)
PMV BLD AUTO: 8.7 FL (ref 6–12)
POTASSIUM BLD-SCNC: 4.3 MMOL/L (ref 3.5–5.2)
RBC # BLD AUTO: 3.03 10*6/MM3 (ref 4.6–6)
SODIUM BLD-SCNC: 140 MMOL/L (ref 136–145)
WBC NRBC COR # BLD: 8.35 10*3/MM3 (ref 4.5–10.7)

## 2018-10-06 PROCEDURE — 85025 COMPLETE CBC W/AUTO DIFF WBC: CPT | Performed by: INTERNAL MEDICINE

## 2018-10-06 PROCEDURE — 80048 BASIC METABOLIC PNL TOTAL CA: CPT | Performed by: INTERNAL MEDICINE

## 2018-10-06 RX ORDER — FERROUS SULFATE 325(65) MG
325 TABLET ORAL
Qty: 7 TABLET | Refills: 0 | Status: SHIPPED | OUTPATIENT
Start: 2018-10-06 | End: 2018-10-13

## 2018-10-06 RX ORDER — HYDROCORTISONE 10 MG/1
10 TABLET ORAL 2 TIMES DAILY
Qty: 60 TABLET | Refills: 0 | Status: SHIPPED | OUTPATIENT
Start: 2018-10-06 | End: 2018-11-05

## 2018-10-06 RX ORDER — POTASSIUM CHLORIDE 1500 MG/1
20 TABLET, EXTENDED RELEASE ORAL DAILY
Qty: 7 TABLET | Refills: 0 | Status: SHIPPED | OUTPATIENT
Start: 2018-10-06 | End: 2018-10-13

## 2018-10-06 RX ORDER — DIPHENOXYLATE HYDROCHLORIDE AND ATROPINE SULFATE 2.5; .025 MG/1; MG/1
1 TABLET ORAL DAILY
Qty: 30 TABLET | Refills: 0 | Status: SHIPPED | OUTPATIENT
Start: 2018-10-06 | End: 2018-10-13

## 2018-10-06 RX ORDER — GABAPENTIN 300 MG/1
300 CAPSULE ORAL 3 TIMES DAILY
Qty: 21 CAPSULE | Refills: 0 | Status: SHIPPED | OUTPATIENT
Start: 2018-10-06 | End: 2018-10-13

## 2018-10-06 RX ORDER — PANTOPRAZOLE SODIUM 40 MG/1
40 TABLET, DELAYED RELEASE ORAL
Qty: 7 TABLET | Refills: 0 | Status: SHIPPED | OUTPATIENT
Start: 2018-10-06 | End: 2018-10-13

## 2018-10-06 RX ORDER — HYDROMORPHONE HYDROCHLORIDE 4 MG/1
4 TABLET ORAL EVERY 8 HOURS PRN
Qty: 20 TABLET | Refills: 0 | Status: SHIPPED | OUTPATIENT
Start: 2018-10-06 | End: 2018-10-13

## 2018-10-06 RX ORDER — OXYCODONE HYDROCHLORIDE 15 MG/1
15 TABLET ORAL EVERY 4 HOURS PRN
Qty: 40 TABLET | Refills: 0 | Status: SHIPPED | OUTPATIENT
Start: 2018-10-06 | End: 2018-10-13

## 2018-10-06 RX ORDER — ALPRAZOLAM 1 MG/1
1 TABLET ORAL 2 TIMES DAILY PRN
Qty: 14 TABLET | Refills: 0 | Status: SHIPPED | OUTPATIENT
Start: 2018-10-06 | End: 2018-10-13

## 2018-10-06 RX ORDER — FENTANYL 50 UG/H
1 PATCH TRANSDERMAL
Qty: 2 PATCH | Refills: 0 | Status: SHIPPED | OUTPATIENT
Start: 2018-10-06 | End: 2018-10-13

## 2018-10-06 RX ORDER — OXYBUTYNIN CHLORIDE 5 MG/1
5 TABLET ORAL DAILY
Qty: 7 TABLET | Refills: 0 | Status: SHIPPED | OUTPATIENT
Start: 2018-10-06 | End: 2018-10-13

## 2018-10-06 RX ORDER — ACETAMINOPHEN 325 MG/1
650 TABLET ORAL EVERY 6 HOURS PRN
Start: 2018-10-06 | End: 2018-10-13

## 2018-10-06 RX ORDER — MIDODRINE HYDROCHLORIDE 10 MG/1
10 TABLET ORAL 3 TIMES DAILY
Qty: 21 TABLET | Refills: 0 | Status: SHIPPED | OUTPATIENT
Start: 2018-10-06 | End: 2018-10-13

## 2018-10-06 RX ORDER — GUAIFENESIN 600 MG/1
600 TABLET, EXTENDED RELEASE ORAL EVERY 12 HOURS SCHEDULED
Qty: 30 TABLET | Refills: 1 | Status: SHIPPED | OUTPATIENT
Start: 2018-10-06 | End: 2018-10-13

## 2018-10-06 RX ORDER — ALBUTEROL SULFATE 2.5 MG/3ML
2.5 SOLUTION RESPIRATORY (INHALATION) EVERY 6 HOURS PRN
Qty: 120 ML | Refills: 2 | Status: SHIPPED | OUTPATIENT
Start: 2018-10-06 | End: 2018-11-05

## 2018-10-06 RX ORDER — ATROPA BELLADONNA AND OPIUM 16.2; 3 MG/1; MG/1
30 SUPPOSITORY RECTAL 2 TIMES DAILY PRN
Qty: 12 EACH | Refills: 0 | Status: SHIPPED | OUTPATIENT
Start: 2018-10-06 | End: 2018-10-13

## 2018-10-06 RX ORDER — LORATADINE 10 MG
500 TABLET ORAL DAILY
Qty: 7 TABLET | Refills: 0 | Status: SHIPPED | OUTPATIENT
Start: 2018-10-06 | End: 2018-10-13

## 2018-10-06 RX ADMIN — GABAPENTIN 300 MG: 300 CAPSULE ORAL at 08:51

## 2018-10-06 RX ADMIN — OXYCODONE HYDROCHLORIDE 15 MG: 15 TABLET ORAL at 06:01

## 2018-10-06 RX ADMIN — FERROUS SULFATE TAB 325 MG (65 MG ELEMENTAL FE) 325 MG: 325 (65 FE) TAB at 08:51

## 2018-10-06 RX ADMIN — COLLAGENASE SANTYL 1 APPLICATION: 250 OINTMENT TOPICAL at 08:52

## 2018-10-06 RX ADMIN — ALPRAZOLAM 1 MG: 0.5 TABLET ORAL at 08:51

## 2018-10-06 RX ADMIN — HYDROMORPHONE HYDROCHLORIDE 4 MG: 4 TABLET ORAL at 07:06

## 2018-10-06 RX ADMIN — OXYCODONE HYDROCHLORIDE 15 MG: 15 TABLET ORAL at 02:15

## 2018-10-06 RX ADMIN — DAKIN'S SOLUTION 0.125% (QUARTER STRENGTH) 946 ML: 0.12 SOLUTION at 08:54

## 2018-10-06 RX ADMIN — OXYBUTYNIN CHLORIDE 5 MG: 5 TABLET ORAL at 08:51

## 2018-10-07 ENCOUNTER — READMISSION MANAGEMENT (OUTPATIENT)
Dept: CALL CENTER | Facility: HOSPITAL | Age: 38
End: 2018-10-07

## 2018-10-07 NOTE — OUTREACH NOTE
Prep Survey      Responses   Facility patient discharged from?  [facility]   Is patient eligible?  Yes   Discharge diagnosis  Hypotension ( Chronic   Does the patient have one of the following disease processes/diagnoses(primary or secondary)?  Other   Does the patient have Home health ordered?  Yes   What is the Home health agency?   [facility]   Is there a DME ordered?  No   Prep survey completed?  Yes          Dacia Howell RN

## 2018-10-08 NOTE — PROGRESS NOTES
Case Management Discharge Note    Final Note: Spoke with Nicki/BRENDA TORRE. States patient would not allow RN to see today due to bed not being in place. Awaiting bed to be delivered. Per [place] says patient requested delivery date to be changed to Monday instead of Friday.     Destination     No service has been selected for the patient.      Durable Medical Equipment - Selection Complete     Service Request Status Selected Specialties Address Phone Number Fax Number    [facility] Selected DME Services [address], [facility] [id number] [phone]      Dialysis/Infusion     No service has been selected for the patient.      Home Medical Care     Service Request Status Selected Specialties Address Phone Number Fax Number    [facility] [place] Selected Home Health Services [address] [place] [address] [phone]      Social Care     No service has been selected for the patient.             Final Discharge Disposition Code: 06 - home with home health care

## 2018-10-11 ENCOUNTER — READMISSION MANAGEMENT (OUTPATIENT)
Dept: CALL CENTER | Facility: HOSPITAL | Age: 38
End: 2018-10-11

## 2018-10-11 NOTE — OUTREACH NOTE
Medical Week 1 Survey      Responses   Facility patient discharged from?  [facility]   Does the patient have one of the following disease processes/diagnoses(primary or secondary)?  Other   Is there a successful TCM telephone encounter documented?  No   Week 1 attempt successful?  Yes   Call start time  1439   Call end time  1509   Discharge diagnosis  Hypotension ( Chronic,  decubitus ulcers   Person spoke with today (if not patient) and relationship  sisterMarc   Meds reviewed with patient/caregiver?  Yes   Is the patient having any side effects they believe may be caused by any medication additions or changes?  No   Does the patient have all medications ordered at discharge?  No   What is keeping the patient from filling the prescriptions?  Prior authorization Issues, Financial   Nursing Interventions  Nurse advised patient to call provider [Patient's sister stated Passport was inactive and therefor patient did not have funds to pay for prescriptions. Patient was referred to case management]   Notified Case Management  Financial   Is the patient taking all medications as directed (includes completed medication regime)?  N/A   Does the patient have a primary care provider?   Yes   Does the patient have an appointment with their PCP within 7 days of discharge?  No   What is preventing the patient from scheduling follow up appointments within 7 days of discharge?  Haven't had time   Nursing Interventions  Educated patient on importance of making appointment, Advised patient to make appointment   Has the patient kept scheduled appointments due by today?  N/A   Comments  sister Marc, [facility] would call to make f/u appt for pt, has MD's phone number   What is the Home health agency?   [facility]   Has home health visited the patient within 72 hours of discharge?  Unsure   Home health interventions  Other   Home health comments  pt's sister Marc did not know if HH has made visits or not, case management notified    What DME was ordered?  Hospital bed   DME comments  sister Jenni did not kmow if hospital bed had been delivered or not, case management notified   Psychosocial issues?  No   Notified Case Management  Home Health, DME   Comments  patient declined nursing facility, and has moved in with sister David, has decubitus ulcers requring dressing changes which family members help with. HH has been ordered and might be seeing pt by now, but Jenni fuentes did not know   Did the patient receive a copy of their discharge instructions?  Yes   Nursing interventions  Reviewed instructions with patient   What is the patient's perception of their health status since discharge?  Improving   Is the patient/caregiver able to teach back signs and symptoms related to disease process for when to call PCP?  Yes   Is the patient/caregiver able to teach back signs and symptoms related to disease process for when to call 911?  Yes   Is the patient/caregiver able to teach back the hierarchy of who to call/visit for symptoms/problems? PCP, Specialist, Home health nurse, Urgent Care, ED, 911  Yes   Week 1 call completed?  Yes          Sue Harvey RN

## 2018-10-26 ENCOUNTER — HOSPITAL ENCOUNTER (INPATIENT)
Facility: HOSPITAL | Age: 38
LOS: 4 days | Discharge: HOME | End: 2018-10-30
Attending: EMERGENCY MEDICINE | Admitting: INTERNAL MEDICINE

## 2018-10-26 ENCOUNTER — APPOINTMENT (OUTPATIENT)
Dept: GENERAL RADIOLOGY | Facility: HOSPITAL | Age: 38
End: 2018-10-26

## 2018-10-26 DIAGNOSIS — M86.29 SUBACUTE OSTEOMYELITIS OF MULTIPLE SITES (HCC): Primary | ICD-10-CM

## 2018-10-26 DIAGNOSIS — L89.154 DECUBITUS ULCER OF SACRAL REGION, STAGE 4 (HCC): ICD-10-CM

## 2018-10-26 DIAGNOSIS — R57.9 SHOCK (HCC): ICD-10-CM

## 2018-10-26 DIAGNOSIS — L89.154 PRESSURE INJURY OF SACRAL REGION, STAGE 4 (HCC): ICD-10-CM

## 2018-10-26 DIAGNOSIS — G82.20 PARAPLEGIA FOLLOWING SPINAL CORD INJURY (HCC): Chronic | ICD-10-CM

## 2018-10-26 LAB
ABO GROUP BLD: NORMAL
ALBUMIN SERPL-MCNC: 2.7 G/DL (ref 3.5–5.2)
ALBUMIN/GLOB SERPL: 0.5 G/DL
ALP SERPL-CCNC: 97 U/L (ref 39–117)
ALT SERPL W P-5'-P-CCNC: <5 U/L (ref 1–41)
ANION GAP SERPL CALCULATED.3IONS-SCNC: 8.7 MMOL/L
AST SERPL-CCNC: 7 U/L (ref 1–40)
BACTERIA UR QL AUTO: ABNORMAL /HPF
BASOPHILS # BLD AUTO: 0.04 10*3/MM3 (ref 0–0.2)
BASOPHILS NFR BLD AUTO: 0.5 % (ref 0–1.5)
BILIRUB SERPL-MCNC: 0.4 MG/DL (ref 0.1–1.2)
BILIRUB UR QL STRIP: NEGATIVE
BLD GP AB SCN SERPL QL: NEGATIVE
BUN BLD-MCNC: 17 MG/DL (ref 6–20)
BUN/CREAT SERPL: 21.3 (ref 7–25)
CALCIUM SPEC-SCNC: 8.7 MG/DL (ref 8.6–10.5)
CHLORIDE SERPL-SCNC: 101 MMOL/L (ref 98–107)
CLARITY UR: ABNORMAL
CO2 SERPL-SCNC: 25.3 MMOL/L (ref 22–29)
COLOR UR: YELLOW
CREAT BLD-MCNC: 0.8 MG/DL (ref 0.76–1.27)
CRP SERPL-MCNC: 11.98 MG/DL (ref 0–0.5)
D-LACTATE SERPL-SCNC: 0.8 MMOL/L (ref 0.5–2)
DEPRECATED RDW RBC AUTO: 50.5 FL (ref 37–54)
EOSINOPHIL # BLD AUTO: 0.08 10*3/MM3 (ref 0–0.7)
EOSINOPHIL NFR BLD AUTO: 1 % (ref 0.3–6.2)
ERYTHROCYTE [DISTWIDTH] IN BLOOD BY AUTOMATED COUNT: 16.7 % (ref 11.5–14.5)
ERYTHROCYTE [SEDIMENTATION RATE] IN BLOOD: >140 MM/HR (ref 0–15)
GFR SERPL CREATININE-BSD FRML MDRD: 131 ML/MIN/1.73
GLOBULIN UR ELPH-MCNC: 5.3 GM/DL
GLUCOSE BLD-MCNC: 98 MG/DL (ref 65–99)
GLUCOSE BLDC GLUCOMTR-MCNC: 76 MG/DL (ref 70–130)
GLUCOSE UR STRIP-MCNC: NEGATIVE MG/DL
HCT VFR BLD AUTO: 24.5 % (ref 40.4–52.2)
HGB BLD-MCNC: 7.2 G/DL (ref 13.7–17.6)
HGB UR QL STRIP.AUTO: ABNORMAL
HOLD SPECIMEN: NORMAL
HOLD SPECIMEN: NORMAL
HYALINE CASTS UR QL AUTO: ABNORMAL /LPF
IMM GRANULOCYTES # BLD: 0.02 10*3/MM3 (ref 0–0.03)
IMM GRANULOCYTES NFR BLD: 0.2 % (ref 0–0.5)
KETONES UR QL STRIP: NEGATIVE
LEUKOCYTE ESTERASE UR QL STRIP.AUTO: ABNORMAL
LYMPHOCYTES # BLD AUTO: 1.26 10*3/MM3 (ref 0.9–4.8)
LYMPHOCYTES NFR BLD AUTO: 15.5 % (ref 19.6–45.3)
MCH RBC QN AUTO: 24 PG (ref 27–32.7)
MCHC RBC AUTO-ENTMCNC: 29.4 G/DL (ref 32.6–36.4)
MCV RBC AUTO: 81.7 FL (ref 79.8–96.2)
MONOCYTES # BLD AUTO: 0.59 10*3/MM3 (ref 0.2–1.2)
MONOCYTES NFR BLD AUTO: 7.3 % (ref 5–12)
NEUTROPHILS # BLD AUTO: 6.15 10*3/MM3 (ref 1.9–8.1)
NEUTROPHILS NFR BLD AUTO: 75.7 % (ref 42.7–76)
NITRITE UR QL STRIP: POSITIVE
NRBC BLD MANUAL-RTO: 0 /100 WBC (ref 0–0)
PH UR STRIP.AUTO: 5.5 [PH] (ref 5–8)
PLATELET # BLD AUTO: 278 10*3/MM3 (ref 140–500)
PMV BLD AUTO: 8.9 FL (ref 6–12)
POTASSIUM BLD-SCNC: 3.9 MMOL/L (ref 3.5–5.2)
PROCALCITONIN SERPL-MCNC: 0.19 NG/ML (ref 0.1–0.25)
PROT SERPL-MCNC: 8 G/DL (ref 6–8.5)
PROT UR QL STRIP: ABNORMAL
RBC # BLD AUTO: 3 10*6/MM3 (ref 4.6–6)
RBC # UR: ABNORMAL /HPF
REF LAB TEST METHOD: ABNORMAL
RH BLD: POSITIVE
SODIUM BLD-SCNC: 135 MMOL/L (ref 136–145)
SP GR UR STRIP: 1.01 (ref 1–1.03)
SQUAMOUS #/AREA URNS HPF: ABNORMAL /HPF
T&S EXPIRATION DATE: NORMAL
UROBILINOGEN UR QL STRIP: ABNORMAL
WBC NRBC COR # BLD: 8.12 10*3/MM3 (ref 4.5–10.7)
WBC UR QL AUTO: ABNORMAL /HPF
WHOLE BLOOD HOLD SPECIMEN: NORMAL
WHOLE BLOOD HOLD SPECIMEN: NORMAL
YEAST URNS QL MICRO: ABNORMAL /HPF

## 2018-10-26 PROCEDURE — 85652 RBC SED RATE AUTOMATED: CPT | Performed by: EMERGENCY MEDICINE

## 2018-10-26 PROCEDURE — 87147 CULTURE TYPE IMMUNOLOGIC: CPT | Performed by: EMERGENCY MEDICINE

## 2018-10-26 PROCEDURE — 25010000002 HYDROCORTISONE SODIUM SUCCINATE 100 MG RECONSTITUTED SOLUTION: Performed by: INTERNAL MEDICINE

## 2018-10-26 PROCEDURE — 86900 BLOOD TYPING SEROLOGIC ABO: CPT | Performed by: EMERGENCY MEDICINE

## 2018-10-26 PROCEDURE — 25010000002 ONDANSETRON PER 1 MG: Performed by: EMERGENCY MEDICINE

## 2018-10-26 PROCEDURE — 87186 SC STD MICRODIL/AGAR DIL: CPT | Performed by: EMERGENCY MEDICINE

## 2018-10-26 PROCEDURE — 86901 BLOOD TYPING SEROLOGIC RH(D): CPT | Performed by: EMERGENCY MEDICINE

## 2018-10-26 PROCEDURE — 25010000002 FENTANYL CITRATE (PF) 100 MCG/2ML SOLUTION: Performed by: INTERNAL MEDICINE

## 2018-10-26 PROCEDURE — 25010000002 LEVOFLOXACIN PER 250 MG: Performed by: EMERGENCY MEDICINE

## 2018-10-26 PROCEDURE — 80053 COMPREHEN METABOLIC PANEL: CPT | Performed by: EMERGENCY MEDICINE

## 2018-10-26 PROCEDURE — 84145 PROCALCITONIN (PCT): CPT | Performed by: EMERGENCY MEDICINE

## 2018-10-26 PROCEDURE — 87070 CULTURE OTHR SPECIMN AEROBIC: CPT | Performed by: EMERGENCY MEDICINE

## 2018-10-26 PROCEDURE — 87205 SMEAR GRAM STAIN: CPT | Performed by: EMERGENCY MEDICINE

## 2018-10-26 PROCEDURE — 85025 COMPLETE CBC W/AUTO DIFF WBC: CPT | Performed by: EMERGENCY MEDICINE

## 2018-10-26 PROCEDURE — 87077 CULTURE AEROBIC IDENTIFY: CPT | Performed by: EMERGENCY MEDICINE

## 2018-10-26 PROCEDURE — 83605 ASSAY OF LACTIC ACID: CPT | Performed by: EMERGENCY MEDICINE

## 2018-10-26 PROCEDURE — 86923 COMPATIBILITY TEST ELECTRIC: CPT

## 2018-10-26 PROCEDURE — 93005 ELECTROCARDIOGRAM TRACING: CPT | Performed by: EMERGENCY MEDICINE

## 2018-10-26 PROCEDURE — 81001 URINALYSIS AUTO W/SCOPE: CPT | Performed by: EMERGENCY MEDICINE

## 2018-10-26 PROCEDURE — 87040 BLOOD CULTURE FOR BACTERIA: CPT | Performed by: EMERGENCY MEDICINE

## 2018-10-26 PROCEDURE — 93010 ELECTROCARDIOGRAM REPORT: CPT | Performed by: INTERNAL MEDICINE

## 2018-10-26 PROCEDURE — 86850 RBC ANTIBODY SCREEN: CPT | Performed by: EMERGENCY MEDICINE

## 2018-10-26 PROCEDURE — 82962 GLUCOSE BLOOD TEST: CPT

## 2018-10-26 PROCEDURE — 99285 EMERGENCY DEPT VISIT HI MDM: CPT

## 2018-10-26 PROCEDURE — 86140 C-REACTIVE PROTEIN: CPT | Performed by: EMERGENCY MEDICINE

## 2018-10-26 PROCEDURE — 25010000002 FENTANYL CITRATE (PF) 100 MCG/2ML SOLUTION: Performed by: EMERGENCY MEDICINE

## 2018-10-26 PROCEDURE — 87150 DNA/RNA AMPLIFIED PROBE: CPT | Performed by: EMERGENCY MEDICINE

## 2018-10-26 PROCEDURE — 25010000002 HYDROMORPHONE PER 4 MG: Performed by: EMERGENCY MEDICINE

## 2018-10-26 PROCEDURE — 87086 URINE CULTURE/COLONY COUNT: CPT | Performed by: EMERGENCY MEDICINE

## 2018-10-26 PROCEDURE — 71045 X-RAY EXAM CHEST 1 VIEW: CPT

## 2018-10-26 PROCEDURE — 25010000002 VANCOMYCIN PER 500 MG: Performed by: EMERGENCY MEDICINE

## 2018-10-26 RX ORDER — ONDANSETRON 4 MG/1
4 TABLET, FILM COATED ORAL EVERY 6 HOURS PRN
Status: DISCONTINUED | OUTPATIENT
Start: 2018-10-26 | End: 2018-10-30 | Stop reason: HOSPADM

## 2018-10-26 RX ORDER — HYDROCODONE BITARTRATE AND ACETAMINOPHEN 5; 325 MG/1; MG/1
1 TABLET ORAL EVERY 6 HOURS PRN
Status: DISCONTINUED | OUTPATIENT
Start: 2018-10-26 | End: 2018-10-27

## 2018-10-26 RX ORDER — BISACODYL 5 MG/1
5 TABLET, DELAYED RELEASE ORAL DAILY PRN
Status: DISCONTINUED | OUTPATIENT
Start: 2018-10-26 | End: 2018-10-30 | Stop reason: HOSPADM

## 2018-10-26 RX ORDER — BISACODYL 10 MG
10 SUPPOSITORY, RECTAL RECTAL DAILY PRN
Status: DISCONTINUED | OUTPATIENT
Start: 2018-10-26 | End: 2018-10-30 | Stop reason: HOSPADM

## 2018-10-26 RX ORDER — HYDROMORPHONE HYDROCHLORIDE 1 MG/ML
0.5 INJECTION, SOLUTION INTRAMUSCULAR; INTRAVENOUS; SUBCUTANEOUS ONCE
Status: DISCONTINUED | OUTPATIENT
Start: 2018-10-26 | End: 2018-10-26

## 2018-10-26 RX ORDER — SODIUM CHLORIDE 0.9 % (FLUSH) 0.9 %
10 SYRINGE (ML) INJECTION EVERY 12 HOURS SCHEDULED
Status: DISCONTINUED | OUTPATIENT
Start: 2018-10-26 | End: 2018-10-30 | Stop reason: HOSPADM

## 2018-10-26 RX ORDER — VANCOMYCIN HYDROCHLORIDE 1 G/200ML
20 INJECTION, SOLUTION INTRAVENOUS ONCE
Status: COMPLETED | OUTPATIENT
Start: 2018-10-26 | End: 2018-10-26

## 2018-10-26 RX ORDER — ONDANSETRON 4 MG/1
4 TABLET, ORALLY DISINTEGRATING ORAL EVERY 6 HOURS PRN
Status: DISCONTINUED | OUTPATIENT
Start: 2018-10-26 | End: 2018-10-30 | Stop reason: HOSPADM

## 2018-10-26 RX ORDER — ONDANSETRON 2 MG/ML
4 INJECTION INTRAMUSCULAR; INTRAVENOUS EVERY 6 HOURS PRN
Status: DISCONTINUED | OUTPATIENT
Start: 2018-10-26 | End: 2018-10-30 | Stop reason: HOSPADM

## 2018-10-26 RX ORDER — LEVOFLOXACIN 5 MG/ML
500 INJECTION, SOLUTION INTRAVENOUS ONCE
Status: COMPLETED | OUTPATIENT
Start: 2018-10-26 | End: 2018-10-26

## 2018-10-26 RX ORDER — SODIUM CHLORIDE 9 MG/ML
125 INJECTION, SOLUTION INTRAVENOUS CONTINUOUS
Status: DISCONTINUED | OUTPATIENT
Start: 2018-10-26 | End: 2018-10-28

## 2018-10-26 RX ORDER — FENTANYL CITRATE 50 UG/ML
25 INJECTION, SOLUTION INTRAMUSCULAR; INTRAVENOUS ONCE
Status: COMPLETED | OUTPATIENT
Start: 2018-10-26 | End: 2018-10-26

## 2018-10-26 RX ORDER — DOPAMINE HYDROCHLORIDE 160 MG/100ML
2-20 INJECTION, SOLUTION INTRAVENOUS
Status: DISCONTINUED | OUTPATIENT
Start: 2018-10-26 | End: 2018-10-30 | Stop reason: HOSPADM

## 2018-10-26 RX ORDER — SODIUM CHLORIDE 0.9 % (FLUSH) 0.9 %
10 SYRINGE (ML) INJECTION AS NEEDED
Status: DISCONTINUED | OUTPATIENT
Start: 2018-10-26 | End: 2018-10-30 | Stop reason: HOSPADM

## 2018-10-26 RX ORDER — IPRATROPIUM BROMIDE AND ALBUTEROL SULFATE 2.5; .5 MG/3ML; MG/3ML
3 SOLUTION RESPIRATORY (INHALATION) EVERY 6 HOURS PRN
Status: DISCONTINUED | OUTPATIENT
Start: 2018-10-26 | End: 2018-10-30 | Stop reason: HOSPADM

## 2018-10-26 RX ORDER — ONDANSETRON 2 MG/ML
4 INJECTION INTRAMUSCULAR; INTRAVENOUS ONCE
Status: COMPLETED | OUTPATIENT
Start: 2018-10-26 | End: 2018-10-26

## 2018-10-26 RX ORDER — ACETAMINOPHEN 325 MG/1
650 TABLET ORAL EVERY 4 HOURS PRN
Status: DISCONTINUED | OUTPATIENT
Start: 2018-10-26 | End: 2018-10-30 | Stop reason: HOSPADM

## 2018-10-26 RX ORDER — LEVOFLOXACIN 5 MG/ML
750 INJECTION, SOLUTION INTRAVENOUS EVERY 24 HOURS
Status: DISCONTINUED | OUTPATIENT
Start: 2018-10-27 | End: 2018-10-27

## 2018-10-26 RX ORDER — FENTANYL CITRATE 50 UG/ML
50 INJECTION, SOLUTION INTRAMUSCULAR; INTRAVENOUS
Status: DISCONTINUED | OUTPATIENT
Start: 2018-10-26 | End: 2018-10-27

## 2018-10-26 RX ORDER — ALUMINA, MAGNESIA, AND SIMETHICONE 2400; 2400; 240 MG/30ML; MG/30ML; MG/30ML
15 SUSPENSION ORAL EVERY 6 HOURS PRN
Status: DISCONTINUED | OUTPATIENT
Start: 2018-10-26 | End: 2018-10-30 | Stop reason: HOSPADM

## 2018-10-26 RX ORDER — HYDROMORPHONE HYDROCHLORIDE 1 MG/ML
0.5 INJECTION, SOLUTION INTRAMUSCULAR; INTRAVENOUS; SUBCUTANEOUS ONCE
Status: COMPLETED | OUTPATIENT
Start: 2018-10-26 | End: 2018-10-26

## 2018-10-26 RX ORDER — ACETAMINOPHEN 650 MG/1
650 SUPPOSITORY RECTAL EVERY 4 HOURS PRN
Status: DISCONTINUED | OUTPATIENT
Start: 2018-10-26 | End: 2018-10-30 | Stop reason: HOSPADM

## 2018-10-26 RX ORDER — SODIUM CHLORIDE 0.9 % (FLUSH) 0.9 %
20 SYRINGE (ML) INJECTION AS NEEDED
Status: DISCONTINUED | OUTPATIENT
Start: 2018-10-26 | End: 2018-10-30 | Stop reason: HOSPADM

## 2018-10-26 RX ADMIN — FENTANYL CITRATE 25 MCG: 50 INJECTION, SOLUTION INTRAMUSCULAR; INTRAVENOUS at 21:30

## 2018-10-26 RX ADMIN — VANCOMYCIN HYDROCHLORIDE 1000 MG: 1 INJECTION, SOLUTION INTRAVENOUS at 21:32

## 2018-10-26 RX ADMIN — ONDANSETRON 4 MG: 2 INJECTION INTRAMUSCULAR; INTRAVENOUS at 17:38

## 2018-10-26 RX ADMIN — Medication 10 ML: at 21:00

## 2018-10-26 RX ADMIN — SODIUM CHLORIDE 1000 ML: 9 INJECTION, SOLUTION INTRAVENOUS at 17:36

## 2018-10-26 RX ADMIN — FENTANYL CITRATE 25 MCG: 50 INJECTION, SOLUTION INTRAMUSCULAR; INTRAVENOUS at 19:13

## 2018-10-26 RX ADMIN — HYDROMORPHONE HYDROCHLORIDE 0.5 MG: 1 INJECTION, SOLUTION INTRAMUSCULAR; INTRAVENOUS; SUBCUTANEOUS at 17:41

## 2018-10-26 RX ADMIN — SODIUM CHLORIDE 125 ML/HR: 9 INJECTION, SOLUTION INTRAVENOUS at 20:02

## 2018-10-26 RX ADMIN — HYDROCORTISONE SODIUM SUCCINATE 50 MG: 100 INJECTION, POWDER, FOR SOLUTION INTRAMUSCULAR; INTRAVENOUS at 22:21

## 2018-10-26 RX ADMIN — SODIUM CHLORIDE, POTASSIUM CHLORIDE, SODIUM LACTATE AND CALCIUM CHLORIDE 1000 ML: 600; 310; 30; 20 INJECTION, SOLUTION INTRAVENOUS at 18:57

## 2018-10-26 RX ADMIN — LEVOFLOXACIN 500 MG: 5 INJECTION, SOLUTION INTRAVENOUS at 19:14

## 2018-10-26 RX ADMIN — FENTANYL CITRATE 50 MCG: 50 INJECTION, SOLUTION INTRAMUSCULAR; INTRAVENOUS at 22:23

## 2018-10-27 LAB
ABO + RH BLD: NORMAL
ANION GAP SERPL CALCULATED.3IONS-SCNC: 12.1 MMOL/L
BASOPHILS # BLD AUTO: 0.01 10*3/MM3 (ref 0–0.2)
BASOPHILS NFR BLD AUTO: 0.1 % (ref 0–1.5)
BH BB BLOOD EXPIRATION DATE: NORMAL
BH BB BLOOD TYPE BARCODE: 8400
BH BB DISPENSE STATUS: NORMAL
BH BB PRODUCT CODE: NORMAL
BH BB UNIT NUMBER: NORMAL
BUN BLD-MCNC: 15 MG/DL (ref 6–20)
BUN/CREAT SERPL: 20.8 (ref 7–25)
CALCIUM SPEC-SCNC: 7.9 MG/DL (ref 8.6–10.5)
CHLORIDE SERPL-SCNC: 105 MMOL/L (ref 98–107)
CO2 SERPL-SCNC: 20.9 MMOL/L (ref 22–29)
CREAT BLD-MCNC: 0.72 MG/DL (ref 0.76–1.27)
DEPRECATED RDW RBC AUTO: 57.2 FL (ref 37–54)
EOSINOPHIL # BLD AUTO: 0 10*3/MM3 (ref 0–0.7)
EOSINOPHIL NFR BLD AUTO: 0 % (ref 0.3–6.2)
ERYTHROCYTE [DISTWIDTH] IN BLOOD BY AUTOMATED COUNT: 17.8 % (ref 11.5–14.5)
GFR SERPL CREATININE-BSD FRML MDRD: 148 ML/MIN/1.73
GLUCOSE BLD-MCNC: 133 MG/DL (ref 65–99)
GLUCOSE BLDC GLUCOMTR-MCNC: 137 MG/DL (ref 70–130)
GLUCOSE BLDC GLUCOMTR-MCNC: 155 MG/DL (ref 70–130)
HCT VFR BLD AUTO: 24.9 % (ref 40.4–52.2)
HGB BLD-MCNC: 7.1 G/DL (ref 13.7–17.6)
IMM GRANULOCYTES # BLD: 0.03 10*3/MM3 (ref 0–0.03)
IMM GRANULOCYTES NFR BLD: 0.4 % (ref 0–0.5)
LYMPHOCYTES # BLD AUTO: 0.51 10*3/MM3 (ref 0.9–4.8)
LYMPHOCYTES NFR BLD AUTO: 7.3 % (ref 19.6–45.3)
MCH RBC QN AUTO: 24.9 PG (ref 27–32.7)
MCHC RBC AUTO-ENTMCNC: 28.5 G/DL (ref 32.6–36.4)
MCV RBC AUTO: 87.4 FL (ref 79.8–96.2)
MONOCYTES # BLD AUTO: 0.29 10*3/MM3 (ref 0.2–1.2)
MONOCYTES NFR BLD AUTO: 4.1 % (ref 5–12)
NEUTROPHILS # BLD AUTO: 6.18 10*3/MM3 (ref 1.9–8.1)
NEUTROPHILS NFR BLD AUTO: 88.1 % (ref 42.7–76)
PLATELET # BLD AUTO: 205 10*3/MM3 (ref 140–500)
PMV BLD AUTO: 9 FL (ref 6–12)
POTASSIUM BLD-SCNC: 4.1 MMOL/L (ref 3.5–5.2)
RBC # BLD AUTO: 2.85 10*6/MM3 (ref 4.6–6)
SODIUM BLD-SCNC: 138 MMOL/L (ref 136–145)
UNIT  ABO: NORMAL
UNIT  RH: NORMAL
WBC NRBC COR # BLD: 7.02 10*3/MM3 (ref 4.5–10.7)

## 2018-10-27 PROCEDURE — 36430 TRANSFUSION BLD/BLD COMPNT: CPT

## 2018-10-27 PROCEDURE — 82962 GLUCOSE BLOOD TEST: CPT

## 2018-10-27 PROCEDURE — P9016 RBC LEUKOCYTES REDUCED: HCPCS

## 2018-10-27 PROCEDURE — 85025 COMPLETE CBC W/AUTO DIFF WBC: CPT | Performed by: INTERNAL MEDICINE

## 2018-10-27 PROCEDURE — 86900 BLOOD TYPING SEROLOGIC ABO: CPT

## 2018-10-27 PROCEDURE — 25010000002 VANCOMYCIN 750 MG RECONSTITUTED SOLUTION: Performed by: INTERNAL MEDICINE

## 2018-10-27 PROCEDURE — 99223 1ST HOSP IP/OBS HIGH 75: CPT | Performed by: INTERNAL MEDICINE

## 2018-10-27 PROCEDURE — 25010000002 HYDROCORTISONE SODIUM SUCCINATE 100 MG RECONSTITUTED SOLUTION: Performed by: INTERNAL MEDICINE

## 2018-10-27 PROCEDURE — 80048 BASIC METABOLIC PNL TOTAL CA: CPT | Performed by: INTERNAL MEDICINE

## 2018-10-27 PROCEDURE — 25010000002 ONDANSETRON PER 1 MG: Performed by: INTERNAL MEDICINE

## 2018-10-27 PROCEDURE — 25010000002 ENOXAPARIN PER 10 MG: Performed by: INTERNAL MEDICINE

## 2018-10-27 PROCEDURE — 25010000002 FENTANYL CITRATE (PF) 100 MCG/2ML SOLUTION: Performed by: INTERNAL MEDICINE

## 2018-10-27 RX ORDER — ALPRAZOLAM 0.5 MG/1
1 TABLET ORAL 2 TIMES DAILY PRN
Status: DISCONTINUED | OUTPATIENT
Start: 2018-10-27 | End: 2018-10-30 | Stop reason: HOSPADM

## 2018-10-27 RX ORDER — OXYBUTYNIN CHLORIDE 5 MG/1
5 TABLET ORAL EVERY 6 HOURS
Status: DISCONTINUED | OUTPATIENT
Start: 2018-10-27 | End: 2018-10-30 | Stop reason: HOSPADM

## 2018-10-27 RX ORDER — OXYCODONE HYDROCHLORIDE 15 MG/1
15 TABLET ORAL EVERY 4 HOURS PRN
Status: DISCONTINUED | OUTPATIENT
Start: 2018-10-27 | End: 2018-10-28

## 2018-10-27 RX ORDER — SODIUM HYPOCHLORITE 1.25 MG/ML
SOLUTION TOPICAL DAILY
Status: DISCONTINUED | OUTPATIENT
Start: 2018-10-27 | End: 2018-10-30 | Stop reason: HOSPADM

## 2018-10-27 RX ORDER — HYDROCORTISONE 10 MG/1
10 TABLET ORAL 2 TIMES DAILY
Status: DISCONTINUED | OUTPATIENT
Start: 2018-10-27 | End: 2018-10-30 | Stop reason: HOSPADM

## 2018-10-27 RX ORDER — HYDROCODONE BITARTRATE AND ACETAMINOPHEN 5; 325 MG/1; MG/1
1 TABLET ORAL EVERY 6 HOURS PRN
Status: DISCONTINUED | OUTPATIENT
Start: 2018-10-27 | End: 2018-10-28

## 2018-10-27 RX ADMIN — OXYCODONE HYDROCHLORIDE 15 MG: 15 TABLET ORAL at 18:13

## 2018-10-27 RX ADMIN — Medication 10 ML: at 21:00

## 2018-10-27 RX ADMIN — Medication 10 ML: at 09:00

## 2018-10-27 RX ADMIN — HYDROCORTISONE 10 MG: 10 TABLET ORAL at 12:50

## 2018-10-27 RX ADMIN — OXYBUTYNIN CHLORIDE 5 MG: 5 TABLET ORAL at 23:42

## 2018-10-27 RX ADMIN — SODIUM CHLORIDE 125 ML/HR: 9 INJECTION, SOLUTION INTRAVENOUS at 16:20

## 2018-10-27 RX ADMIN — FENTANYL CITRATE 50 MCG: 50 INJECTION, SOLUTION INTRAMUSCULAR; INTRAVENOUS at 05:01

## 2018-10-27 RX ADMIN — ENOXAPARIN SODIUM 30 MG: 30 INJECTION SUBCUTANEOUS at 12:50

## 2018-10-27 RX ADMIN — HYDROCORTISONE SODIUM SUCCINATE 50 MG: 100 INJECTION, POWDER, FOR SOLUTION INTRAMUSCULAR; INTRAVENOUS at 05:00

## 2018-10-27 RX ADMIN — SODIUM CHLORIDE 750 MG: 900 INJECTION, SOLUTION INTRAVENOUS at 08:51

## 2018-10-27 RX ADMIN — FENTANYL CITRATE 50 MCG: 50 INJECTION, SOLUTION INTRAMUSCULAR; INTRAVENOUS at 10:54

## 2018-10-27 RX ADMIN — FENTANYL CITRATE 50 MCG: 50 INJECTION, SOLUTION INTRAMUSCULAR; INTRAVENOUS at 08:51

## 2018-10-27 RX ADMIN — HYDROCORTISONE 10 MG: 10 TABLET ORAL at 22:00

## 2018-10-27 RX ADMIN — ONDANSETRON 4 MG: 2 INJECTION INTRAMUSCULAR; INTRAVENOUS at 14:08

## 2018-10-27 RX ADMIN — ALPRAZOLAM 1 MG: 0.5 TABLET ORAL at 23:42

## 2018-10-27 RX ADMIN — OXYBUTYNIN CHLORIDE 5 MG: 5 TABLET ORAL at 17:47

## 2018-10-27 RX ADMIN — SODIUM CHLORIDE 125 ML/HR: 9 INJECTION, SOLUTION INTRAVENOUS at 23:43

## 2018-10-27 RX ADMIN — OXYCODONE HYDROCHLORIDE 15 MG: 15 TABLET ORAL at 14:08

## 2018-10-27 RX ADMIN — OXYBUTYNIN CHLORIDE 5 MG: 5 TABLET ORAL at 12:50

## 2018-10-27 RX ADMIN — OXYCODONE HYDROCHLORIDE 15 MG: 15 TABLET ORAL at 22:33

## 2018-10-27 RX ADMIN — SODIUM CHLORIDE 125 ML/HR: 9 INJECTION, SOLUTION INTRAVENOUS at 16:21

## 2018-10-27 RX ADMIN — FENTANYL CITRATE 50 MCG: 50 INJECTION, SOLUTION INTRAMUSCULAR; INTRAVENOUS at 00:48

## 2018-10-28 ENCOUNTER — APPOINTMENT (OUTPATIENT)
Dept: GENERAL RADIOLOGY | Facility: HOSPITAL | Age: 38
End: 2018-10-28

## 2018-10-28 LAB
ANION GAP SERPL CALCULATED.3IONS-SCNC: 7.4 MMOL/L
BACTERIA BLD CULT: ABNORMAL
BACTERIA SPEC AEROBE CULT: NORMAL
BASOPHILS # BLD AUTO: 0.01 10*3/MM3 (ref 0–0.2)
BASOPHILS NFR BLD AUTO: 0.2 % (ref 0–1.5)
BUN BLD-MCNC: 15 MG/DL (ref 6–20)
BUN/CREAT SERPL: 17.9 (ref 7–25)
CALCIUM SPEC-SCNC: 7.5 MG/DL (ref 8.6–10.5)
CHLORIDE SERPL-SCNC: 111 MMOL/L (ref 98–107)
CO2 SERPL-SCNC: 22.6 MMOL/L (ref 22–29)
CREAT BLD-MCNC: 0.84 MG/DL (ref 0.76–1.27)
DEPRECATED RDW RBC AUTO: 55.5 FL (ref 37–54)
EOSINOPHIL # BLD AUTO: 0.06 10*3/MM3 (ref 0–0.7)
EOSINOPHIL NFR BLD AUTO: 1 % (ref 0.3–6.2)
ERYTHROCYTE [DISTWIDTH] IN BLOOD BY AUTOMATED COUNT: 17.2 % (ref 11.5–14.5)
GFR SERPL CREATININE-BSD FRML MDRD: 124 ML/MIN/1.73
GLUCOSE BLD-MCNC: 119 MG/DL (ref 65–99)
HCT VFR BLD AUTO: 21.1 % (ref 40.4–52.2)
HGB BLD-MCNC: 6.3 G/DL (ref 13.7–17.6)
IMM GRANULOCYTES # BLD: 0.02 10*3/MM3 (ref 0–0.03)
IMM GRANULOCYTES NFR BLD: 0.3 % (ref 0–0.5)
LYMPHOCYTES # BLD AUTO: 0.73 10*3/MM3 (ref 0.9–4.8)
LYMPHOCYTES NFR BLD AUTO: 12.2 % (ref 19.6–45.3)
MCH RBC QN AUTO: 25.9 PG (ref 27–32.7)
MCHC RBC AUTO-ENTMCNC: 29.9 G/DL (ref 32.6–36.4)
MCV RBC AUTO: 86.8 FL (ref 79.8–96.2)
MONOCYTES # BLD AUTO: 0.51 10*3/MM3 (ref 0.2–1.2)
MONOCYTES NFR BLD AUTO: 8.5 % (ref 5–12)
NEUTROPHILS # BLD AUTO: 4.69 10*3/MM3 (ref 1.9–8.1)
NEUTROPHILS NFR BLD AUTO: 78.1 % (ref 42.7–76)
PLATELET # BLD AUTO: 180 10*3/MM3 (ref 140–500)
PMV BLD AUTO: 8.7 FL (ref 6–12)
POTASSIUM BLD-SCNC: 3.6 MMOL/L (ref 3.5–5.2)
PROCALCITONIN SERPL-MCNC: 0.46 NG/ML (ref 0.1–0.25)
RBC # BLD AUTO: 2.43 10*6/MM3 (ref 4.6–6)
SODIUM BLD-SCNC: 141 MMOL/L (ref 136–145)
WBC NRBC COR # BLD: 6 10*3/MM3 (ref 4.5–10.7)

## 2018-10-28 PROCEDURE — 84145 PROCALCITONIN (PCT): CPT | Performed by: INTERNAL MEDICINE

## 2018-10-28 PROCEDURE — 86900 BLOOD TYPING SEROLOGIC ABO: CPT

## 2018-10-28 PROCEDURE — 0 IOPAMIDOL PER 1 ML: Performed by: INTERNAL MEDICINE

## 2018-10-28 PROCEDURE — 25010000002 VANCOMYCIN PER 500 MG: Performed by: INTERNAL MEDICINE

## 2018-10-28 PROCEDURE — 99232 SBSQ HOSP IP/OBS MODERATE 35: CPT | Performed by: INTERNAL MEDICINE

## 2018-10-28 PROCEDURE — 36430 TRANSFUSION BLD/BLD COMPNT: CPT

## 2018-10-28 PROCEDURE — 25010000002 ENOXAPARIN PER 10 MG: Performed by: INTERNAL MEDICINE

## 2018-10-28 PROCEDURE — P9016 RBC LEUKOCYTES REDUCED: HCPCS

## 2018-10-28 PROCEDURE — 80048 BASIC METABOLIC PNL TOTAL CA: CPT | Performed by: INTERNAL MEDICINE

## 2018-10-28 PROCEDURE — 85025 COMPLETE CBC W/AUTO DIFF WBC: CPT | Performed by: INTERNAL MEDICINE

## 2018-10-28 PROCEDURE — 0T9B80Z DRAINAGE OF BLADDER WITH DRAINAGE DEVICE, VIA NATURAL OR ARTIFICIAL OPENING ENDOSCOPIC: ICD-10-PCS | Performed by: UROLOGY

## 2018-10-28 PROCEDURE — 74430 CONTRAST X-RAY BLADDER: CPT

## 2018-10-28 RX ORDER — OXYCODONE HYDROCHLORIDE 15 MG/1
15 TABLET ORAL EVERY 6 HOURS PRN
Status: DISPENSED | OUTPATIENT
Start: 2018-10-28 | End: 2018-10-30

## 2018-10-28 RX ORDER — HYDROCODONE BITARTRATE AND ACETAMINOPHEN 7.5; 325 MG/1; MG/1
1 TABLET ORAL EVERY 4 HOURS PRN
Status: DISCONTINUED | OUTPATIENT
Start: 2018-10-28 | End: 2018-10-30

## 2018-10-28 RX ORDER — VANCOMYCIN HYDROCHLORIDE 1 G/200ML
20 INJECTION, SOLUTION INTRAVENOUS ONCE
Status: COMPLETED | OUTPATIENT
Start: 2018-10-28 | End: 2018-10-28

## 2018-10-28 RX ORDER — HYDROCODONE BITARTRATE AND ACETAMINOPHEN 7.5; 325 MG/1; MG/1
1 TABLET ORAL EVERY 4 HOURS PRN
Status: DISCONTINUED | OUTPATIENT
Start: 2018-10-28 | End: 2018-10-28

## 2018-10-28 RX ADMIN — VANCOMYCIN HYDROCHLORIDE 1000 MG: 1 INJECTION, SOLUTION INTRAVENOUS at 22:14

## 2018-10-28 RX ADMIN — OXYBUTYNIN CHLORIDE 5 MG: 5 TABLET ORAL at 05:38

## 2018-10-28 RX ADMIN — Medication 10 ML: at 09:17

## 2018-10-28 RX ADMIN — HYDROCODONE BITARTRATE AND ACETAMINOPHEN 1 TABLET: 7.5; 325 TABLET ORAL at 16:46

## 2018-10-28 RX ADMIN — IOPAMIDOL 150 ML: 510 INJECTION, SOLUTION INTRAVASCULAR at 13:48

## 2018-10-28 RX ADMIN — OXYCODONE HYDROCHLORIDE 15 MG: 15 TABLET ORAL at 19:38

## 2018-10-28 RX ADMIN — OXYBUTYNIN CHLORIDE 5 MG: 5 TABLET ORAL at 11:58

## 2018-10-28 RX ADMIN — Medication 10 ML: at 22:02

## 2018-10-28 RX ADMIN — SODIUM HYPOCHLORITE: 1.25 SOLUTION TOPICAL at 09:17

## 2018-10-28 RX ADMIN — OXYCODONE HYDROCHLORIDE 15 MG: 15 TABLET ORAL at 12:48

## 2018-10-28 RX ADMIN — OXYBUTYNIN CHLORIDE 5 MG: 5 TABLET ORAL at 19:38

## 2018-10-29 LAB
ABO + RH BLD: NORMAL
ANION GAP SERPL CALCULATED.3IONS-SCNC: 9.4 MMOL/L
BACTERIA SPEC AEROBE CULT: ABNORMAL
BASOPHILS # BLD AUTO: 0.02 10*3/MM3 (ref 0–0.2)
BASOPHILS NFR BLD AUTO: 0.3 % (ref 0–1.5)
BH BB BLOOD EXPIRATION DATE: NORMAL
BH BB BLOOD TYPE BARCODE: 6200
BH BB DISPENSE STATUS: NORMAL
BH BB PRODUCT CODE: NORMAL
BH BB UNIT NUMBER: NORMAL
BUN BLD-MCNC: 16 MG/DL (ref 6–20)
BUN/CREAT SERPL: 17.6 (ref 7–25)
CALCIUM SPEC-SCNC: 7.4 MG/DL (ref 8.6–10.5)
CHLORIDE SERPL-SCNC: 109 MMOL/L (ref 98–107)
CO2 SERPL-SCNC: 23.6 MMOL/L (ref 22–29)
CREAT BLD-MCNC: 0.91 MG/DL (ref 0.76–1.27)
DEPRECATED RDW RBC AUTO: 55.2 FL (ref 37–54)
EOSINOPHIL # BLD AUTO: 0.14 10*3/MM3 (ref 0–0.7)
EOSINOPHIL NFR BLD AUTO: 2.4 % (ref 0.3–6.2)
ERYTHROCYTE [DISTWIDTH] IN BLOOD BY AUTOMATED COUNT: 17.5 % (ref 11.5–14.5)
GFR SERPL CREATININE-BSD FRML MDRD: 113 ML/MIN/1.73
GLUCOSE BLD-MCNC: 97 MG/DL (ref 65–99)
GRAM STN SPEC: ABNORMAL
HCT VFR BLD AUTO: 25 % (ref 40.4–52.2)
HGB BLD-MCNC: 7.6 G/DL (ref 13.7–17.6)
IMM GRANULOCYTES # BLD: 0.01 10*3/MM3 (ref 0–0.03)
IMM GRANULOCYTES NFR BLD: 0.2 % (ref 0–0.5)
ISOLATED FROM: ABNORMAL
LYMPHOCYTES # BLD AUTO: 1.8 10*3/MM3 (ref 0.9–4.8)
LYMPHOCYTES NFR BLD AUTO: 30.4 % (ref 19.6–45.3)
MCH RBC QN AUTO: 26.2 PG (ref 27–32.7)
MCHC RBC AUTO-ENTMCNC: 30.4 G/DL (ref 32.6–36.4)
MCV RBC AUTO: 86.2 FL (ref 79.8–96.2)
MONOCYTES # BLD AUTO: 0.45 10*3/MM3 (ref 0.2–1.2)
MONOCYTES NFR BLD AUTO: 7.6 % (ref 5–12)
NEUTROPHILS # BLD AUTO: 3.52 10*3/MM3 (ref 1.9–8.1)
NEUTROPHILS NFR BLD AUTO: 59.3 % (ref 42.7–76)
NRBC BLD MANUAL-RTO: 0 /100 WBC (ref 0–0)
PLATELET # BLD AUTO: 173 10*3/MM3 (ref 140–500)
PMV BLD AUTO: 8.6 FL (ref 6–12)
POTASSIUM BLD-SCNC: 3.6 MMOL/L (ref 3.5–5.2)
RBC # BLD AUTO: 2.9 10*6/MM3 (ref 4.6–6)
SODIUM BLD-SCNC: 142 MMOL/L (ref 136–145)
UNIT  ABO: NORMAL
UNIT  RH: NORMAL
WBC NRBC COR # BLD: 5.93 10*3/MM3 (ref 4.5–10.7)

## 2018-10-29 PROCEDURE — 80048 BASIC METABOLIC PNL TOTAL CA: CPT | Performed by: INTERNAL MEDICINE

## 2018-10-29 PROCEDURE — 25010000002 VANCOMYCIN 750 MG RECONSTITUTED SOLUTION: Performed by: INTERNAL MEDICINE

## 2018-10-29 PROCEDURE — 25010000002 ENOXAPARIN PER 10 MG: Performed by: INTERNAL MEDICINE

## 2018-10-29 PROCEDURE — 85025 COMPLETE CBC W/AUTO DIFF WBC: CPT | Performed by: INTERNAL MEDICINE

## 2018-10-29 RX ADMIN — HYDROCORTISONE 10 MG: 10 TABLET ORAL at 20:52

## 2018-10-29 RX ADMIN — OXYBUTYNIN CHLORIDE 5 MG: 5 TABLET ORAL at 01:47

## 2018-10-29 RX ADMIN — OXYCODONE HYDROCHLORIDE 15 MG: 15 TABLET ORAL at 17:23

## 2018-10-29 RX ADMIN — HYDROCODONE BITARTRATE AND ACETAMINOPHEN 1 TABLET: 7.5; 325 TABLET ORAL at 20:52

## 2018-10-29 RX ADMIN — OXYCODONE HYDROCHLORIDE 15 MG: 15 TABLET ORAL at 10:03

## 2018-10-29 RX ADMIN — SODIUM CHLORIDE 750 MG: 900 INJECTION, SOLUTION INTRAVENOUS at 10:05

## 2018-10-29 RX ADMIN — OXYBUTYNIN CHLORIDE 5 MG: 5 TABLET ORAL at 17:23

## 2018-10-29 RX ADMIN — OXYBUTYNIN CHLORIDE 5 MG: 5 TABLET ORAL at 22:31

## 2018-10-29 RX ADMIN — Medication 10 ML: at 20:52

## 2018-10-29 RX ADMIN — ALPRAZOLAM 1 MG: 0.5 TABLET ORAL at 22:31

## 2018-10-29 RX ADMIN — HYDROCODONE BITARTRATE AND ACETAMINOPHEN 1 TABLET: 7.5; 325 TABLET ORAL at 12:03

## 2018-10-29 RX ADMIN — SODIUM HYPOCHLORITE: 1.25 SOLUTION TOPICAL at 10:12

## 2018-10-29 RX ADMIN — Medication 10 ML: at 10:12

## 2018-10-29 RX ADMIN — OXYCODONE HYDROCHLORIDE 15 MG: 15 TABLET ORAL at 01:47

## 2018-10-29 RX ADMIN — ALPRAZOLAM 1 MG: 0.5 TABLET ORAL at 10:19

## 2018-10-29 RX ADMIN — ENOXAPARIN SODIUM 30 MG: 30 INJECTION SUBCUTANEOUS at 12:02

## 2018-10-29 RX ADMIN — OXYBUTYNIN CHLORIDE 5 MG: 5 TABLET ORAL at 10:04

## 2018-10-30 VITALS
BODY MASS INDEX: 15.27 KG/M2 | RESPIRATION RATE: 18 BRPM | TEMPERATURE: 98.6 F | HEIGHT: 73 IN | WEIGHT: 115.2 LBS | SYSTOLIC BLOOD PRESSURE: 89 MMHG | OXYGEN SATURATION: 97 % | DIASTOLIC BLOOD PRESSURE: 65 MMHG | HEART RATE: 71 BPM

## 2018-10-30 LAB
ANION GAP SERPL CALCULATED.3IONS-SCNC: 10.2 MMOL/L
BASOPHILS # BLD AUTO: 0.03 10*3/MM3 (ref 0–0.2)
BASOPHILS NFR BLD AUTO: 0.5 % (ref 0–1.5)
BUN BLD-MCNC: 13 MG/DL (ref 6–20)
BUN/CREAT SERPL: 20.6 (ref 7–25)
CALCIUM SPEC-SCNC: 7.8 MG/DL (ref 8.6–10.5)
CHLORIDE SERPL-SCNC: 109 MMOL/L (ref 98–107)
CO2 SERPL-SCNC: 22.8 MMOL/L (ref 22–29)
CREAT BLD-MCNC: 0.63 MG/DL (ref 0.76–1.27)
DEPRECATED RDW RBC AUTO: 56.6 FL (ref 37–54)
EOSINOPHIL # BLD AUTO: 0.09 10*3/MM3 (ref 0–0.7)
EOSINOPHIL NFR BLD AUTO: 1.6 % (ref 0.3–6.2)
ERYTHROCYTE [DISTWIDTH] IN BLOOD BY AUTOMATED COUNT: 17.6 % (ref 11.5–14.5)
GFR SERPL CREATININE-BSD FRML MDRD: >150 ML/MIN/1.73
GLUCOSE BLD-MCNC: 110 MG/DL (ref 65–99)
HCT VFR BLD AUTO: 26.3 % (ref 40.4–52.2)
HGB BLD-MCNC: 8 G/DL (ref 13.7–17.6)
IMM GRANULOCYTES # BLD: 0.05 10*3/MM3 (ref 0–0.03)
IMM GRANULOCYTES NFR BLD: 0.9 % (ref 0–0.5)
LYMPHOCYTES # BLD AUTO: 1.29 10*3/MM3 (ref 0.9–4.8)
LYMPHOCYTES NFR BLD AUTO: 23.2 % (ref 19.6–45.3)
MCH RBC QN AUTO: 26.6 PG (ref 27–32.7)
MCHC RBC AUTO-ENTMCNC: 30.4 G/DL (ref 32.6–36.4)
MCV RBC AUTO: 87.4 FL (ref 79.8–96.2)
MONOCYTES # BLD AUTO: 0.6 10*3/MM3 (ref 0.2–1.2)
MONOCYTES NFR BLD AUTO: 10.8 % (ref 5–12)
NEUTROPHILS # BLD AUTO: 3.54 10*3/MM3 (ref 1.9–8.1)
NEUTROPHILS NFR BLD AUTO: 63.9 % (ref 42.7–76)
NRBC BLD MANUAL-RTO: 0 /100 WBC (ref 0–0)
PLATELET # BLD AUTO: 180 10*3/MM3 (ref 140–500)
PMV BLD AUTO: 9.2 FL (ref 6–12)
POTASSIUM BLD-SCNC: 3.4 MMOL/L (ref 3.5–5.2)
RBC # BLD AUTO: 3.01 10*6/MM3 (ref 4.6–6)
SODIUM BLD-SCNC: 142 MMOL/L (ref 136–145)
WBC NRBC COR # BLD: 5.55 10*3/MM3 (ref 4.5–10.7)

## 2018-10-30 PROCEDURE — 80048 BASIC METABOLIC PNL TOTAL CA: CPT | Performed by: INTERNAL MEDICINE

## 2018-10-30 PROCEDURE — 85025 COMPLETE CBC W/AUTO DIFF WBC: CPT | Performed by: INTERNAL MEDICINE

## 2018-10-30 PROCEDURE — 25010000002 MORPHINE SULFATE (PF) 2 MG/ML SOLUTION: Performed by: INTERNAL MEDICINE

## 2018-10-30 PROCEDURE — 25010000002 ENOXAPARIN PER 10 MG: Performed by: INTERNAL MEDICINE

## 2018-10-30 RX ORDER — MORPHINE SULFATE 2 MG/ML
6 INJECTION, SOLUTION INTRAMUSCULAR; INTRAVENOUS DAILY PRN
Status: DISCONTINUED | OUTPATIENT
Start: 2018-10-30 | End: 2018-10-30 | Stop reason: HOSPADM

## 2018-10-30 RX ORDER — OXYCODONE HYDROCHLORIDE 15 MG/1
15 TABLET ORAL EVERY 4 HOURS PRN
Qty: 16 TABLET | Refills: 0 | Status: SHIPPED | OUTPATIENT
Start: 2018-10-30 | End: 2018-12-12 | Stop reason: HOSPADM

## 2018-10-30 RX ORDER — HYDROCODONE BITARTRATE AND ACETAMINOPHEN 7.5; 325 MG/1; MG/1
1 TABLET ORAL EVERY 4 HOURS PRN
Qty: 10 TABLET | Refills: 0 | Status: SHIPPED | OUTPATIENT
Start: 2018-10-30 | End: 2018-10-30 | Stop reason: HOSPADM

## 2018-10-30 RX ADMIN — Medication 500 UNITS: at 17:10

## 2018-10-30 RX ADMIN — HYDROCODONE BITARTRATE AND ACETAMINOPHEN 1 TABLET: 7.5; 325 TABLET ORAL at 03:00

## 2018-10-30 RX ADMIN — OXYCODONE HYDROCHLORIDE 15 MG: 15 TABLET ORAL at 00:34

## 2018-10-30 RX ADMIN — OXYCODONE HYDROCHLORIDE 15 MG: 15 TABLET ORAL at 09:58

## 2018-10-30 RX ADMIN — ENOXAPARIN SODIUM 30 MG: 30 INJECTION SUBCUTANEOUS at 11:50

## 2018-10-30 RX ADMIN — Medication 20 ML: at 09:59

## 2018-10-30 RX ADMIN — OXYBUTYNIN CHLORIDE 5 MG: 5 TABLET ORAL at 05:57

## 2018-10-30 RX ADMIN — ALPRAZOLAM 1 MG: 0.5 TABLET ORAL at 11:50

## 2018-10-30 RX ADMIN — MORPHINE SULFATE 6 MG: 2 INJECTION, SOLUTION INTRAMUSCULAR; INTRAVENOUS at 13:29

## 2018-10-30 RX ADMIN — HYDROCORTISONE 10 MG: 10 TABLET ORAL at 09:58

## 2018-10-30 RX ADMIN — OXYBUTYNIN CHLORIDE 5 MG: 5 TABLET ORAL at 11:50

## 2018-10-30 RX ADMIN — SODIUM HYPOCHLORITE: 1.25 SOLUTION TOPICAL at 09:59

## 2018-10-31 ENCOUNTER — READMISSION MANAGEMENT (OUTPATIENT)
Dept: CALL CENTER | Facility: HOSPITAL | Age: 38
End: 2018-10-31

## 2018-10-31 LAB — BACTERIA SPEC AEROBE CULT: NORMAL

## 2018-10-31 NOTE — OUTREACH NOTE
Prep Survey      Responses   Facility patient discharged from?  [place]   Is patient eligible?  No   What are the reasons patient is not eligible?  Other   Does the patient have one of the following disease processes/diagnoses(primary or secondary)?  Other   Prep survey completed?  Yes          Kary Ballesteros RN

## 2018-12-06 ENCOUNTER — HOSPITAL ENCOUNTER (INPATIENT)
Facility: HOSPITAL | Age: 38
LOS: 6 days | Discharge: HOME HEALTH | End: 2018-12-12
Attending: EMERGENCY MEDICINE | Admitting: HOSPITALIST

## 2018-12-06 ENCOUNTER — APPOINTMENT (OUTPATIENT)
Dept: CT IMAGING | Facility: HOSPITAL | Age: 38
End: 2018-12-06

## 2018-12-06 DIAGNOSIS — M86.68 OTHER CHRONIC OSTEOMYELITIS, OTHER SITE (HCC): Primary | ICD-10-CM

## 2018-12-06 DIAGNOSIS — G82.20 PARAPLEGIA FOLLOWING SPINAL CORD INJURY (HCC): Chronic | ICD-10-CM

## 2018-12-06 DIAGNOSIS — D64.9 ANEMIA, UNSPECIFIED TYPE: ICD-10-CM

## 2018-12-06 DIAGNOSIS — T83.010A SUPRAPUBIC CATHETER DYSFUNCTION, INITIAL ENCOUNTER (HCC): ICD-10-CM

## 2018-12-06 DIAGNOSIS — L89.154 DECUBITUS ULCER OF SACRAL REGION, STAGE 4 (HCC): ICD-10-CM

## 2018-12-06 DIAGNOSIS — E86.0 DEHYDRATION: ICD-10-CM

## 2018-12-06 LAB
ALBUMIN SERPL-MCNC: 2.2 G/DL (ref 3.5–5.2)
ALBUMIN/GLOB SERPL: 0.4 G/DL
ALP SERPL-CCNC: 104 U/L (ref 39–117)
ALT SERPL W P-5'-P-CCNC: <5 U/L (ref 1–41)
ANION GAP SERPL CALCULATED.3IONS-SCNC: 11.3 MMOL/L
AST SERPL-CCNC: 7 U/L (ref 1–40)
BASOPHILS # BLD AUTO: 0.02 10*3/MM3 (ref 0–0.2)
BASOPHILS NFR BLD AUTO: 0.2 % (ref 0–1.5)
BILIRUB SERPL-MCNC: 0.3 MG/DL (ref 0.1–1.2)
BUN BLD-MCNC: 25 MG/DL (ref 6–20)
BUN/CREAT SERPL: 22.9 (ref 7–25)
CALCIUM SPEC-SCNC: 7.3 MG/DL (ref 8.6–10.5)
CHLORIDE SERPL-SCNC: 102 MMOL/L (ref 98–107)
CO2 SERPL-SCNC: 18.7 MMOL/L (ref 22–29)
CREAT BLD-MCNC: 1.09 MG/DL (ref 0.76–1.27)
DEPRECATED RDW RBC AUTO: 55.1 FL (ref 37–54)
EOSINOPHIL # BLD AUTO: 0.08 10*3/MM3 (ref 0–0.7)
EOSINOPHIL NFR BLD AUTO: 0.8 % (ref 0.3–6.2)
ERYTHROCYTE [DISTWIDTH] IN BLOOD BY AUTOMATED COUNT: 17.9 % (ref 11.5–14.5)
GFR SERPL CREATININE-BSD FRML MDRD: 92 ML/MIN/1.73
GLOBULIN UR ELPH-MCNC: 4.9 GM/DL
GLUCOSE BLD-MCNC: 98 MG/DL (ref 65–99)
HCT VFR BLD AUTO: 21.8 % (ref 40.4–52.2)
HGB BLD-MCNC: 6.3 G/DL (ref 13.7–17.6)
IMM GRANULOCYTES # BLD: 0.02 10*3/MM3 (ref 0–0.03)
IMM GRANULOCYTES NFR BLD: 0.2 % (ref 0–0.5)
LYMPHOCYTES # BLD AUTO: 1.19 10*3/MM3 (ref 0.9–4.8)
LYMPHOCYTES NFR BLD AUTO: 11.6 % (ref 19.6–45.3)
MCH RBC QN AUTO: 24 PG (ref 27–32.7)
MCHC RBC AUTO-ENTMCNC: 28.9 G/DL (ref 32.6–36.4)
MCV RBC AUTO: 83.2 FL (ref 79.8–96.2)
MONOCYTES # BLD AUTO: 0.83 10*3/MM3 (ref 0.2–1.2)
MONOCYTES NFR BLD AUTO: 8.1 % (ref 5–12)
NEUTROPHILS # BLD AUTO: 8.14 10*3/MM3 (ref 1.9–8.1)
NEUTROPHILS NFR BLD AUTO: 79.3 % (ref 42.7–76)
PLATELET # BLD AUTO: 188 10*3/MM3 (ref 140–500)
PMV BLD AUTO: 8.7 FL (ref 6–12)
POTASSIUM BLD-SCNC: 3.8 MMOL/L (ref 3.5–5.2)
PROCALCITONIN SERPL-MCNC: 0.88 NG/ML (ref 0.1–0.25)
PROT SERPL-MCNC: 7.1 G/DL (ref 6–8.5)
RBC # BLD AUTO: 2.62 10*6/MM3 (ref 4.6–6)
SODIUM BLD-SCNC: 132 MMOL/L (ref 136–145)
WBC NRBC COR # BLD: 10.26 10*3/MM3 (ref 4.5–10.7)

## 2018-12-06 PROCEDURE — 80053 COMPREHEN METABOLIC PANEL: CPT | Performed by: NURSE PRACTITIONER

## 2018-12-06 PROCEDURE — 25010000002 ONDANSETRON PER 1 MG: Performed by: NURSE PRACTITIONER

## 2018-12-06 PROCEDURE — 83605 ASSAY OF LACTIC ACID: CPT | Performed by: NURSE PRACTITIONER

## 2018-12-06 PROCEDURE — 86923 COMPATIBILITY TEST ELECTRIC: CPT

## 2018-12-06 PROCEDURE — 51798 US URINE CAPACITY MEASURE: CPT

## 2018-12-06 PROCEDURE — 84145 PROCALCITONIN (PCT): CPT | Performed by: NURSE PRACTITIONER

## 2018-12-06 PROCEDURE — 86901 BLOOD TYPING SEROLOGIC RH(D): CPT | Performed by: NURSE PRACTITIONER

## 2018-12-06 PROCEDURE — 99284 EMERGENCY DEPT VISIT MOD MDM: CPT

## 2018-12-06 PROCEDURE — 74176 CT ABD & PELVIS W/O CONTRAST: CPT

## 2018-12-06 PROCEDURE — 86900 BLOOD TYPING SEROLOGIC ABO: CPT | Performed by: NURSE PRACTITIONER

## 2018-12-06 PROCEDURE — 85025 COMPLETE CBC W/AUTO DIFF WBC: CPT | Performed by: NURSE PRACTITIONER

## 2018-12-06 PROCEDURE — 86140 C-REACTIVE PROTEIN: CPT | Performed by: INTERNAL MEDICINE

## 2018-12-06 PROCEDURE — 87040 BLOOD CULTURE FOR BACTERIA: CPT | Performed by: NURSE PRACTITIONER

## 2018-12-06 PROCEDURE — 25010000002 HYDROMORPHONE 1 MG/ML SOLUTION: Performed by: NURSE PRACTITIONER

## 2018-12-06 PROCEDURE — 86850 RBC ANTIBODY SCREEN: CPT | Performed by: NURSE PRACTITIONER

## 2018-12-06 RX ORDER — PANTOPRAZOLE SODIUM 40 MG/1
40 TABLET, DELAYED RELEASE ORAL DAILY
COMMUNITY

## 2018-12-06 RX ORDER — ALPRAZOLAM 1 MG/1
1 TABLET ORAL 3 TIMES DAILY PRN
COMMUNITY

## 2018-12-06 RX ORDER — SODIUM CHLORIDE 0.9 % (FLUSH) 0.9 %
10 SYRINGE (ML) INJECTION AS NEEDED
Status: DISCONTINUED | OUTPATIENT
Start: 2018-12-06 | End: 2018-12-13 | Stop reason: HOSPADM

## 2018-12-06 RX ORDER — GABAPENTIN 300 MG/1
300 CAPSULE ORAL 3 TIMES DAILY
COMMUNITY

## 2018-12-06 RX ORDER — LINEZOLID 2 MG/ML
600 INJECTION, SOLUTION INTRAVENOUS ONCE
Status: COMPLETED | OUTPATIENT
Start: 2018-12-06 | End: 2018-12-07

## 2018-12-06 RX ORDER — ONDANSETRON 2 MG/ML
4 INJECTION INTRAMUSCULAR; INTRAVENOUS ONCE
Status: COMPLETED | OUTPATIENT
Start: 2018-12-06 | End: 2018-12-06

## 2018-12-06 RX ORDER — OXYBUTYNIN CHLORIDE 5 MG/1
5 TABLET ORAL 2 TIMES DAILY
COMMUNITY

## 2018-12-06 RX ADMIN — ONDANSETRON 4 MG: 2 INJECTION INTRAMUSCULAR; INTRAVENOUS at 20:47

## 2018-12-06 RX ADMIN — HYDROMORPHONE HYDROCHLORIDE 1 MG: 1 INJECTION, SOLUTION INTRAMUSCULAR; INTRAVENOUS; SUBCUTANEOUS at 20:48
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
## 2018-12-07 ENCOUNTER — APPOINTMENT (OUTPATIENT)
Dept: NUCLEAR MEDICINE | Facility: HOSPITAL | Age: 38
End: 2018-12-07

## 2018-12-07 PROBLEM — A41.9 SEPSIS, UNSPECIFIED ORGANISM (HCC): Status: ACTIVE | Noted: 2018-12-07

## 2018-12-07 LAB
ABO GROUP BLD: NORMAL
BLD GP AB SCN SERPL QL: NEGATIVE
CRP SERPL-MCNC: 18.59 MG/DL (ref 0–0.5)
D-LACTATE SERPL-SCNC: 0.6 MMOL/L (ref 0.5–2)
RH BLD: POSITIVE
T&S EXPIRATION DATE: NORMAL

## 2018-12-07 PROCEDURE — 25010000002 MEROPENEM PER 100 MG: Performed by: HOSPITALIST

## 2018-12-07 PROCEDURE — 25810000003 SODIUM CHLORIDE 0.9 % WITH KCL 20 MEQ 20-0.9 MEQ/L-% SOLUTION: Performed by: HOSPITALIST

## 2018-12-07 PROCEDURE — 25010000002 LINEZOLID 600 MG/300ML SOLUTION: Performed by: NURSE PRACTITIONER

## 2018-12-07 PROCEDURE — 25010000002 MEROPENEM PER 100 MG: Performed by: NURSE PRACTITIONER

## 2018-12-07 PROCEDURE — 25010000002 HYDROMORPHONE PER 4 MG: Performed by: HOSPITALIST

## 2018-12-07 PROCEDURE — 36430 TRANSFUSION BLD/BLD COMPNT: CPT

## 2018-12-07 PROCEDURE — 25010000002 HYDROCORTISONE SODIUM SUCCINATE 100 MG RECONSTITUTED SOLUTION: Performed by: HOSPITALIST

## 2018-12-07 PROCEDURE — 87040 BLOOD CULTURE FOR BACTERIA: CPT | Performed by: NURSE PRACTITIONER

## 2018-12-07 PROCEDURE — 86900 BLOOD TYPING SEROLOGIC ABO: CPT

## 2018-12-07 PROCEDURE — P9016 RBC LEUKOCYTES REDUCED: HCPCS

## 2018-12-07 PROCEDURE — 99223 1ST HOSP IP/OBS HIGH 75: CPT | Performed by: INTERNAL MEDICINE

## 2018-12-07 RX ORDER — HYDROMORPHONE HYDROCHLORIDE 1 MG/ML
0.5 INJECTION, SOLUTION INTRAMUSCULAR; INTRAVENOUS; SUBCUTANEOUS
Status: DISCONTINUED | OUTPATIENT
Start: 2018-12-07 | End: 2018-12-10

## 2018-12-07 RX ORDER — PANTOPRAZOLE SODIUM 40 MG/1
40 TABLET, DELAYED RELEASE ORAL
Status: DISCONTINUED | OUTPATIENT
Start: 2018-12-07 | End: 2018-12-13 | Stop reason: HOSPADM

## 2018-12-07 RX ORDER — OXYCODONE HYDROCHLORIDE 15 MG/1
15 TABLET ORAL EVERY 4 HOURS PRN
Status: DISCONTINUED | OUTPATIENT
Start: 2018-12-07 | End: 2018-12-11

## 2018-12-07 RX ORDER — ONDANSETRON 4 MG/1
4 TABLET, FILM COATED ORAL EVERY 6 HOURS PRN
Status: DISCONTINUED | OUTPATIENT
Start: 2018-12-07 | End: 2018-12-13 | Stop reason: HOSPADM

## 2018-12-07 RX ORDER — OXYBUTYNIN CHLORIDE 5 MG/1
5 TABLET ORAL 2 TIMES DAILY
Status: DISCONTINUED | OUTPATIENT
Start: 2018-12-07 | End: 2018-12-13 | Stop reason: HOSPADM

## 2018-12-07 RX ORDER — ONDANSETRON 4 MG/1
4 TABLET, ORALLY DISINTEGRATING ORAL EVERY 6 HOURS PRN
Status: DISCONTINUED | OUTPATIENT
Start: 2018-12-07 | End: 2018-12-13 | Stop reason: HOSPADM

## 2018-12-07 RX ORDER — SODIUM CHLORIDE AND POTASSIUM CHLORIDE 150; 900 MG/100ML; MG/100ML
50 INJECTION, SOLUTION INTRAVENOUS CONTINUOUS
Status: DISCONTINUED | OUTPATIENT
Start: 2018-12-07 | End: 2018-12-10

## 2018-12-07 RX ORDER — SACCHAROMYCES BOULARDII 250 MG
250 CAPSULE ORAL 2 TIMES DAILY
Status: DISCONTINUED | OUTPATIENT
Start: 2018-12-07 | End: 2018-12-13 | Stop reason: HOSPADM

## 2018-12-07 RX ORDER — ONDANSETRON 2 MG/ML
4 INJECTION INTRAMUSCULAR; INTRAVENOUS EVERY 6 HOURS PRN
Status: DISCONTINUED | OUTPATIENT
Start: 2018-12-07 | End: 2018-12-13 | Stop reason: HOSPADM

## 2018-12-07 RX ORDER — ALPRAZOLAM 0.5 MG/1
1 TABLET ORAL 2 TIMES DAILY PRN
Status: DISCONTINUED | OUTPATIENT
Start: 2018-12-07 | End: 2018-12-13 | Stop reason: HOSPADM

## 2018-12-07 RX ORDER — GABAPENTIN 300 MG/1
300 CAPSULE ORAL 3 TIMES DAILY
Status: DISCONTINUED | OUTPATIENT
Start: 2018-12-07 | End: 2018-12-13 | Stop reason: HOSPADM

## 2018-12-07 RX ORDER — LINEZOLID 2 MG/ML
600 INJECTION, SOLUTION INTRAVENOUS EVERY 12 HOURS
Status: DISCONTINUED | OUTPATIENT
Start: 2018-12-07 | End: 2018-12-07

## 2018-12-07 RX ADMIN — GABAPENTIN 300 MG: 300 CAPSULE ORAL at 21:29

## 2018-12-07 RX ADMIN — MEROPENEM 1 G: 1 INJECTION, POWDER, FOR SOLUTION INTRAVENOUS at 02:04

## 2018-12-07 RX ADMIN — HYDROCORTISONE SODIUM SUCCINATE 100 MG: 100 INJECTION, POWDER, FOR SOLUTION INTRAMUSCULAR; INTRAVENOUS at 13:34

## 2018-12-07 RX ADMIN — Medication 250 MG: at 03:02

## 2018-12-07 RX ADMIN — OXYCODONE HYDROCHLORIDE 15 MG: 15 TABLET ORAL at 19:34

## 2018-12-07 RX ADMIN — GABAPENTIN 300 MG: 300 CAPSULE ORAL at 15:19

## 2018-12-07 RX ADMIN — HYDROMORPHONE HYDROCHLORIDE 0.5 MG: 1 INJECTION, SOLUTION INTRAMUSCULAR; INTRAVENOUS; SUBCUTANEOUS at 15:19

## 2018-12-07 RX ADMIN — OXYBUTYNIN CHLORIDE 5 MG: 5 TABLET ORAL at 03:03

## 2018-12-07 RX ADMIN — HYDROCORTISONE SODIUM SUCCINATE 100 MG: 100 INJECTION, POWDER, FOR SOLUTION INTRAMUSCULAR; INTRAVENOUS at 19:34

## 2018-12-07 RX ADMIN — HYDROMORPHONE HYDROCHLORIDE 0.5 MG: 1 INJECTION, SOLUTION INTRAMUSCULAR; INTRAVENOUS; SUBCUTANEOUS at 23:33

## 2018-12-07 RX ADMIN — SODIUM CHLORIDE, POTASSIUM CHLORIDE, SODIUM LACTATE AND CALCIUM CHLORIDE 1000 ML: 600; 310; 30; 20 INJECTION, SOLUTION INTRAVENOUS at 00:15

## 2018-12-07 RX ADMIN — OXYCODONE HYDROCHLORIDE 15 MG: 15 TABLET ORAL at 13:34

## 2018-12-07 RX ADMIN — OXYCODONE HYDROCHLORIDE 15 MG: 15 TABLET ORAL at 03:01

## 2018-12-07 RX ADMIN — ALPRAZOLAM 1 MG: 0.5 TABLET ORAL at 00:30

## 2018-12-07 RX ADMIN — LINEZOLID 600 MG: 600 INJECTION, SOLUTION INTRAVENOUS at 02:39

## 2018-12-07 RX ADMIN — HYDROMORPHONE HYDROCHLORIDE 0.5 MG: 1 INJECTION, SOLUTION INTRAMUSCULAR; INTRAVENOUS; SUBCUTANEOUS at 17:40

## 2018-12-07 RX ADMIN — DAKIN'S SOLUTION 0.125% (QUARTER STRENGTH) 946 ML: 0.12 SOLUTION at 23:50

## 2018-12-07 RX ADMIN — PANTOPRAZOLE SODIUM 40 MG: 40 TABLET, DELAYED RELEASE ORAL at 06:35

## 2018-12-07 RX ADMIN — ALPRAZOLAM 1 MG: 0.5 TABLET ORAL at 17:39

## 2018-12-07 RX ADMIN — OXYBUTYNIN CHLORIDE 5 MG: 5 TABLET ORAL at 21:29

## 2018-12-07 RX ADMIN — SODIUM CHLORIDE 500 ML: 9 INJECTION, SOLUTION INTRAVENOUS at 03:45

## 2018-12-07 RX ADMIN — MEROPENEM 1 G: 1 INJECTION, POWDER, FOR SOLUTION INTRAVENOUS at 19:34

## 2018-12-07 RX ADMIN — POTASSIUM CHLORIDE AND SODIUM CHLORIDE 100 ML/HR: 900; 150 INJECTION, SOLUTION INTRAVENOUS at 04:18

## 2018-12-07 RX ADMIN — GABAPENTIN 300 MG: 300 CAPSULE ORAL at 09:41

## 2018-12-07 RX ADMIN — MEROPENEM 1 G: 1 INJECTION, POWDER, FOR SOLUTION INTRAVENOUS at 09:41

## 2018-12-07 RX ADMIN — OXYCODONE HYDROCHLORIDE 15 MG: 15 TABLET ORAL at 07:11

## 2018-12-07 RX ADMIN — Medication 250 MG: at 09:41

## 2018-12-07 RX ADMIN — OXYBUTYNIN CHLORIDE 5 MG: 5 TABLET ORAL at 09:41

## 2018-12-07 RX ADMIN — Medication 250 MG: at 21:29

## 2018-12-07 RX ADMIN — HYDROMORPHONE HYDROCHLORIDE 0.5 MG: 1 INJECTION, SOLUTION INTRAMUSCULAR; INTRAVENOUS; SUBCUTANEOUS at 02:38

## 2018-12-07 RX ADMIN — DAKIN'S SOLUTION 0.125% (QUARTER STRENGTH) 946 ML: 0.12 SOLUTION at 13:00

## 2018-12-07 RX ADMIN — ALPRAZOLAM 1 MG: 0.5 TABLET ORAL at 09:40

## 2018-12-07 RX ADMIN — HYDROMORPHONE HYDROCHLORIDE 0.5 MG: 1 INJECTION, SOLUTION INTRAMUSCULAR; INTRAVENOUS; SUBCUTANEOUS at 09:41

## 2018-12-07 RX ADMIN — HYDROMORPHONE HYDROCHLORIDE 0.5 MG: 1 INJECTION, SOLUTION INTRAMUSCULAR; INTRAVENOUS; SUBCUTANEOUS at 00:30

## 2018-12-07 RX ADMIN — HYDROCORTISONE SODIUM SUCCINATE 100 MG: 100 INJECTION, POWDER, FOR SOLUTION INTRAMUSCULAR; INTRAVENOUS at 03:02

## 2018-12-07 RX ADMIN — HYDROCORTISONE SODIUM SUCCINATE 100 MG: 100 INJECTION, POWDER, FOR SOLUTION INTRAMUSCULAR; INTRAVENOUS at 06:35

## 2018-12-07 NOTE — ED NOTES
"Pt refusing to let nurse start IV, stating \"you aren't going to get it so you aren't going to try\" IV nurse called to room to place IV.      Marcela Tello RN  12/07/18 0011    "

## 2018-12-07 NOTE — CONSULTS
Adult Nutrition  Assessment/PES    Patient Name:  Joaquín Sherman  YOB: 1980  MRN: [id number]  Admit Date:  12/6/2018    Assessment Date:  12/7/2018    Nutrition assessment triggered by low BMI-13.2, MST score-5 and RN consult. Patient severely malnourished. He stated due to severe pain will interfere with po intake.  Provided nutrition therapy. He agreed to nutrition supplements TID. He stated hospice is following him. Will speak to RN regarding this.  RD to monitor/follow per protocol.     Reason for Assessment     Row Name 12/07/18 1044          Reason for Assessment    Reason For Assessment  identified at risk by screening criteria;nurse/nurse practitioner consult     Diagnosis   Primary Problem:  Osteomyelitis Pelvis (CMS/HCC)      Identified At Risk by Screening Criteria  MST SCORE 2+;low BMI-13.2           Anthropometrics     Row Name 12/07/18 1044          Body Mass Index (BMI)    BMI Assessment  BMI less than 16: protein-energy malnutrition grade III         Labs/Tests/Procedures/Meds     Row Name 12/07/18 1044          Labs/Procedures/Meds    Lab Results Reviewed  reviewed, pertinent        Diagnostic Tests/Procedures    Diagnostic Test/Procedure Reviewed  reviewed, pertinent        Medications    Pertinent Medications Reviewed  reviewed, pertinent         Physical Findings     Row Name 12/07/18 1045          Physical Findings    Overall Physical Appearance  loss of muscle mass;loss of subcutaneous fat;underweight B=11         Estimated/Assessed Needs     Row Name 12/07/18 1045          Calculation Measurements    Weight Used For Calculations  45.4 kg (100 lb 1.4 oz)        Estimated/Assessed Needs    Additional Documentation  KCAL/KG (Group);Protein Requirements (Group);Fluid Requirements (Group)        KCAL/KG                                                                kcal/kg (Specify)  -- 7114-2410        [place]-St. Jeor Equation    RMR ([place]-St. Jeor Equation)  1427.75         Protein Requirements    Est Protein Requirement Amount (gms/kg)  1.4 gm protein     Estimated Protein Requirements (gms/day)  63.56        Fluid Requirements    Estimated Fluid Requirements (mL/day)  1300     RDA Method (mL)  1300     Meg-Segar Method (over 20 kg)  2408         Nutrition Prescription Ordered     Row Name 12/07/18 1045          Nutrition Prescription PO    Common Modifiers  Consistent Carbohydrate         Evaluation of Received Nutrient/Fluid Intake     Row Name 12/07/18 1045          Calculation Measurements    Weight Used For Calculations  45.4 kg (100 lb 1.4 oz)        PO Evaluation    Number of Meals  1     % PO Intake  50         Evaluation of Prescribed Nutrient/Fluid Intake     Row Name 12/07/18 1045          Calculation Measurements    Weight Used For Calculations  45.4 kg (100 lb 1.4 oz)         Malnutrition Severity Assessment     Row Name 12/07/18 1045          Malnutrition Severity Assessment    Malnutrition Type  Chronic Illness Malnutrition        Physical Signs of Malnutrition (Chronic)    Muscle Wasting  Severe     Fat Loss  Severe        Weight Status (Chronic)    BMI  Severe (<16) BMI-13.2     %IBW  Severe (<70%) 57%        Criteria Met (Must meet criteria for severity in at least 2 of these categories: M Wasting, Fat Loss, Fluid, Secondary Signs, Wt. Status, Intake)    Patient meets criteria for   Severe malnutrition           Problem/Interventions:  Problem 1     Row Name 12/07/18 1046          Nutrition Diagnoses Problem 1    Problem 1  Malnutrition     Etiology (related to)  MNT for Treatment/Condition     Signs/Symptoms (evidenced by)  Report of Mnimal PO Intake;BMI;% IBW     BMI  Less than 16     Percent (%) IBW  57 %                 Intervention Goal     Row Name 12/07/18 1046          Intervention Goal    General  Maintain nutrition;Meet nutritional needs for age/condition     PO  Tolerate PO;Increase intake     Weight  Appropriate weight gain         Nutrition  Intervention     Row Name 12/07/18 1047          Nutrition Intervention    RD/Tech Action  Interview for preference;Follow Tx progress;Care plan reviewd;Encourage intake;Recommend/ordered;Supplement provided     Recommended/Ordered  Supplement         Nutrition Prescription     Row Name 12/07/18 1047          Nutrition Prescription PO    PO Prescription  Begin/change supplement     Supplement  Boost Glucose Control     Supplement Frequency  3 times a day     New PO Prescription Ordered?  Yes         Education/Evaluation     Row Name 12/07/18 1047          Education    Education  Will Instruct as appropriate        Monitor/Evaluation    Monitor  Per protocol           Electronically signed by:  Katiana Colby RD  12/07/18 10:47 AM

## 2018-12-07 NOTE — PROGRESS NOTES
Continued Stay Note  [facility]     Patient Name: Joaquín Sherman  MRN: [id number]  Today's Date: 12/7/2018    Admit Date: 12/6/2018    Discharge Plan     Row Name 12/07/18 4425       Plan    Plan Comments  Faxed requested MD notes to [facility] at [facility]; patient is requesting new low air flow mattress for home. JAMAL Marlow     Row Name 12/07/18 6906       Plan    Plan  Plan is home with family and current with [facility].     Patient/Family in Agreement with Plan  yes    Plan Comments  CCP role explained and face sheet verified; emergency contact is mother Shiloh Guzman [phone], family will provide transportation at discharge; DME includes a hospital bed and mattress; current pharmacy is [facility] on [address]; patient states his family provides assistance with ADL's and is current with [facility]; denied financial concerns and reported he had food at home and was able to get  RX's filled filled        Discharge Codes    No documentation.             Jessica Hansen RN

## 2018-12-07 NOTE — PROGRESS NOTES
Discharge Planning Assessment  [facility]     Patient Name: Joaquín Sherman  MRN: [id number]  Today's Date: 12/7/2018    Admit Date: 12/6/2018    Discharge Needs Assessment     Row Name 12/07/18 1408       Living Environment    Lives With  parent(s);sibling(s)    Current Living Arrangements  home/apartment/condo    Primary Care Provided by  parent(s)    Provides Primary Care For  no one    Family Caregiver if Needed  sibling(s);spouse    Quality of Family Relationships  helpful;involved;supportive    Able to Return to Prior Arrangements  yes       Resource/Environmental Concerns    Resource/Environmental Concerns  none    Home Accessibility Concerns  stairs to enter home    Transportation Concerns  other (see comments) Patient states his family provides transportation.        Transition Planning    Patient/Family Anticipates Transition to  home with family    Patient/Family Anticipated Services at Transition  home health care;durable medical equipment    Transportation Anticipated  family or friend will provide       Discharge Needs Assessment    Readmission Within the Last 30 Days  no previous admission in last 30 days    Concerns to be Addressed  discharge planning    Equipment Currently Used at Home  other (see comments) Hospital bed, colostomy supplies    Equipment Needed After Discharge  other (see comments) Requested a new mattress.         Discharge Plan     Row Name 12/07/18 0669       Plan    Plan  Plan is home with family and current with [facility].     Patient/Family in Agreement with Plan  yes    Plan Comments  CCP role explained and face sheet verified; emergency contact is mother Shiloh Guzman [phone], family will provide transportation at discharge; DME includes a hospital bed and mattress; current pharmacy is [facility] on [address]; patient states his family provides assistance with ADL's and is current with [facility]; denied financial concerns and reported he had food at home and was able  to get  RX's filled as needed; declined needing information on living will; patient requested a new air mattress, CCP called [facility] with [facility] and will fax requested information; CCP will follow. JAMAL Marlow        Destination      No service coordination in this encounter.      Durable Medical Equipment      No service coordination in this encounter.      Dialysis/Infusion      No service coordination in this encounter.      Home Medical Care      No service coordination in this encounter.      Community Resources      No service coordination in this encounter.          Demographic Summary     Row Name 12/07/18 1406       General Information    Admission Type  inpatient    Arrived From  home    Reason for Consult  discharge planning        Functional Status     Row Name 12/07/18 1407       Functional Status    Usual Activity Tolerance  fair    Current Activity Tolerance  fair       Functional Status, IADL    Medications  assistive person    Meal Preparation  assistive person    Housekeeping  assistive person    Laundry  assistive person    Shopping  assistive person        Psychosocial    No documentation.       Abuse/Neglect    No documentation.       Legal     Row Name 12/07/18 1407       Financial/Legal    Finance Comments  Patient declined needing information on living will.         Substance Abuse    No documentation.       Patient Forms    No documentation.           Jessica Hansen RN

## 2018-12-07 NOTE — ED NOTES
"Nursing report ED to floor  Joaquín Sherman  38 y.o.  male    HPI (triage note):   Chief Complaint   Patient presents with   • Difficulty Urinating     PT HAS NOT URINATED SINCE HIS SUPRAPUBIC CATHETER WAS REPLACED YESTERDAY AFTERNOON       Admitting doctor:   Gibran Ogden MD    Admitting diagnosis:   The primary encounter diagnosis was Other chronic osteomyelitis, other site (CMS/[facility]). Diagnoses of Suprapubic catheter dysfunction, initial encounter (CMS/[facility]), Anemia, unspecified type, and Dehydration were also pertinent to this visit.    Code status:   Current Code Status     Date Active Code Status Order ID Comments User Context       Prior          Allergies:   Amoxicillin-pot clavulanate; Ibuprofen; Ketorolac tromethamine; and Vancomycin    Weight:       12/06/18  1711   Weight: 45.7 kg (100 lb 11.2 oz)       Most recent vitals:   Vitals:    12/06/18 1711 12/06/18 2205   BP: 92/54 92/49   BP Location:  Right arm   Patient Position:  Lying   Pulse: 80 80   Resp: 16 18   Temp: 97.8 °F (36.6 °C)    TempSrc: Tympanic    SpO2: 100% 100%   Weight: 45.7 kg (100 lb 11.2 oz)    Height: 185.4 cm (73\")        Active LDAs/IV Access:   Lines, Drains & Airways    Active LDAs     Name:   Placement date:   Placement time:   Site:   Days:    Colostomy LLQ   --    --    LLQ       Suprapubic Catheter  18 Fr.   10/27/18    1035     40    External Urinary Catheter   10/28/18    0000    --   39    Single Lumen Implantable Port Right Chest   --    --    Chest                   Labs (abnormal labs have a star):   Labs Reviewed   COMPREHENSIVE METABOLIC PANEL - Abnormal; Notable for the following components:       Result Value    BUN 25 (*)     Sodium 132 (*)     CO2 18.7 (*)     Calcium 7.3 (*)     Albumin 2.20 (*)     All other components within normal limits   CBC WITH AUTO DIFFERENTIAL - Abnormal; Notable for the following components:    RBC 2.62 (*)     Hemoglobin 6.3 (*)     Hematocrit 21.8 (*)     MCH 24.0 (*)     MCHC " 28.9 (*)     RDW 17.9 (*)     RDW-SD 55.1 (*)     Neutrophil % 79.3 (*)     Lymphocyte % 11.6 (*)     Neutrophils, Absolute 8.14 (*)     All other components within normal limits   BLOOD CULTURE   BLOOD CULTURE   URINALYSIS W/ MICROSCOPIC IF INDICATED (NO CULTURE)   LACTIC ACID, PLASMA   PROCALCITONIN   TYPE AND SCREEN   PREPARE RBC   CBC AND DIFFERENTIAL    Narrative:     The following orders were created for panel order CBC & Differential.  Procedure                               Abnormality         Status                     ---------                               -----------         ------                     CBC Auto Differential[[id number]]        Abnormal            Final result                 Please view results for these tests on the individual orders.       EKG:   No orders to display       Meds given in ED:   Medications   sodium chloride 0.9 % flush 10 mL (not administered)   lactated ringers bolus 1,000 mL (not administered)   Linezolid (ZYVOX) 600 mg 300 mL (not administered)   meropenem (MERREM) 1 g/100 mL 0.9% NS VTB (mbp) (not administered)   HYDROmorphone (DILAUDID) injection 1 mg (1 mg Intravenous Given 12/6/18 2048)   ondansetron (ZOFRAN) injection 4 mg (4 mg Intravenous Given 12/6/18 2047)       Imaging results:  Ct Abdomen Pelvis Without Contrast    Result Date: 12/6/2018  1.  Moderate left hydronephrosis and hydroureter which appears chronic but has increased from previous. No calcified ureteral stone. 2. Resolving right hydronephrosis also without calcified ureteral stone. 3. Urinary bladder catheter balloon projects at the level of the prostatic urethra, consider repositioning. 4. Findings of osteomyelitis/cellulitis are again noted about the pelvis and bilateral hips. There is new soft tissue air along the upper right sacrum which is felt to be infectious in nature. No obvious abscess by noncontrast technique     This report was finalized on 12/6/2018 10:05 PM by Renny Burk M.D.         Ambulatory status:   At [facility]    Social issues:   Social History     Socioeconomic History   • Marital status: Single     Spouse name: Not on file   • Number of children: Not on file   • Years of education: Not on file   • Highest education level: Not on file   Social Needs   • Financial resource strain: Not on file   • Food insecurity - worry: Not on file   • Food insecurity - inability: Not on file   • Transportation needs - medical: Not on file   • Transportation needs - non-medical: Not on file   Occupational History   • Occupation: unemployed   • Occupation: disabled   Tobacco Use   • Smoking status: Former Smoker     Packs/day: 0.50     Last attempt to quit: 2010     Years since quittin.9   • Smokeless tobacco: Never Used   Substance and Sexual Activity   • Alcohol use: No   • Drug use: No   • Sexual activity: Defer   Other Topics Concern   • Not on file   Social History Narrative   • Not on file          Marcela Tello RN  [phone]

## 2018-12-07 NOTE — PLAN OF CARE
Problem: Patient Care Overview  Goal: Plan of Care Review  Outcome: Ongoing (interventions implemented as appropriate)   12/07/18 0837   Coping/Psychosocial   Plan of Care Reviewed With patient;family   Plan of Care Review   Progress no change   OTHER   Outcome Summary Admitted from ED to 3P on 12/6. Initially, very verbally aggressive, uncooperative and family was likewise. IV team was able to draw labs peripherally and collect second set of blood cultures from port. Port flushing well with blood return now. IV antibiotics ordered. IVF boluses as ordered. Suprapubic catheter leaking at insertion, confirmed anchored position and applied split gauze to catch leaks. Ostomy initially intact, but starting to peel due to urine saturation. WCON consulted for such and wound management. Patient's wounds are that of last admission, plus some. See photos. All dressings changed - mostly wet to dry with abd pad or kerlix. IV Dilaudid held d/t BP - ordered not to give for systolic less than 100. Home Roxicodone given instead, without any relief. Patient turned as often as he'd let us. Patient ate most of a dinner and PO fluid intake was great through shift. Patient was eventually, more respectful and open to listening. He is anemic, but I was unable to complete blood transfusions on my shift. Two units ordered and day shift plans to complete. Many consults ordered - Urology, Plastic Surgery for his open hip area, nutrition, WCON, pallative RN, Infectious Disease, etc. Contact precautions initiated and maintained.     Goal: Individualization and Mutuality  Outcome: Ongoing (interventions implemented as appropriate)      Problem: Fall Risk (Adult)  Goal: Identify Related Risk Factors and Signs and Symptoms  Outcome: Ongoing (interventions implemented as appropriate)    Goal: Absence of Fall  Outcome: Ongoing (interventions implemented as appropriate)      Problem: Skin and Soft Tissue Infection (Adult)  Goal: Signs and Symptoms of  Listed Potential Problems Will be Absent, Minimized or Managed (Skin and Soft Tissue Infection)  Outcome: Ongoing (interventions implemented as appropriate)      Problem: Skin Injury Risk (Adult)  Goal: Identify Related Risk Factors and Signs and Symptoms  Outcome: Ongoing (interventions implemented as appropriate)    Goal: Skin Health and Integrity  Outcome: Ongoing (interventions implemented as appropriate)      Problem: Anemia (Adult)  Goal: Identify Related Risk Factors and Signs and Symptoms  Outcome: Ongoing (interventions implemented as appropriate)    Goal: Symptom Improvement  Outcome: Ongoing (interventions implemented as appropriate)      Problem: Wound (Includes Pressure Injury) (Adult)  Goal: Signs and Symptoms of Listed Potential Problems Will be Absent, Minimized or Managed (Wound)  Outcome: Ongoing (interventions implemented as appropriate)      Problem: Mobility, Physical Impaired (Adult)  Goal: Identify Related Risk Factors and Signs and Symptoms  Outcome: Ongoing (interventions implemented as appropriate)    Goal: Enhanced Mobility Skills  Outcome: Ongoing (interventions implemented as appropriate)    Goal: Enhanced Functional Ability  Outcome: Ongoing (interventions implemented as appropriate)      Problem: Anxiety (Adult)  Goal: Identify Related Risk Factors and Signs and Symptoms  Outcome: Ongoing (interventions implemented as appropriate)    Goal: Reduction/Resolution  Outcome: Ongoing (interventions implemented as appropriate)      Problem: Behavioral Regulation Impairment (Disruptive Behavior) (Adult)  Goal: Improved Impulse/Aggression Control (Disruptive Behavior)  Outcome: Ongoing (interventions implemented as appropriate)      Problem: Pain, Chronic (Adult)  Goal: Identify Related Risk Factors and Signs and Symptoms  Outcome: Ongoing (interventions implemented as appropriate)    Goal: Acceptable Pain/Comfort Level and Functional Ability  Outcome: Ongoing (interventions implemented as  appropriate)      Problem: Nutrition, Imbalanced: Inadequate Oral Intake (Adult)  Goal: Identify Related Risk Factors and Signs and Symptoms  Outcome: Ongoing (interventions implemented as appropriate)    Goal: Improved Oral Intake  Outcome: Ongoing (interventions implemented as appropriate)    Goal: Prevent Further Weight Loss  Outcome: Ongoing (interventions implemented as appropriate)      Problem: Colostomy (Adult)  Goal: Signs and Symptoms of Listed Potential Problems Will be Absent, Minimized or Managed (Colostomy)  Outcome: Ongoing (interventions implemented as appropriate)    Goal: Anesthesia/Sedation Recovery  Outcome: Outcome(s) achieved Date Met: 12/07/18
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
## 2018-12-07 NOTE — NURSING NOTE
MANISHN noted patient admission. Reviewed chart and photos- wound beds are clean, beefy red, pink. Dr Cole has been consulted. Will see if he wants to make any changes from home care plan. Continued normal care plan at this time with [id number] Dakins dressings to wounds. Patient often will not allow [id number] dressing changes, however. I am sending over Colostomy supplies. I have ordered a Dolphin mattress. Called to floor and spoke with Martha- asked if they can request a bed frame and trapeze- she agreed and will pass all on to RN.

## 2018-12-07 NOTE — PROGRESS NOTES
Malnutrition Severity Assessment    Patient Name:  Joaquín Sherman  YOB: 1980  MRN: [id number]  Admit Date:  12/6/2018    Patient meets criteria for : Severe malnutrition    Severe malnutrition. BMI-13.2, 57% IBW. Severe muscle loss to temporalis, clavicle and acromion bone region; severe fat loss to orbital region, thoracic and lumbar region.    Malnutrition Type: Chronic Illness Malnutrition     Malnutrition Type (last 8 hours)      Malnutrition Severity Assessment     Row Name 12/07/18 1045       Malnutrition Severity Assessment    Malnutrition Type  Chronic Illness Malnutrition    Row Name 12/07/18 1045       Physical Signs of Malnutrition (Chronic)    Muscle Wasting  Severe    Fat Loss  Severe    Row Name 12/07/18 1045       Weight Status (Chronic)    BMI  Severe (<16) BMI-13.2    %IBW  Severe (<70%) 57%    Row Name 12/07/18 1045       Criteria Met (Must meet criteria for severity in at least 2 of these categories: M Wasting, Fat Loss, Fluid, Secondary Signs, Wt. Status, Intake)    Patient meets criteria for   Severe malnutrition          Electronically signed by:  Katiana Colby RD  12/07/18 10:49 AM
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
## 2018-12-07 NOTE — PLAN OF CARE
Problem: Nutrition, Imbalanced: Inadequate Oral Intake (Adult)  Intervention: Promote/Optimize Nutrition   12/07/18 1050   Nutrition Interventions   Oral Nutrition Promotion calorie dense foods provided;calorie dense liquids provided;nutritional therapy counseling provided   Nutrition supplement boost glucose control TID
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
## 2018-12-07 NOTE — CONSULTS
"Referring Provider: Gibran Ogden MD    Reason for Consultation: \"abx mgnt\"    History of present illness:  Mr Sherman is a 38 YOM with paraplegia who I am asked to evaluate and give opinion for \"abx mgnt\". History is obtained from the patient and review of the old medical records which I summarize/synthesize as follows: He presented to the ER on 12/6/18 because he says his suprapubic catheter was not working. He had associated suprapubic pain due to distension. He says he has not been getting his pain meds at home as he would like. He says that his family has been doing a good job taking care of his wounds at home. However, he says that his nutrition has been poor lately and he does appear thinner to me than when I have seen him previously.     In the ER he was afebrile and has been since admission. He has chronically low blood pressure and it is at its baseline. Labs were notable for WBC 10.2, CRP 18, Crt 1.1,a nd procal 0.88. CT A/P showed increased L hydronephrosis but improved R hydronephrosis. I spoke with nursing and reviewed his photos of the wounds. The wounds on the LLE are larger but are not draining purulent material. The sacral and ischial wounds appear stable to me.     The patient was started on linezolid and meropenem in the ER. He is awaiting plastic surgery, palliative care, and urology evaluations.      PMH:  Paraplegia  Anemia  GERD  Sacral decubitus ulcer with chronic osteomyelitis  Protein calorie malnutrition  Chronic suprapubic catheter with ESBL E coli colonization  ESBL E coli septicemia      PSH:  Suprapubic catheter  R chest port  Colostomy      Social History:  Smokes cigarettes  No illicits  Single  Lives w/ family in [place]      Family History:  No 1st degree family members with history of resistant infections      Allergies:    1. Vancomycin - \"increased creatinine\" THIS IS NOT AN ALLERGY AND CAN BE GIVEN IF DOSED CORRECTLY  2. Meropenem - kidney injury and \"iit makes me " "hallucinate\" - tolerates ertapenem so I doubt true reaction  3. Amox-clav - unsure of reaction    Medications:    Current Facility-Administered Medications:   •  ALPRAZolam (XANAX) tablet 1 mg, 1 mg, Oral, BID PRN, Gibran Ogden MD, 1 mg at 12/07/18 0030  •  gabapentin (NEURONTIN) capsule 300 mg, 300 mg, Oral, TID, Gibran Ogden MD  •  hydrocortisone sodium succinate (Solu-CORTEF) injection 100 mg, 100 mg, Intravenous, Q6H, Gibran Ogden MD, 100 mg at 12/07/18 0635  •  HYDROmorphone (DILAUDID) injection 0.5 mg, 0.5 mg, Intravenous, Q2H PRN, Gibran Ogden MD, 0.5 mg at 12/07/18 0238  •  Linezolid (ZYVOX) 600 mg 300 mL, 600 mg, Intravenous, Q12H, Gibran Ogden MD  •  meropenem (MERREM) 1 g/100 mL 0.9% NS VTB (mbp), 1 g, Intravenous, Q8H, Gibran Ogden MD  •  ondansetron (ZOFRAN) tablet 4 mg, 4 mg, Oral, Q6H PRN **OR** ondansetron ODT (ZOFRAN-ODT) disintegrating tablet 4 mg, 4 mg, Oral, Q6H PRN **OR** ondansetron (ZOFRAN) injection 4 mg, 4 mg, Intravenous, Q6H PRN, Gibran Ogden MD  •  oxybutynin (DITROPAN) tablet 5 mg, 5 mg, Oral, BID, Gibran Ogden MD, 5 mg at 12/07/18 0303  •  oxyCODONE (ROXICODONE) immediate release tablet 15 mg, 15 mg, Oral, Q4H PRN, Gibran Ogden MD, 15 mg at 12/07/18 0711  •  pantoprazole (PROTONIX) EC tablet 40 mg, 40 mg, Oral, Q AM, Gibran Ogden MD, 40 mg at 12/07/18 0635  •  Pharmacy to Dose meropenem (MERREM), , Does not apply, Continuous PRN, Gibran Ogden MD  •  saccharomyces boulardii (FLORASTOR) capsule 250 mg, 250 mg, Oral, BID, Gibran Ogden MD, 250 mg at 12/07/18 0302  •  Access VAD, , , Once **AND** sodium chloride 0.9 % flush 10 mL, 10 mL, Intravenous, PRN, Urszula Tavarez, APRN  •  sodium chloride 0.9 % with KCl 20 mEq/L infusion, 100 mL/hr, Intravenous, Continuous, Gibran Ogden MD, Last Rate: 100 mL/hr at 12/07/18 0418, 100 mL/hr at 12/07/18 0418  •  Sodium Hypochlorite 0.0625 % " (Dakin's 1/8th Strength) topical solution, 946 mL, Irrigation, Q12H, Sanket Bowman MD    Review of Systems  All systems were reviewed and are negative unless otherwise stated above in the HPI    Objective   Vital Signs   Temp:  [97 °F (36.1 °C)-97.9 °F (36.6 °C)] 97.9 °F (36.6 °C)  Heart Rate:  [80-92] 92  Resp:  [14-18] 14  BP: (77-92)/(49-64) 90/64    Physical Exam:   General: awake, alert, chronically ill-appearing, cacechtic  Head: Normocephalic, atraumatic  Eyes: PERRL, EOMI, no scleral icterus, + conjunctival pallor  ENT: MMM, OP clear, no thrush. Gold dentition.   Neck: Supple  Cardiovascular: NR, RR, no murmurs; no LE edema  Respiratory: Lungs are clear to ascultation bilaterally, no rales or wheezing; normal work of breathing on ambient air;   GI: Abdomen is thin, soft, non-tender, non-distended, normal bowel sounds in all four quadrants; ostomy bag distended w/ gas  :  suprapubic catheters present  Musculoskeletal: large L hip and sacral ulcer are stable; left leg wounds are larger in size  Skin: no rashes, see MSK above  Neurological: Alert and oriented x 3,  motor strength 4/5 in upper extremities; 0/5 lower  Psychiatric: Normal mood and affect   Vasc: R chest port w/o erythema    Labs:     Lab Results   Component Value Date    WBC 10.26 12/06/2018    HGB 6.3 (C) 12/06/2018    HCT 21.8 (L) 12/06/2018    MCV 83.2 12/06/2018     12/06/2018       Lab Results   Component Value Date    GLUCOSE 98 12/06/2018    BUN 25 (H) 12/06/2018    CREATININE 1.09 12/06/2018    EGFRIFAFRI 92 12/06/2018    BCR 22.9 12/06/2018    CO2 18.7 (L) 12/06/2018    CALCIUM 7.3 (L) 12/06/2018    ALBUMIN 2.20 (L) 12/06/2018    AST 7 12/06/2018    ALT <5 12/06/2018     Lab Results   Component Value Date    CRP 11.98 (H) 10/26/2018     Procal 0.88    Microbiology:  12/7 BCx: NGTD    Radiology (personally reviewed report):  CT A/P with moderate L hydronephrosis and hydroureter increased from prior; improving R  "hydronephrosis; chronic osteomyelitis of the pelvis with some new soft tissue air near R sacrum per radiologist    Assessment/Plan   1. Chronic osteomyelitis of sacrum and chronic LLE wounds  -larger in size though no changes to amount of drainage  -CT results noted  -with increased CRP and increased size of wounds, I would recommend 1 week of meropenem 1 g IV q8h; a 6 week course of antibiotics will not cure his problem and only likely lead to unwanted side effects of long-term antibiotics use  -stop linezolid; in the future, he can receive vancomycin if needed  -follow-up plastic surgery evaluation  -continue topical wound care  -follow-up blood cultures  -I agree with palliative care evaluation    2. Paraplegia    3. Suprapubic urinary catheter  -urology to see    4. Anemia of chronic disease    5. Left > Right hydronephrosis  -urology to see    6. Multiple \"drug allergies\"  -vanco and meropenem listed were not true allergies (see my notes in \"allergy\" tab)    Thank you for this consult. ID will follow. I discussed the plan with RN and PharmD.    "

## 2018-12-07 NOTE — ED NOTES
"Spoke with patient again regarding lab draw that is needed before being able to start ATB that was ordered. Patient again started yelling and cussing at nurse. This nurse educated patient on the need for blood cultures before antibiotics could be started as well as the need for a type and screen so that he could get a blood transfusion that had been ordered. Told patient if it would make him feel better I would call IV team and request that they start his IV. Patient agreed, stating \"she gets one shot to get my IV and that is it.\" This nurse thanked patient for allowing us to do what is neccessary to take care of him. IV team aware and will meet patient in his room on [address] as his room is ready and report is being called.      Marcela Tello RN  12/07/18 0009    "

## 2018-12-07 NOTE — ED NOTES
"Pt refusing to let ED tech perform a butterfly stick for cultures and lab orders. Stating \"this is why doctors put a fucking port in me so that you people could get the cultures off of that\" This nurse explained to patient that cultures needed to be drawn from a separate site and could not be drawn from a port. Asked patient if he would please allow tech to draw blood and patient again refused. Will try talking to patient again after he calms down and is not yelling and cussing. DEONDRE jeffery.      Marcela Tello, RN  12/07/18 0004    "

## 2018-12-07 NOTE — ED NOTES
"Pt refusing to let IV nurse start IV. Stating \"I have a port and you are just going to have to access that if you want any blood\" Spoke with NP Urszula who gave verbal okay for port to be accessed by IV nurse.      Marcela Tello RN  12/07/18 0012    "
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
## 2018-12-08 LAB
ABO + RH BLD: NORMAL
ABO + RH BLD: NORMAL
ANION GAP SERPL CALCULATED.3IONS-SCNC: 11.7 MMOL/L
BH BB BLOOD EXPIRATION DATE: NORMAL
BH BB BLOOD EXPIRATION DATE: NORMAL
BH BB BLOOD TYPE BARCODE: 8400
BH BB BLOOD TYPE BARCODE: 8400
BH BB DISPENSE STATUS: NORMAL
BH BB DISPENSE STATUS: NORMAL
BH BB PRODUCT CODE: NORMAL
BH BB PRODUCT CODE: NORMAL
BH BB UNIT NUMBER: NORMAL
BH BB UNIT NUMBER: NORMAL
BUN BLD-MCNC: 22 MG/DL (ref 6–20)
BUN/CREAT SERPL: 21.4 (ref 7–25)
CALCIUM SPEC-SCNC: 7.5 MG/DL (ref 8.6–10.5)
CHLORIDE SERPL-SCNC: 109 MMOL/L (ref 98–107)
CO2 SERPL-SCNC: 20.3 MMOL/L (ref 22–29)
CREAT BLD-MCNC: 1.03 MG/DL (ref 0.76–1.27)
DEPRECATED RDW RBC AUTO: 53.9 FL (ref 37–54)
ERYTHROCYTE [DISTWIDTH] IN BLOOD BY AUTOMATED COUNT: 16.5 % (ref 11.5–14.5)
FOLATE SERPL-MCNC: 3.38 NG/ML (ref 4.78–24.2)
GFR SERPL CREATININE-BSD FRML MDRD: 98 ML/MIN/1.73
GLUCOSE BLD-MCNC: 139 MG/DL (ref 65–99)
HCT VFR BLD AUTO: 22 % (ref 40.4–52.2)
HCT VFR BLD AUTO: 23.2 % (ref 40.4–52.2)
HGB BLD-MCNC: 6.5 G/DL (ref 13.7–17.6)
HGB BLD-MCNC: 7 G/DL (ref 13.7–17.6)
INR PPP: 1.84 (ref 0.9–1.1)
IRON 24H UR-MRATE: 26 MCG/DL (ref 59–158)
IRON SATN MFR SERPL: 20 % (ref 20–50)
MCH RBC QN AUTO: 26.1 PG (ref 27–32.7)
MCHC RBC AUTO-ENTMCNC: 29.5 G/DL (ref 32.6–36.4)
MCV RBC AUTO: 88.4 FL (ref 79.8–96.2)
PLATELET # BLD AUTO: 160 10*3/MM3 (ref 140–500)
PMV BLD AUTO: 8.7 FL (ref 6–12)
POTASSIUM BLD-SCNC: 3.9 MMOL/L (ref 3.5–5.2)
PROTHROMBIN TIME: 20.9 SECONDS (ref 11.7–14.2)
RBC # BLD AUTO: 2.49 10*6/MM3 (ref 4.6–6)
RETICS/RBC NFR AUTO: 0.88 % (ref 0.5–1.5)
SODIUM BLD-SCNC: 141 MMOL/L (ref 136–145)
TIBC SERPL-MCNC: 131 MCG/DL
TRANSFERRIN SERPL-MCNC: 88 MG/DL (ref 200–360)
UNIT  ABO: NORMAL
UNIT  ABO: NORMAL
UNIT  RH: NORMAL
UNIT  RH: NORMAL
VIT B12 BLD-MCNC: 532 PG/ML (ref 211–946)
WBC NRBC COR # BLD: 7.27 10*3/MM3 (ref 4.5–10.7)

## 2018-12-08 PROCEDURE — 25810000003 SODIUM CHLORIDE 0.9 % WITH KCL 20 MEQ 20-0.9 MEQ/L-% SOLUTION: Performed by: HOSPITALIST

## 2018-12-08 PROCEDURE — 25010000002 HYDROMORPHONE PER 4 MG: Performed by: HOSPITALIST

## 2018-12-08 PROCEDURE — 25010000002 MEROPENEM PER 100 MG: Performed by: HOSPITALIST

## 2018-12-08 PROCEDURE — 83540 ASSAY OF IRON: CPT | Performed by: HOSPITALIST

## 2018-12-08 PROCEDURE — 85018 HEMOGLOBIN: CPT | Performed by: HOSPITALIST

## 2018-12-08 PROCEDURE — 84466 ASSAY OF TRANSFERRIN: CPT | Performed by: HOSPITALIST

## 2018-12-08 PROCEDURE — P9016 RBC LEUKOCYTES REDUCED: HCPCS

## 2018-12-08 PROCEDURE — 82607 VITAMIN B-12: CPT | Performed by: HOSPITALIST

## 2018-12-08 PROCEDURE — 82746 ASSAY OF FOLIC ACID SERUM: CPT | Performed by: HOSPITALIST

## 2018-12-08 PROCEDURE — 85045 AUTOMATED RETICULOCYTE COUNT: CPT | Performed by: HOSPITALIST

## 2018-12-08 PROCEDURE — 86900 BLOOD TYPING SEROLOGIC ABO: CPT

## 2018-12-08 PROCEDURE — 85027 COMPLETE CBC AUTOMATED: CPT | Performed by: HOSPITALIST

## 2018-12-08 PROCEDURE — 85014 HEMATOCRIT: CPT | Performed by: HOSPITALIST

## 2018-12-08 PROCEDURE — 36430 TRANSFUSION BLD/BLD COMPNT: CPT

## 2018-12-08 PROCEDURE — 25010000002 MEROPENEM: Performed by: HOSPITALIST

## 2018-12-08 PROCEDURE — 25010000002 HYDROCORTISONE SODIUM SUCCINATE 100 MG RECONSTITUTED SOLUTION: Performed by: HOSPITALIST

## 2018-12-08 PROCEDURE — 80048 BASIC METABOLIC PNL TOTAL CA: CPT | Performed by: HOSPITALIST

## 2018-12-08 PROCEDURE — 85610 PROTHROMBIN TIME: CPT | Performed by: HOSPITALIST

## 2018-12-08 PROCEDURE — 99232 SBSQ HOSP IP/OBS MODERATE 35: CPT | Performed by: INTERNAL MEDICINE

## 2018-12-08 RX ADMIN — HYDROCORTISONE SODIUM SUCCINATE 100 MG: 100 INJECTION, POWDER, FOR SOLUTION INTRAMUSCULAR; INTRAVENOUS at 20:47

## 2018-12-08 RX ADMIN — OXYBUTYNIN CHLORIDE 5 MG: 5 TABLET ORAL at 21:40

## 2018-12-08 RX ADMIN — HYDROCORTISONE SODIUM SUCCINATE 100 MG: 100 INJECTION, POWDER, FOR SOLUTION INTRAMUSCULAR; INTRAVENOUS at 09:20

## 2018-12-08 RX ADMIN — HYDROCORTISONE SODIUM SUCCINATE 100 MG: 100 INJECTION, POWDER, FOR SOLUTION INTRAMUSCULAR; INTRAVENOUS at 09:21

## 2018-12-08 RX ADMIN — POTASSIUM CHLORIDE AND SODIUM CHLORIDE 100 ML/HR: 900; 150 INJECTION, SOLUTION INTRAVENOUS at 04:57

## 2018-12-08 RX ADMIN — MEROPENEM 1 G: 1 INJECTION, POWDER, FOR SOLUTION INTRAVENOUS at 12:12

## 2018-12-08 RX ADMIN — HYDROMORPHONE HYDROCHLORIDE 0.5 MG: 1 INJECTION, SOLUTION INTRAMUSCULAR; INTRAVENOUS; SUBCUTANEOUS at 18:17

## 2018-12-08 RX ADMIN — GABAPENTIN 300 MG: 300 CAPSULE ORAL at 21:40

## 2018-12-08 RX ADMIN — GABAPENTIN 300 MG: 300 CAPSULE ORAL at 09:19

## 2018-12-08 RX ADMIN — POTASSIUM CHLORIDE AND SODIUM CHLORIDE 100 ML/HR: 900; 150 INJECTION, SOLUTION INTRAVENOUS at 20:47

## 2018-12-08 RX ADMIN — OXYCODONE HYDROCHLORIDE 15 MG: 15 TABLET ORAL at 00:27

## 2018-12-08 RX ADMIN — HYDROMORPHONE HYDROCHLORIDE 0.5 MG: 1 INJECTION, SOLUTION INTRAMUSCULAR; INTRAVENOUS; SUBCUTANEOUS at 13:35

## 2018-12-08 RX ADMIN — HYDROCORTISONE SODIUM SUCCINATE 100 MG: 100 INJECTION, POWDER, FOR SOLUTION INTRAMUSCULAR; INTRAVENOUS at 00:27

## 2018-12-08 RX ADMIN — HYDROMORPHONE HYDROCHLORIDE 0.5 MG: 1 INJECTION, SOLUTION INTRAMUSCULAR; INTRAVENOUS; SUBCUTANEOUS at 16:23

## 2018-12-08 RX ADMIN — PANTOPRAZOLE SODIUM 40 MG: 40 TABLET, DELAYED RELEASE ORAL at 06:32

## 2018-12-08 RX ADMIN — HYDROMORPHONE HYDROCHLORIDE 0.5 MG: 1 INJECTION, SOLUTION INTRAMUSCULAR; INTRAVENOUS; SUBCUTANEOUS at 22:25

## 2018-12-08 RX ADMIN — OXYCODONE HYDROCHLORIDE 15 MG: 15 TABLET ORAL at 21:40

## 2018-12-08 RX ADMIN — GABAPENTIN 300 MG: 300 CAPSULE ORAL at 16:23

## 2018-12-08 RX ADMIN — HYDROMORPHONE HYDROCHLORIDE 0.5 MG: 1 INJECTION, SOLUTION INTRAMUSCULAR; INTRAVENOUS; SUBCUTANEOUS at 11:35

## 2018-12-08 RX ADMIN — DAKIN'S SOLUTION 0.125% (QUARTER STRENGTH) 946 ML: 0.12 SOLUTION at 09:21

## 2018-12-08 RX ADMIN — OXYCODONE HYDROCHLORIDE 15 MG: 15 TABLET ORAL at 09:32

## 2018-12-08 RX ADMIN — OXYBUTYNIN CHLORIDE 5 MG: 5 TABLET ORAL at 09:20

## 2018-12-08 RX ADMIN — HYDROMORPHONE HYDROCHLORIDE 0.5 MG: 1 INJECTION, SOLUTION INTRAMUSCULAR; INTRAVENOUS; SUBCUTANEOUS at 09:20

## 2018-12-08 RX ADMIN — Medication 250 MG: at 21:39

## 2018-12-08 RX ADMIN — OXYCODONE HYDROCHLORIDE 15 MG: 15 TABLET ORAL at 13:35

## 2018-12-08 RX ADMIN — HYDROMORPHONE HYDROCHLORIDE 0.5 MG: 1 INJECTION, SOLUTION INTRAMUSCULAR; INTRAVENOUS; SUBCUTANEOUS at 20:20

## 2018-12-08 RX ADMIN — DAKIN'S SOLUTION 0.125% (QUARTER STRENGTH) 946 ML: 0.12 SOLUTION at 21:40

## 2018-12-08 RX ADMIN — MEROPENEM 1 G: 1 INJECTION, POWDER, FOR SOLUTION INTRAVENOUS at 17:36

## 2018-12-08 RX ADMIN — Medication 250 MG: at 09:20

## 2018-12-08 RX ADMIN — MEROPENEM 1 G: 1 INJECTION, POWDER, FOR SOLUTION INTRAVENOUS at 01:01

## 2018-12-08 RX ADMIN — ALPRAZOLAM 1 MG: 0.5 TABLET ORAL at 09:19

## 2018-12-08 RX ADMIN — OXYCODONE HYDROCHLORIDE 15 MG: 15 TABLET ORAL at 17:36

## 2018-12-08 RX ADMIN — ALPRAZOLAM 1 MG: 0.5 TABLET ORAL at 18:17

## 2018-12-08 NOTE — PLAN OF CARE
Problem: Patient Care Overview  Goal: Plan of Care Review  Outcome: Ongoing (interventions implemented as appropriate)    Goal: Individualization and Mutuality  Outcome: Ongoing (interventions implemented as appropriate)      Problem: Fall Risk (Adult)  Goal: Identify Related Risk Factors and Signs and Symptoms  Outcome: Ongoing (interventions implemented as appropriate)    Goal: Absence of Fall  Outcome: Ongoing (interventions implemented as appropriate)      Problem: Skin and Soft Tissue Infection (Adult)  Goal: Signs and Symptoms of Listed Potential Problems Will be Absent, Minimized or Managed (Skin and Soft Tissue Infection)  Outcome: Ongoing (interventions implemented as appropriate)      Problem: Wound (Includes Pressure Injury) (Adult)  Goal: Signs and Symptoms of Listed Potential Problems Will be Absent, Minimized or Managed (Wound)  Outcome: Ongoing (interventions implemented as appropriate)      Problem: Mobility, Physical Impaired (Adult)  Goal: Identify Related Risk Factors and Signs and Symptoms  Outcome: Ongoing (interventions implemented as appropriate)    Goal: Enhanced Mobility Skills  Outcome: Ongoing (interventions implemented as appropriate)      Problem: Nutrition, Imbalanced: Inadequate Oral Intake (Adult)  Goal: Identify Related Risk Factors and Signs and Symptoms  Outcome: Ongoing (interventions implemented as appropriate)    Goal: Improved Oral Intake  Outcome: Ongoing (interventions implemented as appropriate)

## 2018-12-08 NOTE — PLAN OF CARE
Problem: Patient Care Overview  Goal: Plan of Care Review  Outcome: Ongoing (interventions implemented as appropriate)   12/08/18 1700   Coping/Psychosocial   Plan of Care Reviewed With patient   Plan of Care Review   Progress no change   OTHER   Outcome Summary Pt. has had a decent day. Turned Q2. Dressing changed. Given 2 units of PRBC's - tolerated well. IV dilaudid and PO roxicodone given. Wound doctor saw and says wounds are improving. Will redraw HgB around 1800.        Problem: Fall Risk (Adult)  Goal: Absence of Fall  Outcome: Ongoing (interventions implemented as appropriate)      Problem: Wound (Includes Pressure Injury) (Adult)  Goal: Signs and Symptoms of Listed Potential Problems Will be Absent, Minimized or Managed (Wound)  Outcome: Ongoing (interventions implemented as appropriate)      Problem: Mobility, Physical Impaired (Adult)  Goal: Enhanced Functional Ability  Outcome: Ongoing (interventions implemented as appropriate)
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
## 2018-12-08 NOTE — PROGRESS NOTES
LOS: 2 days     Chief Complaint:  Follow-up wound infection    Interval History:  No acute events. No fevers. Low BP is at baseline. Tolerating meropenem w/o rash.     ROS; no n/v/d    Vital Signs  Temp:  [96.8 °F (36 °C)-97.1 °F (36.2 °C)] 96.8 °F (36 °C)  Heart Rate:  [50-84] 50  Resp:  [12-16] 14  BP: ()/(48-60) 84/48    Physical Exam:  General: awake, alert, chronically ill-appearing, cacechtic  Head: Normocephalic  Eyes:no scleral icterus, + conjunctival pallor  ENT: MMM, OP clear, no thrush. Gold dentition.   Neck: Supple  Cardiovascular: bradycardic; no LE edema  Respiratory:  normal work of breathing on ambient air;   GI: Abdomen is thin, soft, non-distended, ostomy  :  suprapubic catheter present  Musculoskeletal: large L hip and sacral ulcer are stable; left leg wounds are larger in size compared to last admission though no purulent drainage is present  Skin: no rashes, see MSK above  Neurological: Alert and oriented x 3,  motor strength 4/5 in upper extremities; 0/5 lower  Psychiatric: Normal mood and affect   Vasc: R chest port w/o erythema    Antibiotics:  •  meropenem (MERREM) 1 g/100 mL 0.9% NS VTB (mbp), 1 g, Intravenous, Q8H, Gibran Ogden MD, 1 g at 12/08/18 0101    LABS:  CBC, BMP, CRP, micro reviewed today  Lab Results   Component Value Date    WBC 7.27 12/08/2018    HGB 6.5 (C) 12/08/2018    HCT 22.0 (L) 12/08/2018    MCV 88.4 12/08/2018     12/08/2018     Lab Results   Component Value Date    GLUCOSE 139 (H) 12/08/2018    BUN 22 (H) 12/08/2018    CREATININE 1.03 12/08/2018    EGFRIFAFRI 98 12/08/2018    BCR 21.4 12/08/2018    CO2 20.3 (L) 12/08/2018    CALCIUM 7.5 (L) 12/08/2018    ALBUMIN 2.20 (L) 12/06/2018    AST 7 12/06/2018    ALT <5 12/06/2018    CRP 18.59 (H) 12/06/2018   Procal 0.88     Microbiology:  12/7 BCx: NGTD    Radiology (personally reviewed):   No new imaging today    Assessment/Plan   1. Chronic osteomyelitis of sacrum and chronic LLE wounds  -larger in  "size though no changes to amount of drainage  -CT results noted  -with increased CRP and increased size of wounds, I would recommend 1 week of meropenem 1 g IV q8h; a 6 week course of antibiotics will not cure his problem and only likely lead to unwanted side effects of long-term antibiotics use  -follow-up plastic surgery evaluation  -continue topical wound care  -follow-up blood cultures  -I agree with palliative care evaluation     2. Paraplegia     3. Suprapubic urinary catheter  -urology following     4. Anemia of chronic disease     5. Left > Right hydronephrosis  -urology following     6. Multiple \"drug allergies\"  -vanco and meropenem listed were not true allergies (see my notes in \"allergy\" tab)     Thank you for this consult. ID will follow.             "

## 2018-12-08 NOTE — PLAN OF CARE
"Problem: Patient Care Overview  Goal: Plan of Care Review  Outcome: Ongoing (interventions implemented as appropriate)   12/07/18 2031 12/07/18 2039   Coping/Psychosocial   Plan of Care Reviewed With --  patient   Plan of Care Review   Progress --  no change   OTHER   Outcome Summary Pt agitated at times during the day, but mostly directed towards his situation. Fluctuates between wanting hospice support and then getting mad about people trying to \"cut his life short\". Provided emotional support and encouraged him to explore his options for what would give him the most care. Pt appreciative. BP remains low - holding dilaudid as needed to maintain SBP around 100. Pt reports severe pain but has appeared comfortable throughout the shift. 1 u. pRBC given. Dressings changed to multiple decub ulcers. Specialty bed in use.  --          "

## 2018-12-08 NOTE — PLAN OF CARE
Problem: Patient Care Overview  Goal: Plan of Care Review  Outcome: Ongoing (interventions implemented as appropriate)   12/08/18 0518   Coping/Psychosocial   Plan of Care Reviewed With patient   Plan of Care Review   Progress no change   OTHER   Outcome Summary pt is alert and oriented, forgetful at times, no falls, bed alarm on, turn, dressing changes complete, medicated PRN for pain, IV fluids/antibiotic, palliative consult, continue to monitor     Goal: Individualization and Mutuality  Outcome: Ongoing (interventions implemented as appropriate)    Goal: Discharge Needs Assessment  Outcome: Ongoing (interventions implemented as appropriate)      Problem: Fall Risk (Adult)  Goal: Absence of Fall  Outcome: Ongoing (interventions implemented as appropriate)      Problem: Wound (Includes Pressure Injury) (Adult)  Goal: Signs and Symptoms of Listed Potential Problems Will be Absent, Minimized or Managed (Wound)  Outcome: Ongoing (interventions implemented as appropriate)      Problem: Mobility, Physical Impaired (Adult)  Goal: Enhanced Mobility Skills  Outcome: Ongoing (interventions implemented as appropriate)    Goal: Enhanced Functional Ability  Outcome: Ongoing (interventions implemented as appropriate)      Problem: Nutrition, Imbalanced: Inadequate Oral Intake (Adult)  Goal: Improved Oral Intake  Outcome: Ongoing (interventions implemented as appropriate)    Goal: Prevent Further Weight Loss  Outcome: Ongoing (interventions implemented as appropriate)

## 2018-12-09 LAB
ALBUMIN SERPL-MCNC: 2.5 G/DL (ref 3.5–5.2)
ALBUMIN/GLOB SERPL: 0.7 G/DL
ALP SERPL-CCNC: 78 U/L (ref 39–117)
ALT SERPL W P-5'-P-CCNC: <5 U/L (ref 1–41)
ANION GAP SERPL CALCULATED.3IONS-SCNC: 11 MMOL/L
AST SERPL-CCNC: 8 U/L (ref 1–40)
BASOPHILS # BLD AUTO: 0 10*3/MM3 (ref 0–0.2)
BASOPHILS NFR BLD AUTO: 0 % (ref 0–1.5)
BILIRUB SERPL-MCNC: 0.2 MG/DL (ref 0.1–1.2)
BUN BLD-MCNC: 18 MG/DL (ref 6–20)
BUN/CREAT SERPL: 20.7 (ref 7–25)
CALCIUM SPEC-SCNC: 7.3 MG/DL (ref 8.6–10.5)
CHLORIDE SERPL-SCNC: 114 MMOL/L (ref 98–107)
CO2 SERPL-SCNC: 20 MMOL/L (ref 22–29)
CORTIS SERPL-MCNC: NORMAL MCG/DL
CREAT BLD-MCNC: 0.87 MG/DL (ref 0.76–1.27)
DEPRECATED RDW RBC AUTO: 52.5 FL (ref 37–54)
EOSINOPHIL # BLD AUTO: 0 10*3/MM3 (ref 0–0.7)
EOSINOPHIL NFR BLD AUTO: 0 % (ref 0.3–6.2)
ERYTHROCYTE [DISTWIDTH] IN BLOOD BY AUTOMATED COUNT: 16 % (ref 11.5–14.5)
GFR SERPL CREATININE-BSD FRML MDRD: 119 ML/MIN/1.73
GLOBULIN UR ELPH-MCNC: 3.5 GM/DL
GLUCOSE BLD-MCNC: 90 MG/DL (ref 65–99)
HCT VFR BLD AUTO: 31 % (ref 40.4–52.2)
HGB BLD-MCNC: 9.2 G/DL (ref 13.7–17.6)
IMM GRANULOCYTES # BLD: 0.05 10*3/MM3 (ref 0–0.03)
IMM GRANULOCYTES NFR BLD: 0.7 % (ref 0–0.5)
LYMPHOCYTES # BLD AUTO: 0.48 10*3/MM3 (ref 0.9–4.8)
LYMPHOCYTES NFR BLD AUTO: 6.8 % (ref 19.6–45.3)
MCH RBC QN AUTO: 26.7 PG (ref 27–32.7)
MCHC RBC AUTO-ENTMCNC: 29.7 G/DL (ref 32.6–36.4)
MCV RBC AUTO: 90.1 FL (ref 79.8–96.2)
MONOCYTES # BLD AUTO: 0.43 10*3/MM3 (ref 0.2–1.2)
MONOCYTES NFR BLD AUTO: 6.1 % (ref 5–12)
NEUTROPHILS # BLD AUTO: 6.07 10*3/MM3 (ref 1.9–8.1)
NEUTROPHILS NFR BLD AUTO: 86.4 % (ref 42.7–76)
PLATELET # BLD AUTO: 132 10*3/MM3 (ref 140–500)
PMV BLD AUTO: 9.3 FL (ref 6–12)
POTASSIUM BLD-SCNC: 4.7 MMOL/L (ref 3.5–5.2)
PROT SERPL-MCNC: 6 G/DL (ref 6–8.5)
RBC # BLD AUTO: 3.44 10*6/MM3 (ref 4.6–6)
SODIUM BLD-SCNC: 145 MMOL/L (ref 136–145)
WBC NRBC COR # BLD: 7.03 10*3/MM3 (ref 4.5–10.7)

## 2018-12-09 PROCEDURE — 82533 TOTAL CORTISOL: CPT | Performed by: HOSPITALIST

## 2018-12-09 PROCEDURE — 25010000002 COSYNTROPIN PER 0.25 MG: Performed by: HOSPITALIST

## 2018-12-09 PROCEDURE — 25010000002 HYDROMORPHONE PER 4 MG: Performed by: HOSPITALIST

## 2018-12-09 PROCEDURE — 25010000002 ONDANSETRON PER 1 MG: Performed by: HOSPITALIST

## 2018-12-09 PROCEDURE — 85025 COMPLETE CBC W/AUTO DIFF WBC: CPT | Performed by: HOSPITALIST

## 2018-12-09 PROCEDURE — 25010000002 HYDROCORTISONE SODIUM SUCCINATE 100 MG RECONSTITUTED SOLUTION: Performed by: HOSPITALIST

## 2018-12-09 PROCEDURE — 25010000002 DEXAMETHASONE PER 1 MG: Performed by: HOSPITALIST

## 2018-12-09 PROCEDURE — 80053 COMPREHEN METABOLIC PANEL: CPT | Performed by: HOSPITALIST

## 2018-12-09 PROCEDURE — 25810000003 SODIUM CHLORIDE 0.9 % WITH KCL 20 MEQ 20-0.9 MEQ/L-% SOLUTION: Performed by: HOSPITALIST

## 2018-12-09 PROCEDURE — 25010000002 MEROPENEM PER 100 MG: Performed by: HOSPITALIST

## 2018-12-09 RX ORDER — FOLIC ACID 1 MG/1
1 TABLET ORAL DAILY
Status: DISCONTINUED | OUTPATIENT
Start: 2018-12-10 | End: 2018-12-13 | Stop reason: HOSPADM

## 2018-12-09 RX ORDER — DEXAMETHASONE SODIUM PHOSPHATE 4 MG/ML
4 INJECTION, SOLUTION INTRA-ARTICULAR; INTRALESIONAL; INTRAMUSCULAR; INTRAVENOUS; SOFT TISSUE EVERY 8 HOURS
Status: DISCONTINUED | OUTPATIENT
Start: 2018-12-09 | End: 2018-12-10

## 2018-12-09 RX ORDER — COSYNTROPIN 0.25 MG/ML
0.25 INJECTION, POWDER, FOR SOLUTION INTRAMUSCULAR; INTRAVENOUS ONCE
Status: COMPLETED | OUTPATIENT
Start: 2018-12-09 | End: 2018-12-09

## 2018-12-09 RX ADMIN — Medication 250 MG: at 10:53

## 2018-12-09 RX ADMIN — HYDROMORPHONE HYDROCHLORIDE 0.5 MG: 1 INJECTION, SOLUTION INTRAMUSCULAR; INTRAVENOUS; SUBCUTANEOUS at 20:01

## 2018-12-09 RX ADMIN — HYDROMORPHONE HYDROCHLORIDE 0.5 MG: 1 INJECTION, SOLUTION INTRAMUSCULAR; INTRAVENOUS; SUBCUTANEOUS at 04:36

## 2018-12-09 RX ADMIN — GABAPENTIN 300 MG: 300 CAPSULE ORAL at 20:16

## 2018-12-09 RX ADMIN — GABAPENTIN 300 MG: 300 CAPSULE ORAL at 10:53

## 2018-12-09 RX ADMIN — HYDROMORPHONE HYDROCHLORIDE 0.5 MG: 1 INJECTION, SOLUTION INTRAMUSCULAR; INTRAVENOUS; SUBCUTANEOUS at 15:54

## 2018-12-09 RX ADMIN — Medication 250 MG: at 20:16

## 2018-12-09 RX ADMIN — DEXAMETHASONE SODIUM PHOSPHATE 4 MG: 4 INJECTION, SOLUTION INTRAMUSCULAR; INTRAVENOUS at 17:48

## 2018-12-09 RX ADMIN — MEROPENEM 1 G: 1 INJECTION, POWDER, FOR SOLUTION INTRAVENOUS at 02:31

## 2018-12-09 RX ADMIN — HYDROMORPHONE HYDROCHLORIDE 0.5 MG: 1 INJECTION, SOLUTION INTRAMUSCULAR; INTRAVENOUS; SUBCUTANEOUS at 22:41

## 2018-12-09 RX ADMIN — PANTOPRAZOLE SODIUM 40 MG: 40 TABLET, DELAYED RELEASE ORAL at 06:34

## 2018-12-09 RX ADMIN — DAKIN'S SOLUTION 0.125% (QUARTER STRENGTH) 946 ML: 0.12 SOLUTION at 10:54

## 2018-12-09 RX ADMIN — OXYCODONE HYDROCHLORIDE 15 MG: 15 TABLET ORAL at 22:49

## 2018-12-09 RX ADMIN — DAKIN'S SOLUTION 0.125% (QUARTER STRENGTH) 946 ML: 0.12 SOLUTION at 22:42

## 2018-12-09 RX ADMIN — HYDROMORPHONE HYDROCHLORIDE 0.5 MG: 1 INJECTION, SOLUTION INTRAMUSCULAR; INTRAVENOUS; SUBCUTANEOUS at 17:48

## 2018-12-09 RX ADMIN — OXYBUTYNIN CHLORIDE 5 MG: 5 TABLET ORAL at 20:16

## 2018-12-09 RX ADMIN — POTASSIUM CHLORIDE AND SODIUM CHLORIDE 100 ML/HR: 900; 150 INJECTION, SOLUTION INTRAVENOUS at 06:34

## 2018-12-09 RX ADMIN — FOLIC ACID 1 MG: 5 INJECTION, SOLUTION INTRAMUSCULAR; INTRAVENOUS; SUBCUTANEOUS at 11:33

## 2018-12-09 RX ADMIN — ALPRAZOLAM 1 MG: 0.5 TABLET ORAL at 15:54

## 2018-12-09 RX ADMIN — COSYNTROPIN 0.25 MG: 0.25 INJECTION, POWDER, FOR SOLUTION INTRAMUSCULAR; INTRAVENOUS at 12:07

## 2018-12-09 RX ADMIN — GABAPENTIN 300 MG: 300 CAPSULE ORAL at 15:54

## 2018-12-09 RX ADMIN — HYDROCORTISONE SODIUM SUCCINATE 100 MG: 100 INJECTION, POWDER, FOR SOLUTION INTRAMUSCULAR; INTRAVENOUS at 10:53

## 2018-12-09 RX ADMIN — HYDROMORPHONE HYDROCHLORIDE 0.5 MG: 1 INJECTION, SOLUTION INTRAMUSCULAR; INTRAVENOUS; SUBCUTANEOUS at 02:31

## 2018-12-09 RX ADMIN — OXYBUTYNIN CHLORIDE 5 MG: 5 TABLET ORAL at 10:53

## 2018-12-09 RX ADMIN — OXYCODONE HYDROCHLORIDE 15 MG: 15 TABLET ORAL at 05:37

## 2018-12-09 RX ADMIN — HYDROMORPHONE HYDROCHLORIDE 0.5 MG: 1 INJECTION, SOLUTION INTRAMUSCULAR; INTRAVENOUS; SUBCUTANEOUS at 06:34

## 2018-12-09 RX ADMIN — HYDROMORPHONE HYDROCHLORIDE 0.5 MG: 1 INJECTION, SOLUTION INTRAMUSCULAR; INTRAVENOUS; SUBCUTANEOUS at 00:35

## 2018-12-09 RX ADMIN — ALPRAZOLAM 1 MG: 0.5 TABLET ORAL at 22:50

## 2018-12-09 RX ADMIN — ONDANSETRON 4 MG: 2 INJECTION INTRAMUSCULAR; INTRAVENOUS at 17:48

## 2018-12-09 RX ADMIN — OXYCODONE HYDROCHLORIDE 15 MG: 15 TABLET ORAL at 01:20

## 2018-12-09 RX ADMIN — OXYCODONE HYDROCHLORIDE 15 MG: 15 TABLET ORAL at 15:54

## 2018-12-09 NOTE — PLAN OF CARE
Problem: Patient Care Overview  Goal: Plan of Care Review  Outcome: Ongoing (interventions implemented as appropriate)   12/09/18 3306   Coping/Psychosocial   Plan of Care Reviewed With patient   Plan of Care Review   Progress declining   OTHER   Outcome Summary All VS declining, patient agitated and hallucinating. Patient states meropenem makes him hallucinate. Mother confirm this as well. Pt verbally abusive and combative. Forgetful. States he has not eaten for days and pain not managed or pain medication not given for days. IV dilaudid & PO Gisela given pretty much whenever available. PU dressing change on BLE & pelvis completed around 2200. Skin flaky and dry. Applied lotion. RF turns. Kristina RN may be meeting with patient today

## 2018-12-09 NOTE — PLAN OF CARE
Problem: Patient Care Overview  Goal: Plan of Care Review  Outcome: Ongoing (interventions implemented as appropriate)   12/09/18 1712   Coping/Psychosocial   Plan of Care Reviewed With patient   Plan of Care Review   Progress declining   OTHER   Outcome Summary Pt. has been drowsy most of the day - only given pain medication regimen x 1. Changed dressings - wounds remain unhealed and red and granulating. Bathed around 1500. Pain remains constant. Turned Q2. Sohan cortisol labs which did not turn out correctly - will change to decadron IV and redraw labs in the AM.        Problem: Skin and Soft Tissue Infection (Adult)  Goal: Signs and Symptoms of Listed Potential Problems Will be Absent, Minimized or Managed (Skin and Soft Tissue Infection)  Outcome: Ongoing (interventions implemented as appropriate)      Problem: Wound (Includes Pressure Injury) (Adult)  Goal: Signs and Symptoms of Listed Potential Problems Will be Absent, Minimized or Managed (Wound)  Outcome: Ongoing (interventions implemented as appropriate)      Problem: Nutrition, Imbalanced: Inadequate Oral Intake (Adult)  Goal: Identify Related Risk Factors and Signs and Symptoms  Outcome: Ongoing (interventions implemented as appropriate)

## 2018-12-09 NOTE — NURSING NOTE
Spoke to Dr. Parmar concerning patient's pain management.  Patient getting dilaudid & tashi whenever available.  Patient looked comfortable with no c/o of pain until patient brought up later dressing change.  Patient states he is not getting good pain control.  Dr. Parmar saw patient earlier today and patient seem comfortable.  No changes to pain management.  Continue with current regimen.  Relayed information to patient.

## 2018-12-09 NOTE — NURSING NOTE
[place] patient calling out to see if family can go to cafeteria and get food for patient.  Checked family kitchen and unit lounge and did not find family present.  Advise patient family is no on unit and I have not seen patient.  Patient wondered if antibiotics is giving him hallucination because he isn't suppose to have merropenem.

## 2018-12-09 NOTE — PROGRESS NOTES
Notified by RN that patient is refusing meropenem due to concerns for hallucinations. He has tolerated well each time I have seen him and has tolerated ertapenem during multiple prior hospitalizations. Of note, he is also on pain medications and high dose IV steroids.    I reviewed the wound care note and it sounds like they got a great look at all of his wounds that do not show any concern for purulent drainage or active infection. I also reviewed their recommendations. Therefore I think we can safely stop his meropenem and continue with topical wound care as outlined by Dr Bowman.     He is afebrile with normal WBC, negative blood cultures, and baseline blood pressure.    Thank you for allowing me to be involved in Mr Sherman's care again. I will sign off at this time but please call me at [phone] if any further questions or concerns.

## 2018-12-09 NOTE — NURSING NOTE
Patient's mother called unit stating patient called her twice tonight.  NA spoke to mother and stated patient thought she was visiting her tonight.  Mother states she has not visited him.  Mother asked if patient is taking Merropenem because it causes patient to hallucinate.
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
## 2018-12-10 LAB
ABO + RH BLD: NORMAL
ABO + RH BLD: NORMAL
ANION GAP SERPL CALCULATED.3IONS-SCNC: 9.2 MMOL/L
BASOPHILS # BLD AUTO: 0.01 10*3/MM3 (ref 0–0.2)
BASOPHILS NFR BLD AUTO: 0.1 % (ref 0–1.5)
BH BB BLOOD EXPIRATION DATE: NORMAL
BH BB BLOOD EXPIRATION DATE: NORMAL
BH BB BLOOD TYPE BARCODE: 8400
BH BB BLOOD TYPE BARCODE: 8400
BH BB DISPENSE STATUS: NORMAL
BH BB DISPENSE STATUS: NORMAL
BH BB PRODUCT CODE: NORMAL
BH BB PRODUCT CODE: NORMAL
BH BB UNIT NUMBER: NORMAL
BH BB UNIT NUMBER: NORMAL
BUN BLD-MCNC: 21 MG/DL (ref 6–20)
BUN/CREAT SERPL: 24.7 (ref 7–25)
CALCIUM SPEC-SCNC: 7.1 MG/DL (ref 8.6–10.5)
CHLORIDE SERPL-SCNC: 114 MMOL/L (ref 98–107)
CO2 SERPL-SCNC: 19.8 MMOL/L (ref 22–29)
CORTIS SERPL-MCNC: 36.52 MCG/DL
CREAT BLD-MCNC: 0.85 MG/DL (ref 0.76–1.27)
DEPRECATED RDW RBC AUTO: 53.3 FL (ref 37–54)
EOSINOPHIL # BLD AUTO: 0 10*3/MM3 (ref 0–0.7)
EOSINOPHIL NFR BLD AUTO: 0 % (ref 0.3–6.2)
ERYTHROCYTE [DISTWIDTH] IN BLOOD BY AUTOMATED COUNT: 16.5 % (ref 11.5–14.5)
GFR SERPL CREATININE-BSD FRML MDRD: 122 ML/MIN/1.73
GLUCOSE BLD-MCNC: 105 MG/DL (ref 65–99)
HCT VFR BLD AUTO: 31.6 % (ref 40.4–52.2)
HGB BLD-MCNC: 9.6 G/DL (ref 13.7–17.6)
IMM GRANULOCYTES # BLD: 0.19 10*3/MM3 (ref 0–0.03)
IMM GRANULOCYTES NFR BLD: 1.8 % (ref 0–0.5)
LYMPHOCYTES # BLD AUTO: 0.61 10*3/MM3 (ref 0.9–4.8)
LYMPHOCYTES NFR BLD AUTO: 5.8 % (ref 19.6–45.3)
MCH RBC QN AUTO: 26.5 PG (ref 27–32.7)
MCHC RBC AUTO-ENTMCNC: 30.4 G/DL (ref 32.6–36.4)
MCV RBC AUTO: 87.3 FL (ref 79.8–96.2)
MONOCYTES # BLD AUTO: 0.5 10*3/MM3 (ref 0.2–1.2)
MONOCYTES NFR BLD AUTO: 4.8 % (ref 5–12)
NEUTROPHILS # BLD AUTO: 9.32 10*3/MM3 (ref 1.9–8.1)
NEUTROPHILS NFR BLD AUTO: 89.3 % (ref 42.7–76)
PLATELET # BLD AUTO: 153 10*3/MM3 (ref 140–500)
PMV BLD AUTO: 9.4 FL (ref 6–12)
POTASSIUM BLD-SCNC: 4.1 MMOL/L (ref 3.5–5.2)
RBC # BLD AUTO: 3.62 10*6/MM3 (ref 4.6–6)
SODIUM BLD-SCNC: 143 MMOL/L (ref 136–145)
UNIT  ABO: NORMAL
UNIT  ABO: NORMAL
UNIT  RH: NORMAL
UNIT  RH: NORMAL
WBC NRBC COR # BLD: 10.44 10*3/MM3 (ref 4.5–10.7)

## 2018-12-10 PROCEDURE — 85025 COMPLETE CBC W/AUTO DIFF WBC: CPT | Performed by: HOSPITALIST

## 2018-12-10 PROCEDURE — 82533 TOTAL CORTISOL: CPT | Performed by: HOSPITALIST

## 2018-12-10 PROCEDURE — 25810000003 SODIUM CHLORIDE 0.9 % WITH KCL 20 MEQ 20-0.9 MEQ/L-% SOLUTION: Performed by: HOSPITALIST

## 2018-12-10 PROCEDURE — 25010000002 ONDANSETRON PER 1 MG: Performed by: HOSPITALIST

## 2018-12-10 PROCEDURE — 25010000002 HYDROMORPHONE PER 4 MG: Performed by: HOSPITALIST

## 2018-12-10 PROCEDURE — 80048 BASIC METABOLIC PNL TOTAL CA: CPT | Performed by: HOSPITALIST

## 2018-12-10 PROCEDURE — 25010000002 DEXAMETHASONE PER 1 MG: Performed by: HOSPITALIST

## 2018-12-10 RX ORDER — MORPHINE SULFATE 30 MG/1
30 TABLET, FILM COATED, EXTENDED RELEASE ORAL EVERY 12 HOURS SCHEDULED
Status: DISCONTINUED | OUTPATIENT
Start: 2018-12-10 | End: 2018-12-11

## 2018-12-10 RX ADMIN — MORPHINE SULFATE 30 MG: 30 TABLET, EXTENDED RELEASE ORAL at 21:31

## 2018-12-10 RX ADMIN — PANTOPRAZOLE SODIUM 40 MG: 40 TABLET, DELAYED RELEASE ORAL at 06:03

## 2018-12-10 RX ADMIN — HYDROMORPHONE HYDROCHLORIDE 0.5 MG: 1 INJECTION, SOLUTION INTRAMUSCULAR; INTRAVENOUS; SUBCUTANEOUS at 03:13

## 2018-12-10 RX ADMIN — OXYCODONE HYDROCHLORIDE 15 MG: 15 TABLET ORAL at 09:09

## 2018-12-10 RX ADMIN — GABAPENTIN 300 MG: 300 CAPSULE ORAL at 21:31

## 2018-12-10 RX ADMIN — SODIUM CHLORIDE, PRESERVATIVE FREE 10 ML: 5 INJECTION INTRAVENOUS at 21:31

## 2018-12-10 RX ADMIN — DAKIN'S SOLUTION 0.125% (QUARTER STRENGTH) 946 ML: 0.12 SOLUTION at 09:10

## 2018-12-10 RX ADMIN — OXYBUTYNIN CHLORIDE 5 MG: 5 TABLET ORAL at 21:30

## 2018-12-10 RX ADMIN — OXYCODONE HYDROCHLORIDE 15 MG: 15 TABLET ORAL at 14:36

## 2018-12-10 RX ADMIN — GABAPENTIN 300 MG: 300 CAPSULE ORAL at 09:09

## 2018-12-10 RX ADMIN — POTASSIUM CHLORIDE AND SODIUM CHLORIDE 50 ML/HR: 900; 150 INJECTION, SOLUTION INTRAVENOUS at 01:14

## 2018-12-10 RX ADMIN — ALPRAZOLAM 1 MG: 0.5 TABLET ORAL at 11:44

## 2018-12-10 RX ADMIN — HYDROMORPHONE HYDROCHLORIDE 0.5 MG: 1 INJECTION, SOLUTION INTRAMUSCULAR; INTRAVENOUS; SUBCUTANEOUS at 06:01

## 2018-12-10 RX ADMIN — OXYBUTYNIN CHLORIDE 5 MG: 5 TABLET ORAL at 09:09

## 2018-12-10 RX ADMIN — Medication 250 MG: at 21:30

## 2018-12-10 RX ADMIN — FOLIC ACID 1 MG: 1 TABLET ORAL at 09:09

## 2018-12-10 RX ADMIN — DEXAMETHASONE SODIUM PHOSPHATE 4 MG: 4 INJECTION, SOLUTION INTRAMUSCULAR; INTRAVENOUS at 09:10

## 2018-12-10 RX ADMIN — OXYCODONE HYDROCHLORIDE 15 MG: 15 TABLET ORAL at 21:30

## 2018-12-10 RX ADMIN — DEXAMETHASONE SODIUM PHOSPHATE 4 MG: 4 INJECTION, SOLUTION INTRAMUSCULAR; INTRAVENOUS at 02:54

## 2018-12-10 RX ADMIN — HYDROMORPHONE HYDROCHLORIDE 0.5 MG: 1 INJECTION, SOLUTION INTRAMUSCULAR; INTRAVENOUS; SUBCUTANEOUS at 09:09

## 2018-12-10 RX ADMIN — Medication 250 MG: at 09:09

## 2018-12-10 RX ADMIN — OXYCODONE HYDROCHLORIDE 15 MG: 15 TABLET ORAL at 03:41

## 2018-12-10 RX ADMIN — ONDANSETRON 4 MG: 2 INJECTION INTRAMUSCULAR; INTRAVENOUS at 09:22

## 2018-12-10 RX ADMIN — ALPRAZOLAM 1 MG: 0.5 TABLET ORAL at 22:10

## 2018-12-10 RX ADMIN — MORPHINE SULFATE 30 MG: 30 TABLET, EXTENDED RELEASE ORAL at 14:23

## 2018-12-10 NOTE — PLAN OF CARE
Problem: Patient Care Overview  Goal: Plan of Care Review  Outcome: Ongoing (interventions implemented as appropriate)   12/10/18 5952   Coping/Psychosocial   Plan of Care Reviewed With patient   Plan of Care Review   Progress no change   OTHER   Outcome Summary Pt has vocalized many complaints this shift. IV Dilaudid was d/c'd and pt was started on MS Contin q12 hours. Wound dressings werre changed, patient refused to allow RN to change port dressing. RN explained that the dressing was dirty and no longer intact however, patient still refused. Education given on risk for infection, patient refused. C/O constant pain. Scheduled turns q2, however patient has refused those today as well. Pt has been argumentative and verbally abusive towards staff. House RN came to discuss issues with patient. Will continue to monitor.      Goal: Individualization and Mutuality  Outcome: Ongoing (interventions implemented as appropriate)    Goal: Discharge Needs Assessment  Outcome: Ongoing (interventions implemented as appropriate)    Goal: Interprofessional Rounds/Family Conf  Outcome: Ongoing (interventions implemented as appropriate)      Problem: Fall Risk (Adult)  Goal: Identify Related Risk Factors and Signs and Symptoms  Outcome: Outcome(s) achieved Date Met: 12/10/18    Goal: Absence of Fall  Outcome: Ongoing (interventions implemented as appropriate)      Problem: Skin and Soft Tissue Infection (Adult)  Goal: Signs and Symptoms of Listed Potential Problems Will be Absent, Minimized or Managed (Skin and Soft Tissue Infection)  Outcome: Ongoing (interventions implemented as appropriate)      Problem: Wound (Includes Pressure Injury) (Adult)  Goal: Signs and Symptoms of Listed Potential Problems Will be Absent, Minimized or Managed (Wound)  Outcome: Ongoing (interventions implemented as appropriate)      Problem: Mobility, Physical Impaired (Adult)  Goal: Identify Related Risk Factors and Signs and Symptoms  Outcome: Outcome(s)  achieved Date Met: 12/10/18    Goal: Enhanced Mobility Skills  Outcome: Ongoing (interventions implemented as appropriate)    Goal: Enhanced Functional Ability  Outcome: Ongoing (interventions implemented as appropriate)      Problem: Nutrition, Imbalanced: Inadequate Oral Intake (Adult)  Goal: Identify Related Risk Factors and Signs and Symptoms  Outcome: Outcome(s) achieved Date Met: 12/10/18    Goal: Improved Oral Intake  Outcome: Ongoing (interventions implemented as appropriate)    Goal: Prevent Further Weight Loss  Outcome: Ongoing (interventions implemented as appropriate)

## 2018-12-10 NOTE — NURSING NOTE
Call placed to Dr. Parmar regarding patient requesting new physician and IV pain medications for dressing changes. As well as to inform of conversation with Urology MD.    Dr. Parmar stated that Dr. Rhodes has agreed to take [facility] patient's that request new physicians to see. Call placed to Dr. Rhodes, waiting on response. Dr. Parmar stated that he will not order any more pain medications and he will defer that to Dr. Rhodes if he chooses to take the patient.     Call returned from Dr. Rhodes, he stated that he would prefer to speak with Dr. Parmar himself regarding the case.     Called and informed Dr. Parmar who stated that he will contact Dr. Rhodes.

## 2018-12-10 NOTE — CONSULTS
Purpose of the visit was to evaluate for: goals of care/advanced care planning and hospice referral/discussion. Spoke with RN and CCP as well as patient and discussed palliative care, resuscitation status and [facility].      Assessment:    Patient is palliative care appropriate for community based services with [facility] or SNF with palliative care (list reasons): osteomyelitis, malnutrition, and severe decubitus ulcers.      Recommendations/Plan: [facility] evaluation for community based services.    Other Comments: I tried to talk with Mr. Sherman.  Although, he is upset because his medications ordered via IV were discontinued by the physician.  He was yelling and cursing, and he stated the other physician told him he was dying.  Therefore, he wanted his medications.  I did get to ask him about his code status, and he wants to be resuscitated while he yelled YES a couple of times to this question.  He states he would like to have [facility] at home, and he told me he has met with [facility] before. I called Dr. Parmar for a [facility] order for discharge planning per the patient's request.  I had Fernando Rodriges present for the conversation.  I believe having another conversation after the patient is not upset may be helpful.  We will follow to see the results of the [facility] consult.    Thank you for referral.

## 2018-12-10 NOTE — NURSING NOTE
"While doing dressing changes to back side, patient's port dressing became loose at the top and was dirty from patient spilling food/liquids on dressing. Ask for permission to change port dressing, patient declined stating, \"I'm not getting anything through it so what's the point in changing it and putting me through more pain.\" Informed the patient that he was still getting IV Fluids (since he refused for the nurse to discontinue them) and that the dressing coming off ans being dirty puts him at an increased risk for and infection leading to sepsis since the port was a central line. Pt stated that he understood, however, still refused to have the dressing changed.   "

## 2018-12-10 NOTE — NURSING NOTE
Call was placed to Dr. Blanca regarding patient. He stated that if the patient wanted the diverting procedure he would have to go to [facility] for that. Dr. Blanca stated that he would not be the one to perform that procedure.     He also stated that if the patient wanted the larger catheter placed for the suprapubic that staff could replace that. (Will confirm that with Unit Coordinator/Unit Manager.)    Dr. Blanca also stated that he would come see the patient, however it would not be until tomorrow.

## 2018-12-10 NOTE — CONSULTS
"Notified by Caron BOYER that patient requested [facility] consult as he had met with [facility] previously. Reviewed records. Met with patient accompanied by Caron BOYER and Nurse coordinator Martha, to discuss Hospice services. Patient stated multiple times he was told [facility] would \"give him pain medicines and a bed.\" Discussed goals with patient who stated he wanted to remain a FULL Code, continue to be hospitalized and have all aggressive treatments needed to prolong his life. Informed patient that goals did not align with [facility] Hospice service goals at this time and attempted to discuss [facility] home based palliative services. Patient became very argumentative and started yelling and cursing at this nurse. Caron BOYER began discussing patient's discharge plan and patient continued to curse and yell at staff. Patient also verbally threatened CCP. Informed patient [facility] palliative team had previously attempted to contact him and did not call back. Patient stated he is now agreeable to discussions with [facility] palliative team. [facility] palliative referral placed.     Thanks for the referral and opportunity to care for this patient.   "
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
## 2018-12-11 RX ORDER — SODIUM CHLORIDE 9 MG/ML
50 INJECTION, SOLUTION INTRAVENOUS CONTINUOUS
Status: DISCONTINUED | OUTPATIENT
Start: 2018-12-11 | End: 2018-12-13 | Stop reason: HOSPADM

## 2018-12-11 RX ORDER — SODIUM HYPOCHLORITE 1.25 MG/ML
SOLUTION TOPICAL DAILY
Status: DISCONTINUED | OUTPATIENT
Start: 2018-12-11 | End: 2018-12-13 | Stop reason: HOSPADM

## 2018-12-11 RX ORDER — OXYCODONE HYDROCHLORIDE 15 MG/1
30 TABLET, FILM COATED, EXTENDED RELEASE ORAL EVERY 12 HOURS SCHEDULED
Status: DISCONTINUED | OUTPATIENT
Start: 2018-12-11 | End: 2018-12-13 | Stop reason: HOSPADM

## 2018-12-11 RX ADMIN — OXYCODONE HYDROCHLORIDE 15 MG: 15 TABLET ORAL at 11:13

## 2018-12-11 RX ADMIN — OXYBUTYNIN CHLORIDE 5 MG: 5 TABLET ORAL at 22:54

## 2018-12-11 RX ADMIN — OXYCODONE HYDROCHLORIDE 15 MG: 15 TABLET ORAL at 07:05

## 2018-12-11 RX ADMIN — OXYCODONE HYDROCHLORIDE 15 MG: 15 TABLET ORAL at 03:00

## 2018-12-11 RX ADMIN — MORPHINE SULFATE 30 MG: 30 TABLET, EXTENDED RELEASE ORAL at 09:20

## 2018-12-11 RX ADMIN — OXYCODONE HYDROCHLORIDE 20 MG: 5 TABLET ORAL at 18:05

## 2018-12-11 RX ADMIN — SODIUM CHLORIDE 50 ML/HR: 9 INJECTION, SOLUTION INTRAVENOUS at 06:39

## 2018-12-11 RX ADMIN — OXYCODONE HYDROCHLORIDE 30 MG: 15 TABLET, FILM COATED, EXTENDED RELEASE ORAL at 22:53

## 2018-12-11 RX ADMIN — ALPRAZOLAM 1 MG: 0.5 TABLET ORAL at 22:53

## 2018-12-11 RX ADMIN — ALPRAZOLAM 1 MG: 0.5 TABLET ORAL at 11:13

## 2018-12-11 RX ADMIN — DAKIN'S SOLUTION 0.125% (QUARTER STRENGTH) 1 BOTTLE: 0.12 SOLUTION at 15:03

## 2018-12-11 RX ADMIN — DAKIN'S SOLUTION 0.125% (QUARTER STRENGTH) 946 ML: 0.12 SOLUTION at 03:00

## 2018-12-11 RX ADMIN — OXYCODONE HYDROCHLORIDE 20 MG: 5 TABLET ORAL at 13:46

## 2018-12-11 RX ADMIN — GABAPENTIN 300 MG: 300 CAPSULE ORAL at 22:53

## 2018-12-11 RX ADMIN — GABAPENTIN 300 MG: 300 CAPSULE ORAL at 09:20

## 2018-12-11 RX ADMIN — Medication 250 MG: at 22:54

## 2018-12-11 RX ADMIN — FOLIC ACID 1 MG: 1 TABLET ORAL at 09:20

## 2018-12-11 RX ADMIN — OXYCODONE HYDROCHLORIDE 20 MG: 5 TABLET ORAL at 22:53

## 2018-12-11 RX ADMIN — Medication 250 MG: at 09:20

## 2018-12-11 RX ADMIN — GABAPENTIN 300 MG: 300 CAPSULE ORAL at 16:28

## 2018-12-11 RX ADMIN — PANTOPRAZOLE SODIUM 40 MG: 40 TABLET, DELAYED RELEASE ORAL at 06:39

## 2018-12-11 RX ADMIN — OXYBUTYNIN CHLORIDE 5 MG: 5 TABLET ORAL at 09:22

## 2018-12-11 NOTE — NURSING NOTE
Went to turn patient for 9:30 turns with BRITTNEY Mendez. Explained to pt why we were in the room told him that we were there to reposition him and take pressure off of his back so that his wounds did not get any worse. Pt verbalized using several curse words saying that another nurse was just in here and put a bag over his catheter so it would stop leaking (this was done on night shift), and that he was comfortable. We explained to the pt that this was his choice we did not have to reposition him if he did not want to. Pt proceeded to curse at staff saying no you all are already f'in here might as well do what you need to do without asking. Once again explained to the patient that this was his choice, and he again told us to go ahead and turn him while he continued to curse. Mean while the pt is on the phone with a family member who is asking what is going on, and the pt is stating that they are here to do something to me when I am already comfortable and that we do not care about him at all. Started to turn the pt and he moaned out in pain while cursing, nursing staff stopped the turn and told him we don't have to go through with this if it is painful, pt once again told us to go ahead and do what we need to do. Pillow placed under pt's L side, he explained this hurts him even more. I offered to remove the pillow and put it back where it was before, he denied and we asked what we could do for the pt. We attempted to remove wrinkle from the other side, offered to place another pillow, pt agreed. Pillow placed under him but he still verbalized that he was more comfortable the way he was before we came in, offered to removed all pillows and pt declined. Family member on the phone kept dezelmer out asking what we are doing to the pt and asked if another family member was in the room. There is a family member present in the room, unknown relationship to pt., he walked over to the bed and told the person on the phone that he  is here watching everything that we are doing, and stating that he was already comfortable before we moved him. Pt also verbalized that staff and drChacorta's did not care about him and that we aren't even working on getting him an air mattress, I explained to him and the family member that he is on a low air loss mattress and this is the best mattress for him to be on, the family member again stated that we aren't getting him an air mattress and that he is here to watch everything and talk to the drChacorta's to get things done. Pt was then covered back up and staff offered to get him anything, and told him to call if he wanted to be repositioned again.

## 2018-12-11 NOTE — PROGRESS NOTES
"Continued Stay Note  [facility]     Patient Name: Joaquín Sherman  MRN: [id number]  Today's Date: 12/10/2018    Admit Date: 12/6/2018    Discharge Plan     Row Name 12/10/18 1917       Plan    Plan Comments  Discussed case with Martha/unit coordinator, Ethan/Angus BERRY and Dr. Parmar. Patient does not meet inpatient palliative or [facility] criteria. Met with patient at bedside to discuss goals of care. Ethan explained [facility] services vs [facility]. Patient became upset and called his mother onh phone yelling we were sending him home to die. Says he wants to continue blood transfusions, IV abx and pain medications. Ethan explained to patient's mother on speaker phone the difference between [facility] and [facility]. Verbalized understanding. Ethan states will contact [facility] palliative to speak with patient per phone. Patient and mother agreeable with referral.    Row Name 12/10/18 1909       Plan    Plan  Home with family and [facility].    Patient/Family in Agreement with Plan  yes    Plan Comments  Spoke with patient, Toño/Jovan Luevano and Dr. Parmar at bedside. Patient requesting new low air loss mattress. DME form signed per Dr. Parmar. Call placed to wound care RN for updated wound measurements. States will see patient in am. Discussed patient's complaint regarding bed not \"going up & down\". Toño says it has to be adjusted manually and will send  to patient's home to instruct family. Says insurance will not cover an electric bed. Toño to speak with patient in am. Dr. Parmar informed patient goal is to dc home Tuesday or Wednesday. Patient became loud and agitated when informed about changes to medication regimen. Demanded to transfer to [facility] stating he was told to come to hospital per  nurse so he could get \"pain medications and everything he need per [facility]\". Told him CCP would contact [facility] to evaluate.         Discharge Codes    No " documentation.             Caron Gamino RN

## 2018-12-11 NOTE — PLAN OF CARE
Problem: Patient Care Overview  Goal: Plan of Care Review  Outcome: Ongoing (interventions implemented as appropriate)   12/11/18 1634   Coping/Psychosocial   Plan of Care Reviewed With patient   Plan of Care Review   Progress no change   OTHER   Outcome Summary Ostomy bag over S/P bag is containing leakage. Oxycodone dose increased to 20 mg q 4 hr prn. Multiple pressure wounds redressed around 1500 with measurments documented. Dakins's solution strength increased. Soft non-formed stool from colostomy. Contact isolation continued. Argumentative at times and cursing on phone.      Goal: Individualization and Mutuality  Outcome: Ongoing (interventions implemented as appropriate)

## 2018-12-11 NOTE — NURSING NOTE
WHEN I CAME ON AT 7PM THE PATIENT WAS SLEEPING. I POUCHED HIS SUPRA PUBIC CATHETER BECAUSE IT WAS LEAKING AROUND THE SITE AND NO URINE WAS COMING OUT OF THE ACTUAL CATH. I PLACED A UROSTOMY BAG OVER THE SITE AND PUT  IT TO BSD. I DID THIS BECAUSE THE URINE WOULD LAY AGAINST HIS SKIN FOR HOURS NO MATTER HOW OFTEN WE CHANGED THE DRESSING. THE PATIENT WAS PLEASED WITH THIS. THE PATIENT COMPLAINED MOST OF THE NIGHT ABOUT WHAT THE DOCTORS WERE NOT DOING FOR HIM AND AND FOR CUTTING OFF HIS DILAUDID AND STOPPING HIS IV FLUIDS. TRYING TO REASON WITH THE PATIENT WAS OF NO USE. EVEN THOUGH WE GOT ALONG WELL HE WAS STILL ARGUMENTATIVE AND COMPLAINING AND VERY NEEDY

## 2018-12-11 NOTE — NURSING NOTE
WOCN: Patient seen wound measurements:  Left Knee [id number] stage 4  Left lateral knee [id number] stage4  Right lateral posterior leg [id number] partial thickness  Right shin scattered open partial thickness spots [id number]   Right lateral ankle [id number] partial thickness  Right Hip 1.5cmx4.5cmx4.6cm Stage 4  Ishium/sacrum 17.5 cmx29 cm Joins to left trochanter which is 3cm depth stage 4  Lumbar 2 areas above 2.1cmx.3cm  2.2cm x .4cm Upper lumbar stage3 with slough depth  .4cm  Behind scrotum 9bpu9ih partial thickness  Moist dakins gauze applied to all wounds  Currently on low air loss mattress      Ostomy pouch changed/Stoma red/ Brown stool output

## 2018-12-11 NOTE — PROGRESS NOTES
Continued Stay Note  [facility]     Patient Name: Joaquín Sherman  MRN: [id number]  Today's Date: 12/11/2018    Admit Date: 12/6/2018    Discharge Plan     Row Name 12/11/18 0570       Plan    Plan  Home with family and [facility] (current patient) and [facility].     Patient/Family in Agreement with Plan  yes    Plan Comments  Discussed case with Dr. Castellon and Bárbara/CCP manager. Explained [facility] palliative program does not manage patient's pain medications. They work along with patient's PCP to make recommendations but do not order medications. [facility] instructed to contact Dr. Jaramillo office to see if he plans to manage pain medications once patient is dc'ed home. Call placed to [facility] ([phone]). Spoke with Marissa who states they are following for primary medical needs and patient was referred to pain management clinic. Unable to identify which clinic. Bárbara suggested [facility] schedule an expediated follow up appointment with Dr. Garza ([phone]). Call placed to office to arrange. States needs ambulatory referral and requested imaging and office notes. Order obtained from Dr. Castellon and CCP to f/u in am.        Discharge Codes    No documentation.             Caron Gamino, RN

## 2018-12-11 NOTE — PROGRESS NOTES
LOS: 5 days     Name: Joaquín Sherman  Age/Sex: 38 y.o. male  :  1980        PCP: Savlador Jaramillo MD    Subjective   Denies new issues today.  He wants to rehash everything that happened with Dr. River yesterday.  I informed him we will not be discussing anything that happened with him yesterday.  He's complaining of increased pain with dressing changes.  He's requesting IV Dilaudid for this.  He does not use IV Dilaudid for dressing changes at home.  He seems rather upset and agitated by the fact that I'm asking questions regarding why he needs to pain medications.  He denies any fevers or chills no nausea or vomiting.  When I came in the room he was on the phone and he refused acting up his phone call and did not acknowledge my presence for a few minutes upon entry.  General: No Fever or Chills, Cardiac: No Chest Pain or Palpitations, Resp: No Cough or SOA, GI: No Nausea, Vomiting, or Diarrhea and Other: No bleeding      folic acid 1 mg Oral Daily   gabapentin 300 mg Oral TID   oxybutynin 5 mg Oral BID   oxyCODONE 30 mg Oral Q12H   pantoprazole 40 mg Oral Q AM   saccharomyces boulardii 250 mg Oral BID   sodium hypochlorite  Topical Daily       sodium chloride 50 mL/hr Last Rate: 50 mL/hr (18 0639)       Objective   Vital Signs  Temp:  [96.8 °F (36 °C)-97.6 °F (36.4 °C)] 97.2 °F (36.2 °C)  Heart Rate:  [52-78] 78  Resp:  [16-18] 18  BP: ()/(60-74) 98/60  Body mass index is 13.2 kg/m².    Intake/Output Summary (Last 24 hours) at 2018 1716  Last data filed at 2018 1500  Gross per 24 hour   Intake 1635 ml   Output 3150 ml   Net -1515 ml       Physical Exam   Constitutional: He is oriented to person, place, and time. He appears well-developed and well-nourished.   Frail cachectic malnourished   HENT:   Head: Normocephalic and atraumatic.   Cardiovascular: Normal rate and regular rhythm.   Pulmonary/Chest: Effort normal and breath sounds normal.   Abdominal: Soft. Bowel  sounds are normal.   Super pubic catheter in place   Musculoskeletal: He exhibits no edema.   Neurological: He is alert and oriented to person, place, and time.   Skin: Skin is warm and dry.   Nursing note and vitals reviewed.        Results Review:       I reviewed the patient's new clinical results.  Results from last 7 days   Lab Units  12/10/18   0414  12/09/18   0437  12/08/18   1744  12/08/18 0457 12/06/18 2019   WBC 10*3/mm3  10.44  7.03   --   7.27  10.26   HEMOGLOBIN g/dL  9.6*  9.2*  7.0*  6.5*  6.3*   PLATELETS 10*3/mm3  153  132*   --   160  188     Results from last 7 days   Lab Units  12/10/18   0414  12/09/18   0437  12/08/18   0457  12/06/18   2019   SODIUM mmol/L  143  145  141  132*   POTASSIUM mmol/L  4.1  4.7  3.9  3.8   CHLORIDE mmol/L  114*  114*  109*  102   CO2 mmol/L  19.8*  20.0*  20.3*  18.7*   BUN mg/dL  21*  18  22*  25*   CREATININE mg/dL  0.85  0.87  1.03  1.09   CALCIUM mg/dL  7.1*  7.3*  7.5*  7.3*   Estimated Creatinine Clearance: 75.7 mL/min (by C-G formula based on SCr of 0.85 mg/dL).    Assessment/Plan     Osteomyelitis Pelvis (CMS/HCC)    Paraplegia following spinal cord injury (CMS/[facility])    Decubitus ulcer of sacral region, stage 4 (CMS/HCC)    Severe protein-calorie malnutrition (CMS/HCC)    UTI (urinary tract infection) due to urinary indwelling catheter (CMS/HCC)    Decubitus ulcer of hip    Chronic adrenal insufficiency (CMS/HCC)    Sepsis, unspecified organism (CMS/HCC)    PLAN  · Mr. Sherman's not the most pleasant person to deal with.  He was rather argumentative at the bedside today.  I explained multiple times that I wasn't here to argue with him and I was just going to explain how things were going to go here in the hospital.  This upset him some at the bedside but he eventually calm down.  · No IV pain medications required for this gentleman scared.  I have increased his oral pain medications I see no point in adding or continuing IV pain medication as these  cannot be used or prescribed at home.  · I'm still working on a way that he'll be able to continue to get his oral pain medications at home.  Isn't digging into his story more and more I'm learning that he isn't terribly noncompliant patient and this is why most people will not prescribe him pain medication.  · Awaiting urology's recommendations regarding leaking suprapubic catheter and I did explain to him that it is extremely unlikely that they will be willing to do any surgical procedure for him here at this facility.  Multiple times throughout his past history told him that if he wants any surgeries done he's he will need to go to his outside urologist in the office and discussed surgical repair.  · Severe decubitus stage IV ulceration of his pelvis: ID input greatly appreciated. Plastic surgery eval greatly appreciated. -BC x2 neg   · Osteomyelitis, chronic, progressive, 2nd to above: ID input appreciated.   · Severe protein caloric malnutrition with cachexia - he is skin and bones at this point as I can see outlines of all ribs - Nutrition can evaluate but not sure what can be offered.   · UTI/Colonization. Suprapubic catheter malfunction - Urology input greatly appreciated.   Disposition  Plan home after final determination from urology on care for urostomy.      Mariano Castellon MD  [facility]  12/11/18  5:16 PM

## 2018-12-11 NOTE — PROGRESS NOTES
"Continued Stay Note  [facility]     Patient Name: Joaquín Sherman  MRN: [id number]  Today's Date: 12/11/2018    Admit Date: 12/6/2018    Discharge Plan     Row Name 12/11/18 8527       Plan    Plan Comments  Spoke with patient's parents on speaker phone at bedside. Clarified difference between \"traditional\" [facility] vs [facility] care program. Verbalized understanding although patient continues to state staff wants him to \"go home and die\" Kin/ present while CCP at bedside. Call placed to Gretchen/ [facility] palliative program. Left voice message. Await call back. Continue to follow.    Row Name 12/11/18 5110       Plan    Plan Comments  Call placed to [facility]. Faxed wound measurements as requested. States he also needs plan of care faxed as well. Spoke with patient's RN Lena regarding ostomy appliance placed over supra-pubic catheter. States no urine leaking noted.     Row Name 12/11/18 4895       Plan    Plan Comments  Rod on unit to obtain order and progress note for wheelchair. She spoke with patient at bedside. Says patient is requesting a ultra light manual w/c with \"captain seated\" head rest. Tasha says unable to place head rest to w/c. Will order standard w/c at this time. Informed patient in order to get customized w/c he must have a seating evaluation. Instructed patient to have [facility] order PT/OT evaluation per PCP and evaluation can be completed in patient's home. Will have standard w/c delivered to bedside prior to discharge.     Row Name 12/11/18 5065       Plan    Plan  Home with family and [facility] (current patient) and [facility] program.     Patient/Family in Agreement with Plan  yes    Plan Comments  Discussed case with Dr. Castellon and Bárbara/CCP manager. Explained [facility] home palliative program does not manage patient's pain medications. They work along with patient's PCP to make recommendations but do not order medications. CCP " instructed to contact Dr. Jaramillo office to see if he plans to manage pain medications once patient is dc'ed home. Call placed to [facility] ([phone]). Spoke with Marissa who states they are following for primary medical needs and patient was referred to pain management clinic. Unable to identify which clinic. Bárbara suggested [facility] schedule an expediated follow up appointment with Dr. Garza. Call placed to office to arrange. States needs ambulatory referral and requested imaging and office notes. Order obtained from Dr. Castellon and CCP to f/u in am.        Discharge Codes    No documentation.             Caron Gamino RN

## 2018-12-11 NOTE — PROGRESS NOTES
"Continued Stay Note  [facility]     Patient Name: Joaquín Sherman  MRN: [id number]  Today's Date: 12/10/2018    Admit Date: 12/6/2018    Discharge Plan     Row Name 12/10/18 1930       Plan    Plan Comments  Met with patient and Bárbara/CCP manager at bedside to confirm dc plans. States agreeable with [facility] HH and [facility] program. Patient on phone with Gretchen/[facility] palliative program when CCP entered room. In addition to new bed and mattress patient also requested wheelchair with \"head piece/extension\". Call placed to Tasha/Eugene regarding referral. States he needs to be evaluated per therapy at [facility] in order to get speciality wheelchair. [facility] spoke with patient at bedside with Robyn/RN present to inform about evaluation. Says he understands but CCP will f/u in am.     Row Name 12/10/18 1925       Plan    Plan Comments  CCP continued to discuss dc plans with patient at bedside. Martha/unit coordinator and patient's RN Robyn present. Explained his options: home with [facility]; Home with  and [facility] palliative or SNF. Patient became increasingly agitated and upset with CCP stating he was being \"kicked out\" of hospital and he wasn't ready. [facility] contacted Bárbara/CCP manager to come and speak with patient at bedside. Martha contacted house supervisor to see patient. Continue to follow.    Row Name 12/10/18 1917       Plan    Plan Comments  Discussed case with Martha/unit coordinator, Ethan/Angus RN and Dr. Parmar. Patient does not meet inpatient palliative or [facility] criteria. Met with patient at bedside to discuss goals of care. Ethan explained [facility] services vs [facility]. Patient became upset and called his mother onh phone yelling we were sending him home to die. Says he wants to continue blood transfusions, IV abx and pain medications. Ethan explained to patient's mother on speaker phone the difference between [facility] and [facility] program. Verbalized " "understanding. Ethan [facility] will contact [facility] palliative to speak with patient per phone. Patient and mother agreeable with referral.    Row Name 12/10/18 1753       Plan    Plan  Home with family and [facility].    Patient/Family in Agreement with Plan  yes    Plan Comments  Spoke with patient, Toño/Jovan Luevano and Dr. Parmar at bedside. Patient requesting new low air loss mattress. DME form signed per Dr. Parmar. Call placed to wound care RN for updated wound measurements. States will see patient in am. Discussed patient's complaint regarding bed not \"going up & down\". Toño says it has to be adjusted manually and will send  to patient's home to instruct family. Says insurance will not cover an electric bed. Toño to speak with patient in am. Dr. Parmar informed patient goal is to dc home Tuesday or Wednesday. Patient became loud and agitated when informed about changes to medication regimen. Demanded to transfer to [facility] stating he was told to come to hospital per  nurse so he could get \"pain medications and everything he need per [facility]\". Told him CCP would contact [facility] to evaluate.         Discharge Codes    No documentation.             Caron Gamino, RN    "

## 2018-12-12 VITALS
RESPIRATION RATE: 18 BRPM | HEART RATE: 119 BPM | OXYGEN SATURATION: 99 % | WEIGHT: 100 LBS | HEIGHT: 73 IN | DIASTOLIC BLOOD PRESSURE: 60 MMHG | SYSTOLIC BLOOD PRESSURE: 118 MMHG | BODY MASS INDEX: 13.25 KG/M2 | TEMPERATURE: 98.2 F

## 2018-12-12 LAB
BACTERIA SPEC AEROBE CULT: NORMAL
BACTERIA SPEC AEROBE CULT: NORMAL

## 2018-12-12 RX ORDER — OXYCODONE HYDROCHLORIDE 10 MG/1
20 TABLET ORAL EVERY 4 HOURS PRN
Qty: 120 TABLET | Refills: 0 | Status: SHIPPED | OUTPATIENT
Start: 2018-12-12

## 2018-12-12 RX ORDER — OXYCODONE HYDROCHLORIDE 30 MG/1
30 TABLET, FILM COATED, EXTENDED RELEASE ORAL EVERY 12 HOURS SCHEDULED
Qty: 20 TABLET | Refills: 0 | Status: SHIPPED | OUTPATIENT
Start: 2018-12-12 | End: 2018-12-22

## 2018-12-12 RX ADMIN — GABAPENTIN 300 MG: 300 CAPSULE ORAL at 20:49

## 2018-12-12 RX ADMIN — OXYCODONE HYDROCHLORIDE 20 MG: 5 TABLET ORAL at 16:40

## 2018-12-12 RX ADMIN — OXYCODONE HYDROCHLORIDE 20 MG: 5 TABLET ORAL at 08:17

## 2018-12-12 RX ADMIN — OXYCODONE HYDROCHLORIDE 30 MG: 15 TABLET, FILM COATED, EXTENDED RELEASE ORAL at 20:49

## 2018-12-12 RX ADMIN — GABAPENTIN 300 MG: 300 CAPSULE ORAL at 08:00

## 2018-12-12 RX ADMIN — OXYBUTYNIN CHLORIDE 5 MG: 5 TABLET ORAL at 08:00

## 2018-12-12 RX ADMIN — PANTOPRAZOLE SODIUM 40 MG: 40 TABLET, DELAYED RELEASE ORAL at 06:48

## 2018-12-12 RX ADMIN — GABAPENTIN 300 MG: 300 CAPSULE ORAL at 16:40

## 2018-12-12 RX ADMIN — OXYCODONE HYDROCHLORIDE 20 MG: 5 TABLET ORAL at 04:16

## 2018-12-12 RX ADMIN — ALPRAZOLAM 1 MG: 0.5 TABLET ORAL at 09:46

## 2018-12-12 RX ADMIN — Medication 500 UNITS: at 18:15

## 2018-12-12 RX ADMIN — Medication 250 MG: at 20:49

## 2018-12-12 RX ADMIN — OXYCODONE HYDROCHLORIDE 30 MG: 15 TABLET, FILM COATED, EXTENDED RELEASE ORAL at 08:00

## 2018-12-12 RX ADMIN — OXYCODONE HYDROCHLORIDE 20 MG: 5 TABLET ORAL at 20:49

## 2018-12-12 RX ADMIN — OXYCODONE HYDROCHLORIDE 20 MG: 5 TABLET ORAL at 12:20

## 2018-12-12 RX ADMIN — Medication 250 MG: at 08:00

## 2018-12-12 RX ADMIN — OXYBUTYNIN CHLORIDE 5 MG: 5 TABLET ORAL at 20:49

## 2018-12-12 RX ADMIN — FOLIC ACID 1 MG: 1 TABLET ORAL at 08:00

## 2018-12-12 NOTE — DISCHARGE INSTR - APPOINTMENTS
Dr Jaramillo [facility] ([phone]) Monday @ 2:20pm [facility] location.    [facility] transportation scheduled for monday to [facility]/ Dr Jaramillo, [facility] location (conf# [id number]) pickup time from patient’s home 1:15pm per Debra.
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
## 2018-12-12 NOTE — PROGRESS NOTES
Continued Stay Note  [facility]     Patient Name: Joaquín Sherman  MRN: [id number]  Today's Date: 12/12/2018    Admit Date: 12/6/2018    Discharge Plan     Row Name 12/12/18 1618       Plan    Plan Comments  Transportation with [facility] scheduled for monday to [facility]/ Dr Jaramillo, conf# [id number] (pickup 1:15pm) per Debra.      Row Name 12/12/18 1609       Plan    Plan Comments  CCP placed call to Dr Jaramillo [facility] to reschedule appt from friday to monday ([phone]).  Per Tiarra, appt rescheduled for monday @ 2:20pm [address] [place] [address] location.       Row Name 12/12/18 7159       Plan    Plan Comments  Left vm with supervisor Brooklyn Pedroza at [facility] requesting assistance with friday transportation.      Row Name 12/12/18 1542       Plan    Plan Comments  [facility] @ [phone] to arrange for transport to Dr. Jaramillo friday at 0940 ([facility], [address]).  Was advised that they are unable to schedule for friday- needing 72 hr notice.               Expected Discharge Date and Time     Expected Discharge Date Expected Discharge Time    Dec 12, 2018             Corbin Barreto RN
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
## 2018-12-13 ENCOUNTER — READMISSION MANAGEMENT (OUTPATIENT)
Dept: CALL CENTER | Facility: HOSPITAL | Age: 38
End: 2018-12-13

## 2018-12-13 NOTE — NURSING NOTE
"12/12/18 2232 Call placed to [facility] ambulance, instructed \"someone is coming to get patient right now\".   "

## 2018-12-13 NOTE — PROGRESS NOTES
[facility]ued [facility]     Patient Name: Joaquín Sherman  MRN: [id number]  Today's Date: 12/12/2018    Admit Date: 12/6/2018    Discharge Plan     Row Name 12/12/18 2022       Plan    Plan Comments  Discharge summary and AVS faxed to Dr. Jaramillo's as requested ([phone]). [facility] arranged for pick-up at 2200. Patient, patient's mother and RN informed of pick-up time. Ambulance necessity form and facesheet given to RN. Call placed to [place] to inform of dc tonight. Updated regarding plan of care. RN to provide dressing and ostomy supplies. No additional needs noted.     Row Name 12/12/18 2011       Plan    Plan Comments  Orders received to [place] home today. Met with patient and Dr. Castellon at bedside to inform. Attempted to fill patient's medication at [facility] retail pharmacy but medications are not available and would need to be ordered. Instructed patient to call family to pick-up prescriptions from hospital and fill before patient leaves. Verbalized someone will get scripts from RN. Tasha/Eugene called regarding wheelchair. [facility] Passport pre-cert pending but order has been processed. Will have w/c delivered to patient's home once pre-cert obtained. [place] called. [facility] mattress has been delivered to patient's home this afternoon. [facility] told patient and his mother that bed has to be adjusted manually (height). Insurance will not cover electric bed. Instructed to contact [facility] customer services with questions. Verbalized understanding.     Row Name 12/12/18 1958       Plan    Patient/Family in Agreement with Plan  yes    Plan Comments  Received call from Marissa/Dr. Jaramillo stating referral called to [facility] ([phone]). Call placed to office and spoke with Thalia. States they have not received referral or chart notes at this time. Says unable to accept referral from [facility] and must come from PCP. Call placed to Marissa/Dr. Jaramillo to inform. Confirms referral  made and will see that paperwork is faxed as requested. No appointment scheduled at this time. Call placed to [facility]. Described details of program to [facility]. Services include RN & SW who see patient monthly with check-in calls in between. A CNA will see patient weekly for an hour. Fee is based on sliding scale but Medicaid patients are covered. Explained they do not prescribe pain medications. Patient must receive from PCP. States if patient is interested then he needs to call her after dc. Patient given information at bedside.     Row Name 12/12/18 1954       Plan    Plan  Home with family and [facility] (current patient) and [facility].    Final Discharge Disposition Code  06 - home with home health care        Discharge Codes    No documentation.       Expected Discharge Date and Time     Expected Discharge Date Expected Discharge Time    Dec 12, 2018             Caron Gamino RN

## 2018-12-13 NOTE — PROGRESS NOTES
Continued Stay Note  [facility]     Patient Name: Joaquín Sherman  MRN: [id number]  Today's Date: 12/12/2018    Admit Date: 12/6/2018    Discharge Plan     Row Name 12/12/18 1958       Plan    Patient/Family in Agreement with Plan  yes    Plan Comments  Received call from Marissa/Dr. Jaramillo stating referral called to [facility] ([phone]). Call placed to office and spoke with Thalia. States they have not received referral or chart notes at this time. Says unable to accept referral from [facility] and must come from PCP. Call placed to Marissa/Dr. Jaramillo to inform. Confirms referral made and will see that paperwork is faxed as requested. No appointment scheduled at this time. Call placed to [facility]. Described details of program to CCP. Services include RN & SW who see patient monthly with check-in calls in between. A CNA will see patient weekly for an hour. Fee is based on sliding scale but Medicaid patients are covered. Explained they do not prescribe pain medications. Patient must receive from PCP. States if patient is interested then he needs to call her after dc. Patient given information at bedside.     Row Name 12/12/18 1954       Plan    Plan  Home with family and [facility] (current patient) and [facility].    Final Discharge Disposition Code  06 - home with home health care        Discharge Codes    No documentation.       Expected Discharge Date and Time     Expected Discharge Date Expected Discharge Time    Dec 12, 2018             Caron Gamino RN

## 2018-12-13 NOTE — OUTREACH NOTE
Prep Survey      Responses   Facility patient discharged from?  [place]   Is patient eligible?  Yes   Discharge diagnosis  Osteomyelitis Pelvis, Paraplegia following spinal cord injury, Decubitus ulcer of sacral region, stage 4   Does the patient have one of the following disease processes/diagnoses(primary or secondary)?  Other   Does the patient have Home health ordered?  Yes   What is the Home health agency?   [facility]   Is there a DME ordered?  No   Prep survey completed?  Yes          Desiree Castillo RN

## 2018-12-13 NOTE — PROGRESS NOTES
Case Management Discharge Note    Final Note: Confirmed with [facility] following at home. [facility] arranged for pick-up at 2200.    Destination      No service has been selected for the patient.      Durable Medical Equipment      No service has been selected for the patient.      Dialysis/Infusion      No service has been selected for the patient.      Home Medical Care - Selection Complete      Service Provider Request Status Selected Services Address Phone Number Fax Number    [facility] Selected Home Health Services [address] [address] [phone]      Community Resources      No service has been selected for the patient.        Ambulance: [facility]    Final Discharge Disposition Code: 06 - home with home health care

## 2018-12-13 NOTE — PROGRESS NOTES
Continued Stay Note  [facility]     Patient Name: Joaquín Sherman  MRN: [id number]  Today's Date: 12/12/2018    Admit Date: 12/6/2018    Discharge Plan     Row Name 12/12/18 2011       Plan    Plan Comments  Orders received to [place] home today. Met with patient and Dr. Castellon at bedside to inform. Attempted to fill patient's medication at [facility] retail pharmacy but medications are not available and would need to be ordered. Instructed patient to call family to pick-up prescriptions from hospital and fill before patient leaves. Verbalized someone will get scripts from RN. Tasha/[facility] called regarding wheelchair. [facility] Passport pre-cert pending but order has been processed. Will have w/c delivered to patient's home once pre-cert obtained. [place] called. [facility] mattress has been delivered to patient's home this afternoon. CCP told patient and his mother that bed has to be adjusted manually (height). Insurance will not cover electric bed. Instructed to contact [facility] customer services with questions. Verbalized understanding.     Row Name 12/12/18 1958       Plan    Patient/Family in Agreement with Plan  yes    Plan Comments  Received call from Marissa/Dr. Jaramillo stating referral called to [facility] ([phone]). Call placed to office and spoke with Thalia. States they have not received referral or chart notes at this time. Says unable to accept referral from [facility] and must come from PCP. Call placed to Marissa/Dr. Jaramillo to inform. Confirms referral made and will see that paperwork is faxed as requested. No appointment scheduled at this time. Call placed to [facility]. Described details of program to CCP. Services include RN & SW who see patient monthly with check-in calls in between. A CNA will see patient weekly for an hour. Fee is based on sliding scale but Medicaid patients are covered. Explained they do not prescribe pain medications. Patient must receive from  PCP. States if patient is interested then he needs to call her after dc. Patient given information at bedside.     Row Name 12/12/18 1954       Plan    Plan  Home with family and [facility] (current patient) and [facility].    Final Discharge Disposition Code  06 - home with home health care        Discharge Codes    No documentation.       Expected Discharge Date and Time     Expected Discharge Date Expected Discharge Time    Dec 12, 2018             Caron Gamino RN

## 2018-12-13 NOTE — NURSING NOTE
Pt alert and oriented x4, transported from unit for discharge by [facility]. Dressing supplies at bedside sent with patient.

## 2018-12-14 ENCOUNTER — READMISSION MANAGEMENT (OUTPATIENT)
Dept: CALL CENTER | Facility: HOSPITAL | Age: 38
End: 2018-12-14

## 2018-12-14 NOTE — OUTREACH NOTE
Medical Week 1 Survey      Responses   Facility patient discharged from?  [place]   Does the patient have one of the following disease processes/diagnoses(primary or secondary)?  Other   Is there a successful TCM telephone encounter documented?  No   Week 1 attempt successful?  No   Unsuccessful attempts  Attempt 1          Sue Harvey RN

## 2018-12-15 ENCOUNTER — READMISSION MANAGEMENT (OUTPATIENT)
Dept: CALL CENTER | Facility: HOSPITAL | Age: 38
End: 2018-12-15

## 2018-12-15 NOTE — OUTREACH NOTE
Medical Week 1 Survey      Responses   Facility patient discharged from?  [place]   Does the patient have one of the following disease processes/diagnoses(primary or secondary)?  Other   Is there a successful TCM telephone encounter documented?  No   Week 1 attempt successful?  Yes   Call start time  1611   Revoke  Decline to participate          Tram Storey RN

## 2019-03-27 ENCOUNTER — APPOINTMENT (OUTPATIENT)
Dept: ULTRASOUND IMAGING | Facility: HOSPITAL | Age: 39
End: 2019-03-27

## 2019-03-27 ENCOUNTER — APPOINTMENT (OUTPATIENT)
Dept: GENERAL RADIOLOGY | Facility: HOSPITAL | Age: 39
End: 2019-03-27

## 2019-03-27 ENCOUNTER — HOSPITAL ENCOUNTER (INPATIENT)
Facility: HOSPITAL | Age: 39
LOS: 2 days | Discharge: HOSPICE | End: 2019-03-29
Attending: EMERGENCY MEDICINE | Admitting: INTERNAL MEDICINE

## 2019-03-27 DIAGNOSIS — N39.0 UTI (URINARY TRACT INFECTION), BACTERIAL: ICD-10-CM

## 2019-03-27 DIAGNOSIS — A41.9 SEPTIC SHOCK (HCC): Primary | ICD-10-CM

## 2019-03-27 DIAGNOSIS — D64.9 ANEMIA, UNSPECIFIED TYPE: ICD-10-CM

## 2019-03-27 DIAGNOSIS — L89.159 PRESSURE INJURY OF SKIN OF SACRAL REGION, UNSPECIFIED INJURY STAGE: ICD-10-CM

## 2019-03-27 DIAGNOSIS — R65.21 SEPTIC SHOCK (HCC): Primary | ICD-10-CM

## 2019-03-27 DIAGNOSIS — A49.9 UTI (URINARY TRACT INFECTION), BACTERIAL: ICD-10-CM

## 2019-03-27 LAB
ABO GROUP BLD: NORMAL
ALBUMIN SERPL-MCNC: 2.2 G/DL (ref 3.5–5.2)
ALBUMIN/GLOB SERPL: 0.5 G/DL
ALP SERPL-CCNC: 71 U/L (ref 39–117)
ALT SERPL W P-5'-P-CCNC: <5 U/L (ref 1–41)
ANION GAP SERPL CALCULATED.3IONS-SCNC: 10.9 MMOL/L
AST SERPL-CCNC: 8 U/L (ref 1–40)
BACTERIA UR QL AUTO: ABNORMAL /HPF
BASOPHILS # BLD AUTO: 0.03 10*3/MM3 (ref 0–0.2)
BASOPHILS NFR BLD AUTO: 0.3 % (ref 0–1.5)
BILIRUB SERPL-MCNC: 0.3 MG/DL (ref 0.2–1.2)
BILIRUB UR QL STRIP: NEGATIVE
BLD GP AB SCN SERPL QL: NEGATIVE
BUN BLD-MCNC: 24 MG/DL (ref 6–20)
BUN/CREAT SERPL: 20.3 (ref 7–25)
CALCIUM SPEC-SCNC: 7.6 MG/DL (ref 8.6–10.5)
CHLORIDE SERPL-SCNC: 97 MMOL/L (ref 98–107)
CLARITY UR: ABNORMAL
CO2 SERPL-SCNC: 23.1 MMOL/L (ref 22–29)
COLOR UR: YELLOW
CORTIS SERPL-MCNC: 21.02 MCG/DL
CREAT BLD-MCNC: 1.18 MG/DL (ref 0.76–1.27)
D-LACTATE SERPL-SCNC: 0.5 MMOL/L (ref 0.5–2)
DEPRECATED RDW RBC AUTO: 57.9 FL (ref 37–54)
EOSINOPHIL # BLD AUTO: 0 10*3/MM3 (ref 0–0.4)
EOSINOPHIL NFR BLD AUTO: 0 % (ref 0.3–6.2)
ERYTHROCYTE [DISTWIDTH] IN BLOOD BY AUTOMATED COUNT: 18.1 % (ref 12.3–15.4)
GFR SERPL CREATININE-BSD FRML MDRD: 84 ML/MIN/1.73
GLOBULIN UR ELPH-MCNC: 4.2 GM/DL
GLUCOSE BLD-MCNC: 99 MG/DL (ref 65–99)
GLUCOSE BLDC GLUCOMTR-MCNC: 104 MG/DL (ref 70–130)
GLUCOSE BLDC GLUCOMTR-MCNC: 105 MG/DL (ref 70–130)
GLUCOSE UR STRIP-MCNC: NEGATIVE MG/DL
HCT VFR BLD AUTO: 23.1 % (ref 37.5–51)
HGB BLD-MCNC: 6.5 G/DL (ref 13–17.7)
HGB UR QL STRIP.AUTO: ABNORMAL
HYALINE CASTS UR QL AUTO: ABNORMAL /LPF
IMM GRANULOCYTES # BLD AUTO: 0.09 10*3/MM3 (ref 0–0.05)
IMM GRANULOCYTES NFR BLD AUTO: 0.8 % (ref 0–0.5)
IRON 24H UR-MRATE: 11 MCG/DL (ref 59–158)
IRON SATN MFR SERPL: 13 % (ref 20–50)
KETONES UR QL STRIP: NEGATIVE
LEUKOCYTE ESTERASE UR QL STRIP.AUTO: ABNORMAL
LYMPHOCYTES # BLD AUTO: 1.38 10*3/MM3 (ref 0.7–3.1)
LYMPHOCYTES NFR BLD AUTO: 12.5 % (ref 19.6–45.3)
MCH RBC QN AUTO: 24.3 PG (ref 26.6–33)
MCHC RBC AUTO-ENTMCNC: 28.1 G/DL (ref 31.5–35.7)
MCV RBC AUTO: 86.5 FL (ref 79–97)
MONOCYTES # BLD AUTO: 0.78 10*3/MM3 (ref 0.1–0.9)
MONOCYTES NFR BLD AUTO: 7.1 % (ref 5–12)
NEUTROPHILS # BLD AUTO: 8.75 10*3/MM3 (ref 1.4–7)
NEUTROPHILS NFR BLD AUTO: 79.3 % (ref 42.7–76)
NITRITE UR QL STRIP: POSITIVE
NRBC BLD AUTO-RTO: 0 /100 WBC (ref 0–0)
PH UR STRIP.AUTO: 6 [PH] (ref 5–8)
PLATELET # BLD AUTO: 206 10*3/MM3 (ref 140–450)
PMV BLD AUTO: 9.2 FL (ref 6–12)
POTASSIUM BLD-SCNC: 3.7 MMOL/L (ref 3.5–5.2)
PROCALCITONIN SERPL-MCNC: 0.62 NG/ML (ref 0.1–0.25)
PROT SERPL-MCNC: 6.4 G/DL (ref 6–8.5)
PROT UR QL STRIP: ABNORMAL
RBC # BLD AUTO: 2.67 10*6/MM3 (ref 4.14–5.8)
RBC # UR: ABNORMAL /HPF
REF LAB TEST METHOD: ABNORMAL
RH BLD: POSITIVE
SODIUM BLD-SCNC: 131 MMOL/L (ref 136–145)
SP GR UR STRIP: 1.01 (ref 1–1.03)
SQUAMOUS #/AREA URNS HPF: ABNORMAL /HPF
T&S EXPIRATION DATE: NORMAL
TIBC SERPL-MCNC: 88 MCG/DL (ref 298–536)
TRANSFERRIN SERPL-MCNC: 59 MG/DL (ref 200–360)
UROBILINOGEN UR QL STRIP: ABNORMAL
WBC NRBC COR # BLD: 11.03 10*3/MM3 (ref 3.4–10.8)
WBC UR QL AUTO: ABNORMAL /HPF

## 2019-03-27 PROCEDURE — 86900 BLOOD TYPING SEROLOGIC ABO: CPT

## 2019-03-27 PROCEDURE — 86900 BLOOD TYPING SEROLOGIC ABO: CPT | Performed by: EMERGENCY MEDICINE

## 2019-03-27 PROCEDURE — 86850 RBC ANTIBODY SCREEN: CPT | Performed by: EMERGENCY MEDICINE

## 2019-03-27 PROCEDURE — 99285 EMERGENCY DEPT VISIT HI MDM: CPT

## 2019-03-27 PROCEDURE — 81001 URINALYSIS AUTO W/SCOPE: CPT | Performed by: EMERGENCY MEDICINE

## 2019-03-27 PROCEDURE — 86923 COMPATIBILITY TEST ELECTRIC: CPT

## 2019-03-27 PROCEDURE — P9016 RBC LEUKOCYTES REDUCED: HCPCS

## 2019-03-27 PROCEDURE — 85025 COMPLETE CBC W/AUTO DIFF WBC: CPT | Performed by: EMERGENCY MEDICINE

## 2019-03-27 PROCEDURE — 84145 PROCALCITONIN (PCT): CPT | Performed by: EMERGENCY MEDICINE

## 2019-03-27 PROCEDURE — 82136 AMINO ACIDS QUANT 2-5: CPT | Performed by: INTERNAL MEDICINE

## 2019-03-27 PROCEDURE — 25010000002 ERTAPENEM PER 500 MG: Performed by: INTERNAL MEDICINE

## 2019-03-27 PROCEDURE — 86901 BLOOD TYPING SEROLOGIC RH(D): CPT | Performed by: EMERGENCY MEDICINE

## 2019-03-27 PROCEDURE — 82962 GLUCOSE BLOOD TEST: CPT

## 2019-03-27 PROCEDURE — 71045 X-RAY EXAM CHEST 1 VIEW: CPT

## 2019-03-27 PROCEDURE — 84466 ASSAY OF TRANSFERRIN: CPT | Performed by: INTERNAL MEDICINE

## 2019-03-27 PROCEDURE — 87040 BLOOD CULTURE FOR BACTERIA: CPT | Performed by: EMERGENCY MEDICINE

## 2019-03-27 PROCEDURE — 83605 ASSAY OF LACTIC ACID: CPT | Performed by: EMERGENCY MEDICINE

## 2019-03-27 PROCEDURE — 82533 TOTAL CORTISOL: CPT | Performed by: INTERNAL MEDICINE

## 2019-03-27 PROCEDURE — 83540 ASSAY OF IRON: CPT | Performed by: INTERNAL MEDICINE

## 2019-03-27 PROCEDURE — 80053 COMPREHEN METABOLIC PANEL: CPT | Performed by: EMERGENCY MEDICINE

## 2019-03-27 PROCEDURE — 83918 ORGANIC ACIDS TOTAL QUANT: CPT | Performed by: INTERNAL MEDICINE

## 2019-03-27 PROCEDURE — 76775 US EXAM ABDO BACK WALL LIM: CPT

## 2019-03-27 PROCEDURE — 36430 TRANSFUSION BLD/BLD COMPNT: CPT

## 2019-03-27 PROCEDURE — 25010000002 HYDROCORTISONE SODIUM SUCCINATE 100 MG RECONSTITUTED SOLUTION: Performed by: INTERNAL MEDICINE

## 2019-03-27 PROCEDURE — 25010000002 ENOXAPARIN PER 10 MG: Performed by: INTERNAL MEDICINE

## 2019-03-27 PROCEDURE — 99223 1ST HOSP IP/OBS HIGH 75: CPT | Performed by: INTERNAL MEDICINE

## 2019-03-27 PROCEDURE — 87086 URINE CULTURE/COLONY COUNT: CPT | Performed by: INTERNAL MEDICINE

## 2019-03-27 RX ORDER — ACETAMINOPHEN 650 MG/1
650 SUPPOSITORY RECTAL ONCE
Status: DISCONTINUED | OUTPATIENT
Start: 2019-03-27 | End: 2019-03-27

## 2019-03-27 RX ORDER — FAMOTIDINE 20 MG/1
20 TABLET, FILM COATED ORAL DAILY
Status: DISCONTINUED | OUTPATIENT
Start: 2019-03-27 | End: 2019-03-28

## 2019-03-27 RX ORDER — NICOTINE POLACRILEX 4 MG
15 LOZENGE BUCCAL
Status: DISCONTINUED | OUTPATIENT
Start: 2019-03-27 | End: 2019-03-29 | Stop reason: HOSPADM

## 2019-03-27 RX ORDER — SODIUM CHLORIDE, SODIUM LACTATE, POTASSIUM CHLORIDE, CALCIUM CHLORIDE 600; 310; 30; 20 MG/100ML; MG/100ML; MG/100ML; MG/100ML
125 INJECTION, SOLUTION INTRAVENOUS CONTINUOUS
Status: ACTIVE | OUTPATIENT
Start: 2019-03-27 | End: 2019-03-28

## 2019-03-27 RX ORDER — ACETAMINOPHEN 650 MG/1
650 SUPPOSITORY RECTAL EVERY 4 HOURS PRN
Status: DISCONTINUED | OUTPATIENT
Start: 2019-03-27 | End: 2019-03-29 | Stop reason: HOSPADM

## 2019-03-27 RX ORDER — MAGNESIUM SULFATE HEPTAHYDRATE 40 MG/ML
2 INJECTION, SOLUTION INTRAVENOUS AS NEEDED
Status: DISCONTINUED | OUTPATIENT
Start: 2019-03-27 | End: 2019-03-29 | Stop reason: HOSPADM

## 2019-03-27 RX ORDER — SODIUM CHLORIDE 0.9 % (FLUSH) 0.9 %
10 SYRINGE (ML) INJECTION AS NEEDED
Status: DISCONTINUED | OUTPATIENT
Start: 2019-03-27 | End: 2019-03-29 | Stop reason: HOSPADM

## 2019-03-27 RX ORDER — SENNA AND DOCUSATE SODIUM 50; 8.6 MG/1; MG/1
2 TABLET, FILM COATED ORAL NIGHTLY
Status: DISCONTINUED | OUTPATIENT
Start: 2019-03-27 | End: 2019-03-29 | Stop reason: HOSPADM

## 2019-03-27 RX ORDER — SODIUM CHLORIDE 0.9 % (FLUSH) 0.9 %
3-10 SYRINGE (ML) INJECTION AS NEEDED
Status: DISCONTINUED | OUTPATIENT
Start: 2019-03-27 | End: 2019-03-29 | Stop reason: HOSPADM

## 2019-03-27 RX ORDER — MORPHINE SULFATE 2 MG/ML
4 INJECTION, SOLUTION INTRAMUSCULAR; INTRAVENOUS EVERY 4 HOURS PRN
Status: DISCONTINUED | OUTPATIENT
Start: 2019-03-27 | End: 2019-03-29 | Stop reason: HOSPADM

## 2019-03-27 RX ORDER — ACETAMINOPHEN 325 MG/1
650 TABLET ORAL EVERY 4 HOURS PRN
Status: DISCONTINUED | OUTPATIENT
Start: 2019-03-27 | End: 2019-03-29 | Stop reason: HOSPADM

## 2019-03-27 RX ORDER — POTASSIUM CHLORIDE 750 MG/1
40 CAPSULE, EXTENDED RELEASE ORAL AS NEEDED
Status: DISCONTINUED | OUTPATIENT
Start: 2019-03-27 | End: 2019-03-29 | Stop reason: HOSPADM

## 2019-03-27 RX ORDER — GABAPENTIN 300 MG/1
300 CAPSULE ORAL 3 TIMES DAILY
Status: DISCONTINUED | OUTPATIENT
Start: 2019-03-27 | End: 2019-03-29 | Stop reason: HOSPADM

## 2019-03-27 RX ORDER — MORPHINE SULFATE 2 MG/ML
2 INJECTION, SOLUTION INTRAMUSCULAR; INTRAVENOUS ONCE AS NEEDED
Status: DISCONTINUED | OUTPATIENT
Start: 2019-03-27 | End: 2019-03-29 | Stop reason: HOSPADM

## 2019-03-27 RX ORDER — METHADONE HYDROCHLORIDE 10 MG/1
5 TABLET ORAL EVERY 12 HOURS SCHEDULED
Status: DISCONTINUED | OUTPATIENT
Start: 2019-03-27 | End: 2019-03-29 | Stop reason: HOSPADM

## 2019-03-27 RX ORDER — DEXTROSE MONOHYDRATE 25 G/50ML
25 INJECTION, SOLUTION INTRAVENOUS
Status: DISCONTINUED | OUTPATIENT
Start: 2019-03-27 | End: 2019-03-29 | Stop reason: HOSPADM

## 2019-03-27 RX ORDER — OXYBUTYNIN CHLORIDE 5 MG/1
5 TABLET ORAL 2 TIMES DAILY
Status: DISCONTINUED | OUTPATIENT
Start: 2019-03-27 | End: 2019-03-29 | Stop reason: HOSPADM

## 2019-03-27 RX ORDER — POTASSIUM CHLORIDE 7.45 MG/ML
10 INJECTION INTRAVENOUS
Status: DISCONTINUED | OUTPATIENT
Start: 2019-03-27 | End: 2019-03-29 | Stop reason: HOSPADM

## 2019-03-27 RX ORDER — METHADONE HYDROCHLORIDE 10 MG/1
5 TABLET ORAL EVERY 12 HOURS PRN
Status: DISCONTINUED | OUTPATIENT
Start: 2019-03-27 | End: 2019-03-29 | Stop reason: HOSPADM

## 2019-03-27 RX ORDER — MIDODRINE HYDROCHLORIDE 5 MG/1
5 TABLET ORAL
Status: DISCONTINUED | OUTPATIENT
Start: 2019-03-27 | End: 2019-03-29 | Stop reason: HOSPADM

## 2019-03-27 RX ORDER — PANTOPRAZOLE SODIUM 40 MG/1
40 TABLET, DELAYED RELEASE ORAL DAILY
Status: DISCONTINUED | OUTPATIENT
Start: 2019-03-27 | End: 2019-03-29 | Stop reason: HOSPADM

## 2019-03-27 RX ORDER — MAGNESIUM SULFATE 1 G/100ML
1 INJECTION INTRAVENOUS AS NEEDED
Status: DISCONTINUED | OUTPATIENT
Start: 2019-03-27 | End: 2019-03-29 | Stop reason: HOSPADM

## 2019-03-27 RX ORDER — MORPHINE SULFATE 2 MG/ML
2 INJECTION, SOLUTION INTRAMUSCULAR; INTRAVENOUS ONCE
Status: DISCONTINUED | OUTPATIENT
Start: 2019-03-27 | End: 2019-03-29 | Stop reason: HOSPADM

## 2019-03-27 RX ORDER — IPRATROPIUM BROMIDE AND ALBUTEROL SULFATE 2.5; .5 MG/3ML; MG/3ML
3 SOLUTION RESPIRATORY (INHALATION)
Status: DISCONTINUED | OUTPATIENT
Start: 2019-03-27 | End: 2019-03-29 | Stop reason: HOSPADM

## 2019-03-27 RX ORDER — ALPRAZOLAM 1 MG/1
1 TABLET ORAL 2 TIMES DAILY PRN
Status: DISCONTINUED | OUTPATIENT
Start: 2019-03-27 | End: 2019-03-29 | Stop reason: HOSPADM

## 2019-03-27 RX ORDER — METHADONE HYDROCHLORIDE 5 MG/1
5 TABLET ORAL EVERY 12 HOURS PRN
COMMUNITY

## 2019-03-27 RX ORDER — POTASSIUM CHLORIDE 1.5 G/1.77G
40 POWDER, FOR SOLUTION ORAL AS NEEDED
Status: DISCONTINUED | OUTPATIENT
Start: 2019-03-27 | End: 2019-03-29 | Stop reason: HOSPADM

## 2019-03-27 RX ORDER — SODIUM CHLORIDE 0.9 % (FLUSH) 0.9 %
3 SYRINGE (ML) INJECTION EVERY 12 HOURS SCHEDULED
Status: DISCONTINUED | OUTPATIENT
Start: 2019-03-27 | End: 2019-03-29 | Stop reason: HOSPADM

## 2019-03-27 RX ADMIN — OXYBUTYNIN CHLORIDE 5 MG: 5 TABLET ORAL at 22:25

## 2019-03-27 RX ADMIN — AZTREONAM 1000 MG: 1 INJECTION, POWDER, LYOPHILIZED, FOR SOLUTION INTRAMUSCULAR; INTRAVENOUS at 06:10

## 2019-03-27 RX ADMIN — SODIUM CHLORIDE 1000 ML: 9 INJECTION, SOLUTION INTRAVENOUS at 03:23

## 2019-03-27 RX ADMIN — MIDODRINE HYDROCHLORIDE 5 MG: 5 TABLET ORAL at 16:20

## 2019-03-27 RX ADMIN — ERTAPENEM SODIUM 1 G: 1 INJECTION, POWDER, LYOPHILIZED, FOR SOLUTION INTRAMUSCULAR; INTRAVENOUS at 13:00

## 2019-03-27 RX ADMIN — GABAPENTIN 300 MG: 300 CAPSULE ORAL at 16:21

## 2019-03-27 RX ADMIN — OXYCODONE HYDROCHLORIDE 20 MG: 5 TABLET ORAL at 13:18

## 2019-03-27 RX ADMIN — ALPRAZOLAM 1 MG: 1 TABLET ORAL at 13:52

## 2019-03-27 RX ADMIN — HYDROCORTISONE SODIUM SUCCINATE 50 MG: 100 INJECTION, POWDER, FOR SOLUTION INTRAMUSCULAR; INTRAVENOUS at 22:26

## 2019-03-27 RX ADMIN — SODIUM CHLORIDE 1000 ML: 9 INJECTION, SOLUTION INTRAVENOUS at 03:17

## 2019-03-27 RX ADMIN — ENOXAPARIN SODIUM 30 MG: 30 INJECTION SUBCUTANEOUS at 08:40

## 2019-03-27 RX ADMIN — OXYBUTYNIN CHLORIDE 5 MG: 5 TABLET ORAL at 08:41

## 2019-03-27 RX ADMIN — METHADONE HYDROCHLORIDE 5 MG: 5 TABLET ORAL at 16:21

## 2019-03-27 RX ADMIN — GABAPENTIN 300 MG: 300 CAPSULE ORAL at 22:24

## 2019-03-27 RX ADMIN — GABAPENTIN 300 MG: 300 CAPSULE ORAL at 08:41

## 2019-03-27 RX ADMIN — SODIUM CHLORIDE, PRESERVATIVE FREE 3 ML: 5 INJECTION INTRAVENOUS at 22:26

## 2019-03-27 RX ADMIN — PANTOPRAZOLE SODIUM 40 MG: 40 TABLET, DELAYED RELEASE ORAL at 16:21

## 2019-03-27 RX ADMIN — OXYCODONE HYDROCHLORIDE 20 MG: 5 TABLET ORAL at 22:24

## 2019-03-27 RX ADMIN — SODIUM CHLORIDE, POTASSIUM CHLORIDE, SODIUM LACTATE AND CALCIUM CHLORIDE 125 ML/HR: 600; 310; 30; 20 INJECTION, SOLUTION INTRAVENOUS at 07:44

## 2019-03-27 RX ADMIN — METHADONE HYDROCHLORIDE 5 MG: 5 TABLET ORAL at 22:25

## 2019-03-28 LAB
ABO + RH BLD: NORMAL
ABO + RH BLD: NORMAL
ALBUMIN SERPL-MCNC: 2.3 G/DL (ref 3.5–5.2)
ALBUMIN/GLOB SERPL: 0.6 G/DL
ALP SERPL-CCNC: 68 U/L (ref 39–117)
ALT SERPL W P-5'-P-CCNC: <5 U/L (ref 1–41)
ANION GAP SERPL CALCULATED.3IONS-SCNC: 10.6 MMOL/L
AST SERPL-CCNC: 11 U/L (ref 1–40)
BACTERIA SPEC AEROBE CULT: NORMAL
BH BB BLOOD EXPIRATION DATE: NORMAL
BH BB BLOOD EXPIRATION DATE: NORMAL
BH BB BLOOD TYPE BARCODE: 8400
BH BB BLOOD TYPE BARCODE: 8400
BH BB DISPENSE STATUS: NORMAL
BH BB DISPENSE STATUS: NORMAL
BH BB PRODUCT CODE: NORMAL
BH BB PRODUCT CODE: NORMAL
BH BB UNIT NUMBER: NORMAL
BH BB UNIT NUMBER: NORMAL
BILIRUB SERPL-MCNC: 0.2 MG/DL (ref 0.2–1.2)
BUN BLD-MCNC: 16 MG/DL (ref 6–20)
BUN/CREAT SERPL: 19.3 (ref 7–25)
CALCIUM SPEC-SCNC: 6.9 MG/DL (ref 8.6–10.5)
CHLORIDE SERPL-SCNC: 103 MMOL/L (ref 98–107)
CO2 SERPL-SCNC: 21.4 MMOL/L (ref 22–29)
CREAT BLD-MCNC: 0.83 MG/DL (ref 0.76–1.27)
DEPRECATED RDW RBC AUTO: 55 FL (ref 37–54)
ERYTHROCYTE [DISTWIDTH] IN BLOOD BY AUTOMATED COUNT: 17.1 % (ref 12.3–15.4)
GFR SERPL CREATININE-BSD FRML MDRD: 126 ML/MIN/1.73
GLOBULIN UR ELPH-MCNC: 3.7 GM/DL
GLUCOSE BLD-MCNC: 117 MG/DL (ref 65–99)
GLUCOSE BLDC GLUCOMTR-MCNC: 128 MG/DL (ref 70–130)
HCT VFR BLD AUTO: 27.5 % (ref 37.5–51)
HGB BLD-MCNC: 8 G/DL (ref 13–17.7)
MCH RBC QN AUTO: 25.6 PG (ref 26.6–33)
MCHC RBC AUTO-ENTMCNC: 29.1 G/DL (ref 31.5–35.7)
MCV RBC AUTO: 88.1 FL (ref 79–97)
PLATELET # BLD AUTO: 135 10*3/MM3 (ref 140–450)
PMV BLD AUTO: 9.2 FL (ref 6–12)
POTASSIUM BLD-SCNC: 3.3 MMOL/L (ref 3.5–5.2)
PROT SERPL-MCNC: 6 G/DL (ref 6–8.5)
RBC # BLD AUTO: 3.12 10*6/MM3 (ref 4.14–5.8)
SODIUM BLD-SCNC: 135 MMOL/L (ref 136–145)
UNIT  ABO: NORMAL
UNIT  ABO: NORMAL
UNIT  RH: NORMAL
UNIT  RH: NORMAL
WBC NRBC COR # BLD: 7.3 10*3/MM3 (ref 3.4–10.8)

## 2019-03-28 PROCEDURE — 25010000002 ERTAPENEM PER 500 MG: Performed by: INTERNAL MEDICINE

## 2019-03-28 PROCEDURE — 80053 COMPREHEN METABOLIC PANEL: CPT | Performed by: INTERNAL MEDICINE

## 2019-03-28 PROCEDURE — 82962 GLUCOSE BLOOD TEST: CPT

## 2019-03-28 PROCEDURE — 25010000003 POTASSIUM CHLORIDE 10 MEQ/100ML SOLUTION: Performed by: INTERNAL MEDICINE

## 2019-03-28 PROCEDURE — 25010000002 HYDROCORTISONE SODIUM SUCCINATE 100 MG RECONSTITUTED SOLUTION: Performed by: INTERNAL MEDICINE

## 2019-03-28 PROCEDURE — 85027 COMPLETE CBC AUTOMATED: CPT | Performed by: INTERNAL MEDICINE

## 2019-03-28 PROCEDURE — 99232 SBSQ HOSP IP/OBS MODERATE 35: CPT | Performed by: INTERNAL MEDICINE

## 2019-03-28 RX ADMIN — OXYBUTYNIN CHLORIDE 5 MG: 5 TABLET ORAL at 21:19

## 2019-03-28 RX ADMIN — POTASSIUM CHLORIDE 10 MEQ: 7.46 INJECTION, SOLUTION INTRAVENOUS at 12:10

## 2019-03-28 RX ADMIN — HYDROCORTISONE SODIUM SUCCINATE 50 MG: 100 INJECTION, POWDER, FOR SOLUTION INTRAMUSCULAR; INTRAVENOUS at 14:17

## 2019-03-28 RX ADMIN — SODIUM CHLORIDE, PRESERVATIVE FREE 3 ML: 5 INJECTION INTRAVENOUS at 21:19

## 2019-03-28 RX ADMIN — HYDROCORTISONE SODIUM SUCCINATE 50 MG: 100 INJECTION, POWDER, FOR SOLUTION INTRAMUSCULAR; INTRAVENOUS at 07:12

## 2019-03-28 RX ADMIN — POTASSIUM CHLORIDE 10 MEQ: 7.46 INJECTION, SOLUTION INTRAVENOUS at 10:40

## 2019-03-28 RX ADMIN — METHADONE HYDROCHLORIDE 5 MG: 5 TABLET ORAL at 21:18

## 2019-03-28 RX ADMIN — OXYCODONE HYDROCHLORIDE 20 MG: 5 TABLET ORAL at 17:01

## 2019-03-28 RX ADMIN — ALPRAZOLAM 1 MG: 1 TABLET ORAL at 17:01

## 2019-03-28 RX ADMIN — SODIUM CHLORIDE, PRESERVATIVE FREE 3 ML: 5 INJECTION INTRAVENOUS at 10:00

## 2019-03-28 RX ADMIN — GABAPENTIN 300 MG: 300 CAPSULE ORAL at 09:53

## 2019-03-28 RX ADMIN — MIDODRINE HYDROCHLORIDE 5 MG: 5 TABLET ORAL at 07:12

## 2019-03-28 RX ADMIN — SENNOSIDES AND DOCUSATE SODIUM 2 TABLET: 8.6; 5 TABLET ORAL at 21:18

## 2019-03-28 RX ADMIN — HYDROCORTISONE SODIUM SUCCINATE 50 MG: 100 INJECTION, POWDER, FOR SOLUTION INTRAMUSCULAR; INTRAVENOUS at 21:18

## 2019-03-28 RX ADMIN — POTASSIUM CHLORIDE 10 MEQ: 7.46 INJECTION, SOLUTION INTRAVENOUS at 08:09

## 2019-03-28 RX ADMIN — GABAPENTIN 300 MG: 300 CAPSULE ORAL at 21:18

## 2019-03-28 RX ADMIN — PANTOPRAZOLE SODIUM 40 MG: 40 TABLET, DELAYED RELEASE ORAL at 09:53

## 2019-03-28 RX ADMIN — DAKIN'S SOLUTION 0.125% (QUARTER STRENGTH) 946 ML: 0.12 SOLUTION at 12:10

## 2019-03-28 RX ADMIN — GABAPENTIN 300 MG: 300 CAPSULE ORAL at 16:41

## 2019-03-28 RX ADMIN — OXYBUTYNIN CHLORIDE 5 MG: 5 TABLET ORAL at 09:53

## 2019-03-28 RX ADMIN — ERTAPENEM SODIUM 1 G: 1 INJECTION, POWDER, LYOPHILIZED, FOR SOLUTION INTRAMUSCULAR; INTRAVENOUS at 09:50

## 2019-03-28 RX ADMIN — METHADONE HYDROCHLORIDE 5 MG: 5 TABLET ORAL at 09:54

## 2019-03-29 VITALS
SYSTOLIC BLOOD PRESSURE: 90 MMHG | OXYGEN SATURATION: 99 % | HEIGHT: 73 IN | RESPIRATION RATE: 14 BRPM | BODY MASS INDEX: 11.02 KG/M2 | DIASTOLIC BLOOD PRESSURE: 66 MMHG | TEMPERATURE: 97.4 F | HEART RATE: 52 BPM | WEIGHT: 83.11 LBS

## 2019-03-29 LAB
GLUCOSE BLDC GLUCOMTR-MCNC: 132 MG/DL (ref 70–130)
GLUCOSE BLDC GLUCOMTR-MCNC: 134 MG/DL (ref 70–130)

## 2019-03-29 PROCEDURE — 82962 GLUCOSE BLOOD TEST: CPT

## 2019-03-29 PROCEDURE — 25010000002 ERTAPENEM PER 500 MG: Performed by: INTERNAL MEDICINE

## 2019-03-29 PROCEDURE — 25010000002 ENOXAPARIN PER 10 MG: Performed by: INTERNAL MEDICINE

## 2019-03-29 PROCEDURE — 25010000002 HYDROCORTISONE SODIUM SUCCINATE 100 MG RECONSTITUTED SOLUTION: Performed by: INTERNAL MEDICINE

## 2019-03-29 PROCEDURE — 99232 SBSQ HOSP IP/OBS MODERATE 35: CPT | Performed by: INTERNAL MEDICINE

## 2019-03-29 RX ORDER — MIDODRINE HYDROCHLORIDE 5 MG/1
5 TABLET ORAL 3 TIMES DAILY
Qty: 90 TABLET | Refills: 0 | Status: SHIPPED | OUTPATIENT
Start: 2019-03-29

## 2019-03-29 RX ADMIN — Medication 500 UNITS: at 15:05

## 2019-03-29 RX ADMIN — PANTOPRAZOLE SODIUM 40 MG: 40 TABLET, DELAYED RELEASE ORAL at 09:05

## 2019-03-29 RX ADMIN — HYDROCORTISONE SODIUM SUCCINATE 50 MG: 100 INJECTION, POWDER, FOR SOLUTION INTRAMUSCULAR; INTRAVENOUS at 09:05

## 2019-03-29 RX ADMIN — GABAPENTIN 300 MG: 300 CAPSULE ORAL at 09:05

## 2019-03-29 RX ADMIN — OXYBUTYNIN CHLORIDE 5 MG: 5 TABLET ORAL at 09:05

## 2019-03-29 RX ADMIN — METHADONE HYDROCHLORIDE 5 MG: 5 TABLET ORAL at 09:06

## 2019-03-29 RX ADMIN — SODIUM CHLORIDE, PRESERVATIVE FREE 3 ML: 5 INJECTION INTRAVENOUS at 09:07

## 2019-03-29 RX ADMIN — MIDODRINE HYDROCHLORIDE 5 MG: 5 TABLET ORAL at 09:05

## 2019-03-30 ENCOUNTER — READMISSION MANAGEMENT (OUTPATIENT)
Dept: CALL CENTER | Facility: HOSPITAL | Age: 39
End: 2019-03-30

## 2019-03-30 NOTE — OUTREACH NOTE
Prep Survey      Responses   Facility patient discharged from?  [facility]   Is patient eligible?  No   What are the reasons patient is not eligible?  Hospice/Pallative Care   Discharge diagnosis  Septic shock   Does the patient have one of the following disease processes/diagnoses(primary or secondary)?  Sepsis   Prep survey completed?  Yes          Elida Sanders RN

## 2019-04-01 LAB — BACTERIA SPEC AEROBE CULT: NORMAL

## 2019-04-01 NOTE — PAYOR COMM NOTE
"DISCHARGED        Joaquín Youssef (38 y.o. 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), 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)   Code Status:  Prior    Ht:  185.4 cm (73\")   Wt:  37.7 kg (83 lb 1.8 oz)    Admission Cmt:  None   Principal Problem:  None                Active Insurance as of 3/27/2019     Primary Coverage     Payor Plan Insurance Group Employer/Plan Group    PASSPORT PASSPORT MEDICAID     Payor Plan Address Payor Plan Phone Number Payor Plan Fax Number Effective Dates    [address] [phone]  11/1/1997 - None Entered    [facility] [id number]       Subscriber Name Subscriber Birth Date Member ID       JOAQUÍN YOUSSEF NEGRA 1980 [id number]                 Emergency Contacts      (Rel.) Home Phone Work Phone Mobile Phone    KhouryJenni (Sister) [phone] -- --    Shiloh Guzman (Mother) [phone] -- --    Marilynn Baer (Relative) [phone] -- --          "

## 2019-04-01 NOTE — PROGRESS NOTES
Case Management Discharge Note    Final Note: home with hospice. Aranza scott CSW     Destination      No service has been selected for the patient.      Durable Medical Equipment      No service has been selected for the patient.      Dialysis/Infusion      No service has been selected for the patient.      Home Medical Care - Selection Complete      Service Provider Request Status Selected Services Address Phone Number Fax Number    [facility] Selected Home Hospice [address], [facility] [id number] [phone]      Therapy      No service has been selected for the patient.      Community Resources      No service has been selected for the patient.        Transportation Services  Private: Car    Final Discharge Disposition Code: 50 - home with hospice

## 2019-04-02 LAB
2ME-CITRATE SERPL-MCNC: 183 NMOL/L (ref 60–228)
CYSTATHIONIN SERPL-SCNC: 262 NMOL/L (ref 44–342)
HCYS SERPL-SCNC: 8.1 UMOL/L (ref 5.1–13.9)
Lab: NORMAL
METHYLMALONATE SERPL-SCNC: 357 NMOL/L (ref 0–378)

## 2020-08-23 ENCOUNTER — INPATIENT HOSPITAL (OUTPATIENT)
Dept: URBAN - METROPOLITAN AREA HOSPITAL 107 | Facility: HOSPITAL | Age: 40
End: 2020-08-23
Payer: COMMERCIAL

## 2020-08-23 DIAGNOSIS — R10.32 LEFT LOWER QUADRANT PAIN: ICD-10-CM

## 2020-08-23 DIAGNOSIS — D64.9 ANEMIA, UNSPECIFIED: ICD-10-CM

## 2020-08-23 PROCEDURE — 99222 1ST HOSP IP/OBS MODERATE 55: CPT | Performed by: INTERNAL MEDICINE

## 2021-02-14 ENCOUNTER — INPATIENT HOSPITAL (OUTPATIENT)
Dept: URBAN - METROPOLITAN AREA HOSPITAL 107 | Facility: HOSPITAL | Age: 41
End: 2021-02-14
Payer: COMMERCIAL

## 2021-02-14 DIAGNOSIS — G82.20 PARAPLEGIA, UNSPECIFIED: ICD-10-CM

## 2021-02-14 DIAGNOSIS — E46 UNSPECIFIED PROTEIN-CALORIE MALNUTRITION: ICD-10-CM

## 2021-02-14 DIAGNOSIS — R63.3 FEEDING DIFFICULTIES: ICD-10-CM

## 2021-02-14 DIAGNOSIS — Z93.3 COLOSTOMY STATUS: ICD-10-CM

## 2021-02-14 PROCEDURE — 99222 1ST HOSP IP/OBS MODERATE 55: CPT | Performed by: INTERNAL MEDICINE
